# Patient Record
Sex: FEMALE | Race: WHITE | NOT HISPANIC OR LATINO | Employment: OTHER | URBAN - METROPOLITAN AREA
[De-identification: names, ages, dates, MRNs, and addresses within clinical notes are randomized per-mention and may not be internally consistent; named-entity substitution may affect disease eponyms.]

---

## 2017-03-29 ENCOUNTER — TELEPHONE (OUTPATIENT)
Dept: SURGERY | Facility: CLINIC | Age: 62
End: 2017-03-29

## 2017-03-29 NOTE — TELEPHONE ENCOUNTER
Informed pt of new pat appt 4-14-17 9:00 arrival for 9:30 appt/Prescreened for biopsy. Called for [employer] referral.

## 2017-03-31 ENCOUNTER — TELEPHONE (OUTPATIENT)
Dept: SURGERY | Facility: CLINIC | Age: 62
End: 2017-03-31

## 2017-03-31 NOTE — TELEPHONE ENCOUNTER
Pre-screened for possible biopsy, patient does not take ASA.   Takes Ibuprofen prn for pain. She will avoid this x 1 wk prior to appt here.     Yue

## 2017-04-04 ENCOUNTER — TELEPHONE (OUTPATIENT)
Dept: SURGERY | Facility: CLINIC | Age: 62
End: 2017-04-04

## 2017-04-04 DIAGNOSIS — R92.8 ABNORMAL MAMMOGRAM: Primary | ICD-10-CM

## 2017-04-10 ENCOUNTER — TELEPHONE (OUTPATIENT)
Dept: SURGERY | Facility: CLINIC | Age: 62
End: 2017-04-10

## 2017-04-10 NOTE — TELEPHONE ENCOUNTER
Pt rescheduled her U/S at [facility] for today 4-10-17 @ 2:30  I informed patient she would need to bring the U/s images with her for her appt  With Dr CASTILLO on 4-14-17. She voiced she would.    ric

## 2017-04-14 ENCOUNTER — TELEPHONE (OUTPATIENT)
Dept: SURGERY | Facility: CLINIC | Age: 62
End: 2017-04-14

## 2017-04-14 ENCOUNTER — OFFICE VISIT (OUTPATIENT)
Dept: SURGERY | Facility: CLINIC | Age: 62
End: 2017-04-14

## 2017-04-14 VITALS
DIASTOLIC BLOOD PRESSURE: 74 MMHG | SYSTOLIC BLOOD PRESSURE: 115 MMHG | OXYGEN SATURATION: 95 % | WEIGHT: 230.8 LBS | HEIGHT: 59 IN | HEART RATE: 86 BPM | BODY MASS INDEX: 46.53 KG/M2 | TEMPERATURE: 98.1 F

## 2017-04-14 DIAGNOSIS — R59.9 ENLARGED LYMPH NODES: ICD-10-CM

## 2017-04-14 DIAGNOSIS — N63.0 LUMP OR MASS IN BREAST: Primary | ICD-10-CM

## 2017-04-14 DIAGNOSIS — R92.8 ABNORMAL MAMMOGRAM: ICD-10-CM

## 2017-04-14 DIAGNOSIS — Z80.3 FH: BREAST CANCER: ICD-10-CM

## 2017-04-14 DIAGNOSIS — R92.8 ABNORMAL ULTRASOUND OF BREAST: ICD-10-CM

## 2017-04-14 PROCEDURE — 10022 PR FINE NEEDLE ASP;W/IMAGING GUIDANCE: CPT | Performed by: SURGERY

## 2017-04-14 PROCEDURE — 88342 IMHCHEM/IMCYTCHM 1ST ANTB: CPT | Performed by: SURGERY

## 2017-04-14 PROCEDURE — 76942 ECHO GUIDE FOR BIOPSY: CPT | Performed by: SURGERY

## 2017-04-14 PROCEDURE — 19084 BX BREAST ADD LESION US IMAG: CPT | Performed by: SURGERY

## 2017-04-14 PROCEDURE — 88305 TISSUE EXAM BY PATHOLOGIST: CPT | Performed by: SURGERY

## 2017-04-14 PROCEDURE — 99204 OFFICE O/P NEW MOD 45 MIN: CPT | Performed by: SURGERY

## 2017-04-14 PROCEDURE — 19083 BX BREAST 1ST LESION US IMAG: CPT | Performed by: SURGERY

## 2017-04-14 NOTE — TELEPHONE ENCOUNTER
Pt informed of all appts:NPO 4 hrs prior to bone scan/liquids ok  4-21-17 [facility] 9:00 Injection: Bone scan at 12:00  CT Scan 12:30 arrival for contrast:Appt at 1:30  MRI 2:15 arrival  Dr. Garcias 4-27-17 10:45 arrival  santiago

## 2017-04-14 NOTE — PROGRESS NOTES
Chief Complaint: Josseline Vargas is a 61 y.o. female who was seen in consultation at the request of William Hankins, *  for abnormal breast imaging    History of Present Illness:  Patient presents with breast mass and abnormal breast imaging.  She noticed in March a mass in the medial breast.  She denies any skin changes associated with this or nipple discharge or other masses.      She noted no other new masses, skin changes, nipple discharge, nipple changes prior to her most recent imaging.  Her most recent imaging includes the following: September 9, 2015 at/day.  Left diagnostic mammogram showed calcifications pleomorphic in the 9:00 medial left breast anteriorly.  These were felt to be new.  BI-RADS 4.  She then underwent October 20, 2015 a left stereotactic biopsy that returned as atypical duct hyperplasia with intraluminal calcifications.  She had no procedure performed after the stereotactic biopsy and has not had a mammogram between October 15 and her most recent imaging.    Her most recent imaging includes a bilateral screening mammogram at/day that showed more dense fibroglandular tissue versus priors.  Increased skin thickening periareolar.  Scattered calcifications on the left of increased.  BI-RADS 0.  She then had a left diagnostic mammogram and left breast ultrasound that showed an increase in the asymmetric density lateral and medial breast in retroareolar with new calcifications and skin thickening.    Left breast ultrasound showed at 1:00 a 3 cm mass with satellite densities and at 9:00 a 2 cm mass.  BI-RADS 4.    She has had the single left breast biopsy in 2015 no others previously.    She has her uterus and ovaries, is postmenopausal, and takes nor hormones.  Her family history includes the following:   She has no daughters, has 2 sisters, one maternal aunt, no paternal aunts.  Her mother had breast cancer age 50.  Her maternal great aunt had breast cancer unknown age.  No other breast or  ovarian cancer in her family.    She is here for evaluation.    Review of Systems:  Review of Systems   HENT:   Positive for mouth sores and tinnitus.    Cardiovascular: Positive for leg swelling.   Genitourinary: Positive for frequency.    Musculoskeletal: Positive for gait problem and myalgias.   Neurological: Positive for dizziness, extremity weakness and gait problem.   Hematological: Bruises/bleeds easily.   Psychiatric/Behavioral: Positive for confusion and depression. The patient is nervous/anxious.    All other systems reviewed and are negative.       Past Medical and Surgical History:  Breast Biopsy History:  Patient has had the following breast biopsies:10/2015 left breast biopsy, benign   Breast Cancer HIstory:  Patient does not have a past medical history of breast cancer.  Breast Operations, and year:  None   Obstetric/Gynecologic History:  Age menstrual periods began:9 yrs old   Patient is postmenopausal, entered menopause naturally at age: [age]   Number of pregnancies:1  Number of live births: 1 (son passed away, suicide 4 yrs ago)  Number of abortions or miscarriages: 0  Age of delivery of first child: 18  Patient did not breast feed.  Length of time taking birth control pills:none   Patient has never taken hormone replacement  Patient still has uterus and ovaries.     Past Surgical History:   Procedure Laterality Date   • APPENDECTOMY     • CATARACT EXTRACTION     • EAR TUBES     • KNEE SURGERY     • KNEE SURGERY Right    • OVARIAN CYST REMOVAL     • TONSILLECTOMY     • TUBAL ABDOMINAL LIGATION         Past Medical History:   Diagnosis Date   • Bell's palsy    • Depressed    • Diabetes    • H/O meningitis 1987   • H/O: stroke    • High cholesterol    • HTN (hypertension)    • Migraine headache    • Polio    • Seizures        Prior Hospitalizations, other than for surgery or childbirth, and year:  Meningitis 2/1987, Coma following this for 6 days.   Right leg swelling 2016  [facility] in 2012  "4-5 days    Social History     Social History   • Marital status:      Spouse name: N/A   • Number of children: N/A   • Years of education: N/A     Occupational History   • Not on file.     Social History Main Topics   • Smoking status: Former Smoker     Quit date: 1972   • Smokeless tobacco: Not on file   • Alcohol use Yes      Comment: SOCIAL   • Drug use: No   • Sexual activity: Not on file     Other Topics Concern   • Not on file     Social History Narrative     Patient is .  Patient is on disability.  Patient drinks 2 servings of caffeine per day.    Family History:  Family History   Problem Relation Age of Onset   • Asthma Mother    • Diabetes Mother    • Hearing loss Mother    • Heart attack Mother    • Heart failure Mother    • Breast cancer Mother 50   • Alcohol abuse Father    • Diabetes Sister    • Diabetes Brother    • Brain cancer Brother    • Cancer Brother      bladder   • Cancer Paternal Aunt    • Lung cancer Paternal Uncle    • Breast cancer Maternal Grandmother        Vital Signs:  /74 (BP Location: Right arm, Patient Position: Sitting, Cuff Size: Large Adult)  Pulse 86  Temp 98.1 °F (36.7 °C) (Temporal Artery )   Ht 59\" (149.9 cm)  Wt 230 lb 12.8 oz (105 kg)  SpO2 95%  BMI 46.62 kg/m2     Medications:    Current Outpatient Prescriptions:   •  cetirizine (ZyrTEC) 10 MG tablet, , Disp: , Rfl:   •  Cholecalciferol (VITAMIN D3) 92099 UNITS capsule, Take  by mouth., Disp: , Rfl:   •  citalopram (CELEXA) 40 MG tablet, Take  by mouth daily., Disp: , Rfl:   •  fluconazole (DIFLUCAN) 150 MG tablet, , Disp: , Rfl:   •  folic acid (FOLVITE) 1 MG tablet, Take  by mouth daily., Disp: , Rfl:   •  gabapentin (NEURONTIN) 300 MG capsule, , Disp: , Rfl:   •  ibuprofen (ADVIL,MOTRIN) 800 MG tablet, , Disp: , Rfl:   •  KLOR-CON 10 MEQ CR tablet, , Disp: , Rfl:   •  lisinopril-hydrochlorothiazide (PRINZIDE,ZESTORETIC) 10-12.5 MG per tablet, , Disp: , Rfl:   •  metFORMIN (GLUCOPHAGE) 500 MG " "tablet, , Disp: , Rfl:   •  nortriptyline (PAMELOR) 25 MG capsule, Take 1 capsule by mouth every night., Disp: 30 capsule, Rfl: 11  •  phenytoin (DILANTIN) 100 MG ER capsule, Take 2 capsules by mouth 2 (two) times a day., Disp: 120 capsule, Rfl: 11  •  topiramate (TOPAMAX) 25 MG tablet, Take 1 tablet by mouth 2 (two) times a day., Disp: 60 tablet, Rfl: 11  •  topiramate (TOPAMAX) 50 MG tablet, Take 1 tablet by mouth 2 (two) times a day., Disp: 60 tablet, Rfl: 11  •  VENTOLIN  (90 BASE) MCG/ACT inhaler, , Disp: , Rfl:   •  butalbital-acetaminophen-caffeine (ORBIVAN) -40 MG capsule capsule, TAKE ONE CAPSULE BY MOUTH TWICE DAILY AS NEEDED FOR HEADACHE, Disp: 30 capsule, Rfl: 0  •  butalbital-acetaminophen-caffeine (ORBIVAN) -40 MG capsule capsule, Take  by mouth., Disp: , Rfl:   •  doxycycline (VIBRAMYCIN) 100 MG injection, Take  by mouth 2 (two) times a day., Disp: , Rfl:   •  famotidine (PEPCID) 20 MG tablet, Take  by mouth daily., Disp: , Rfl:   •  LORazepam (ATIVAN) 1 MG tablet, Take  by mouth., Disp: , Rfl:   •  prazosin (MINIPRESS) 2 MG capsule, Take  by mouth., Disp: , Rfl:   •  traMADol (ULTRAM) 50 MG tablet, , Disp: , Rfl:      Allergies:  Allergies   Allergen Reactions   • Diclofenac    • Penicillins        Physical Examination:  /74 (BP Location: Right arm, Patient Position: Sitting, Cuff Size: Large Adult)  Pulse 86  Temp 98.1 °F (36.7 °C) (Temporal Artery )   Ht 59\" (149.9 cm)  Wt 230 lb 12.8 oz (105 kg)  SpO2 95%  BMI 46.62 kg/m2  General Appearance:  Patient is in no distress.  She is well kept and has an morbidly obese build.   Psychiatric:  Patient with appropriate mood and affect. Alert and oriented to self, time, and place.    Breast, RIGHT:  medium sized, symmetric with the contralateral side.  Breast skin is without erythema, edema, rashes.  There are no visible abnormalities upon inspection during the arm-raising maneuver or with hands on hips in the sitting " position.  There are bilateral symmetric chronically inverted nipples.  There is no nipple retraction, discharge or nipple/areolar skin changes.There are no masses palpable in the sitting or supine positions.    Breast, LEFT:  medium sized, symmetric with the contralateral side.  Breast skin is without erythema, edema, rashes.  There are no visible abnormalities upon inspection during the arm-raising maneuver or with hands on hips in the sitting position. There is no nipple retraction, discharge or nipple/areolar skin changes.There are bilateral symmetric chronically inverted nipples. There is a palpable mass at the left breast 2:00, 10 cm from the nipple location that measures 6 x 4 cm.  There is no overlying skin involvement.  There is a second mass palpable at the left breast 10:00, 9.5 cm from the nipple location that measures 5.5 x 4 cm.  There is no skin involvement or tethering.  There are some edematous changes to the breast skin centrally due to the size of these masses.  It is not consistent with inflammatory breast cancer.    Lymphatic:  There is no axillary, cervical, infraclavicular, or supraclavicular adenopathy bilaterally. Noticeable axillary fat pads with no palpable nodes. There are bilateral skin hyperpigmentation changes due to the skin rubbing chronically.  Eyes:  Pupils are round and reactive to light.  Cardiovascular:  Heart rate and rhythm are regular.  Respiratory:  Lungs are clear bilaterally with no crackles or wheezes in any lung field.  Gastrointestinal:  Abdomen is soft, nondistended, and nontender. Obese abdomen with rectus diastasis.    Musculoskeletal:  Good strength in all 4 extremities.   There is good range of motion in both shoulders.    Skin:  No new skin lesions or rashes on the skin excluding the breast (see breast exam above).        Imagin/01/15 [facility]  BILAT DIGITAL SCREENING MAMMO  ALFREDO PIKE  Stable benign calcifications bilaterally new small group of 3 or 4  microcalcifications anterior left breast middle one third, 9:00 position.  BI RADS 0    09/09/15 [facility]  DIAGNOSTIC LEFT MAMMOGRAM ALFREDO PIKE MD  Fairly tight cluster of slightly ovoid and mildly pleomorphic calculi medial left breast, 9:00 position anteriorly. Felt to be new. Indeterminate appearance.  BI-RADS: Category 4    3/14/17 [facility]  BILATERAL SCREENING MAMMO ALFREDO PIKE  Fibroglandular tissue on the left has become significantly more dense as the previous exams. There is also felt to be significant skin thickening in the periareolar area on the left.  There are scattered microcalcifications throughout the fibroglandular tissue on the left. They have increased since the previous exams and are considered indeterminate.  BI-RADS CATEGORY 0    3/29/17 [facility]      DIAGNOSTIC MAMMO/LEFT BREAST ULTRASOUND     ALFREDO PIKE  Increased asymmetric densities are noted in both the lateral and medial aspect of the breast and to a lesser degree of the subareolar tissue. There are new microcalcifications associated with dense tissue in both the medial and lateral locations.  LEFT BREAST ULTRASOUND: 1:00 location, there is an irregular hypoechoic mass with poorly delineated margins. 3 cm in diameter. There appear to be small satellite hypoechoic densities. A mass of similar echogenicity that is approximately 2 cm in diameter is noted at the 9:00 location.  BI-RADS CATEGORY 4    4/10/17        [facility]                ULTRASOUND LEFT AXILLA         ALFREDO PIKE   A single lymph node is identified measuring 2.5 cm along the long axis and 1.5 cm on the short axis. The cortex is thicker toward the interior aspect of the lymph node; however, there is no lobulation of the cortex. Findings are indeterminate. Fine needle aspiration specifically of the inferior pole of the lymph node could be performed.   BIRADS Category 4    Pathology:  10/20/15 [facility]  LEFT BREAST STEREOTACTIC BIOPSY GLENDY  ALFREDO  9:00 left breast. Multiple samples. Biopsy clip marker was deployed. A post procedure routine views demonstrate the biopsy clip marker to be in the location of the previously seen calcifications and the calcifications are no longer present.    10/21/15 [facility]  ALFREDO PIKE   Atypical ductal hyperplasia and intraluminal calcifications.      Procedures:  Percutaneous ultrasound-guided vacuum-assisted core breast biopsy X 2  Indication:  ultrasound-visible breast mass x 2 separate sites suspicious for breast cancer  Location:  1- LEFT 2:00 10 CFN  2- LEFT 10:00 9.5 CFN  Consent:  The risks, benefits, and alternatives to the procedure were discussed with the patient, who understood and wished to proceed.  The risks described included, but were not limited to, bleeding, infection, pneumothorax, and inadequate sampling requiring either repeat percutaneous or open excisional biopsy.  Description of Procedure:   After the patient was positioned supine on the procedure table, I located each  lesion using ultrasound. I performed the same procedure on each separate mass, each located in a different quadrant.  I prepped and draped the breast skin in sterile fashion at each site.  I anesthetized the breast skin at the site of anticipated mammotomy at each separate site with 1% lidocaine with epinephrine.  I then anesthetized the underlying subcutaneous tissue and breast parenchyma surrounding each lesion with 1% lidocaine with epinephrine under ultrasound visualization and guidance. I then made a nicking incision with an 11blade and inserted the 14 G celero biopsy device from inferolateral to superomedial through each lesion under ultrasound guidance.   I then took 4 core samples  of the 2:00 lesion  And 2 core samples of the 10:00 lesion under direct visualization with ultrasound.  I withdrew the probe and placed a hydromark marker into each biopsy site.  We held manual compression for 10  minutes, placed steri -strips at the mammotomy site, and wrapped the patient in a 6 inch super ace wrap and a cold pack.  Marker placed: hydromark at each of the 2 sites  Tolerance: The patient tolerated the procedure well.  Disposition: We will see her back within a week to review her pathology.    Ultrasound-guided fine-needle aspiration of axillary lymph node:  Indication:  enlarged axillary lymph node on ultrasound with clinical diagnosis of cancer and abnormal imaging and exam  Location: LEFT axilla  Consent:  The risks, benefits, and alternatives to the procedure were discussed with the patient, who understood and wished to proceed.  The risks described included, but were not limited to, bleeding, infection, pneumothorax, injury to the axillary vein.  Description of Procedure:   After the patient was positioned supine on the procedure table with her arm raised over her head, I located the abnormal appearing/enlarged lymph node using ultrasound.  I prepped and draped the axillary skin in sterile fashion.  I anesthetized the axillary skin with 1% lidocaine with epinephrine.  I then anesthetized the deep tissues with 1% lidocaine with epinephrine under ultrasound visualization and guidance. I then inserted a 21 G needle (attached to a 5cc syringe) into the node under ultrasound guidance and made 15-20 passes into the node to perform an adequate aspiration of cells.   The sample was placed on 4 slides and sent in alcohol for cytologic evaluation.  Manual compression was held for 5 minutes and a bandaid applied.  Marker placed: hydromark  Tolerance: The patient tolerated the procedure well.  Disposition: We will see her back within one week to discuss her pathology.    Assessment:   Diagnosis Plan   1. Lump or mass in breast  CT Abdomen Pelvis With Contrast    CT Chest With Contrast    MRI Breast Bilateral With & Without Contrast    NM Bone Scan Whole Body   2. Abnormal mammogram  US Axilla Left    CT Abdomen  Pelvis With Contrast    CT Chest With Contrast    MRI Breast Bilateral With & Without Contrast    NM Bone Scan Whole Body   3. Abnormal ultrasound of breast  CT Abdomen Pelvis With Contrast    CT Chest With Contrast    MRI Breast Bilateral With & Without Contrast    NM Bone Scan Whole Body   4. Enlarged lymph nodes  CT Abdomen Pelvis With Contrast    CT Chest With Contrast    MRI Breast Bilateral With & Without Contrast    NM Bone Scan Whole Body   5. FH: breast cancer  CT Abdomen Pelvis With Contrast    CT Chest With Contrast    MRI Breast Bilateral With & Without Contrast    NM Bone Scan Whole Body   1-3  2 breast masses;  LEFT 10:00 9.5 CFN- 5.5 cm on exam; 2 cm on ultrasound but underrepresented- BR4      LEFT 2:00 10 CFN 6 cm no exam, 3 cm on ultrasound but underrepresented- Br5      4-  LEFT equivocal axillary node 2.5 cm enlarged without definitavely abnormal morphology- BR4    5-  Mother age 50  MGA age uncertain        Plan:  Josseline and I reviewed her interval history, her imaging reports, her bedside imaging, her past medical history and family history.    We discussed that she clinically has a situation that is consistent with left breast cancer.  We discussed that we are not sure if there is anh involvement at this time.  We discussed the need to obtain histology to determine further treatment.    We discussed the option of core biopsy of the 2 separate masses indistinct quadrants as well as a fine-needle aspiration of the left axillary lymph node and she was interested in doing these today.  She tolerated the procedure well and I left markers in all 3 locations.    I told her that I do believe this will be breast cancer in that I will arrange a CT chest abdomen pelvis as well as bone scan and MRI of the breast once we have tissue verification.  She would be a candidate for neoadjuvant chemotherapy upfront my opinion.  In addition with her family history of breast cancer if this returns as malignant we  will have her see genetics.      I will await to discuss the genetics component until her next visit.    She needs to have her imaging after Tuesday and see me the following week pls.    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  Note- Depression with prior admission to [facility].  in 2012 and lost son to suicide 2013.  Has smoked tobacco for 45 years 1.5 ppd    Yuli Garcias MD        We have spent 50 minutes in visit today, 26 in face to face .      Next Appointment:  Return for week afer next, after her imaging next week at [facility].      EMR Dragon/transcription disclaimer:    Much of this encounter note is an electronic transcription/translocation of spoken language to printed text.  The electronic translation of spoken language may permit erroneous, or at times, nonsensical words or phrases to be inadvertently transcribed.  Although I have reviewed the note from such areas, some may still exist.

## 2017-04-17 ENCOUNTER — TELEPHONE (OUTPATIENT)
Dept: SURGERY | Facility: CLINIC | Age: 62
End: 2017-04-17

## 2017-04-17 NOTE — TELEPHONE ENCOUNTER
Dr. Guzman with [facility] called to let us know melignant cells in sample from FNA, report should be out soon.     lml

## 2017-04-18 ENCOUNTER — TELEPHONE (OUTPATIENT)
Dept: SURGERY | Facility: CLINIC | Age: 62
End: 2017-04-18

## 2017-04-18 NOTE — TELEPHONE ENCOUNTER
I let patient know that her biopsies showed breast cancer as we discussed in clinic, Dr. Garcias will be seeing her back in office after scans are completed as we planned and discussed.     isabel

## 2017-04-19 ENCOUNTER — TELEPHONE (OUTPATIENT)
Dept: SURGERY | Facility: CLINIC | Age: 62
End: 2017-04-19
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
## 2017-04-19 NOTE — TELEPHONE ENCOUNTER
Silvadene cream 1%  Sig-apply BID to affected area, 1 refill.   Called into [facility] [phone]. Medication will be delivered to patient.     lml

## 2017-04-21 ENCOUNTER — HOSPITAL ENCOUNTER (OUTPATIENT)
Dept: CT IMAGING | Facility: HOSPITAL | Age: 62
Discharge: HOME | End: 2017-04-21
Attending: SURGERY | Admitting: SURGERY

## 2017-04-21 ENCOUNTER — HOSPITAL ENCOUNTER (OUTPATIENT)
Dept: MRI IMAGING | Facility: HOSPITAL | Age: 62
Discharge: HOME | End: 2017-04-21
Attending: SURGERY

## 2017-04-21 ENCOUNTER — HOSPITAL ENCOUNTER (OUTPATIENT)
Dept: NUCLEAR MEDICINE | Facility: HOSPITAL | Age: 62
Discharge: HOME | End: 2017-04-21
Attending: SURGERY

## 2017-04-21 ENCOUNTER — TELEPHONE (OUTPATIENT)
Dept: SURGERY | Facility: CLINIC | Age: 62
End: 2017-04-21

## 2017-04-21 DIAGNOSIS — R92.8 ABNORMAL MAMMOGRAM: ICD-10-CM

## 2017-04-21 DIAGNOSIS — Z80.3 FH: BREAST CANCER: ICD-10-CM

## 2017-04-21 DIAGNOSIS — N63.0 LUMP OR MASS IN BREAST: ICD-10-CM

## 2017-04-21 DIAGNOSIS — R92.8 ABNORMAL ULTRASOUND OF BREAST: ICD-10-CM

## 2017-04-21 DIAGNOSIS — R59.9 ENLARGED LYMPH NODES: ICD-10-CM

## 2017-04-21 LAB
CREAT BLDA-MCNC: 0.9 MG/DL (ref 0.6–1.3)
CYTO UR: NORMAL
LAB AP CASE REPORT: NORMAL
LAB AP CLINICAL INFORMATION: NORMAL
Lab: NORMAL
Lab: NORMAL
PATH REPORT.ADDENDUM SPEC: NORMAL
PATH REPORT.FINAL DX SPEC: NORMAL
PATH REPORT.GROSS SPEC: NORMAL

## 2017-04-21 PROCEDURE — A9577 INJ MULTIHANCE: HCPCS | Performed by: SURGERY

## 2017-04-21 PROCEDURE — 78306 BONE IMAGING WHOLE BODY: CPT

## 2017-04-21 PROCEDURE — 0 GADOBENATE DIMEGLUMINE 529 MG/ML SOLUTION: Performed by: SURGERY

## 2017-04-21 PROCEDURE — 0 TECHNETIUM MEDRONATE KIT: Performed by: SURGERY

## 2017-04-21 PROCEDURE — 71260 CT THORAX DX C+: CPT

## 2017-04-21 PROCEDURE — A9503 TC99M MEDRONATE: HCPCS | Performed by: SURGERY

## 2017-04-21 PROCEDURE — 0159T HC MRI BREAST COMPUTER ANALYSIS: CPT

## 2017-04-21 PROCEDURE — 74177 CT ABD & PELVIS W/CONTRAST: CPT

## 2017-04-21 PROCEDURE — 0 IOPAMIDOL 61 % SOLUTION: Performed by: SURGERY

## 2017-04-21 PROCEDURE — C8908 MRI W/O FOL W/CONT, BREAST,: HCPCS

## 2017-04-21 PROCEDURE — 82565 ASSAY OF CREATININE: CPT

## 2017-04-21 RX ORDER — TC 99M MEDRONATE 20 MG/10ML
22.3 INJECTION, POWDER, LYOPHILIZED, FOR SOLUTION INTRAVENOUS
Status: COMPLETED | OUTPATIENT
Start: 2017-04-21 | End: 2017-04-21

## 2017-04-21 RX ADMIN — IOPAMIDOL 85 ML: 612 INJECTION, SOLUTION INTRAVENOUS at 10:49

## 2017-04-21 RX ADMIN — GADOBENATE DIMEGLUMINE 20 ML: 529 INJECTION, SOLUTION INTRAVENOUS at 14:16

## 2017-04-21 RX ADMIN — Medication 22.3 MILLICURIE: at 10:30

## 2017-04-21 NOTE — TELEPHONE ENCOUNTER
Receptors returned as   2:00 10 CFN- ER 90 WV 90, her 2 IHC 2+, ration 2.1, positive. Copy number was 5.1.  10:00 9.5 CFN- ER 90 WV 90 her 2 tacos 2+, ration 2.0, copy number 4.6, positive.

## 2017-04-24 ENCOUNTER — TELEPHONE (OUTPATIENT)
Dept: SURGERY | Facility: CLINIC | Age: 62
End: 2017-04-24

## 2017-04-24 NOTE — TELEPHONE ENCOUNTER
[facility] Bone scan 4-24-17 returned as    FINDINGS:  1. Right parietal-occipital bone lesion with intense abnormal activity,  suspicious for metastasis. CT-guided biopsy may be appropriate.  2. Abnormal effectively laterally at the left knee likely degenerative.  3. Otherwise normal whole body nuclear medicine bone scan (for age and  body habitus).      4-21-17- Bilateral breast MRI [facility]  Posterior one third medial left breast at 9:00 there is an area of irregular enhancement measuring 5.7 x 2.7 x 2.5 cm.  Clip is in this region.    -Additionally in the posterior one third lateral left breast area of irregular enhancement measures 4.7 x 5.7 x 3.1 cm.  Centered at 2:30.    -Left nipple retraction, no chest wall enhancement.  -Metallic clip within an abnormal appearing left axillary lymph node.  -No areas of abnormal enhancement on the right side.    We will ask if the marker can be seen, as the lateral mass has also been biopsied and a hydromark left in place.      Ct chest,abd, pelvis [facility] 4-21-17 showed multiple left axillary nodes the largest measuring 1.6 cm.  A few nodes at the lateral margin between pectoralis muscle bellies.  No lymphadenopathy within the chest a few tiny 3-4 mm pulmonary nodules are seen.  The liver is cirrhotic with a diffusely nodular surface there are gastroesophageal junction varices.  Recommend follow-up CT in 4 months for the pulmonary nodules.

## 2017-04-27 ENCOUNTER — OFFICE VISIT (OUTPATIENT)
Dept: SURGERY | Facility: CLINIC | Age: 62
End: 2017-04-27

## 2017-04-27 ENCOUNTER — DOCUMENTATION (OUTPATIENT)
Dept: PSYCHIATRY | Facility: HOSPITAL | Age: 62
End: 2017-04-27

## 2017-04-27 VITALS
HEIGHT: 59 IN | HEART RATE: 101 BPM | TEMPERATURE: 97.4 F | DIASTOLIC BLOOD PRESSURE: 80 MMHG | WEIGHT: 234.2 LBS | BODY MASS INDEX: 47.21 KG/M2 | OXYGEN SATURATION: 99 % | SYSTOLIC BLOOD PRESSURE: 139 MMHG

## 2017-04-27 DIAGNOSIS — R93.89 ABNORMAL CT SCAN, CHEST: ICD-10-CM

## 2017-04-27 DIAGNOSIS — C77.3 SECONDARY MALIGNANT NEOPLASM OF AXILLARY LYMPH NODES (HCC): ICD-10-CM

## 2017-04-27 DIAGNOSIS — K74.60 CIRRHOSIS OF LIVER WITHOUT ASCITES, UNSPECIFIED HEPATIC CIRRHOSIS TYPE (HCC): ICD-10-CM

## 2017-04-27 DIAGNOSIS — C50.212 MALIGNANT NEOPLASM OF UPPER-INNER QUADRANT OF LEFT FEMALE BREAST (HCC): Primary | ICD-10-CM

## 2017-04-27 DIAGNOSIS — S21.002A BREAST WOUND, LEFT, INITIAL ENCOUNTER: ICD-10-CM

## 2017-04-27 DIAGNOSIS — R94.8 ABNORMAL RADIONUCLIDE BONE SCAN: ICD-10-CM

## 2017-04-27 DIAGNOSIS — C50.412 MALIGNANT NEOPLASM OF UPPER-OUTER QUADRANT OF LEFT FEMALE BREAST (HCC): ICD-10-CM

## 2017-04-27 DIAGNOSIS — Z80.3 FH: BREAST CANCER: ICD-10-CM

## 2017-04-27 PROCEDURE — 99214 OFFICE O/P EST MOD 30 MIN: CPT | Performed by: SURGERY

## 2017-04-27 NOTE — PROGRESS NOTES
Chief Complaint: Josselnie Vargas is a 61 y.o. female who was seen in consultation at the request of William Hankins, *  for abnormal breast imaging    History of Present Illness:  Patient presents with breast mass and abnormal breast imaging.  She noticed in March a mass in the medial breast.  She denies any skin changes associated with this or nipple discharge or other masses.      She noted no other new masses, skin changes, nipple discharge, nipple changes prior to her most recent imaging.  Her most recent imaging includes the following: September 9, 2015 at/day.  Left diagnostic mammogram showed calcifications pleomorphic in the 9:00 medial left breast anteriorly.  These were felt to be new.  BI-RADS 4.  She then underwent October 20, 2015 a left stereotactic biopsy that returned as atypical duct hyperplasia with intraluminal calcifications.  She had no procedure performed after the stereotactic biopsy and has not had a mammogram between October 15 and her most recent imaging.    Her most recent imaging includes a bilateral screening mammogram at/day that showed more dense fibroglandular tissue versus priors.  Increased skin thickening periareolar.  Scattered calcifications on the left of increased.  BI-RADS 0.  She then had a left diagnostic mammogram and left breast ultrasound that showed an increase in the asymmetric density lateral and medial breast in retroareolar with new calcifications and skin thickening.    Left breast ultrasound showed at 1:00 a 3 cm mass with satellite densities and at 9:00 a 2 cm mass.  BI-RADS 4.    She has had the single left breast biopsy in 2015 no others previously.    She has her uterus and ovaries, is postmenopausal, and takes nor hormones.  Her family history includes the following:   She has no daughters, has 2 sisters, one maternal aunt, no paternal aunts.  Her mother had breast cancer age 50.  Her maternal great aunt had breast cancer unknown age.  No other breast or  ovarian cancer in her family.    She is here for evaluation.    Interval History:  Pathology from in office procedures 4-14-17   c  I then arranged for a bone scan and CT CAP:   [facility] Bone scan 4-24-17 returned as  FINDINGS:  1. Right parietal-occipital bone lesion with intense abnormal activity,  suspicious for metastasis. CT-guided biopsy may be appropriate.  2. Abnormal effectively laterally at the left knee likely degenerative.  3. Otherwise normal whole body nuclear medicine bone scan (for age and  body habitus).      4-21-17- Bilateral breast MRI [facility] Posterior one third medial left breast at 9:00 there is an area of irregular enhancement measuring 5.7 x 2.7 x 2.5 cm. Clip is in this region.   -Additionally in the posterior one third lateral left breast area of irregular enhancement measures 4.7 x 5.7 x 3.1 cm. Centered at 2:30.   -Left nipple retraction, no chest wall enhancement.  -Metallic clip within an abnormal appearing left axillary lymph node.  -No areas of abnormal enhancement on the right side.     Dr Fish confimred that the second marker, UOQ cannot be clearly seen on MRI.      Ct chest,abd, pelvis [facility] 4-21-17 showed multiple left axillary nodes the largest measuring 1.6 cm. A few nodes at the lateral margin between pectoralis muscle bellies. No lymphadenopathy within the chest a few tiny 3-4 mm pulmonary nodules are seen. The liver is cirrhotic with a diffusely nodular surface there are gastroesophageal junction varices.  Recommend follow-up CT in 4 months for the pulmonary nodules.     Patient then called with oozing from the breast. I looked at her breast in the office,  LEFT, superior and inferior to the IMF in a mirror distribution, she has a burn type injury to the skin. It is not spatially related to the biopsy sites.   There are no changes on the RIGHT wrap, which I would expect if it were to be from her ACE bandage.  I asked if she has had any burns and she states  no.     I wrote her for silvadene and asked her to apply this BID.     SHe comes in to discuss all of the above today.      Review of Systems:  Review of Systems   Constitutional: Positive for unexpected weight change (4.2 LB WT GAIN).   HENT:   Positive for mouth sores and tinnitus.    Cardiovascular: Positive for leg swelling.   Genitourinary: Positive for frequency.    Musculoskeletal: Positive for gait problem and myalgias.   Neurological: Positive for dizziness, extremity weakness and gait problem.   Hematological: Bruises/bleeds easily.   Psychiatric/Behavioral: Positive for confusion and depression. The patient is nervous/anxious.    All other systems reviewed and are negative.       Past Medical and Surgical History:  Breast Biopsy History:  Patient has had the following breast biopsies:10/2015 left breast biopsy, benign   Breast Cancer HIstory:  Patient does not have a past medical history of breast cancer.  Breast Operations, and year:  None   Obstetric/Gynecologic History:  Age menstrual periods began:9 yrs old   Patient is postmenopausal, entered menopause naturally at age: 1998   Number of pregnancies:1  Number of live births: 1 (son passed away, suicide 4 yrs ago)  Number of abortions or miscarriages: 0  Age of delivery of first child: 18  Patient did not breast feed.  Length of time taking birth control pills:none   Patient has never taken hormone replacement  Patient still has uterus and ovaries.     Past Surgical History:   Procedure Laterality Date   • APPENDECTOMY     • CATARACT EXTRACTION     • EAR TUBES     • KNEE SURGERY     • KNEE SURGERY Right    • OVARIAN CYST REMOVAL     • TONSILLECTOMY     • TUBAL ABDOMINAL LIGATION         Past Medical History:   Diagnosis Date   • Bell's palsy    • Depressed    • Diabetes    • H/O meningitis 1987   • H/O: stroke    • High cholesterol    • HTN (hypertension)    • Migraine headache    • Polio    • Seizures        Prior Hospitalizations, other than for  "surgery or childbirth, and year:  Meningitis 2/1987, Coma following this for 6 days.   Right leg swelling 2016  [facility] in 2012 4-5 days    Social History     Social History   • Marital status:      Spouse name: N/A   • Number of children: N/A   • Years of education: N/A     Occupational History   • Not on file.     Social History Main Topics   • Smoking status: Former Smoker     Quit date: 1972   • Smokeless tobacco: Not on file   • Alcohol use Yes      Comment: SOCIAL   • Drug use: No   • Sexual activity: Not on file     Other Topics Concern   • Not on file     Social History Narrative     Patient is .  Patient is on disability.  Patient drinks 2 servings of caffeine per day.    Family History:  Family History   Problem Relation Age of Onset   • Asthma Mother    • Diabetes Mother    • Hearing loss Mother    • Heart attack Mother    • Heart failure Mother    • Breast cancer Mother 50   • Alcohol abuse Father    • Diabetes Sister    • Diabetes Brother    • Brain cancer Brother    • Cancer Brother      bladder   • Cancer Paternal Aunt    • Lung cancer Paternal Uncle    • Breast cancer Maternal Grandmother        Vital Signs:  /80 (BP Location: Right arm, Patient Position: Sitting, Cuff Size: Large Adult)  Pulse 101  Temp 97.4 °F (36.3 °C) (Temporal Artery )   Ht 59\" (149.9 cm)  Wt 234 lb 3.2 oz (106 kg)  SpO2 99%  BMI 47.3 kg/m2     Medications:    Current Outpatient Prescriptions:   •  butalbital-acetaminophen-caffeine (ORBIVAN) -40 MG capsule capsule, TAKE ONE CAPSULE BY MOUTH TWICE DAILY AS NEEDED FOR HEADACHE, Disp: 30 capsule, Rfl: 0  •  butalbital-acetaminophen-caffeine (ORBIVAN) -40 MG capsule capsule, Take  by mouth., Disp: , Rfl:   •  cetirizine (ZyrTEC) 10 MG tablet, , Disp: , Rfl:   •  Cholecalciferol (VITAMIN D3) 16640 UNITS capsule, Take  by mouth., Disp: , Rfl:   •  citalopram (CELEXA) 40 MG tablet, Take  by mouth daily., Disp: , Rfl:   •  doxycycline " "(VIBRAMYCIN) 100 MG injection, Take  by mouth 2 (two) times a day., Disp: , Rfl:   •  famotidine (PEPCID) 20 MG tablet, Take  by mouth daily., Disp: , Rfl:   •  fluconazole (DIFLUCAN) 150 MG tablet, , Disp: , Rfl:   •  folic acid (FOLVITE) 1 MG tablet, Take  by mouth daily., Disp: , Rfl:   •  gabapentin (NEURONTIN) 300 MG capsule, , Disp: , Rfl:   •  ibuprofen (ADVIL,MOTRIN) 800 MG tablet, , Disp: , Rfl:   •  KLOR-CON 10 MEQ CR tablet, , Disp: , Rfl:   •  lisinopril-hydrochlorothiazide (PRINZIDE,ZESTORETIC) 10-12.5 MG per tablet, , Disp: , Rfl:   •  LORazepam (ATIVAN) 1 MG tablet, Take  by mouth., Disp: , Rfl:   •  metFORMIN (GLUCOPHAGE) 500 MG tablet, , Disp: , Rfl:   •  nortriptyline (PAMELOR) 25 MG capsule, Take 1 capsule by mouth every night., Disp: 30 capsule, Rfl: 11  •  phenytoin (DILANTIN) 100 MG ER capsule, Take 2 capsules by mouth 2 (two) times a day., Disp: 120 capsule, Rfl: 11  •  prazosin (MINIPRESS) 2 MG capsule, Take  by mouth., Disp: , Rfl:   •  silver sulfadiazine (SILVADENE, SSD) 1 % cream, Apply  topically 2 (Two) Times a Day., Disp: , Rfl:   •  topiramate (TOPAMAX) 25 MG tablet, Take 1 tablet by mouth 2 (two) times a day., Disp: 60 tablet, Rfl: 11  •  topiramate (TOPAMAX) 50 MG tablet, Take 1 tablet by mouth 2 (two) times a day., Disp: 60 tablet, Rfl: 11  •  traMADol (ULTRAM) 50 MG tablet, , Disp: , Rfl:   •  VENTOLIN  (90 BASE) MCG/ACT inhaler, , Disp: , Rfl:      Allergies:  Allergies   Allergen Reactions   • Diclofenac    • Penicillins        Physical Examination:  /80 (BP Location: Right arm, Patient Position: Sitting, Cuff Size: Large Adult)  Pulse 101  Temp 97.4 °F (36.3 °C) (Temporal Artery )   Ht 59\" (149.9 cm)  Wt 234 lb 3.2 oz (106 kg)  SpO2 99%  BMI 47.3 kg/m2  General Appearance:  Patient is in no distress.  She is well kept and has an morbidly obese build.   Psychiatric:  Patient with appropriate mood and affect. Alert and oriented to self, time, and " place.    Breast, RIGHT:  medium sized, symmetric with the contralateral side.  Breast skin is without erythema, edema, rashes.  There are no visible abnormalities upon inspection during the arm-raising maneuver or with hands on hips in the sitting position.  There are bilateral symmetric chronically inverted nipples.  There is no nipple retraction, discharge or nipple/areolar skin changes.There are no masses palpable in the sitting or supine positions.    Breast, LEFT:  medium sized, symmetric with the contralateral side.  Breast skin is without erythema, edema, rashes.  There are no visible abnormalities upon inspection during the arm-raising maneuver or with hands on hips in the sitting position. There is no nipple retraction, discharge or nipple/areolar skin changes.There are bilateral symmetric chronically inverted nipples. There is a palpable mass at the left breast 2:00, 10 cm from the nipple location that measures 6 x 4 cm.  There is no overlying skin involvement.  There is a second mass palpable at the left breast 10:00, 9.5 cm from the nipple location that measures 5.5 x 4 cm.  There is no skin involvement or tethering.  There are some edematous changes to the breast skin centrally due to the size of these masses.  It is not consistent with inflammatory breast cancer. LEFT hyperpigmentation on her upper abdomen symmetric to a wound LEFT LOQ breast that we are treating with silvadene and uncertain of etiology. Happened after her biopsy and we thought it looked like a burn injury ? Ice or heating pad, but patient denies. The wound on the LOQ breast skin is clean today and oval, measuring 5cm x 3 cm.    Lymphatic:  There is no axillary, cervical, infraclavicular, or supraclavicular adenopathy bilaterally. Noticeable axillary fat pads with no palpable nodes. There are bilateral skin hyperpigmentation changes due to the skin rubbing chronically.  Eyes:  Pupils are round and reactive to  light.  Cardiovascular:  Heart rate and rhythm are regular.  Respiratory:  Lungs are clear bilaterally with no crackles or wheezes in any lung field.  Gastrointestinal:  Abdomen is soft, nondistended, and nontender. Obese abdomen with rectus diastasis.    Musculoskeletal:  Good strength in all 4 extremities.   There is good range of motion in both shoulders.    Skin:  No new skin lesions or rashes on the skin excluding the breast (see breast exam above).        Imagin/01/15 [facility]  BILAT DIGITAL SCREENING MAMMO  ALFREDO PIKE  Stable benign calcifications bilaterally new small group of 3 or 4 microcalcifications anterior left breast middle one third, 9:00 position.  BI RADS 0    09/09/15 [facility]  DIAGNOSTIC LEFT MAMMOGRAM ALFREDO PIKE MD  Fairly tight cluster of slightly ovoid and mildly pleomorphic calculi medial left breast, 9:00 position anteriorly. Felt to be new. Indeterminate appearance.  BI-RADS: Category 4    3/14/17 [facility]  BILATERAL SCREENING MAMMO ALFREDO PIKE  Fibroglandular tissue on the left has become significantly more dense as the previous exams. There is also felt to be significant skin thickening in the periareolar area on the left.  There are scattered microcalcifications throughout the fibroglandular tissue on the left. They have increased since the previous exams and are considered indeterminate.  BI-RADS CATEGORY 0    3/29/17 [facility]      DIAGNOSTIC MAMMO/LEFT BREAST ULTRASOUND     ALFREDO PIKE  Increased asymmetric densities are noted in both the lateral and medial aspect of the breast and to a lesser degree of the subareolar tissue. There are new microcalcifications associated with dense tissue in both the medial and lateral locations.  LEFT BREAST ULTRASOUND: 1:00 location, there is an irregular hypoechoic mass with poorly delineated margins. 3 cm in diameter. There appear to be small satellite hypoechoic densities. A mass of similar echogenicity that is  approximately 2 cm in diameter is noted at the 9:00 location.  BI-RADS CATEGORY 4    4/10/17        [facility]                ULTRASOUND LEFT AXILLA         ALFREDO PIKE.   A single lymph node is identified measuring 2.5 cm along the long axis and 1.5 cm on the short axis. The cortex is thicker toward the interior aspect of the lymph node; however, there is no lobulation of the cortex. Findings are indeterminate. Fine needle aspiration specifically of the inferior pole of the lymph node could be performed.   BIRADS Category 4    [facility] Bone scan 4-24-17 returned as   FINDINGS:  1. Right parietal-occipital bone lesion with intense abnormal activity,  suspicious for metastasis. CT-guided biopsy may be appropriate.  2. Abnormal effectively laterally at the left knee likely degenerative.  3. Otherwise normal whole body nuclear medicine bone scan (for age and  body habitus).        4-21-17- Bilateral breast MRI [facility] Posterior one third medial left breast at 9:00 there is an area of irregular enhancement measuring 5.7 x 2.7 x 2.5 cm. Clip is in this region.   -Additionally in the posterior one third lateral left breast area of irregular enhancement measures 4.7 x 5.7 x 3.1 cm. Centered at 2:30.   -Left nipple retraction, no chest wall enhancement.  -Metallic clip within an abnormal appearing left axillary lymph node.  -No areas of abnormal enhancement on the right side.       Ct chest,abd, pelvis [facility] 4-21-17 showed multiple left axillary nodes the largest measuring 1.6 cm. A few nodes at the lateral margin between pectoralis muscle bellies. No lymphadenopathy within the chest a few tiny 3-4 mm pulmonary nodules are seen. The liver is cirrhotic with a diffusely nodular surface there are gastroesophageal junction varices.  Recommend follow-up CT in 4 months for the pulmonary nodules.        Pathology:  10/20/15 [facility]  LEFT BREAST STEREOTACTIC BIOPSY ALFREDO PIKE  9:00 left breast. Multiple samples.  "Biopsy clip marker was deployed. A post procedure routine views demonstrate the biopsy clip marker to be in the location of the previously seen calcifications and the calcifications are no longer present.    10/21/15 [facility] SURGICAL PATHOLOGY  ALFREDO PIKE   Atypical ductal hyperplasia and intraluminal calcifications.    04/14/17         [facility]           PATHOLOGY                 ALFREDO PIKE  Final Diagnosis   1. BREAST, LEFT, DESIGNATED \"10 O'CLOCK, 9.5 CM FROM NIPPLE\", CORE BIOPSY:  INVASIVE DUCTAL CARCINOMA.  PREDICTED ERIC SCORE: TUBULAR SCORE 3, NUCLEAR SCORE 2, MITOTIC SCORE 1;  OVERALL GRADE 2 (SCORE 6 OF 9).   MAXIMUM MEASURED LENGTH OF INVASIVE CARCINOMA IN A SINGLE CORE IS 1.2 CM.      2. BREAST, LEFT, DESIGNATED \"2 O'CLOCK, 10 CM FROM NIPPLE\", CORE BIOPSY:  INVASIVE DUCTAL CARCINOMA WITH FOCAL LOBULAR FEATURES (SEE COMMENT).   PREDICTED ERIC SCORE: TUBULAR SCORE 3, NUCLEAR SCORE 2, MITOTIC SCORE 1;  OVERALL GRADE 2 (SCORE 6 OF 9).   MAXIMUM MEASURED LENGTH OF INVASIVE CARCINOMA IN A SINGLE CORE IS 1.5 CM.      COMMENT: The diagnosis for specimen #2 is supported by E-cadherin immunohistochemistry (see Microscopic Description for immunohistochemical staining details). ER, AL, and HER-2/tacos studies will be performed on both specimens with results to be reported in addenda.         TDJ/hmf IHC/a/FLORI     Receptors returned as   2:00 10 CFN- ER 90 AL 90, her 2 IHC 2+, ration 2.1, positive. Copy number was 5.1.  10:00 9.5 CFN- ER 90 AL 90 her 2 tacos 2+, ration 2.0, copy number 4.6, positive.      Procedures:  Percutaneous ultrasound-guided vacuum-assisted core breast biopsy X 2  Indication:  ultrasound-visible breast mass x 2 separate sites suspicious for breast cancer  Location:  1- LEFT 2:00 10 CFN  2- LEFT 10:00 9.5 CFN  Consent:  The risks, benefits, and alternatives to the procedure were discussed with the patient, who understood and wished to proceed.  The risks described included, but " were not limited to, bleeding, infection, pneumothorax, and inadequate sampling requiring either repeat percutaneous or open excisional biopsy.  Description of Procedure:   After the patient was positioned supine on the procedure table, I located each  lesion using ultrasound. I performed the same procedure on each separate mass, each located in a different quadrant.  I prepped and draped the breast skin in sterile fashion at each site.  I anesthetized the breast skin at the site of anticipated mammotomy at each separate site with 1% lidocaine with epinephrine.  I then anesthetized the underlying subcutaneous tissue and breast parenchyma surrounding each lesion with 1% lidocaine with epinephrine under ultrasound visualization and guidance. I then made a nicking incision with an 11blade and inserted the 14 G celero biopsy device from inferolateral to superomedial through each lesion under ultrasound guidance.   I then took 4 core samples  of the 2:00 lesion  And 2 core samples of the 10:00 lesion under direct visualization with ultrasound.  I withdrew the probe and placed a hydromark marker into each biopsy site.  We held manual compression for 10 minutes, placed steri -strips at the mammotomy site, and wrapped the patient in a 6 inch super ace wrap and a cold pack.  Marker placed: hydromark at each of the 2 sites  Tolerance: The patient tolerated the procedure well.  Disposition: We will see her back within a week to review her pathology.    Ultrasound-guided fine-needle aspiration of axillary lymph node:  Indication:  enlarged axillary lymph node on ultrasound with clinical diagnosis of cancer and abnormal imaging and exam  Location: LEFT axilla  Consent:  The risks, benefits, and alternatives to the procedure were discussed with the patient, who understood and wished to proceed.  The risks described included, but were not limited to, bleeding, infection, pneumothorax, injury to the axillary vein.  Description of  Procedure:   After the patient was positioned supine on the procedure table with her arm raised over her head, I located the abnormal appearing/enlarged lymph node using ultrasound.  I prepped and draped the axillary skin in sterile fashion.  I anesthetized the axillary skin with 1% lidocaine with epinephrine.  I then anesthetized the deep tissues with 1% lidocaine with epinephrine under ultrasound visualization and guidance. I then inserted a 21 G needle (attached to a 5cc syringe) into the node under ultrasound guidance and made 15-20 passes into the node to perform an adequate aspiration of cells.   The sample was placed on 4 slides and sent in alcohol for cytologic evaluation.  Manual compression was held for 5 minutes and a bandaid applied.  Marker placed: hydromark  Tolerance: The patient tolerated the procedure well.  Disposition: We will see her back within one week to discuss her pathology.    Assessment:   Diagnosis Plan   1. Malignant neoplasm of upper-inner quadrant of left female breast  Ambulatory Referral to Hematology / Oncology   2. Malignant neoplasm of upper-outer quadrant of left female breast  Ambulatory Referral to Hematology / Oncology   3. Secondary malignant neoplasm of axillary lymph nodes  Ambulatory Referral to Hematology / Oncology   4. Abnormal radionuclide bone scan  Ambulatory Referral to Hematology / Oncology   5. Abnormal CT scan, chest     6. FH: breast cancer     7. Breast wound, left, initial encounter     8. Cirrhosis of liver without ascites, unspecified hepatic cirrhosis type        1-5  LEFT 10:00 9.5 CFN- 5.5 cm on exam; 2 cm on ultrasound but underrepresented, 5.7 cm on MRI- BR4  Left breast 10:00, invasive ductal carcinoma, intermediate grade, 3, 2, 1, largest length in a core 1.2 cm.  10:00 9.5 CFN- ER 90 RI 90 her 2 tacos 2+, ration 2.0, copy number 4.6, positive.    LEFT 2:00 10 CFN 6 cm no exam, 3 cm on ultrasound but underrepresented, also 5.7 cm on MRI- Br5  Left  breast biopsy 2:00, invasive mammary carcinoma with focal lobular features, intermediate grade, 3, 2, 1, largest size in a core 1.5 cm.  2:00 10 CFN- ER 90 AZ 90, her 2 IHC 2+, ration 2.1, positive. Copy number was 5.1.    LEFT axilla node FNA- positive for metastatic mammary carcinoma    Bone scan showed 4-21-17 RIGHT parietal-occipital bone lesion susp for metastatic disease    Clinical stage T3N1M1- stage 4        5-  CT chest 4-2017 pulmonary nodules 3-4 mm- recommend Fu chest CT 4 months      6-  Mother age 50  MGA age uncertain    7-  LEFT LOQ, ? Etiology, clinically resembled a burn after a biopsy- started silvadene 4-24-17    8-  Seen on staging imaging with varices= patient uncertain of etiology- was told was due to dilantin. Denies hepatits or excessive ETOH      Plan:  Josseline and I reviewed her interval history, her imaging reports, and her pathology reports today, including MRI, CT and bone scan.    We discussed that there is concern that there is metastatic disease. In this light, I will have her see medical oncology.    We had Keisha Stauffer come today as well to discuss her social support.    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  Note- Depression with prior admission to [facility].  in 2012 and lost son to suicide 2013.  Has smoked tobacco for 45 years 1.5 ppd    Yuli Garcias MD        We have spent 35 minutes in visit today, 25 in face to face .      Next Appointment:  Return for to be determined.      EMR Dragon/transcription disclaimer:    Much of this encounter note is an electronic transcription/translocation of spoken language to printed text.  The electronic translation of spoken language may permit erroneous, or at times, nonsensical words or phrases to be inadvertently transcribed.  Although I have reviewed the note from such areas, some may still exist.

## 2017-04-27 NOTE — PROGRESS NOTES
Referral rec'd from Dr. Garcias to see patient for psychosocial support in regards to new cancer diagnosis and possible upcoming surgery.  Due to recent imaging and findings, Patient is suspicious for metastatic disease. Surgery may no longer be an option.  Patient has extensive medical history and mild cognitive deficits.    OSW spoke to patient in exam room.  Psychosocial assessment completed.    Patient is 61 yr old  female, on disability, , lives in [facility] and relies on [employer] transportation ( [employer]) to get back and forth for her medical appts,  Patient is alert and oriented x3, uses no assistive walking devices and states that she is independent in her home.   Patient states that her boyfriend, Nic Doran and her sister Tati Dozier ( [phone] ) are her main support people.     Patient disclosed that she  her  about 5 years ago and that her son, Az committed suicide 4 years ago.  Patient is current with Psychiatrist : Dr. Margot Villarreal in [facility].  Patient currently on Ativan and Neurontin which she says Dr. Villarreal placed her on.  Patient also receives counseling 2x a month at [facility] in [facility].  OSW will follow up with [facility] regarding patients treatment plan there.    Patient endorses feelings of sadness and anxiety of the recent news told to her today in Dr. Yuan office.  OSW provided supportive counseling and active and reflective listening while processing new information.  Patient asked for ways to tell her boyfriend, Nic.  OSW offered ways to communicate the news to Nic.  OSW encouraged patient to bring either Nic or her sister Tati to her next appt with the medical oncologist for support and another set of listening ears.    OSW explained role and services available,  Contact card provided.  Will assist as needs arise,  Thank you,  Keisha Looney, MSSW, CSW, OSW-C

## 2017-04-28 ENCOUNTER — TELEPHONE (OUTPATIENT)
Dept: NEUROLOGY | Facility: CLINIC | Age: 62
End: 2017-04-28

## 2017-05-02 ENCOUNTER — TELEPHONE (OUTPATIENT)
Dept: PSYCHIATRY | Facility: HOSPITAL | Age: 62
End: 2017-05-02

## 2017-05-03 ENCOUNTER — LAB (OUTPATIENT)
Dept: OTHER | Facility: HOSPITAL | Age: 62
End: 2017-05-03

## 2017-05-03 ENCOUNTER — APPOINTMENT (OUTPATIENT)
Dept: ONCOLOGY | Facility: CLINIC | Age: 62
End: 2017-05-03

## 2017-05-03 DIAGNOSIS — C77.3 SECONDARY MALIGNANT NEOPLASM OF AXILLARY LYMPH NODES (HCC): Primary | ICD-10-CM

## 2017-05-09 ENCOUNTER — DOCUMENTATION (OUTPATIENT)
Dept: ONCOLOGY | Facility: CLINIC | Age: 62
End: 2017-05-09

## 2017-05-09 DIAGNOSIS — C50.212 MALIGNANT NEOPLASM OF UPPER-INNER QUADRANT OF LEFT FEMALE BREAST (HCC): Primary | ICD-10-CM

## 2017-05-10 ENCOUNTER — DOCUMENTATION (OUTPATIENT)
Dept: ONCOLOGY | Facility: CLINIC | Age: 62
End: 2017-05-10

## 2017-05-10 ENCOUNTER — PREP FOR SURGERY (OUTPATIENT)
Dept: SURGERY | Facility: CLINIC | Age: 62
End: 2017-05-10

## 2017-05-10 ENCOUNTER — CONSULT (OUTPATIENT)
Dept: ONCOLOGY | Facility: CLINIC | Age: 62
End: 2017-05-10

## 2017-05-10 ENCOUNTER — LAB (OUTPATIENT)
Dept: OTHER | Facility: HOSPITAL | Age: 62
End: 2017-05-10

## 2017-05-10 VITALS
TEMPERATURE: 97.9 F | WEIGHT: 232 LBS | BODY MASS INDEX: 45.55 KG/M2 | OXYGEN SATURATION: 98 % | DIASTOLIC BLOOD PRESSURE: 76 MMHG | RESPIRATION RATE: 18 BRPM | SYSTOLIC BLOOD PRESSURE: 121 MMHG | HEART RATE: 73 BPM | HEIGHT: 60 IN

## 2017-05-10 DIAGNOSIS — C50.212 MALIGNANT NEOPLASM OF UPPER-INNER QUADRANT OF LEFT FEMALE BREAST (HCC): Primary | ICD-10-CM

## 2017-05-10 DIAGNOSIS — C50.212 BREAST CANCER OF UPPER-INNER QUADRANT OF LEFT FEMALE BREAST (HCC): Primary | ICD-10-CM

## 2017-05-10 DIAGNOSIS — C50.412 MALIGNANT NEOPLASM OF UPPER-OUTER QUADRANT OF LEFT FEMALE BREAST (HCC): ICD-10-CM

## 2017-05-10 DIAGNOSIS — C50.212 MALIGNANT NEOPLASM OF UPPER-INNER QUADRANT OF LEFT FEMALE BREAST (HCC): ICD-10-CM

## 2017-05-10 DIAGNOSIS — Z79.899 DRUG THERAPY: ICD-10-CM

## 2017-05-10 LAB
ALBUMIN SERPL-MCNC: 4.4 G/DL (ref 3.5–5.2)
ALBUMIN/GLOB SERPL: 1.1 G/DL
ALP SERPL-CCNC: 130 U/L (ref 39–117)
ALT SERPL W P-5'-P-CCNC: <5 U/L (ref 1–33)
ANION GAP SERPL CALCULATED.3IONS-SCNC: 12.5 MMOL/L
AST SERPL-CCNC: 30 U/L (ref 1–32)
BASOPHILS # BLD AUTO: 0.05 10*3/MM3 (ref 0–0.2)
BASOPHILS NFR BLD AUTO: 1 % (ref 0–1.5)
BILIRUB SERPL-MCNC: 0.2 MG/DL (ref 0.1–1.2)
BUN BLD-MCNC: 16 MG/DL (ref 8–23)
BUN/CREAT SERPL: 19.5 (ref 7–25)
CALCIUM SPEC-SCNC: 9.7 MG/DL (ref 8.6–10.5)
CHLORIDE SERPL-SCNC: 103 MMOL/L (ref 98–107)
CO2 SERPL-SCNC: 25.5 MMOL/L (ref 22–29)
CREAT BLD-MCNC: 0.82 MG/DL (ref 0.57–1)
DEPRECATED RDW RBC AUTO: 45.6 FL (ref 37–54)
EOSINOPHIL # BLD AUTO: 0.45 10*3/MM3 (ref 0–0.7)
EOSINOPHIL NFR BLD AUTO: 8.6 % (ref 0.3–6.2)
ERYTHROCYTE [DISTWIDTH] IN BLOOD BY AUTOMATED COUNT: 13.4 % (ref 11.7–13)
GFR SERPL CREATININE-BSD FRML MDRD: 70 ML/MIN/1.73
GLOBULIN UR ELPH-MCNC: 3.9 GM/DL
GLUCOSE BLD-MCNC: 128 MG/DL (ref 65–99)
HCT VFR BLD AUTO: 41.4 % (ref 35.6–45.5)
HGB BLD-MCNC: 13.4 G/DL (ref 11.9–15.5)
HOLD SPECIMEN: NORMAL
IMM GRANULOCYTES # BLD: 0.02 10*3/MM3 (ref 0–0.03)
IMM GRANULOCYTES NFR BLD: 0.4 % (ref 0–0.5)
LYMPHOCYTES # BLD AUTO: 1.4 10*3/MM3 (ref 0.9–4.8)
LYMPHOCYTES NFR BLD AUTO: 26.9 % (ref 19.6–45.3)
MCH RBC QN AUTO: 29.7 PG (ref 26.9–32)
MCHC RBC AUTO-ENTMCNC: 32.4 G/DL (ref 32.4–36.3)
MCV RBC AUTO: 91.8 FL (ref 80.5–98.2)
MONOCYTES # BLD AUTO: 0.3 10*3/MM3 (ref 0.2–1.2)
MONOCYTES NFR BLD AUTO: 5.8 % (ref 5–12)
NEUTROPHILS # BLD AUTO: 2.99 10*3/MM3 (ref 1.9–8.1)
NEUTROPHILS NFR BLD AUTO: 57.3 % (ref 42.7–76)
NRBC BLD MANUAL-RTO: 0 /100 WBC (ref 0–0)
PLATELET # BLD AUTO: 184 10*3/MM3 (ref 140–500)
PMV BLD AUTO: 11.4 FL (ref 6–12)
POTASSIUM BLD-SCNC: 4.8 MMOL/L (ref 3.5–5.2)
PROT SERPL-MCNC: 8.3 G/DL (ref 6–8.5)
RBC # BLD AUTO: 4.51 10*6/MM3 (ref 3.9–5.2)
SODIUM BLD-SCNC: 141 MMOL/L (ref 136–145)
WBC NRBC COR # BLD: 5.21 10*3/MM3 (ref 4.5–10.7)
WHOLE BLOOD HOLD SPECIMEN: NORMAL

## 2017-05-10 PROCEDURE — 80053 COMPREHEN METABOLIC PANEL: CPT | Performed by: INTERNAL MEDICINE

## 2017-05-10 PROCEDURE — 36415 COLL VENOUS BLD VENIPUNCTURE: CPT

## 2017-05-10 PROCEDURE — 85025 COMPLETE CBC W/AUTO DIFF WBC: CPT | Performed by: INTERNAL MEDICINE

## 2017-05-10 PROCEDURE — 99205 OFFICE O/P NEW HI 60 MIN: CPT | Performed by: INTERNAL MEDICINE

## 2017-05-10 RX ORDER — VENLAFAXINE 37.5 MG/1
37.5 TABLET ORAL DAILY
COMMUNITY

## 2017-05-10 RX ORDER — FUROSEMIDE 40 MG/1
TABLET ORAL
COMMUNITY
Start: 2012-12-11 | End: 2017-05-15

## 2017-05-10 RX ORDER — SODIUM CHLORIDE, SODIUM LACTATE, POTASSIUM CHLORIDE, CALCIUM CHLORIDE 600; 310; 30; 20 MG/100ML; MG/100ML; MG/100ML; MG/100ML
100 INJECTION, SOLUTION INTRAVENOUS CONTINUOUS
Status: CANCELLED | OUTPATIENT
Start: 2017-05-10

## 2017-05-11 ENCOUNTER — HOSPITAL ENCOUNTER (OUTPATIENT)
Dept: CT IMAGING | Facility: HOSPITAL | Age: 62
Discharge: HOME | End: 2017-05-11
Attending: INTERNAL MEDICINE

## 2017-05-15 ENCOUNTER — TELEPHONE (OUTPATIENT)
Dept: PSYCHIATRY | Facility: HOSPITAL | Age: 62
End: 2017-05-15

## 2017-05-15 ENCOUNTER — TELEPHONE (OUTPATIENT)
Dept: SURGERY | Facility: CLINIC | Age: 62
End: 2017-05-15

## 2017-05-15 ENCOUNTER — APPOINTMENT (OUTPATIENT)
Dept: PET IMAGING | Facility: HOSPITAL | Age: 62
End: 2017-05-15
Attending: INTERNAL MEDICINE

## 2017-05-15 ENCOUNTER — HOSPITAL ENCOUNTER (OUTPATIENT)
Dept: GENERAL RADIOLOGY | Facility: HOSPITAL | Age: 62
Discharge: HOME | End: 2017-05-15
Admitting: SURGERY

## 2017-05-15 ENCOUNTER — DOCUMENTATION (OUTPATIENT)
Dept: PSYCHIATRY | Facility: HOSPITAL | Age: 62
End: 2017-05-15

## 2017-05-15 ENCOUNTER — APPOINTMENT (OUTPATIENT)
Dept: PREADMISSION TESTING | Facility: HOSPITAL | Age: 62
End: 2017-05-15

## 2017-05-15 VITALS
WEIGHT: 229 LBS | DIASTOLIC BLOOD PRESSURE: 81 MMHG | HEART RATE: 70 BPM | SYSTOLIC BLOOD PRESSURE: 144 MMHG | RESPIRATION RATE: 16 BRPM | TEMPERATURE: 98.7 F | HEIGHT: 60 IN | OXYGEN SATURATION: 98 % | BODY MASS INDEX: 44.96 KG/M2

## 2017-05-15 DIAGNOSIS — C50.212 MALIGNANT NEOPLASM OF UPPER-INNER QUADRANT OF LEFT FEMALE BREAST (HCC): ICD-10-CM

## 2017-05-15 DIAGNOSIS — C50.112 CANCER OF CENTRAL PORTION OF LEFT BREAST (HCC): ICD-10-CM

## 2017-05-15 LAB
ANION GAP SERPL CALCULATED.3IONS-SCNC: 14.8 MMOL/L
BUN BLD-MCNC: 19 MG/DL (ref 8–23)
BUN/CREAT SERPL: 20.9 (ref 7–25)
CALCIUM SPEC-SCNC: 9.9 MG/DL (ref 8.6–10.5)
CHLORIDE SERPL-SCNC: 100 MMOL/L (ref 98–107)
CO2 SERPL-SCNC: 24.2 MMOL/L (ref 22–29)
CREAT BLD-MCNC: 0.91 MG/DL (ref 0.57–1)
DEPRECATED RDW RBC AUTO: 44.9 FL (ref 37–54)
ERYTHROCYTE [DISTWIDTH] IN BLOOD BY AUTOMATED COUNT: 13.7 % (ref 11.7–13)
GFR SERPL CREATININE-BSD FRML MDRD: 62 ML/MIN/1.73
GLUCOSE BLD-MCNC: 132 MG/DL (ref 65–99)
HCT VFR BLD AUTO: 40.3 % (ref 35.6–45.5)
HGB BLD-MCNC: 13.3 G/DL (ref 11.9–15.5)
MCH RBC QN AUTO: 29.4 PG (ref 26.9–32)
MCHC RBC AUTO-ENTMCNC: 33 G/DL (ref 32.4–36.3)
MCV RBC AUTO: 89.2 FL (ref 80.5–98.2)
PLATELET # BLD AUTO: 156 10*3/MM3 (ref 140–500)
PMV BLD AUTO: 12 FL (ref 6–12)
POTASSIUM BLD-SCNC: 4.4 MMOL/L (ref 3.5–5.2)
RBC # BLD AUTO: 4.52 10*6/MM3 (ref 3.9–5.2)
SODIUM BLD-SCNC: 139 MMOL/L (ref 136–145)
WBC NRBC COR # BLD: 5.2 10*3/MM3 (ref 4.5–10.7)

## 2017-05-15 PROCEDURE — 93005 ELECTROCARDIOGRAM TRACING: CPT

## 2017-05-15 PROCEDURE — 80048 BASIC METABOLIC PNL TOTAL CA: CPT | Performed by: SURGERY

## 2017-05-15 PROCEDURE — 85027 COMPLETE CBC AUTOMATED: CPT | Performed by: SURGERY

## 2017-05-15 PROCEDURE — 80076 HEPATIC FUNCTION PANEL: CPT | Performed by: SURGERY

## 2017-05-15 PROCEDURE — 71020 HC CHEST PA AND LATERAL: CPT

## 2017-05-15 PROCEDURE — 93010 ELECTROCARDIOGRAM REPORT: CPT | Performed by: INTERNAL MEDICINE

## 2017-05-15 PROCEDURE — 36415 COLL VENOUS BLD VENIPUNCTURE: CPT

## 2017-05-15 RX ORDER — FUROSEMIDE 40 MG/1
40 TABLET ORAL DAILY
COMMUNITY

## 2017-05-16 ENCOUNTER — TELEPHONE (OUTPATIENT)
Dept: SURGERY | Facility: CLINIC | Age: 62
End: 2017-05-16

## 2017-05-16 ENCOUNTER — TRANSCRIBE ORDERS (OUTPATIENT)
Dept: SURGERY | Facility: CLINIC | Age: 62
End: 2017-05-16

## 2017-05-16 DIAGNOSIS — C50.112 CANCER OF CENTRAL PORTION OF LEFT BREAST (HCC): Primary | ICD-10-CM

## 2017-05-16 LAB
ALBUMIN SERPL-MCNC: 4.3 G/DL (ref 3.5–5.2)
ALP SERPL-CCNC: 138 U/L (ref 39–117)
ALT SERPL W P-5'-P-CCNC: 7 U/L (ref 1–33)
AST SERPL-CCNC: 33 U/L (ref 1–32)
BILIRUB CONJ SERPL-MCNC: <0.2 MG/DL (ref 0–0.3)
BILIRUB INDIRECT SERPL-MCNC: ABNORMAL MG/DL
BILIRUB SERPL-MCNC: 0.4 MG/DL (ref 0.1–1.2)
PROT SERPL-MCNC: 8.3 G/DL (ref 6–8.5)

## 2017-05-17 ENCOUNTER — TELEPHONE (OUTPATIENT)
Dept: SURGERY | Facility: CLINIC | Age: 62
End: 2017-05-17

## 2017-05-17 RX ORDER — CLINDAMYCIN PHOSPHATE 900 MG/50ML
900 INJECTION INTRAVENOUS ONCE
Status: COMPLETED | OUTPATIENT
Start: 2017-05-18 | End: 2017-05-18

## 2017-05-18 ENCOUNTER — APPOINTMENT (OUTPATIENT)
Dept: GENERAL RADIOLOGY | Facility: HOSPITAL | Age: 62
End: 2017-05-18
Attending: SURGERY

## 2017-05-18 ENCOUNTER — ANESTHESIA (OUTPATIENT)
Dept: PERIOP | Facility: HOSPITAL | Age: 62
End: 2017-05-18

## 2017-05-18 ENCOUNTER — HOSPITAL ENCOUNTER (OUTPATIENT)
Facility: HOSPITAL | Age: 62
Setting detail: HOSPITAL OUTPATIENT SURGERY
Discharge: HOME | End: 2017-05-18
Attending: SURGERY | Admitting: SURGERY

## 2017-05-18 ENCOUNTER — ANESTHESIA EVENT (OUTPATIENT)
Dept: PERIOP | Facility: HOSPITAL | Age: 62
End: 2017-05-18

## 2017-05-18 ENCOUNTER — APPOINTMENT (OUTPATIENT)
Dept: GENERAL RADIOLOGY | Facility: HOSPITAL | Age: 62
End: 2017-05-18

## 2017-05-18 VITALS
RESPIRATION RATE: 16 BRPM | HEART RATE: 83 BPM | DIASTOLIC BLOOD PRESSURE: 64 MMHG | OXYGEN SATURATION: 98 % | TEMPERATURE: 98 F | SYSTOLIC BLOOD PRESSURE: 131 MMHG

## 2017-05-18 DIAGNOSIS — C50.212 BREAST CANCER OF UPPER-INNER QUADRANT OF LEFT FEMALE BREAST (HCC): ICD-10-CM

## 2017-05-18 LAB
GLUCOSE BLDC GLUCOMTR-MCNC: 126 MG/DL (ref 70–130)
INR PPP: 1.33 (ref 0.9–1.1)
PROTHROMBIN TIME: 16 SECONDS (ref 11.7–14.2)

## 2017-05-18 PROCEDURE — 85610 PROTHROMBIN TIME: CPT | Performed by: SURGERY

## 2017-05-18 PROCEDURE — C1788 PORT, INDWELLING, IMP: HCPCS | Performed by: SURGERY

## 2017-05-18 PROCEDURE — 25010000002 ONDANSETRON PER 1 MG: Performed by: NURSE ANESTHETIST, CERTIFIED REGISTERED

## 2017-05-18 PROCEDURE — 25010000002 PROPOFOL 10 MG/ML EMULSION: Performed by: NURSE ANESTHETIST, CERTIFIED REGISTERED

## 2017-05-18 PROCEDURE — 77001 FLUOROGUIDE FOR VEIN DEVICE: CPT

## 2017-05-18 PROCEDURE — 25010000002 MIDAZOLAM PER 1 MG: Performed by: ANESTHESIOLOGY

## 2017-05-18 PROCEDURE — 25010000002 HEPARIN (PORCINE) PER 1000 UNITS: Performed by: SURGERY

## 2017-05-18 PROCEDURE — 82962 GLUCOSE BLOOD TEST: CPT

## 2017-05-18 PROCEDURE — 36561 INSERT TUNNELED CV CATH: CPT | Performed by: SURGERY

## 2017-05-18 PROCEDURE — 77001 FLUOROGUIDE FOR VEIN DEVICE: CPT | Performed by: SURGERY

## 2017-05-18 PROCEDURE — 25010000002 FENTANYL CITRATE (PF) 100 MCG/2ML SOLUTION: Performed by: NURSE ANESTHETIST, CERTIFIED REGISTERED

## 2017-05-18 DEVICE — POWERPORT M.R.I. IMPLANTABLE PORT WITH ATTACHABLE 8F CHRONOFLEX OPEN-ENDED SINGLE-LUMEN VENOUS CATHETER INTERMEDIATE KIT (WITH SUTURE PLUGS)
Type: IMPLANTABLE DEVICE | Status: FUNCTIONAL
Brand: POWERPORT M.R.I., CHRONOFLEX

## 2017-05-18 RX ORDER — DIPHENHYDRAMINE HYDROCHLORIDE 50 MG/ML
12.5 INJECTION INTRAMUSCULAR; INTRAVENOUS
Status: DISCONTINUED | OUTPATIENT
Start: 2017-05-18 | End: 2017-05-18 | Stop reason: HOSPADM

## 2017-05-18 RX ORDER — PROMETHAZINE HYDROCHLORIDE 25 MG/ML
6.25 INJECTION, SOLUTION INTRAMUSCULAR; INTRAVENOUS ONCE AS NEEDED
Status: DISCONTINUED | OUTPATIENT
Start: 2017-05-18 | End: 2017-05-18 | Stop reason: HOSPADM

## 2017-05-18 RX ORDER — FENTANYL CITRATE 50 UG/ML
50 INJECTION, SOLUTION INTRAMUSCULAR; INTRAVENOUS
Status: DISCONTINUED | OUTPATIENT
Start: 2017-05-18 | End: 2017-05-18 | Stop reason: HOSPADM

## 2017-05-18 RX ORDER — ONDANSETRON 2 MG/ML
INJECTION INTRAMUSCULAR; INTRAVENOUS AS NEEDED
Status: DISCONTINUED | OUTPATIENT
Start: 2017-05-18 | End: 2017-05-18 | Stop reason: SURG

## 2017-05-18 RX ORDER — FENTANYL CITRATE 50 UG/ML
INJECTION, SOLUTION INTRAMUSCULAR; INTRAVENOUS AS NEEDED
Status: DISCONTINUED | OUTPATIENT
Start: 2017-05-18 | End: 2017-05-18 | Stop reason: SURG

## 2017-05-18 RX ORDER — SODIUM CHLORIDE, SODIUM LACTATE, POTASSIUM CHLORIDE, CALCIUM CHLORIDE 600; 310; 30; 20 MG/100ML; MG/100ML; MG/100ML; MG/100ML
100 INJECTION, SOLUTION INTRAVENOUS CONTINUOUS
Status: DISCONTINUED | OUTPATIENT
Start: 2017-05-18 | End: 2017-05-18 | Stop reason: SDUPTHER

## 2017-05-18 RX ORDER — OXYCODONE HYDROCHLORIDE AND ACETAMINOPHEN 5; 325 MG/1; MG/1
1 TABLET ORAL ONCE AS NEEDED
Status: DISCONTINUED | OUTPATIENT
Start: 2017-05-18 | End: 2017-05-18 | Stop reason: HOSPADM

## 2017-05-18 RX ORDER — FAMOTIDINE 10 MG/ML
20 INJECTION, SOLUTION INTRAVENOUS ONCE
Status: COMPLETED | OUTPATIENT
Start: 2017-05-18 | End: 2017-05-18

## 2017-05-18 RX ORDER — SODIUM CHLORIDE 0.9 % (FLUSH) 0.9 %
1-10 SYRINGE (ML) INJECTION AS NEEDED
Status: DISCONTINUED | OUTPATIENT
Start: 2017-05-18 | End: 2017-05-18 | Stop reason: HOSPADM

## 2017-05-18 RX ORDER — NALBUPHINE HCL 10 MG/ML
2 AMPUL (ML) INJECTION EVERY 4 HOURS PRN
Status: DISCONTINUED | OUTPATIENT
Start: 2017-05-18 | End: 2017-05-18 | Stop reason: HOSPADM

## 2017-05-18 RX ORDER — LIDOCAINE HYDROCHLORIDE 20 MG/ML
INJECTION, SOLUTION INFILTRATION; PERINEURAL AS NEEDED
Status: DISCONTINUED | OUTPATIENT
Start: 2017-05-18 | End: 2017-05-18 | Stop reason: SURG

## 2017-05-18 RX ORDER — ACETAMINOPHEN 325 MG/1
650 TABLET ORAL ONCE AS NEEDED
Status: DISCONTINUED | OUTPATIENT
Start: 2017-05-18 | End: 2017-05-18 | Stop reason: HOSPADM

## 2017-05-18 RX ORDER — SODIUM CHLORIDE, SODIUM LACTATE, POTASSIUM CHLORIDE, CALCIUM CHLORIDE 600; 310; 30; 20 MG/100ML; MG/100ML; MG/100ML; MG/100ML
9 INJECTION, SOLUTION INTRAVENOUS CONTINUOUS
Status: DISCONTINUED | OUTPATIENT
Start: 2017-05-18 | End: 2017-05-18 | Stop reason: HOSPADM

## 2017-05-18 RX ORDER — PROMETHAZINE HYDROCHLORIDE 25 MG/1
25 SUPPOSITORY RECTAL ONCE AS NEEDED
Status: DISCONTINUED | OUTPATIENT
Start: 2017-05-18 | End: 2017-05-18 | Stop reason: HOSPADM

## 2017-05-18 RX ORDER — PROMETHAZINE HYDROCHLORIDE 25 MG/1
25 TABLET ORAL ONCE AS NEEDED
Status: DISCONTINUED | OUTPATIENT
Start: 2017-05-18 | End: 2017-05-18 | Stop reason: HOSPADM

## 2017-05-18 RX ORDER — MIDAZOLAM HYDROCHLORIDE 1 MG/ML
2 INJECTION INTRAMUSCULAR; INTRAVENOUS
Status: DISCONTINUED | OUTPATIENT
Start: 2017-05-18 | End: 2017-05-18 | Stop reason: HOSPADM

## 2017-05-18 RX ORDER — MIDAZOLAM HYDROCHLORIDE 1 MG/ML
1 INJECTION INTRAMUSCULAR; INTRAVENOUS
Status: DISCONTINUED | OUTPATIENT
Start: 2017-05-18 | End: 2017-05-18 | Stop reason: HOSPADM

## 2017-05-18 RX ORDER — NALOXONE HCL 0.4 MG/ML
0.4 VIAL (ML) INJECTION AS NEEDED
Status: DISCONTINUED | OUTPATIENT
Start: 2017-05-18 | End: 2017-05-18 | Stop reason: HOSPADM

## 2017-05-18 RX ORDER — HYDRALAZINE HYDROCHLORIDE 20 MG/ML
5 INJECTION INTRAMUSCULAR; INTRAVENOUS
Status: DISCONTINUED | OUTPATIENT
Start: 2017-05-18 | End: 2017-05-18 | Stop reason: HOSPADM

## 2017-05-18 RX ORDER — ACETAMINOPHEN 650 MG/1
650 SUPPOSITORY RECTAL ONCE AS NEEDED
Status: DISCONTINUED | OUTPATIENT
Start: 2017-05-18 | End: 2017-05-18 | Stop reason: HOSPADM

## 2017-05-18 RX ORDER — PROPOFOL 10 MG/ML
VIAL (ML) INTRAVENOUS AS NEEDED
Status: DISCONTINUED | OUTPATIENT
Start: 2017-05-18 | End: 2017-05-18 | Stop reason: SURG

## 2017-05-18 RX ORDER — NALBUPHINE HCL 10 MG/ML
10 AMPUL (ML) INJECTION EVERY 4 HOURS PRN
Status: DISCONTINUED | OUTPATIENT
Start: 2017-05-18 | End: 2017-05-18 | Stop reason: HOSPADM

## 2017-05-18 RX ORDER — ACETAMINOPHEN 325 MG/1
650 TABLET ORAL ONCE
Status: COMPLETED | OUTPATIENT
Start: 2017-05-18 | End: 2017-05-18

## 2017-05-18 RX ORDER — HYDROMORPHONE HYDROCHLORIDE 1 MG/ML
0.25 INJECTION, SOLUTION INTRAMUSCULAR; INTRAVENOUS; SUBCUTANEOUS
Status: DISCONTINUED | OUTPATIENT
Start: 2017-05-18 | End: 2017-05-18 | Stop reason: HOSPADM

## 2017-05-18 RX ADMIN — ACETAMINOPHEN 650 MG: 325 TABLET ORAL at 07:12

## 2017-05-18 RX ADMIN — OXYCODONE HYDROCHLORIDE AND ACETAMINOPHEN 1 TABLET: 5; 325 TABLET ORAL at 10:15

## 2017-05-18 RX ADMIN — CLINDAMYCIN PHOSPHATE 900 MG: 900 INJECTION INTRAVENOUS at 08:30

## 2017-05-18 RX ADMIN — PROPOFOL 110 MG: 10 INJECTION, EMULSION INTRAVENOUS at 08:34

## 2017-05-18 RX ADMIN — SODIUM CHLORIDE, POTASSIUM CHLORIDE, SODIUM LACTATE AND CALCIUM CHLORIDE 9 ML/HR: 600; 310; 30; 20 INJECTION, SOLUTION INTRAVENOUS at 06:58

## 2017-05-18 RX ADMIN — LIDOCAINE HYDROCHLORIDE 60 MG: 20 INJECTION, SOLUTION INFILTRATION; PERINEURAL at 08:34

## 2017-05-18 RX ADMIN — MIDAZOLAM 1 MG: 1 INJECTION INTRAMUSCULAR; INTRAVENOUS at 07:43

## 2017-05-18 RX ADMIN — MIDAZOLAM 1 MG: 1 INJECTION INTRAMUSCULAR; INTRAVENOUS at 07:13

## 2017-05-18 RX ADMIN — ONDANSETRON 4 MG: 2 INJECTION INTRAMUSCULAR; INTRAVENOUS at 09:15

## 2017-05-18 RX ADMIN — FAMOTIDINE 20 MG: 10 INJECTION, SOLUTION INTRAVENOUS at 07:12

## 2017-05-18 RX ADMIN — FENTANYL CITRATE 50 MCG: 50 INJECTION INTRAMUSCULAR; INTRAVENOUS at 08:34

## 2017-05-22 ENCOUNTER — HOSPITAL ENCOUNTER (OUTPATIENT)
Dept: CT IMAGING | Facility: HOSPITAL | Age: 62
Discharge: HOME | End: 2017-05-22
Attending: INTERNAL MEDICINE | Admitting: INTERNAL MEDICINE

## 2017-05-22 VITALS
WEIGHT: 230 LBS | BODY MASS INDEX: 45.16 KG/M2 | RESPIRATION RATE: 20 BRPM | OXYGEN SATURATION: 100 % | DIASTOLIC BLOOD PRESSURE: 69 MMHG | TEMPERATURE: 97.5 F | SYSTOLIC BLOOD PRESSURE: 169 MMHG | HEIGHT: 60 IN | HEART RATE: 90 BPM

## 2017-05-22 DIAGNOSIS — C50.212 MALIGNANT NEOPLASM OF UPPER-INNER QUADRANT OF LEFT FEMALE BREAST (HCC): ICD-10-CM

## 2017-05-22 DIAGNOSIS — Z79.899 DRUG THERAPY: ICD-10-CM

## 2017-05-22 DIAGNOSIS — C50.412 MALIGNANT NEOPLASM OF UPPER-OUTER QUADRANT OF LEFT FEMALE BREAST (HCC): ICD-10-CM

## 2017-05-22 LAB
INR PPP: 1.28 (ref 0.9–1.1)
PROTHROMBIN TIME: 15.5 SECONDS (ref 11.7–14.2)

## 2017-05-22 PROCEDURE — 36415 COLL VENOUS BLD VENIPUNCTURE: CPT

## 2017-05-22 PROCEDURE — 77012 CT SCAN FOR NEEDLE BIOPSY: CPT

## 2017-05-22 PROCEDURE — 85610 PROTHROMBIN TIME: CPT | Performed by: RADIOLOGY

## 2017-05-22 RX ORDER — SODIUM CHLORIDE 0.9 % (FLUSH) 0.9 %
1-10 SYRINGE (ML) INJECTION AS NEEDED
Status: CANCELLED | OUTPATIENT
Start: 2017-05-22

## 2017-05-22 RX ORDER — ALPRAZOLAM 1 MG/1
1 TABLET ORAL ONCE
Status: COMPLETED | OUTPATIENT
Start: 2017-05-22 | End: 2017-05-22

## 2017-05-22 RX ADMIN — ALPRAZOLAM 1 MG: 0.5 TABLET ORAL at 09:29

## 2017-05-23 ENCOUNTER — OFFICE VISIT (OUTPATIENT)
Dept: NEUROLOGY | Facility: CLINIC | Age: 62
End: 2017-05-23

## 2017-05-23 VITALS
DIASTOLIC BLOOD PRESSURE: 62 MMHG | BODY MASS INDEX: 45.94 KG/M2 | SYSTOLIC BLOOD PRESSURE: 125 MMHG | HEIGHT: 60 IN | WEIGHT: 234 LBS

## 2017-05-23 DIAGNOSIS — G40.909 SEIZURE DISORDER (HCC): ICD-10-CM

## 2017-05-23 DIAGNOSIS — G43.009 MIGRAINE WITHOUT AURA AND WITHOUT STATUS MIGRAINOSUS, NOT INTRACTABLE: Primary | ICD-10-CM

## 2017-05-23 PROCEDURE — 99213 OFFICE O/P EST LOW 20 MIN: CPT | Performed by: PSYCHIATRY & NEUROLOGY

## 2017-05-23 RX ORDER — TOPIRAMATE 25 MG/1
25 TABLET ORAL 2 TIMES DAILY
Qty: 60 TABLET | Refills: 11 | Status: SHIPPED | OUTPATIENT
Start: 2017-05-23 | End: 2017-09-11 | Stop reason: SDUPTHER

## 2017-05-23 RX ORDER — TOPIRAMATE 50 MG/1
50 TABLET, FILM COATED ORAL 2 TIMES DAILY
Qty: 60 TABLET | Refills: 11 | Status: SHIPPED | OUTPATIENT
Start: 2017-05-23 | End: 2017-09-11 | Stop reason: SDUPTHER

## 2017-05-23 RX ORDER — PHENYTOIN SODIUM 100 MG/1
300 CAPSULE, EXTENDED RELEASE ORAL DAILY
Qty: 90 CAPSULE | Refills: 11 | Status: SHIPPED | OUTPATIENT
Start: 2017-05-23 | End: 2017-09-13 | Stop reason: SDUPTHER

## 2017-05-25 ENCOUNTER — HOSPITAL ENCOUNTER (OUTPATIENT)
Dept: CARDIOLOGY | Facility: HOSPITAL | Age: 62
End: 2017-05-25
Attending: INTERNAL MEDICINE

## 2017-05-25 ENCOUNTER — TELEPHONE (OUTPATIENT)
Dept: ONCOLOGY | Facility: HOSPITAL | Age: 62
End: 2017-05-25

## 2017-05-25 ENCOUNTER — APPOINTMENT (OUTPATIENT)
Dept: LAB | Facility: HOSPITAL | Age: 62
End: 2017-05-25

## 2017-05-25 ENCOUNTER — HOSPITAL ENCOUNTER (OUTPATIENT)
Dept: PET IMAGING | Facility: HOSPITAL | Age: 62
End: 2017-05-25
Attending: INTERNAL MEDICINE

## 2017-05-25 ENCOUNTER — APPOINTMENT (OUTPATIENT)
Dept: PET IMAGING | Facility: HOSPITAL | Age: 62
End: 2017-05-25
Attending: INTERNAL MEDICINE

## 2017-05-25 ENCOUNTER — TELEPHONE (OUTPATIENT)
Dept: SURGERY | Facility: CLINIC | Age: 62
End: 2017-05-25

## 2017-05-26 ENCOUNTER — TELEPHONE (OUTPATIENT)
Dept: SURGERY | Facility: CLINIC | Age: 62
End: 2017-05-26

## 2017-05-31 ENCOUNTER — HOSPITAL ENCOUNTER (OUTPATIENT)
Dept: PET IMAGING | Facility: HOSPITAL | Age: 62
Discharge: HOME | End: 2017-05-31
Attending: INTERNAL MEDICINE

## 2017-05-31 ENCOUNTER — TELEPHONE (OUTPATIENT)
Dept: SURGERY | Facility: CLINIC | Age: 62
End: 2017-05-31

## 2017-05-31 ENCOUNTER — HOSPITAL ENCOUNTER (OUTPATIENT)
Dept: CARDIOLOGY | Facility: HOSPITAL | Age: 62
Discharge: HOME | End: 2017-05-31
Attending: INTERNAL MEDICINE

## 2017-05-31 ENCOUNTER — HOSPITAL ENCOUNTER (OUTPATIENT)
Dept: PET IMAGING | Facility: HOSPITAL | Age: 62
Discharge: HOME | End: 2017-05-31
Attending: INTERNAL MEDICINE | Admitting: INTERNAL MEDICINE

## 2017-05-31 VITALS
DIASTOLIC BLOOD PRESSURE: 62 MMHG | HEIGHT: 60 IN | BODY MASS INDEX: 45.94 KG/M2 | SYSTOLIC BLOOD PRESSURE: 125 MMHG | WEIGHT: 234 LBS

## 2017-05-31 DIAGNOSIS — Z79.899 DRUG THERAPY: ICD-10-CM

## 2017-05-31 DIAGNOSIS — C50.212 MALIGNANT NEOPLASM OF UPPER-INNER QUADRANT OF LEFT FEMALE BREAST (HCC): ICD-10-CM

## 2017-05-31 DIAGNOSIS — C50.412 MALIGNANT NEOPLASM OF UPPER-OUTER QUADRANT OF LEFT FEMALE BREAST (HCC): ICD-10-CM

## 2017-05-31 LAB
BH CV ECHO MEAS - ACS: 1.9 CM
BH CV ECHO MEAS - AO MAX PG (FULL): 2.6 MMHG
BH CV ECHO MEAS - AO MAX PG: 6.6 MMHG
BH CV ECHO MEAS - AO MEAN PG (FULL): 2 MMHG
BH CV ECHO MEAS - AO MEAN PG: 4 MMHG
BH CV ECHO MEAS - AO ROOT AREA (BSA CORRECTED): 1.4
BH CV ECHO MEAS - AO ROOT AREA: 6.2 CM^2
BH CV ECHO MEAS - AO ROOT DIAM: 2.8 CM
BH CV ECHO MEAS - AO V2 MAX: 128 CM/SEC
BH CV ECHO MEAS - AO V2 MEAN: 90.2 CM/SEC
BH CV ECHO MEAS - AO V2 VTI: 28.3 CM
BH CV ECHO MEAS - AVA(I,A): 2.7 CM^2
BH CV ECHO MEAS - AVA(I,D): 2.7 CM^2
BH CV ECHO MEAS - AVA(V,A): 2.5 CM^2
BH CV ECHO MEAS - AVA(V,D): 2.5 CM^2
BH CV ECHO MEAS - BSA(HAYCOCK): 2.2 M^2
BH CV ECHO MEAS - BSA: 2 M^2
BH CV ECHO MEAS - BZI_BMI: 45.7 KILOGRAMS/M^2
BH CV ECHO MEAS - BZI_METRIC_HEIGHT: 152.4 CM
BH CV ECHO MEAS - BZI_METRIC_WEIGHT: 106.1 KG
BH CV ECHO MEAS - CONTRAST EF 4CH: 58.7 ML/M^2
BH CV ECHO MEAS - EDV(CUBED): 85.2 ML
BH CV ECHO MEAS - EDV(MOD-SP4): 75 ML
BH CV ECHO MEAS - EDV(TEICH): 87.7 ML
BH CV ECHO MEAS - EF(CUBED): 61.5 %
BH CV ECHO MEAS - EF(MOD-SP4): 58.7 %
BH CV ECHO MEAS - EF(TEICH): 53.3 %
BH CV ECHO MEAS - ESV(CUBED): 32.8 ML
BH CV ECHO MEAS - ESV(MOD-SP4): 31 ML
BH CV ECHO MEAS - ESV(TEICH): 41 ML
BH CV ECHO MEAS - FS: 27.3 %
BH CV ECHO MEAS - IVS/LVPW: 1
BH CV ECHO MEAS - IVSD: 1 CM
BH CV ECHO MEAS - LA DIMENSION: 3.9 CM
BH CV ECHO MEAS - LA/AO: 1.4
BH CV ECHO MEAS - LAT PEAK E' VEL: 10.2 CM/SEC
BH CV ECHO MEAS - LV DIASTOLIC VOL/BSA (35-75): 37.6 ML/M^2
BH CV ECHO MEAS - LV MASS(C)D: 147.8 GRAMS
BH CV ECHO MEAS - LV MASS(C)DI: 74.1 GRAMS/M^2
BH CV ECHO MEAS - LV MAX PG: 4 MMHG
BH CV ECHO MEAS - LV MEAN PG: 2 MMHG
BH CV ECHO MEAS - LV SYSTOLIC VOL/BSA (12-30): 15.5 ML/M^2
BH CV ECHO MEAS - LV V1 MAX: 100 CM/SEC
BH CV ECHO MEAS - LV V1 MEAN: 70.3 CM/SEC
BH CV ECHO MEAS - LV V1 VTI: 23.9 CM
BH CV ECHO MEAS - LVIDD: 4.4 CM
BH CV ECHO MEAS - LVIDS: 3.2 CM
BH CV ECHO MEAS - LVLD AP4: 6.8 CM
BH CV ECHO MEAS - LVLS AP4: 5 CM
BH CV ECHO MEAS - LVOT AREA (M): 3.1 CM^2
BH CV ECHO MEAS - LVOT AREA: 3.1 CM^2
BH CV ECHO MEAS - LVOT DIAM: 2 CM
BH CV ECHO MEAS - LVPWD: 1 CM
BH CV ECHO MEAS - MED PEAK E' VEL: 5.8 CM/SEC
BH CV ECHO MEAS - MV A DUR: 0.19 SEC
BH CV ECHO MEAS - MV A MAX VEL: 98.5 CM/SEC
BH CV ECHO MEAS - MV DEC SLOPE: 430 CM/SEC^2
BH CV ECHO MEAS - MV DEC TIME: 0.2 SEC
BH CV ECHO MEAS - MV E MAX VEL: 97.5 CM/SEC
BH CV ECHO MEAS - MV E/A: 0.99
BH CV ECHO MEAS - MV MAX PG: 4 MMHG
BH CV ECHO MEAS - MV MEAN PG: 1 MMHG
BH CV ECHO MEAS - MV P1/2T MAX VEL: 96.4 CM/SEC
BH CV ECHO MEAS - MV P1/2T: 65.7 MSEC
BH CV ECHO MEAS - MV V2 MAX: 100 CM/SEC
BH CV ECHO MEAS - MV V2 MEAN: 52.2 CM/SEC
BH CV ECHO MEAS - MV V2 VTI: 27.7 CM
BH CV ECHO MEAS - MVA P1/2T LCG: 2.3 CM^2
BH CV ECHO MEAS - MVA(P1/2T): 3.4 CM^2
BH CV ECHO MEAS - MVA(VTI): 2.7 CM^2
BH CV ECHO MEAS - PA MAX PG (FULL): 2.8 MMHG
BH CV ECHO MEAS - PA MAX PG: 4.9 MMHG
BH CV ECHO MEAS - PA V2 MAX: 111 CM/SEC
BH CV ECHO MEAS - PULM A REVS DUR: 0.13 SEC
BH CV ECHO MEAS - PULM A REVS VEL: 28.9 CM/SEC
BH CV ECHO MEAS - PULM DIAS VEL: 52.3 CM/SEC
BH CV ECHO MEAS - PULM S/D: 1.2
BH CV ECHO MEAS - PULM SYS VEL: 64.3 CM/SEC
BH CV ECHO MEAS - PVA(V,A): 2.3 CM^2
BH CV ECHO MEAS - PVA(V,D): 2.3 CM^2
BH CV ECHO MEAS - QP/QS: 0.84
BH CV ECHO MEAS - RV MAX PG: 2.1 MMHG
BH CV ECHO MEAS - RV MEAN PG: 1 MMHG
BH CV ECHO MEAS - RV V1 MAX: 73.2 CM/SEC
BH CV ECHO MEAS - RV V1 MEAN: 52.1 CM/SEC
BH CV ECHO MEAS - RV V1 VTI: 18.2 CM
BH CV ECHO MEAS - RVDD: 2.5 CM
BH CV ECHO MEAS - RVOT AREA: 3.5 CM^2
BH CV ECHO MEAS - RVOT DIAM: 2.1 CM
BH CV ECHO MEAS - SI(AO): 87.3 ML/M^2
BH CV ECHO MEAS - SI(CUBED): 26.3 ML/M^2
BH CV ECHO MEAS - SI(LVOT): 37.6 ML/M^2
BH CV ECHO MEAS - SI(MOD-SP4): 22.1 ML/M^2
BH CV ECHO MEAS - SI(TEICH): 23.4 ML/M^2
BH CV ECHO MEAS - SV(AO): 174.3 ML
BH CV ECHO MEAS - SV(CUBED): 52.4 ML
BH CV ECHO MEAS - SV(LVOT): 75.1 ML
BH CV ECHO MEAS - SV(MOD-SP4): 44 ML
BH CV ECHO MEAS - SV(RVOT): 63 ML
BH CV ECHO MEAS - SV(TEICH): 46.7 ML
BH CV ECHO MEAS - TAPSE (>1.6): 2.5 CM2
BH CV XLRA - RV BASE: 3.6 CM
BH CV XLRA - RV LENGTH: 6.3 CM
BH CV XLRA - RV MID: 2.8 CM
BH CV XLRA - TDI S': 10.9 CM/SEC
LEFT ATRIUM VOLUME INDEX: 33.9 ML/M2
LV EF 2D ECHO EST: 60 %

## 2017-05-31 PROCEDURE — 0 FLUDEOXYGLUCOSE F18 SOLUTION: Performed by: INTERNAL MEDICINE

## 2017-05-31 PROCEDURE — 78815 PET IMAGE W/CT SKULL-THIGH: CPT

## 2017-05-31 PROCEDURE — 93306 TTE W/DOPPLER COMPLETE: CPT | Performed by: INTERNAL MEDICINE

## 2017-05-31 PROCEDURE — C8929 TTE W OR WO FOL WCON,DOPPLER: HCPCS

## 2017-05-31 PROCEDURE — 25010000002 PERFLUTREN (DEFINITY) 8.476 MG IN SODIUM CHLORIDE 10 ML INJECTION: Performed by: INTERNAL MEDICINE

## 2017-05-31 PROCEDURE — A9552 F18 FDG: HCPCS | Performed by: INTERNAL MEDICINE

## 2017-05-31 RX ADMIN — FLUDEOXYGLUCOSE F18 1 DOSE: 300 INJECTION INTRAVENOUS at 12:14

## 2017-05-31 RX ADMIN — SODIUM CHLORIDE 3 ML: 9 INJECTION INTRAMUSCULAR; INTRAVENOUS; SUBCUTANEOUS at 14:16

## 2017-06-02 ENCOUNTER — DOCUMENTATION (OUTPATIENT)
Dept: ONCOLOGY | Facility: CLINIC | Age: 62
End: 2017-06-02

## 2017-06-02 ENCOUNTER — OFFICE VISIT (OUTPATIENT)
Dept: ONCOLOGY | Facility: CLINIC | Age: 62
End: 2017-06-02

## 2017-06-02 ENCOUNTER — APPOINTMENT (OUTPATIENT)
Dept: LAB | Facility: HOSPITAL | Age: 62
End: 2017-06-02

## 2017-06-02 VITALS
TEMPERATURE: 98.4 F | DIASTOLIC BLOOD PRESSURE: 72 MMHG | WEIGHT: 234.8 LBS | SYSTOLIC BLOOD PRESSURE: 120 MMHG | HEART RATE: 82 BPM | RESPIRATION RATE: 18 BRPM | BODY MASS INDEX: 46.1 KG/M2 | HEIGHT: 60 IN

## 2017-06-02 DIAGNOSIS — C50.212 MALIGNANT NEOPLASM OF UPPER-INNER QUADRANT OF LEFT FEMALE BREAST (HCC): Primary | ICD-10-CM

## 2017-06-02 DIAGNOSIS — C50.412 MALIGNANT NEOPLASM OF UPPER-OUTER QUADRANT OF LEFT FEMALE BREAST (HCC): ICD-10-CM

## 2017-06-02 LAB
BASOPHILS # BLD AUTO: 0.04 10*3/MM3 (ref 0–0.1)
BASOPHILS NFR BLD AUTO: 1.2 % (ref 0–1.1)
DEPRECATED RDW RBC AUTO: 43.4 FL (ref 37–49)
EOSINOPHIL # BLD AUTO: 0.17 10*3/MM3 (ref 0–0.36)
EOSINOPHIL NFR BLD AUTO: 5.3 % (ref 1–5)
ERYTHROCYTE [DISTWIDTH] IN BLOOD BY AUTOMATED COUNT: 13.2 % (ref 11.7–14.5)
HCT VFR BLD AUTO: 39.8 % (ref 34–45)
HGB BLD-MCNC: 13 G/DL (ref 11.5–14.9)
IMM GRANULOCYTES # BLD: 0.04 10*3/MM3 (ref 0–0.03)
IMM GRANULOCYTES NFR BLD: 1.2 % (ref 0–0.5)
LYMPHOCYTES # BLD AUTO: 0.88 10*3/MM3 (ref 1–3.5)
LYMPHOCYTES NFR BLD AUTO: 27.4 % (ref 20–49)
MCH RBC QN AUTO: 29.5 PG (ref 27–33)
MCHC RBC AUTO-ENTMCNC: 32.7 G/DL (ref 32–35)
MCV RBC AUTO: 90.2 FL (ref 83–97)
MONOCYTES # BLD AUTO: 0.25 10*3/MM3 (ref 0.25–0.8)
MONOCYTES NFR BLD AUTO: 7.8 % (ref 4–12)
NEUTROPHILS # BLD AUTO: 1.83 10*3/MM3 (ref 1.5–7)
NEUTROPHILS NFR BLD AUTO: 57.1 % (ref 39–75)
NRBC BLD MANUAL-RTO: 0 /100 WBC (ref 0–0)
PLATELET # BLD AUTO: 113 10*3/MM3 (ref 150–375)
PMV BLD AUTO: 11.5 FL (ref 8.9–12.1)
RBC # BLD AUTO: 4.41 10*6/MM3 (ref 3.9–5)
WBC NRBC COR # BLD: 3.21 10*3/MM3 (ref 4–10)

## 2017-06-02 PROCEDURE — 36416 COLLJ CAPILLARY BLOOD SPEC: CPT

## 2017-06-02 PROCEDURE — 99215 OFFICE O/P EST HI 40 MIN: CPT | Performed by: INTERNAL MEDICINE

## 2017-06-02 PROCEDURE — 85025 COMPLETE CBC W/AUTO DIFF WBC: CPT

## 2017-06-02 NOTE — PROGRESS NOTES
Subjective .     REASON FOR FOLLOWUP :   Breast cancer  Provide an opinion on any further workup or treatment                             HISTORY OF PRESENT ILLNESS:  The patient is a 61 y.o. year old female  who is here for follow-up with the above-mentioned history.    Since her last visit she has no new problems.  She is here to review PET scan and to finalize a treatment plan.  She has not yet told her boyfriend/fiancé about her new diagnosis of breast cancer.    Past Medical History:   Diagnosis Date   • Bell's palsy    • Cancer     Left breast   • Depressed    • Diabetes    • Flea bite of multiple sites of lower extremity     PT INSTRUCTED TO INFORM DR GARCIAS OF BITES PRIOR TO SURGERY   • H/O meningitis 1987   • H/O: stroke    • High cholesterol    • HTN (hypertension)    • Migraine headache    • Polio    • Seizures    • Stroke      Past Surgical History:   Procedure Laterality Date   • APPENDECTOMY     • BREAST SURGERY Left 10/2015    Biopsy, benign   • CATARACT EXTRACTION     • EAR TUBES     • KNEE MENISCECTOMY     • OVARIAN CYST REMOVAL     • RI INSJ TUNNELED CVC W/O SUBQ PORT/ AGE 3 YR/> Right 5/18/2017    Procedure: INSERTION RIGHT VENOUS ACCESS DEVICE;  Surgeon: Yuli Garcias MD;  [place] [facility] [place] [facility]  Service: General   • TONSILLECTOMY     • TUBAL ABDOMINAL LIGATION         HEMATOLOGIC/ONCOLOGIC HISTORY:  (History from previous dates can be found in the separate document.)    MEDICATIONS    Current Outpatient Prescriptions:   •  cetirizine (ZyrTEC) 10 MG tablet, Take 10 mg by mouth Daily., Disp: , Rfl:   •  Cholecalciferol (VITAMIN D3) 20071 UNITS capsule, Take 50,000 Units by mouth Daily., Disp: , Rfl:   •  citalopram (CELEXA) 40 MG tablet, Take 40 mg by mouth Daily., Disp: , Rfl:   •  folic acid (FOLVITE) 1 MG tablet, Take 1 mg by mouth Daily., Disp: , Rfl:   •  furosemide (LASIX) 40 MG tablet, Take 40 mg by mouth Daily., Disp: , Rfl:   •  gabapentin (NEURONTIN) 300 MG capsule, Take 300  mg by mouth 3 (Three) Times a Day., Disp: , Rfl:   •  KLOR-CON 10 MEQ CR tablet, Take 10 mEq by mouth Daily., Disp: , Rfl:   •  lisinopril-hydrochlorothiazide (PRINZIDE,ZESTORETIC) 10-12.5 MG per tablet, Take 1 tablet by mouth Daily., Disp: , Rfl:   •  metFORMIN (GLUCOPHAGE) 500 MG tablet, Take 500 mg by mouth Daily With Breakfast., Disp: , Rfl:   •  nortriptyline (PAMELOR) 25 MG capsule, Take 1 capsule by mouth every night., Disp: 30 capsule, Rfl: 11  •  phenytoin (DILANTIN) 100 MG ER capsule, Take 3 capsules by mouth Daily., Disp: 90 capsule, Rfl: 11  •  topiramate (TOPAMAX) 25 MG tablet, Take 1 tablet by mouth 2 (Two) Times a Day., Disp: 60 tablet, Rfl: 11  •  topiramate (TOPAMAX) 50 MG tablet, Take 1 tablet by mouth 2 (Two) Times a Day., Disp: 60 tablet, Rfl: 11  •  venlafaxine (EFFEXOR) 37.5 MG tablet, Take 37.5 mg by mouth Daily., Disp: , Rfl:     ALLERGIES:     Allergies   Allergen Reactions   • Latex Hives   • Penicillins Rash       SOCIAL HISTORY:       Social History     Social History   • Marital status:      Spouse name: N/A   • Number of children: 9   • Years of education: 12     Occupational History   •  Disabled     Social History Main Topics   • Smoking status: Former Smoker     Packs/day: 2.50     Start date: 1972     Quit date: 2003   • Smokeless tobacco: Not on file   • Alcohol use Yes      Comment: SOCIAL; rare   • Drug use: No   • Sexual activity: Not on file     Other Topics Concern   • Not on file     Social History Narrative         FAMILY HISTORY:  Family History   Problem Relation Age of Onset   • Asthma Mother    • Diabetes Mother    • Hearing loss Mother    • Heart attack Mother    • Heart failure Mother    • Breast cancer Mother 55   • Hypertension Mother    • Alcohol abuse Father    • Diabetes Sister    • Diabetes Brother    • Brain cancer Brother 56   • Cancer Brother 56     bladder       REVIEW OF SYSTEMS:  GENERAL: No change in appetite or weight;   No fevers, chills, sweats.  "   SKIN: No nonhealing lesions.   No rashes.  HEME/LYMPH: No easy bruising, bleeding.   No swollen nodes.   EYES: No vision changes or diplopia.   ENT: No tinnitus, hearing loss, gum bleeding, epistaxis, hoarseness or dysphagia.   RESPIRATORY: No cough, shortness of breath, hemoptysis or wheezing.   CVS: No chest pain, palpitations, orthopnea, dyspnea on exertion or PND.   GI: No melena or hematochezia.   No abdominal pain.  No nausea, vomiting, constipation, diarrhea  : No lower tract obstructive symptoms, dysuria or hematuria.   MUSCULOSKELETAL: Chronic intermittent back pain for decades, unchanged NEUROLOGICAL: No global weakness, loss of consciousness or seizures.   PSYCHIATRIC: No increased nervousness, mood changes or depression.     Objective    Vitals:    06/02/17 1110   BP: 120/72   Pulse: 82   Resp: 18   Temp: 98.4 °F (36.9 °C)   Weight: 234 lb 12.8 oz (107 kg)   Height: 59.84\" (152 cm)   PainSc: 4  Comment: headache     Current Status 6/2/2017   ECOG score 0      PHYSICAL EXAM:    GENERAL:  Well-developed, well-nourished in no acute distress.   SKIN:  Warm, dry without rashes, purpura or petechiae.  EYES:  Pupils equal, round and reactive to light.  EOMs intact.  Conjunctivae normal.  EARS:  Hearing intact.  NOSE:  Septum midline.  No excoriations or nasal discharge.  MOUTH:  Tongue is well-papillated; no stomatitis or ulcers.  Lips normal.  THROAT:  Oropharynx without lesions or exudates.  NECK:  Supple with good range of motion; no thyromegaly or masses, no JVD.  LYMPHATICS:  No cervical, supraclavicular, axillary or inguinal adenopathy.  CHEST:  Lungs clear to auscultation. Good airflow.  CARDIAC:  Regular rate and rhythm without murmurs, rubs or gallops. Normal S1,S2.  BREAST: Bilateral inverted nipples  Mild skin dimpling left breast.   In the seated position:  Left breast mass at 9:30 o'clock: 7 cm horizontally, 5.5 cm vertically  Left breast mass at 2:30 o'clock: 7.5 cm horizontally, 5 cm " vertically  ABDOMEN:  Soft, nontender with no hepatosplenomegaly or masses.  EXTREMITIES:  No clubbing, cyanosis or edema.  NEUROLOGICAL:  Cranial Nerves II-XII grossly intact.  No focal neurological deficits.  PSYCHIATRIC:  Normal affect and mood.    RECENT LABS:        WBC   Date Value Ref Range Status   06/02/2017 3.21 (L) 4.00 - 10.00 10*3/mm3 Final   05/15/2017 5.20 4.50 - 10.70 10*3/mm3 Final   05/10/2017 5.21 4.50 - 10.70 10*3/mm3 Final     Hemoglobin   Date Value Ref Range Status   06/02/2017 13.0 11.5 - 14.9 g/dL Final   05/15/2017 13.3 11.9 - 15.5 g/dL Final   05/10/2017 13.4 11.9 - 15.5 g/dL Final     Platelets   Date Value Ref Range Status   06/02/2017 113 (L) 150 - 375 10*3/mm3 Final   05/15/2017 156 140 - 500 10*3/mm3 Final   05/10/2017 184 140 - 500 10*3/mm3 Final       Assessment/Plan   Malignant neoplasm of upper-inner quadrant of left female breast  - MRI Brain With & Without Contrast  - MRI Pelvis With & Without Contrast    Malignant neoplasm of upper-outer quadrant of left female breast  - MRI Brain With & Without Contrast  - MRI Pelvis With & Without Contrast  1.  Synchronous left breast cancers with biopsy-proven left axillary node involvement:  · 9:30 o'clock position: 5.7 cm by MRI 4/21/17. Invasive ductal carcinoma.  Grade 2.  ER >90%, ID >90%.  HER-2 positive.  · With nonflexible ruler in the seated position:   · 6/2/17: 7 cm horizontally, 5.5 cm vertically  · 2:30 o'clock position: 5.7 cm by MRI 4/21/17.  Invasive ductal carcinoma.  Grade 2.  ER 90%, ID 90%.  HER-2 positive  · With nonflexible ruler in the seated position:  · 6/2/17: 7.5 cm horizontally, 5 cm vertically  · CT CAP with contrast 4/21/17: Few tiny pulmonary nodules (3-4 millimeters and also a few tiny calcified granulomas)  · Bone scan 4/21/17: Right parietal/occipital bone lesion (this could not be seen on CT or PET scan) (this could not be biopsied as it could not be seen on CT)  · PET scan 5/31/17:: Possible focal uptake  sacral body and bilateral sacral ala (pelvis), suspected to be due to attenuation artifact.  No problems seen on concurrent CT imaging or on bone scan.  · Due to the questionable right parietal/occipital bone lesion and the questionable pelvic lesions, MRI brain and MRI pelvis ordered as recommended by the radiologist.  This will serve as a baseline for future follow-up.  It is not felt to  and therefore chemotherapy will begin 6/6/17.  I discussed this difficult case with both Dr. Lopez and Dr. Espinosa of our practice.  She has some findings concerning for metastatic disease, but no clear evidence of metastatic disease.  This is a high risk breast cancer.  It measures larger by my exam today (but exam, especially of something this large can be variable) initially, we will treat with curative intent (although suspect this is metastatic disease).  Whether we are treating for cure or for metastatic disease, a reasonable regimen for both would be:  Taxol weekly 80 mg/m² with every three-week Perjeta and every 3 week Herceptin, with a goal of 6 cycles, and then continued HER-2 directed therapy  Likely we'll reimage after 3 cycles.  If her questionable areas resolved, this likely represented metastatic disease.  If this appears to be localized disease, would likely proceed with surgery after completing 6 cycles of therapy.  We all agree TCHP (including carboplatin) would likely be too difficult for her, especially with her underlying cirrhosis.  Taxol was felt to be an easier to tolerate option and less likely to have liver complications than Taxotere.    2.  Cirrhosis with splenomegaly and tiny GE junction varices seen on CT 4/21/17.  Patient was not previously aware of a diagnosis of cirrhosis.  She states her LFTs have been elevated which Dr. Guadarrama believed was due to her Dilantin.  Dr. Guadarrama retired. Her last seizure was 1987 with bacterial meningitis.  Her new neurologist, Dr. Henry,  decreased her Dilantin dose.    Plan:  · Chemotherapy teaching and begin weekly Taxol with every three-week Herceptin and Perjeta on Tuesday 6/6/17.  · MRI brain and pelvis the evening of 6/6/17.  · She relies on free transportation which requires a 72 hour notice.  · Appointment with me in roughly 2 weeks after beginning chemotherapy for toxicity check.  · She will see either a nurse practitioner or a different physician in C2 D1 as I am out of the office that week.  · Reimage after 3 cycles.  Perhaps with bone scan, perhaps with MRI (depending on MRI results).  · Echocardiogram every 3 months.  Last was 5/31/17: 60%        I personally reviewed PET images.

## 2017-06-02 NOTE — PROGRESS NOTES
Dr Hudson called me about Ms Vargas who burst into tears when Dr. Hudson answered her question that, yes, she would lose her hair when she has chemotherapy.  She still hasn't told her boyfriend that she has breast cancer. He lost his first wife to cancer and she is afraid he may not be able to hear she has it too.  We discussed. I suggested she bring him with her to an appointment (and to chemotherapy, if he'll come). Hopefully he would feel supported by everyone talking to him.  Dr. Hudson wanted her to have a MRI this weekend.  We were in the process of getting cab vouchers for this appt.  Her [employer]  insurance denied it (they need 72 hours for an authorization.)  Her Medicaid transportation from [facility] needs 72 hours.  She will call for a ride this afternoon, come for the MRI on Tuesday. She has information about getting a wig, will need a prescription.  She has contact information for us, saw Irma Vera LCSW recently.  She said she planned to call her therapist at [facility] as soon as she got home.

## 2017-06-05 ENCOUNTER — TELEPHONE (OUTPATIENT)
Dept: ONCOLOGY | Facility: CLINIC | Age: 62
End: 2017-06-05

## 2017-06-05 ENCOUNTER — DOCUMENTATION (OUTPATIENT)
Dept: PSYCHIATRY | Facility: HOSPITAL | Age: 62
End: 2017-06-05

## 2017-06-05 DIAGNOSIS — C50.212 MALIGNANT NEOPLASM OF UPPER-INNER QUADRANT OF LEFT FEMALE BREAST (HCC): Primary | ICD-10-CM

## 2017-06-05 PROBLEM — Z45.2 FITTING AND ADJUSTMENT OF VASCULAR CATHETER: Status: ACTIVE | Noted: 2017-06-05

## 2017-06-05 RX ORDER — HEPARIN SODIUM (PORCINE) LOCK FLUSH IV SOLN 100 UNIT/ML 100 UNIT/ML
500 SOLUTION INTRAVENOUS AS NEEDED
OUTPATIENT
Start: 2017-06-06

## 2017-06-05 RX ORDER — SODIUM CHLORIDE 0.9 % (FLUSH) 0.9 %
10 SYRINGE (ML) INJECTION AS NEEDED
OUTPATIENT
Start: 2017-06-06

## 2017-06-05 NOTE — TELEPHONE ENCOUNTER
"Pt calling in states her \" at home\" told her her chemo treatment for tomorrow was cancelled. i confirmed with her that she is scheduled to have chemo ed and is scheduled for chemo following that. She v/u.  "

## 2017-06-05 NOTE — PROGRESS NOTES
Phone call received from patients , Tu [facility] ( [phone]).  Discussed upcoming appt's for patient.  She has transportation arranged.  Tu concerned about patient receiving chemo before results of MRI of brain and pelvis.  Tu feels that patient does not have clear understanding of her disease and the intent of treatment.  OSW to meet with patient in CBC office in the morning and ask APRN if OSW can be present during chemo education.  OSW faxed last progress note/office visit that has treatment plan and decisions. Tu has release of medical information from patient.     Will follow and assist.  Thank you,  Keisha Looney, MSSW, CSW, OSW-C

## 2017-06-06 ENCOUNTER — TELEPHONE (OUTPATIENT)
Dept: ONCOLOGY | Facility: CLINIC | Age: 62
End: 2017-06-06

## 2017-06-06 ENCOUNTER — APPOINTMENT (OUTPATIENT)
Dept: ONCOLOGY | Facility: HOSPITAL | Age: 62
End: 2017-06-06

## 2017-06-06 ENCOUNTER — APPOINTMENT (OUTPATIENT)
Dept: ONCOLOGY | Facility: CLINIC | Age: 62
End: 2017-06-06

## 2017-06-06 ENCOUNTER — HOSPITAL ENCOUNTER (OUTPATIENT)
Dept: MRI IMAGING | Facility: HOSPITAL | Age: 62
Discharge: HOME | End: 2017-06-06
Attending: INTERNAL MEDICINE | Admitting: INTERNAL MEDICINE

## 2017-06-06 ENCOUNTER — HOSPITAL ENCOUNTER (OUTPATIENT)
Dept: MRI IMAGING | Facility: HOSPITAL | Age: 62
Discharge: HOME | End: 2017-06-06
Attending: INTERNAL MEDICINE

## 2017-06-06 DIAGNOSIS — C50.212 MALIGNANT NEOPLASM OF UPPER-INNER QUADRANT OF LEFT FEMALE BREAST (HCC): ICD-10-CM

## 2017-06-06 DIAGNOSIS — C50.412 MALIGNANT NEOPLASM OF UPPER-OUTER QUADRANT OF LEFT FEMALE BREAST (HCC): ICD-10-CM

## 2017-06-06 LAB — CREAT BLDA-MCNC: 0.9 MG/DL (ref 0.6–1.3)

## 2017-06-06 PROCEDURE — 0 GADOBENATE DIMEGLUMINE 529 MG/ML SOLUTION: Performed by: INTERNAL MEDICINE

## 2017-06-06 PROCEDURE — 70553 MRI BRAIN STEM W/O & W/DYE: CPT

## 2017-06-06 PROCEDURE — 72197 MRI PELVIS W/O & W/DYE: CPT

## 2017-06-06 PROCEDURE — A9577 INJ MULTIHANCE: HCPCS | Performed by: INTERNAL MEDICINE

## 2017-06-06 PROCEDURE — 82565 ASSAY OF CREATININE: CPT

## 2017-06-06 RX ORDER — SODIUM CHLORIDE 9 MG/ML
250 INJECTION, SOLUTION INTRAVENOUS ONCE
Status: CANCELLED | OUTPATIENT
Start: 2017-06-20

## 2017-06-06 RX ORDER — SODIUM CHLORIDE 9 MG/ML
250 INJECTION, SOLUTION INTRAVENOUS ONCE
Status: CANCELLED | OUTPATIENT
Start: 2017-06-13

## 2017-06-06 RX ORDER — FAMOTIDINE 10 MG/ML
20 INJECTION, SOLUTION INTRAVENOUS ONCE
Status: CANCELLED | OUTPATIENT
Start: 2017-06-13

## 2017-06-06 RX ORDER — FAMOTIDINE 10 MG/ML
20 INJECTION, SOLUTION INTRAVENOUS ONCE
Status: CANCELLED | OUTPATIENT
Start: 2017-06-20

## 2017-06-06 RX ORDER — FAMOTIDINE 10 MG/ML
20 INJECTION, SOLUTION INTRAVENOUS ONCE
Status: CANCELLED | OUTPATIENT
Start: 2017-06-06

## 2017-06-06 RX ORDER — SODIUM CHLORIDE 9 MG/ML
250 INJECTION, SOLUTION INTRAVENOUS ONCE
Status: CANCELLED | OUTPATIENT
Start: 2017-06-06

## 2017-06-06 RX ADMIN — GADOBENATE DIMEGLUMINE 20 ML: 529 INJECTION, SOLUTION INTRAVENOUS at 21:30

## 2017-06-06 NOTE — TELEPHONE ENCOUNTER
Called the patient to check on her status that she was due for an appointment at 9 AM or in education and to begin chemotherapy today.  The patient states that appointment is to be canceled.  She has an appointment for an MRI this evening that she will keep.  I asked her to come in this afternoon for chemotherapy education that she was going to be in town anyway for an MRI but she declined stating she did not have a ride.  I have her cab vouchers to return to the office tomorrow for education and chemotherapy however she declined.  I asked Wednesday she could come in for treatment, explaining that we wish to get started soon and she was unsure and asked to call our office back.  I have left a voicemail for her  at [facility], Tu, to return my phone call to discuss the patient's status and the need to begin chemotherapy.  Tu had called SADI Haro, with concerns that the patient did not understand her diagnosis and still had pending tests.  This was discussed with Dr. Hudson this morning and the results of the test will not change the treatment plan and as he felt on exam that the breast mass had become larger within a week, he felt it necessary to begin treatment in a timely fashion.  I encouraged the patient once again to come to the office for education as this could be explained to her at that appointment, with a  present as well, however she declined due to concerns of not having a ride though cab vouchers were offered.

## 2017-06-06 NOTE — TELEPHONE ENCOUNTER
----- Message from ELENA Appiah sent at 6/6/2017 12:24 PM EDT -----  Regarding: call social work for patient  Please call the patients , Tu, at [phone].  The patient needs to reschedule her appointment to Friday of this week for chemo education (please alert Keisha Stauffer, social work, of appointment time) and then taxol , herceptin and perjeta.  Her appointment with Dr Hudson would then need to be pushed to 5/23 with taxol and she needs treatment 5/16 with Taxol.

## 2017-06-07 DIAGNOSIS — C50.412 MALIGNANT NEOPLASM OF UPPER-OUTER QUADRANT OF LEFT FEMALE BREAST (HCC): ICD-10-CM

## 2017-06-07 DIAGNOSIS — C50.212 MALIGNANT NEOPLASM OF UPPER-INNER QUADRANT OF LEFT FEMALE BREAST (HCC): ICD-10-CM

## 2017-06-08 ENCOUNTER — TELEPHONE (OUTPATIENT)
Dept: ONCOLOGY | Facility: HOSPITAL | Age: 62
End: 2017-06-08

## 2017-06-08 ENCOUNTER — TELEPHONE (OUTPATIENT)
Dept: ONCOLOGY | Facility: CLINIC | Age: 62
End: 2017-06-08

## 2017-06-08 ENCOUNTER — TELEPHONE (OUTPATIENT)
Dept: SURGERY | Facility: CLINIC | Age: 62
End: 2017-06-08

## 2017-06-08 ENCOUNTER — DOCUMENTATION (OUTPATIENT)
Dept: ONCOLOGY | Facility: HOSPITAL | Age: 62
End: 2017-06-08

## 2017-06-08 NOTE — TELEPHONE ENCOUNTER
Left message with pt confirming that she did want to cancel all further appts with us & to see if she needed anything from us (ex transferring records to [facility]).  Gave her my contact info to ret call.

## 2017-06-08 NOTE — TELEPHONE ENCOUNTER
Lenka with [facility] called because pt. aloxi is being denied.  Needs a PA.  Will give this call to julius to research.  Understanding noted.

## 2017-06-08 NOTE — TELEPHONE ENCOUNTER
Patient's  from [facility], she lab, returned my phone call on Tuesday afternoon.  She reports being unsure of the diagnosis for her patient and what it means as well as the treatment choices and scans.  I did explain that Dr. Poole plan of treatment will not change pending scan results.  I explained to Tu that I thought it would be best for the patient to come in for an education session where we can have severe [place],  present to assist the patient.  This may help clear up any misconceptions as well as give the patient more information.  Tu express concerns of her transportation.  We discussed getting treatment set up as soon as possible if not at the end of this week, then a week from today.  A message was sent to scheduling as Tu asked for them to call her to set this up so she can help arrange for transportation.

## 2017-06-08 NOTE — PROGRESS NOTES
Received a message from Lenka at [facility] -pt is scheduled to receive TCHP + Aloxi & Neulasta AT [facility] .  Lenka is requesting that I remove our auth for Aloxi with [employer] so they can get auth.  Called [employer] & removed auth .  Left message for Lenka [phone].  Will cancel further appts with us.

## 2017-06-08 NOTE — TELEPHONE ENCOUNTER
She has transferred care to Dr Brandon Henley in [place]. He plans to treat her with neoadjuvant chemotherapy  And then repeat MRI brain and breast after 6 cycles of therapy and to see if there is a change in the lesion seen in the calvarium.  Saw neurology and recommendedher reducing her dilantin to improve the recent elevation in liver enzymes.    Patient went for a CT guided biopsy of the calvarium and Dr Hadley did not see a target on CT. She then had a PET CT 5-31-17  that showed index lesion LEFT breast and nodes in the LEFT axilla and subpectoral space.No uptake at the skull to correlate to the recent bone scan finding. Perceived abnormal uptake at the sacral body and bilateral sacral ala with no definate underlying lesions at CT. No visceral metastases. Recommend MRI brain and head. MRI brain and head 6-6-17 showed abnormal signal intensity on the calvarium posterior laterally to the right suspicious for calvarial metastatic lesion with adjacent dural thickening or possibly tumor extension.  A dural metastatic lesion with secondary involvement of the calvarium is less likely.  Far less likely would be and on plaque meningioma with osseous extension.    MRI pelvis June 6, 2017 showed a 1.3 cm area of abnormal signal enhancement medullary space of the left femoral head and neck junction anterosuperiorly which is indeterminate and will require follow-up.  No associated abnormality on PET/CT in this area in the sitting on bone scan.  MRI would be the study of choice for follow-up.    Dr. Henley plans for neoadjuvant chemotherapy and then reassessment with a PET/CT as well as repeat MRI of the brain and breast and possibly pelvis

## 2017-06-12 ENCOUNTER — APPOINTMENT (OUTPATIENT)
Dept: ONCOLOGY | Facility: CLINIC | Age: 62
End: 2017-06-12

## 2017-06-12 ENCOUNTER — APPOINTMENT (OUTPATIENT)
Dept: ONCOLOGY | Facility: HOSPITAL | Age: 62
End: 2017-06-12

## 2017-06-12 ENCOUNTER — TELEPHONE (OUTPATIENT)
Dept: SURGERY | Facility: CLINIC | Age: 62
End: 2017-06-12

## 2017-06-19 ENCOUNTER — APPOINTMENT (OUTPATIENT)
Dept: ONCOLOGY | Facility: CLINIC | Age: 62
End: 2017-06-19

## 2017-06-19 ENCOUNTER — APPOINTMENT (OUTPATIENT)
Dept: LAB | Facility: HOSPITAL | Age: 62
End: 2017-06-19

## 2017-06-19 ENCOUNTER — APPOINTMENT (OUTPATIENT)
Dept: ONCOLOGY | Facility: HOSPITAL | Age: 62
End: 2017-06-19

## 2017-06-26 ENCOUNTER — APPOINTMENT (OUTPATIENT)
Dept: ONCOLOGY | Facility: HOSPITAL | Age: 62
End: 2017-06-26

## 2017-07-06 ENCOUNTER — TELEPHONE (OUTPATIENT)
Dept: SURGERY | Facility: CLINIC | Age: 62
End: 2017-07-06

## 2017-07-06 NOTE — TELEPHONE ENCOUNTER
Confirmed with Dr. Kale Ramos's office   Josseline's last cycle of tx should be 10/2/2017.     Need to be sure we have brain, breast and pelvis MRI reports for upcoming appointment to see Dr. Garcias.   This should take place 3rd week in October 2017.     lml

## 2017-09-11 RX ORDER — TOPIRAMATE 25 MG/1
TABLET ORAL
Qty: 60 TABLET | Refills: 7 | Status: SHIPPED | OUTPATIENT
Start: 2017-09-11 | End: 2017-12-18 | Stop reason: SDUPTHER

## 2017-09-11 RX ORDER — NORTRIPTYLINE HYDROCHLORIDE 25 MG/1
CAPSULE ORAL
Qty: 30 CAPSULE | Refills: 7 | Status: SHIPPED | OUTPATIENT
Start: 2017-09-11 | End: 2018-01-02 | Stop reason: SDUPTHER

## 2017-09-11 RX ORDER — TOPIRAMATE 50 MG/1
TABLET, FILM COATED ORAL
Qty: 60 TABLET | Refills: 7 | Status: SHIPPED | OUTPATIENT
Start: 2017-09-11 | End: 2017-12-18 | Stop reason: SDUPTHER

## 2017-09-13 RX ORDER — PHENYTOIN SODIUM 100 MG/1
CAPSULE, EXTENDED RELEASE ORAL
Qty: 120 CAPSULE | Refills: 5 | Status: SHIPPED | OUTPATIENT
Start: 2017-09-13 | End: 2017-12-18 | Stop reason: SDUPTHER

## 2017-10-02 ENCOUNTER — TELEPHONE (OUTPATIENT)
Dept: SURGERY | Facility: CLINIC | Age: 62
End: 2017-10-02

## 2017-10-02 NOTE — TELEPHONE ENCOUNTER
Need reports; breast, brain and pelvis MR as well as Med Onc notes.     Left message for Rachel Henley's office and Medical records-[facility].     Lml

## 2017-10-05 ENCOUNTER — TELEPHONE (OUTPATIENT)
Dept: SURGERY | Facility: CLINIC | Age: 62
End: 2017-10-05

## 2017-10-05 NOTE — TELEPHONE ENCOUNTER
Note on Josseline Vargsa dated September 27, 2017 from [facility]/ cancer [place].  Plan cycle 5 Neulasta support.  Restaged after cycle 6 and before surgery.  Brandon Henley.  We will obtain the date for her last dose of neoadjuvant PTC H, and request scns of the parietal skull lesion and lung lesions.      The patient completed 5 of 6 cycles of neoadjuvant TC HP.  She was hospitalized twice for transfusion and most recently for lower extremity cellulitis as an exacerbation.  She wishes completed 2 weeks of antibiotic.  He did not feel the patient given the last dose of chemotherapy.

## 2017-10-06 ENCOUNTER — TELEPHONE (OUTPATIENT)
Dept: SURGERY | Facility: CLINIC | Age: 62
End: 2017-10-06

## 2017-10-06 NOTE — TELEPHONE ENCOUNTER
Messages left for Triage nurse with Dr. Henley's offce 10/5/17 and again this morning    Need to know begin and expected end dates for tx; TCHP  Need to know if scan for skull and lung lesions have been scheduled or repeated yet?     lml

## 2017-10-09 ENCOUNTER — TELEPHONE (OUTPATIENT)
Dept: SURGERY | Facility: CLINIC | Age: 62
End: 2017-10-09

## 2017-10-09 NOTE — TELEPHONE ENCOUNTER
Josseline's 1st cycle of TCHP 6/12/17  Last planned cycle 10/11/17  Scans to be scheduled following last cycle    isabel

## 2017-10-19 ENCOUNTER — TELEPHONE (OUTPATIENT)
Dept: SURGERY | Facility: CLINIC | Age: 62
End: 2017-10-19

## 2017-10-19 NOTE — TELEPHONE ENCOUNTER
Contact [facility] to get Scan of brain and Pelvis   Was informed patient was admitted to [facility], requested admitting records   Had to leave another message with Dr. Brandon Dallas office on status of last cycle of chemo, scans, and why she was admitted.     Toml

## 2017-10-19 NOTE — TELEPHONE ENCOUNTER
Confirmed with RN w/Dr. Henley's office   Patient's last tx scheduled 10/16 was delayed due to patient being inpatient  They do have notes to order scan after last tx. Patient's last tx note was sent.     Yue

## 2017-10-24 ENCOUNTER — TELEPHONE (OUTPATIENT)
Dept: SURGERY | Facility: CLINIC | Age: 62
End: 2017-10-24

## 2017-10-24 NOTE — TELEPHONE ENCOUNTER
Spoke with AINSLEY Ramos in [facility]  Ms. Vargas is still inpatient  Last chemotherapy cycle has not been scheduled due to her being in the hospital  Rachel agreed to let me know when she is discharged and tx is scheduled.     lml

## 2017-11-09 ENCOUNTER — TELEPHONE (OUTPATIENT)
Dept: SURGERY | Facility: CLINIC | Age: 62
End: 2017-11-09

## 2017-11-09 NOTE — TELEPHONE ENCOUNTER
Contacted Dr. Brandon Dallas office today,  Patient's last & final dose of chemo was scheduled 11/8  Patient did not have this, per Siria Henley is discontinuing chemo tx, and will not be scheduling anymore scans at this time.   (We have been inquiring on these scans/staging regularly)   Dr. Henley is defering patient back to our office. Ms. Vargas has been added to our schedule for Monday 11/20.     lml     Spoke with Moshe Henley's office  I requested for Dr. Henley to comment on her lung and skull lesions seen pre-tx. It was planned to re-images these after chemo.   Patient's last tx was cycle 5 9/20/17. We need clarification for we see Ms. Vargas back in the office. Moshe will let Dr. Henley know and call me back.     Lml

## 2017-11-13 ENCOUNTER — TELEPHONE (OUTPATIENT)
Dept: SURGERY | Facility: CLINIC | Age: 62
End: 2017-11-13

## 2017-11-22 ENCOUNTER — TELEPHONE (OUTPATIENT)
Dept: SURGERY | Facility: CLINIC | Age: 62
End: 2017-11-22

## 2017-11-22 RX ORDER — AZITHROMYCIN 250 MG/1
250 TABLET, FILM COATED ORAL DAILY
COMMUNITY
End: 2017-11-27 | Stop reason: HOSPADM

## 2017-11-22 NOTE — TELEPHONE ENCOUNTER
Spoke with Radiologist Dr. Hoang [facility] following 11/16/17 scans.   Comparison images were requested to clarify on pulmonary nodules and axillary nodes from 4/21/17 CT @ [facility].   As well as a comment on dural changes, pls compare to 6/6/17 Brain MRI.     Need to clarify if Dr. Henley plans any imaging regarding pelvis abnormality from Pelvis MRI 6-7-17. Rachel relayed to me after confirming w/Dr. Henley. He is not concerned with this finding, asked that  proceeds.     Updated medication list provided by [facility]:  Potassium chloride ER 8 meq daily   Metformin 500 mg daily   Furosemide 20 mg daily   Phentoin (Dilantin) 100 mg 2 tablets BID  Topiramate 75mg BID  Nortripline 25 mg 1 x bedtime   Gabapentin 300 mg 1 x bedtime   Magnesium Oxide 400 mg 2 tablets daily   Zithromax 250 mg started 11/20/17 for 5 days   Venlafaxine 37.5 mg daily   Citalopram 40 mg daily  Updated in system.     Patient's last date/dose of chemotherapy 9/20/17, this was her 5th cycle.     Lml

## 2017-11-27 ENCOUNTER — OFFICE VISIT (OUTPATIENT)
Dept: SURGERY | Facility: CLINIC | Age: 62
End: 2017-11-27

## 2017-11-27 ENCOUNTER — TRANSCRIBE ORDERS (OUTPATIENT)
Dept: SURGERY | Facility: CLINIC | Age: 62
End: 2017-11-27

## 2017-11-27 VITALS
OXYGEN SATURATION: 96 % | WEIGHT: 218.4 LBS | BODY MASS INDEX: 44.03 KG/M2 | DIASTOLIC BLOOD PRESSURE: 78 MMHG | HEIGHT: 59 IN | HEART RATE: 74 BPM | SYSTOLIC BLOOD PRESSURE: 133 MMHG

## 2017-11-27 DIAGNOSIS — Z80.3 FH: BREAST CANCER: ICD-10-CM

## 2017-11-27 DIAGNOSIS — R93.89 ABNORMAL FINDINGS ON IMAGING TEST: ICD-10-CM

## 2017-11-27 DIAGNOSIS — C50.412 CARCINOMA OF BREAST UPPER OUTER QUADRANT, LEFT (HCC): ICD-10-CM

## 2017-11-27 DIAGNOSIS — Z17.0 MALIGNANT NEOPLASM OF UPPER-INNER QUADRANT OF LEFT BREAST IN FEMALE, ESTROGEN RECEPTOR POSITIVE (HCC): ICD-10-CM

## 2017-11-27 DIAGNOSIS — C50.412 MALIGNANT NEOPLASM OF UPPER-OUTER QUADRANT OF LEFT FEMALE BREAST, UNSPECIFIED ESTROGEN RECEPTOR STATUS (HCC): Primary | ICD-10-CM

## 2017-11-27 DIAGNOSIS — C77.3 SECONDARY MALIGNANT NEOPLASM OF AXILLARY LYMPH NODES (HCC): ICD-10-CM

## 2017-11-27 DIAGNOSIS — C50.212 CARCINOMA OF UPPER-INNER QUADRANT OF FEMALE BREAST, LEFT (HCC): Primary | ICD-10-CM

## 2017-11-27 DIAGNOSIS — K74.69 OTHER CIRRHOSIS OF LIVER (HCC): ICD-10-CM

## 2017-11-27 DIAGNOSIS — C50.212 MALIGNANT NEOPLASM OF UPPER-INNER QUADRANT OF LEFT BREAST IN FEMALE, ESTROGEN RECEPTOR POSITIVE (HCC): ICD-10-CM

## 2017-11-27 PROCEDURE — 99215 OFFICE O/P EST HI 40 MIN: CPT | Performed by: SURGERY

## 2017-11-27 NOTE — PROGRESS NOTES
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 axillary lymph node:  Indication:  enlarged axillary lymph node on ultrasound with clinical diagnosis of cancer and abnormal imaging and exam  Location: LEFT axilla  Consent:  The risks, benefits, and alternatives to the procedure were discussed with the patient, who understood and wished to proceed.  The risks described included, but were not limited to, bleeding, infection, pneumothorax, injury to the axillary vein.  Description of Procedure:   After the patient was positioned supine on the procedure table with her arm raised over her head, I located the abnormal appearing/enlarged lymph node using ultrasound.  I prepped and draped the axillary skin in sterile fashion.  I anesthetized the axillary skin with 1% lidocaine with epinephrine.  I then anesthetized the deep tissues with 1% lidocaine with epinephrine under ultrasound visualization and guidance. I then inserted a 21 G needle (attached to a 5cc syringe) into the node under ultrasound guidance and made 15-20 passes into the node to perform an adequate aspiration of cells.   The sample was placed on 4 slides and sent in alcohol for cytologic evaluation.  Manual compression was held for 5 minutes and a bandaid applied.  Marker placed: hydromark  Tolerance: The patient  tolerated the procedure well.  Disposition: We will see her back within one week to discuss her pathology.    Assessment:   Diagnosis Plan   1. Malignant neoplasm of upper-outer quadrant of left female breast, unspecified estrogen receptor status  Ambulatory Referral to Hepatology   2. Malignant neoplasm of upper-inner quadrant of left breast in female, estrogen receptor positive     3. Secondary malignant neoplasm of axillary lymph nodes     4. FH: breast cancer     5. Other cirrhosis of liver     6. Abnormal findings on imaging test        1-5  LEFT 10:00 9.5 CFN- 5.5 cm on exam; 2 cm on ultrasound but underrepresented, 5.7 cm on MRI- BR4  Left breast 10:00, invasive ductal carcinoma, intermediate grade, 3, 2, 1, largest length in a core 1.2 cm.  10:00 9.5 CFN- ER 90 ND 90 her 2 tacos 2+, ration 2.0, copy number 4.6, positive.    LEFT 2:00 10 CFN 6 cm no exam, 3 cm on ultrasound but underrepresented, also 5.7 cm on MRI- Br5  Left breast biopsy 2:00, invasive mammary carcinoma with focal lobular features, intermediate grade, 3, 2, 1, largest size in a core 1.5 cm.  2:00 10 CFN- ER 90 ND 90, her 2 IHC 2+, ration 2.1, positive. Copy number was 5.1.    LEFT axilla node FNA- positive for metastatic mammary carcinoma    - Clinical stage [id number]- stage IIIA (Dr Henley did not feel that the findings on imaging were metastatic)    4-  Mother age 50  MGA age uncertain    5-  Cirrhosis with abnormal imaging of liver, splenomegaly, small GEJxn varices and slightly elevated INR of 1.33  patient uncertain of etiology- was told was due to dilantin. Denies hepatits or excessive ETOH- has not seen hepatology    6-  -Bone scan showed 4-21-17 RIGHT parietal-occipital bone lesion susp for metastatic disease- couldn't find target for biopsy; repeat MRI showed stable dural thickening  -CT chest 4-2017 pulmonary nodules 3-4 mm- recommend Fu chest CT 4 months- FU CT in November 2017 showed stable nodules with benign  morphology      8-  Seen on staging imaging with varices=       Plan:  Josseline, her friend and I reviewed her interval history, her imaging reports, and her exam today.     She has had a quite impressive response to the chemotherapy, despite it being shortened due to her lower extremity cellulitis and/or intolerance.    We discussed that with a multicentric tumor with lesions located at the 10:00 and the 2:00 location, that we are unable to offer breast conservation in this case.  In addition with the magnitude of the initial tumors and the skin changes, we would prefer to resect a good portion of her skin.      We therefore discussed the procedure of a:  Left total mastectomy, left sentinel lymph node biopsy to include retrieval of prior clip, possible axillary lymph node dissection, no reconstruction.    We did discuss the risks and benefits of reconstruction in her case, and have declined that option at this time.  She understands the risks of mastectomy including bleeding, infection, seroma and chance of skin ischemia/necrosis. She understands the risks of  sentinel node biopsy, including 7% chance of lymphedema, injury to nerves or vessels in the axilla,  seroma. We discussed that we will proceed with a frozen section analysis of the sentinel node in the operating room, and if any positivity, would proceed with a completion axillary dissection at that time.  NCCN guidelines have been followed.  We gave her EMLA cream and tegederm for her sentinel node procedure.    She will receive a recommendation about radiation after surgery.SHe will need radiation.    Since she has never been seen by hepatology and has imaging documentation of cirrhosis, we will have her see Dr. Hines in the preoperative period.    Due to her tearfulness in clinic today and her past history of depression, we will have her see Lily Bullard.  We will follow up with Keisha Stauffer to see if to ensure that she has postoperative  care.      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  Note- Depression with prior admission to [facility].  in 2012 and lost son to suicide 2013.  Has smoked tobacco for 45 years 1.5 ppd    Most recent labs:  5-22-17- INR 1.28, Albumin 4.3, ALT7, AST 33, , Bili 0.4  6-2-17- PLts 113, PCV 90, Cr, 0.91      Yuli Garcias MD        We have spent 40 minutes in visit today, 25 in face to face .      Next Appointment:  No Follow-up on file.      EMR Dragon/transcription disclaimer:    Much of this encounter note is an electronic transcription/translocation of spoken language to printed text.  The electronic translation of spoken language may permit erroneous, or at times, nonsensical words or phrases to be inadvertently transcribed.  Although I have reviewed the note from such areas, some may still exist.

## 2017-11-28 ENCOUNTER — PREP FOR SURGERY (OUTPATIENT)
Dept: OTHER | Facility: HOSPITAL | Age: 62
End: 2017-11-28

## 2017-11-28 ENCOUNTER — TELEPHONE (OUTPATIENT)
Dept: SURGERY | Facility: CLINIC | Age: 62
End: 2017-11-28

## 2017-11-28 DIAGNOSIS — C50.412 BREAST CANCER OF UPPER-OUTER QUADRANT OF LEFT FEMALE BREAST (HCC): Primary | ICD-10-CM

## 2017-11-28 DIAGNOSIS — C50.412 CARCINOMA OF UPPER-OUTER QUADRANT OF FEMALE BREAST, LEFT (HCC): ICD-10-CM

## 2017-11-28 DIAGNOSIS — C50.212 CARCINOMA OF UPPER-INNER QUADRANT OF FEMALE BREAST, LEFT (HCC): Primary | ICD-10-CM

## 2017-11-28 PROBLEM — K74.69 OTHER CIRRHOSIS OF LIVER (HCC): Status: ACTIVE | Noted: 2017-11-28

## 2017-11-28 RX ORDER — DIAZEPAM 5 MG/1
10 TABLET ORAL ONCE
Status: CANCELLED | OUTPATIENT
Start: 2018-01-11 | End: 2017-11-28

## 2017-11-28 RX ORDER — CLINDAMYCIN PHOSPHATE 900 MG/50ML
900 INJECTION INTRAVENOUS ONCE
Status: CANCELLED | OUTPATIENT
Start: 2018-01-11 | End: 2017-11-28

## 2017-11-28 RX ORDER — SODIUM CHLORIDE, SODIUM LACTATE, POTASSIUM CHLORIDE, CALCIUM CHLORIDE 600; 310; 30; 20 MG/100ML; MG/100ML; MG/100ML; MG/100ML
100 INJECTION, SOLUTION INTRAVENOUS CONTINUOUS
Status: CANCELLED | OUTPATIENT
Start: 2018-01-11

## 2017-11-28 NOTE — TELEPHONE ENCOUNTER
Scheduled Consult with Dr. Hines (Hepatology)  12-29-19 @ 3:30  Spoke with Tamy.   She will call the patient and let her know about appt.    Explained  needs clearance for surgery.  Tamy said she will let Dr. Hines know.    kdl

## 2017-11-29 ENCOUNTER — TELEPHONE (OUTPATIENT)
Dept: SURGERY | Facility: CLINIC | Age: 62
End: 2017-11-29

## 2017-11-29 ENCOUNTER — TELEPHONE (OUTPATIENT)
Dept: PSYCHIATRY | Facility: HOSPITAL | Age: 62
End: 2017-11-29

## 2017-11-29 PROBLEM — C50.412 BREAST CANCER OF UPPER-OUTER QUADRANT OF LEFT FEMALE BREAST: Status: ACTIVE | Noted: 2017-11-29

## 2017-11-29 NOTE — TELEPHONE ENCOUNTER
Scheduled Surgery  [facility]  1/11/18  Left Total Mastectomy, Left Axilla Oneill Node Biopsy, Possible Left Axilla Node Dissection  No Reconstruction          Arrive       @ 5:45am  SI             @ 7:00am  Surgery   @ 8:00am    PAT 1-2-18 @ 12:30  Return to see Dr CASTILLO 1-23-18 arrive 12:15    Lily Alexander to assist    Patient is aware of appointments  I also sent letter with appointments    ric

## 2017-11-29 NOTE — TELEPHONE ENCOUNTER
Support[facility] Oncology [facility]    Call placed to pt regarding referral from Dr. Garcias to [facility] clinic. Pt reports existing relationship with psychiatric nurse practitioner, Margot Villarreal, which was initiated appx 6 months ago for grief associated with completed suicide of son appx 4 years ago. Additional stressors include breast cancer, recent death of mother, and brother with current brain tumor. Pt is tearful and conversation is relatively childlike. Discussed clinic offerings, including ability for consultation and potential to take over mental health care short term or ability to collaborate with existing psychiatric provider. Pt stated [place] was a far drive. Recommended pt make appt with existing provider to discuss health changes and establish regular follow up. I have provided pt with my information and offered support. Pt aware she may call me if unable to obtain appropriate care through existing provider or if I can be of any assistance.    Pt called back to tell me she has made appt with ELENA Burnette on December 5. I will plan to follow only as needed.

## 2017-12-06 ENCOUNTER — TELEPHONE (OUTPATIENT)
Dept: SURGERY | Facility: CLINIC | Age: 62
End: 2017-12-06

## 2017-12-06 NOTE — TELEPHONE ENCOUNTER
Patient contacted by [facility]   Patient has existing psych provider, patient encouraged to make follow-up appt w/this provider. Patient has done this for 12/5/17.     Lml

## 2017-12-12 ENCOUNTER — TELEPHONE (OUTPATIENT)
Dept: SURGERY | Facility: CLINIC | Age: 62
End: 2017-12-12

## 2017-12-12 NOTE — TELEPHONE ENCOUNTER
Contacted [facility]arm[facility]  Jacinto is no longer w/the agency. Patients new  is Payal Watson [phone]. I left a message for Payal to call me regarding perioperative assistance at home.     lml

## 2017-12-13 ENCOUNTER — TELEPHONE (OUTPATIENT)
Dept: ONCOLOGY | Facility: HOSPITAL | Age: 62
End: 2017-12-13

## 2017-12-13 NOTE — TELEPHONE ENCOUNTER
Referral received from Dr. Garcias.  Patient to undergo breast surgery on 1/11/2018.  Dr. Garcias concerned about post op care at home for clinical needs and practical needs.  OSW called and spoke to patient.  She states that her sisters Tati Dozier and Carine Villatoro will be with her day of sx and day after and they will provide transportation home.  She states that once she is home her sister Carine Villatoro will stay with her in her home to assist.  Carine's # is [phone].  OSW will call Carine and confirm this plan.  Also,  Patient has used [facility] in the past and would like them again to provide in home drain care.  OSW will make referral to [facility] for anticipated help post d/c.    OSW will follow up with patient on 6 park after sx on 1/12/18  VARINDER Lopez, CSW< OSW-C

## 2017-12-18 ENCOUNTER — OFFICE VISIT (OUTPATIENT)
Dept: NEUROLOGY | Facility: CLINIC | Age: 62
End: 2017-12-18

## 2017-12-18 VITALS
SYSTOLIC BLOOD PRESSURE: 130 MMHG | HEIGHT: 60 IN | WEIGHT: 222 LBS | DIASTOLIC BLOOD PRESSURE: 80 MMHG | BODY MASS INDEX: 43.59 KG/M2

## 2017-12-18 DIAGNOSIS — G43.009 MIGRAINE WITHOUT AURA AND WITHOUT STATUS MIGRAINOSUS, NOT INTRACTABLE: Primary | ICD-10-CM

## 2017-12-18 DIAGNOSIS — G40.909 SEIZURE DISORDER (HCC): ICD-10-CM

## 2017-12-18 PROCEDURE — 99213 OFFICE O/P EST LOW 20 MIN: CPT | Performed by: PSYCHIATRY & NEUROLOGY

## 2017-12-18 RX ORDER — TOPIRAMATE 25 MG/1
25 TABLET ORAL 2 TIMES DAILY
Qty: 60 TABLET | Refills: 11 | Status: SHIPPED | OUTPATIENT
Start: 2017-12-18 | End: 2018-08-15 | Stop reason: SDUPTHER

## 2017-12-18 RX ORDER — TOPIRAMATE 50 MG/1
50 TABLET, FILM COATED ORAL 2 TIMES DAILY
Qty: 60 TABLET | Refills: 11 | Status: SHIPPED | OUTPATIENT
Start: 2017-12-18 | End: 2018-07-26 | Stop reason: SDUPTHER

## 2017-12-18 RX ORDER — PHENYTOIN SODIUM 100 MG/1
100 CAPSULE, EXTENDED RELEASE ORAL NIGHTLY
Qty: 90 CAPSULE | Refills: 5 | Status: SHIPPED | OUTPATIENT
Start: 2017-12-18 | End: 2018-12-27 | Stop reason: SDUPTHER

## 2017-12-18 NOTE — PROGRESS NOTES
Subjective:     Patient ID: Josseline Vargas is a 62 y.o. female.    History of Present Illness     The patient was seen back the office for follow-up of seizures and headaches.  She's had no seizures over the past year.  Her headaches are well controlled.  Medicines were reviewed.  Client.  No side effects.  The following portions of the patient's history were reviewed and updated as appropriate: allergies, current medications, past family history, past medical history, past social history, past surgical history and problem list.      Current Outpatient Prescriptions:   •  citalopram (CELEXA) 40 MG tablet, Take 40 mg by mouth Daily., Disp: , Rfl:   •  furosemide (LASIX) 40 MG tablet, Take 20 mg by mouth Daily., Disp: , Rfl:   •  gabapentin (NEURONTIN) 300 MG capsule, Take 300 mg by mouth every night at bedtime., Disp: , Rfl:   •  KLOR-CON 10 MEQ CR tablet, Take 8 mEq by mouth Daily., Disp: , Rfl:   •  magnesium oxide (MAGOX) 400 (241.3 Mg) MG tablet tablet, Take 400 mg by mouth Daily., Disp: , Rfl:   •  metFORMIN (GLUCOPHAGE) 500 MG tablet, Take 500 mg by mouth Daily With Breakfast., Disp: , Rfl:   •  nortriptyline (PAMELOR) 25 MG capsule, TAKE ONE CAPSULE BY MOUTH AT BEDTIME, Disp: 30 capsule, Rfl: 7  •  phenytoin (DILANTIN) 100 MG ER capsule, Take 1 capsule by mouth Every Night., Disp: 90 capsule, Rfl: 5  •  topiramate (TOPAMAX) 25 MG tablet, Take 1 tablet by mouth 2 (Two) Times a Day., Disp: 60 tablet, Rfl: 11  •  topiramate (TOPAMAX) 50 MG tablet, Take 1 tablet by mouth 2 (Two) Times a Day., Disp: 60 tablet, Rfl: 11  •  venlafaxine (EFFEXOR) 37.5 MG tablet, Take 37.5 mg by mouth Daily., Disp: , Rfl:     Review of Systems   Constitutional: Negative.    Neurological: Negative.    Psychiatric/Behavioral: Negative.         Objective:    Neurologic Exam  Mental status examination was appropriate.  Funduscopy, visual fields, eye movements and pupillary reflexes were normal.  No facial weakness was noted.  Gait was  normal.  No pattern of focal weakness was noted.  Physical Exam    Assessment/Plan:     Josseline was seen today for seizures.    Diagnoses and all orders for this visit:    Migraine without aura and without status migrainosus, not intractable    Seizure disorder    Other orders  -     topiramate (TOPAMAX) 50 MG tablet; Take 1 tablet by mouth 2 (Two) Times a Day.  -     topiramate (TOPAMAX) 25 MG tablet; Take 1 tablet by mouth 2 (Two) Times a Day.  -     phenytoin (DILANTIN) 100 MG ER capsule; Take 1 capsule by mouth Every Night.         Prescription drug management - meds as above    Follow-up in the office in one year. Thank you for allowing me to share in the care of this patient.  Hmuberto Henry M.D.

## 2018-01-02 ENCOUNTER — APPOINTMENT (OUTPATIENT)
Dept: PREADMISSION TESTING | Facility: HOSPITAL | Age: 63
End: 2018-01-02

## 2018-01-02 VITALS
SYSTOLIC BLOOD PRESSURE: 118 MMHG | HEIGHT: 60 IN | RESPIRATION RATE: 20 BRPM | WEIGHT: 224 LBS | HEART RATE: 61 BPM | TEMPERATURE: 96.7 F | BODY MASS INDEX: 43.98 KG/M2 | OXYGEN SATURATION: 98 % | DIASTOLIC BLOOD PRESSURE: 61 MMHG

## 2018-01-02 DIAGNOSIS — C50.412 CARCINOMA OF UPPER-OUTER QUADRANT OF FEMALE BREAST, LEFT (HCC): ICD-10-CM

## 2018-01-02 DIAGNOSIS — C50.212 CARCINOMA OF UPPER-INNER QUADRANT OF FEMALE BREAST, LEFT (HCC): ICD-10-CM

## 2018-01-02 LAB
ALBUMIN SERPL-MCNC: 3.8 G/DL (ref 3.5–5.2)
ALBUMIN/GLOB SERPL: 0.8 G/DL
ALP SERPL-CCNC: 129 U/L (ref 39–117)
ALT SERPL W P-5'-P-CCNC: 5 U/L (ref 1–33)
ANION GAP SERPL CALCULATED.3IONS-SCNC: 11.9 MMOL/L
AST SERPL-CCNC: 25 U/L (ref 1–32)
BASOPHILS # BLD AUTO: 0.02 10*3/MM3 (ref 0–0.2)
BASOPHILS NFR BLD AUTO: 0.6 % (ref 0–1.5)
BILIRUB SERPL-MCNC: 0.3 MG/DL (ref 0.1–1.2)
BILIRUB UR QL STRIP: NEGATIVE
BUN BLD-MCNC: 18 MG/DL (ref 8–23)
BUN/CREAT SERPL: 17.8 (ref 7–25)
CALCIUM SPEC-SCNC: 9.2 MG/DL (ref 8.6–10.5)
CHLORIDE SERPL-SCNC: 108 MMOL/L (ref 98–107)
CLARITY UR: CLEAR
CO2 SERPL-SCNC: 22.1 MMOL/L (ref 22–29)
COLOR UR: YELLOW
CREAT BLD-MCNC: 1.01 MG/DL (ref 0.57–1)
DEPRECATED RDW RBC AUTO: 56 FL (ref 37–54)
EOSINOPHIL # BLD AUTO: 0.17 10*3/MM3 (ref 0–0.7)
EOSINOPHIL NFR BLD AUTO: 5.1 % (ref 0.3–6.2)
ERYTHROCYTE [DISTWIDTH] IN BLOOD BY AUTOMATED COUNT: 15.5 % (ref 11.7–13)
GFR SERPL CREATININE-BSD FRML MDRD: 55 ML/MIN/1.73
GLOBULIN UR ELPH-MCNC: 4.5 GM/DL
GLUCOSE BLD-MCNC: 94 MG/DL (ref 65–99)
GLUCOSE UR STRIP-MCNC: NEGATIVE MG/DL
HCT VFR BLD AUTO: 42.7 % (ref 35.6–45.5)
HGB BLD-MCNC: 13 G/DL (ref 11.9–15.5)
HGB UR QL STRIP.AUTO: NEGATIVE
IMM GRANULOCYTES # BLD: 0 10*3/MM3 (ref 0–0.03)
IMM GRANULOCYTES NFR BLD: 0 % (ref 0–0.5)
INR PPP: 1.29 (ref 0.9–1.1)
KETONES UR QL STRIP: NEGATIVE
LEUKOCYTE ESTERASE UR QL STRIP.AUTO: NEGATIVE
LYMPHOCYTES # BLD AUTO: 1.05 10*3/MM3 (ref 0.9–4.8)
LYMPHOCYTES NFR BLD AUTO: 31.5 % (ref 19.6–45.3)
MAGNESIUM SERPL-MCNC: 1.7 MG/DL (ref 1.6–2.4)
MCH RBC QN AUTO: 30.1 PG (ref 26.9–32)
MCHC RBC AUTO-ENTMCNC: 30.4 G/DL (ref 32.4–36.3)
MCV RBC AUTO: 98.8 FL (ref 80.5–98.2)
MONOCYTES # BLD AUTO: 0.26 10*3/MM3 (ref 0.2–1.2)
MONOCYTES NFR BLD AUTO: 7.8 % (ref 5–12)
NEUTROPHILS # BLD AUTO: 1.83 10*3/MM3 (ref 1.9–8.1)
NEUTROPHILS NFR BLD AUTO: 55 % (ref 42.7–76)
NITRITE UR QL STRIP: NEGATIVE
PH UR STRIP.AUTO: <=5 [PH] (ref 5–8)
PLATELET # BLD AUTO: 103 10*3/MM3 (ref 140–500)
PMV BLD AUTO: 11.4 FL (ref 6–12)
POTASSIUM BLD-SCNC: 4.2 MMOL/L (ref 3.5–5.2)
PROT SERPL-MCNC: 8.3 G/DL (ref 6–8.5)
PROT UR QL STRIP: NEGATIVE
PROTHROMBIN TIME: 15.6 SECONDS (ref 11.7–14.2)
RBC # BLD AUTO: 4.32 10*6/MM3 (ref 3.9–5.2)
SODIUM BLD-SCNC: 142 MMOL/L (ref 136–145)
SP GR UR STRIP: 1.01 (ref 1–1.03)
UROBILINOGEN UR QL STRIP: NORMAL
WBC NRBC COR # BLD: 3.33 10*3/MM3 (ref 4.5–10.7)

## 2018-01-02 PROCEDURE — 83735 ASSAY OF MAGNESIUM: CPT | Performed by: SURGERY

## 2018-01-02 PROCEDURE — 85610 PROTHROMBIN TIME: CPT | Performed by: SURGERY

## 2018-01-02 PROCEDURE — 36415 COLL VENOUS BLD VENIPUNCTURE: CPT

## 2018-01-02 PROCEDURE — 93010 ELECTROCARDIOGRAM REPORT: CPT | Performed by: INTERNAL MEDICINE

## 2018-01-02 PROCEDURE — 80053 COMPREHEN METABOLIC PANEL: CPT | Performed by: SURGERY

## 2018-01-02 PROCEDURE — 93005 ELECTROCARDIOGRAM TRACING: CPT

## 2018-01-02 PROCEDURE — 81003 URINALYSIS AUTO W/O SCOPE: CPT | Performed by: SURGERY

## 2018-01-02 PROCEDURE — 85025 COMPLETE CBC W/AUTO DIFF WBC: CPT | Performed by: SURGERY

## 2018-01-02 RX ORDER — NORTRIPTYLINE HYDROCHLORIDE 25 MG/1
25 CAPSULE ORAL NIGHTLY
COMMUNITY
End: 2018-07-11 | Stop reason: SDUPTHER

## 2018-01-02 NOTE — DISCHARGE INSTRUCTIONS
Take the following medications the morning of surgery with a small sip of water:        General Instructions:  • Do not eat solid food after midnight the night before surgery.  • You may drink clear liquids day of surgery but must stop at least one hour before your hospital arrival time.  • It is beneficial for you to have a clear drink that contains carbohydrates the day of surgery.  We suggest a 12 to 20 ounce bottle of Gatorade or Powerade for non-diabetic patients or a 12 to 20 ounce bottle of G2 or Powerade Zero for diabetic patients. (Pediatric patients, are not advised to drink a 12 to 20 ounce carbohydrate drink)    Clear liquids are liquids you can see through.  Nothing red in color.     Plain water                               Sports drinks  Sodas                                   Gelatin (Jell-O)  Fruit juices without pulp such as white grape juice and apple juice  Popsicles that contain no fruit or yogurt  Tea or coffee (no cream or milk added)  Gatorade / Powerade  G2 / Powerade Zero    • Infants may have breast milk up to four hours before surgery.  • Infants drinking formula may drink formula up to six hours before surgery.   • Patients who avoid smoking, chewing tobacco and alcohol for 4 weeks prior to surgery have a reduced risk of post-operative complications.  Quit smoking as many days before surgery as you can.  • Do not smoke, use chewing tobacco or drink alcohol the day of surgery.   • If applicable bring your C-PAP/ BI-PAP machine.  • Bring any papers given to you in the doctor’s office.  • Wear clean comfortable clothes and socks.  • Do not wear contact lenses or make-up.  Bring a case for your glasses.   • Bring crutches or walker if applicable.  • Remove all piercings.  Leave jewelry and any other valuables at home.  • Hair extensions with metal clips must be removed prior to surgery.  • The Pre-Admission Testing nurse will instruct you to bring medications if unable to obtain an accurate  list in Pre-Admission Testing.        If you were given a blood bank ID arm band remember to bring it with you the day of surgery.    Preventing a Surgical Site Infection:  • For 2 to 3 days before surgery, avoid shaving with a razor because the razor can irritate skin and make it easier to develop an infection.  • The night prior to surgery sleep in a clean bed with clean clothing.  Do not allow pets to sleep with you.  • Shower on the morning of surgery using a fresh bar of anti-bacterial soap (such as Dial) and clean washcloth.  Dry with a clean towel and dress in clean clothing.  • Ask your surgeon if you will be receiving antibiotics prior to surgery.  • Make sure you, your family, and all healthcare providers clean their hands with soap and water or an alcohol based hand  before caring for you or your wound.    Day of surgery:  Upon arrival, a Pre-op nurse and Anesthesiologist will review your health history, obtain vital signs, and answer questions you may have.  The only belongings needed at this time will be your home medications and if applicable your C-PAP/BI-PAP machine.  If you are staying overnight your family can leave the rest of your belongings in the car and bring them to your room later.  A Pre-op nurse will start an IV and you may receive medication in preparation for surgery, including something to help you relax.  Your family will be able to see you in the Pre-op area.  While you are in surgery your family should notify the waiting room  if they leave the waiting room area and provide a contact phone number.    Please be aware that surgery does come with discomfort.  We want to make every effort to control your discomfort so please discuss any uncontrolled symptoms with your nurse.   Your doctor will most likely have prescribed pain medications.      If you are going home after surgery you will receive individualized written care instructions before being discharged.  A  responsible adult must drive you to and from the hospital on the day of your surgery and stay with you for 24 hours.    If you are staying overnight following surgery, you will be transported to your hospital room following the recovery period.  [facility] has all private rooms.    If you have any questions please call Pre-Admission Testing at [phone].  Deductibles and co-payments are collected on the day of service. Please be prepared to pay the required co-pay, deductible or deposit on the day of service as defined by your plan.

## 2018-01-03 ENCOUNTER — TELEPHONE (OUTPATIENT)
Dept: SURGERY | Facility: CLINIC | Age: 63
End: 2018-01-03

## 2018-01-04 ENCOUNTER — TELEPHONE (OUTPATIENT)
Dept: SURGERY | Facility: CLINIC | Age: 63
End: 2018-01-04

## 2018-01-04 NOTE — TELEPHONE ENCOUNTER
Changed Ms. Vargas's   Post Op Visit (same day)  But she will arrive at 11:15    Patient v/u    damiánl

## 2018-01-04 NOTE — TELEPHONE ENCOUNTER
Note from Dr. Hines, hepatology from [facility] dated 12-29-17-  Cirrhosis- risk of surgery 15%. Mortality 7d just under 4%; 30 day 15 %, and 90 day 40%. Minimize HTN and nephrotoxins and any form of renal injury.    He sent alpha 1 antitrypsin, kanwal, hep A panel, hep B panel, hep C panel, IgG  And M, Liver fibrosis fibroactitest panel, mitochondrial antibody, GARCIA fibrosure, Smooth muscle antibody, liver US, gave flu shot, prevnar, plan for Cscope and EGD.    Will discuss her INR with him- 1.29-1.33, (nml 0.9-1.1). PT is 15.5-16 (nml 11.7-14.2).  PLt 103, PCV 42.7, WBC 3,.33 with ANC 1.83.      He does not think that she will have trouble with bleeding with surgery.   He believes that her cirrhosis is related to GARCIA.    She has selective Ig A deficiency- would need to let blood bank know.

## 2018-01-08 ENCOUNTER — TELEPHONE (OUTPATIENT)
Dept: SURGERY | Facility: CLINIC | Age: 63
End: 2018-01-08

## 2018-01-08 DIAGNOSIS — C50.412 CARCINOMA OF UPPER-OUTER QUADRANT OF FEMALE BREAST, LEFT: Primary | ICD-10-CM

## 2018-01-10 ENCOUNTER — TELEPHONE (OUTPATIENT)
Dept: SURGERY | Facility: CLINIC | Age: 63
End: 2018-01-10

## 2018-01-10 NOTE — TELEPHONE ENCOUNTER
Lisa can not assist with Surgery tomorrow,  She is sick.    Jessie Rendon is going to assist.    damiánl

## 2018-01-11 ENCOUNTER — APPOINTMENT (OUTPATIENT)
Dept: GENERAL RADIOLOGY | Facility: HOSPITAL | Age: 63
End: 2018-01-11

## 2018-01-11 ENCOUNTER — APPOINTMENT (OUTPATIENT)
Dept: MAMMOGRAPHY | Facility: HOSPITAL | Age: 63
End: 2018-01-11

## 2018-01-11 ENCOUNTER — HOSPITAL ENCOUNTER (OUTPATIENT)
Facility: HOSPITAL | Age: 63
Discharge: HOME | End: 2018-01-12
Attending: SURGERY | Admitting: SURGERY

## 2018-01-11 ENCOUNTER — ANESTHESIA EVENT (OUTPATIENT)
Dept: PERIOP | Facility: HOSPITAL | Age: 63
End: 2018-01-11

## 2018-01-11 ENCOUNTER — HOSPITAL ENCOUNTER (OUTPATIENT)
Dept: NUCLEAR MEDICINE | Facility: HOSPITAL | Age: 63
Discharge: HOME | End: 2018-01-11
Attending: SURGERY

## 2018-01-11 ENCOUNTER — ANESTHESIA (OUTPATIENT)
Dept: PERIOP | Facility: HOSPITAL | Age: 63
End: 2018-01-11

## 2018-01-11 DIAGNOSIS — C50.412 CARCINOMA OF BREAST UPPER OUTER QUADRANT, LEFT (HCC): ICD-10-CM

## 2018-01-11 DIAGNOSIS — C50.412 BREAST CANCER OF UPPER-OUTER QUADRANT OF LEFT FEMALE BREAST (HCC): ICD-10-CM

## 2018-01-11 DIAGNOSIS — C50.212 CARCINOMA OF UPPER-INNER QUADRANT OF FEMALE BREAST, LEFT (HCC): ICD-10-CM

## 2018-01-11 LAB
GLUCOSE BLDC GLUCOMTR-MCNC: 118 MG/DL (ref 70–130)
GLUCOSE BLDC GLUCOMTR-MCNC: 124 MG/DL (ref 70–130)
GLUCOSE BLDC GLUCOMTR-MCNC: 136 MG/DL (ref 70–130)

## 2018-01-11 PROCEDURE — 82962 GLUCOSE BLOOD TEST: CPT

## 2018-01-11 PROCEDURE — 88307 TISSUE EXAM BY PATHOLOGIST: CPT | Performed by: SURGERY

## 2018-01-11 PROCEDURE — 76098 X-RAY EXAM SURGICAL SPECIMEN: CPT

## 2018-01-11 PROCEDURE — 25010000002 PHENYLEPHRINE PER 1 ML: Performed by: NURSE ANESTHETIST, CERTIFIED REGISTERED

## 2018-01-11 PROCEDURE — 25010000002 MIDAZOLAM PER 1 MG: Performed by: ANESTHESIOLOGY

## 2018-01-11 PROCEDURE — 0 TECHNETIUM FILTERED SULFUR COLLOID: Performed by: SURGERY

## 2018-01-11 PROCEDURE — 25010000002 FENTANYL CITRATE (PF) 100 MCG/2ML SOLUTION: Performed by: NURSE ANESTHETIST, CERTIFIED REGISTERED

## 2018-01-11 PROCEDURE — 25010000002 EPINEPHRINE PER 0.1 MG: Performed by: SURGERY

## 2018-01-11 PROCEDURE — 25010000002 FENTANYL CITRATE (PF) 100 MCG/2ML SOLUTION: Performed by: ANESTHESIOLOGY

## 2018-01-11 PROCEDURE — 25010000002 ONDANSETRON PER 1 MG: Performed by: NURSE ANESTHETIST, CERTIFIED REGISTERED

## 2018-01-11 PROCEDURE — 38792 RA TRACER ID OF SENTINL NODE: CPT

## 2018-01-11 PROCEDURE — 19307 MAST MOD RAD: CPT | Performed by: SURGERY

## 2018-01-11 PROCEDURE — A9541 TC99M SULFUR COLLOID: HCPCS | Performed by: SURGERY

## 2018-01-11 PROCEDURE — 25010000002 PROPOFOL 10 MG/ML EMULSION: Performed by: NURSE ANESTHETIST, CERTIFIED REGISTERED

## 2018-01-11 PROCEDURE — G0378 HOSPITAL OBSERVATION PER HR: HCPCS

## 2018-01-11 RX ORDER — LIDOCAINE AND PRILOCAINE 25; 25 MG/G; MG/G
CREAM TOPICAL ONCE
Status: COMPLETED | OUTPATIENT
Start: 2018-01-11 | End: 2018-01-11

## 2018-01-11 RX ORDER — GLYCOPYRROLATE 0.2 MG/ML
INJECTION INTRAMUSCULAR; INTRAVENOUS AS NEEDED
Status: DISCONTINUED | OUTPATIENT
Start: 2018-01-11 | End: 2018-01-11 | Stop reason: SURG

## 2018-01-11 RX ORDER — DIPHENHYDRAMINE HYDROCHLORIDE 50 MG/ML
6.25 INJECTION INTRAMUSCULAR; INTRAVENOUS
Status: DISCONTINUED | OUTPATIENT
Start: 2018-01-11 | End: 2018-01-11 | Stop reason: HOSPADM

## 2018-01-11 RX ORDER — CALCIUM POLYCARBOPHIL 625 MG
1250 TABLET ORAL DAILY
Status: DISCONTINUED | OUTPATIENT
Start: 2018-01-11 | End: 2018-01-12 | Stop reason: HOSPADM

## 2018-01-11 RX ORDER — EPHEDRINE SULFATE 50 MG/ML
INJECTION, SOLUTION INTRAVENOUS AS NEEDED
Status: DISCONTINUED | OUTPATIENT
Start: 2018-01-11 | End: 2018-01-11 | Stop reason: SURG

## 2018-01-11 RX ORDER — HYDROCODONE BITARTRATE AND ACETAMINOPHEN 5; 325 MG/1; MG/1
2 TABLET ORAL EVERY 4 HOURS PRN
Status: DISCONTINUED | OUTPATIENT
Start: 2018-01-11 | End: 2018-01-12 | Stop reason: HOSPADM

## 2018-01-11 RX ORDER — EPHEDRINE SULFATE 50 MG/ML
5 INJECTION, SOLUTION INTRAVENOUS ONCE AS NEEDED
Status: DISCONTINUED | OUTPATIENT
Start: 2018-01-11 | End: 2018-01-11 | Stop reason: HOSPADM

## 2018-01-11 RX ORDER — PHENYTOIN SODIUM 100 MG/1
100 CAPSULE, EXTENDED RELEASE ORAL NIGHTLY
Status: DISCONTINUED | OUTPATIENT
Start: 2018-01-11 | End: 2018-01-12 | Stop reason: HOSPADM

## 2018-01-11 RX ORDER — FENTANYL CITRATE 50 UG/ML
100 INJECTION, SOLUTION INTRAMUSCULAR; INTRAVENOUS
Status: DISCONTINUED | OUTPATIENT
Start: 2018-01-11 | End: 2018-01-11 | Stop reason: HOSPADM

## 2018-01-11 RX ORDER — FENTANYL CITRATE 50 UG/ML
INJECTION, SOLUTION INTRAMUSCULAR; INTRAVENOUS AS NEEDED
Status: DISCONTINUED | OUTPATIENT
Start: 2018-01-11 | End: 2018-01-11 | Stop reason: SURG

## 2018-01-11 RX ORDER — ONDANSETRON 2 MG/ML
4 INJECTION INTRAMUSCULAR; INTRAVENOUS ONCE AS NEEDED
Status: DISCONTINUED | OUTPATIENT
Start: 2018-01-11 | End: 2018-01-11 | Stop reason: HOSPADM

## 2018-01-11 RX ORDER — LIDOCAINE HYDROCHLORIDE 20 MG/ML
INJECTION, SOLUTION INFILTRATION; PERINEURAL AS NEEDED
Status: DISCONTINUED | OUTPATIENT
Start: 2018-01-11 | End: 2018-01-11 | Stop reason: SURG

## 2018-01-11 RX ORDER — NICOTINE POLACRILEX 4 MG
15 LOZENGE BUCCAL
Status: DISCONTINUED | OUTPATIENT
Start: 2018-01-11 | End: 2018-01-12 | Stop reason: HOSPADM

## 2018-01-11 RX ORDER — PROMETHAZINE HYDROCHLORIDE 25 MG/ML
6.25 INJECTION, SOLUTION INTRAMUSCULAR; INTRAVENOUS ONCE AS NEEDED
Status: DISCONTINUED | OUTPATIENT
Start: 2018-01-11 | End: 2018-01-11 | Stop reason: HOSPADM

## 2018-01-11 RX ORDER — PROMETHAZINE HYDROCHLORIDE 25 MG/1
25 SUPPOSITORY RECTAL ONCE AS NEEDED
Status: DISCONTINUED | OUTPATIENT
Start: 2018-01-11 | End: 2018-01-11 | Stop reason: HOSPADM

## 2018-01-11 RX ORDER — GABAPENTIN 300 MG/1
300 CAPSULE ORAL EVERY 12 HOURS SCHEDULED
Status: DISCONTINUED | OUTPATIENT
Start: 2018-01-11 | End: 2018-01-12 | Stop reason: HOSPADM

## 2018-01-11 RX ORDER — HYDROCODONE BITARTRATE AND ACETAMINOPHEN 5; 325 MG/1; MG/1
1 TABLET ORAL EVERY 4 HOURS PRN
Status: DISCONTINUED | OUTPATIENT
Start: 2018-01-11 | End: 2018-01-12 | Stop reason: HOSPADM

## 2018-01-11 RX ORDER — DIPHENHYDRAMINE HCL 25 MG
25 CAPSULE ORAL EVERY 6 HOURS PRN
Status: DISCONTINUED | OUTPATIENT
Start: 2018-01-11 | End: 2018-01-12 | Stop reason: HOSPADM

## 2018-01-11 RX ORDER — DIAZEPAM 5 MG/1
10 TABLET ORAL ONCE
Status: COMPLETED | OUTPATIENT
Start: 2018-01-11 | End: 2018-01-11

## 2018-01-11 RX ORDER — ROCURONIUM BROMIDE 10 MG/ML
INJECTION, SOLUTION INTRAVENOUS AS NEEDED
Status: DISCONTINUED | OUTPATIENT
Start: 2018-01-11 | End: 2018-01-11 | Stop reason: SURG

## 2018-01-11 RX ORDER — HYDRALAZINE HYDROCHLORIDE 20 MG/ML
5 INJECTION INTRAMUSCULAR; INTRAVENOUS
Status: DISCONTINUED | OUTPATIENT
Start: 2018-01-11 | End: 2018-01-11 | Stop reason: HOSPADM

## 2018-01-11 RX ORDER — LIDOCAINE AND PRILOCAINE 25; 25 MG/G; MG/G
CREAM TOPICAL
Status: COMPLETED
Start: 2018-01-11 | End: 2018-01-11

## 2018-01-11 RX ORDER — ONDANSETRON 2 MG/ML
4 INJECTION INTRAMUSCULAR; INTRAVENOUS EVERY 6 HOURS PRN
Status: DISCONTINUED | OUTPATIENT
Start: 2018-01-11 | End: 2018-01-12 | Stop reason: HOSPADM

## 2018-01-11 RX ORDER — LABETALOL HYDROCHLORIDE 5 MG/ML
5 INJECTION, SOLUTION INTRAVENOUS
Status: DISCONTINUED | OUTPATIENT
Start: 2018-01-11 | End: 2018-01-11 | Stop reason: HOSPADM

## 2018-01-11 RX ORDER — PROPOFOL 10 MG/ML
VIAL (ML) INTRAVENOUS AS NEEDED
Status: DISCONTINUED | OUTPATIENT
Start: 2018-01-11 | End: 2018-01-11 | Stop reason: SURG

## 2018-01-11 RX ORDER — CLINDAMYCIN PHOSPHATE 900 MG/50ML
900 INJECTION INTRAVENOUS ONCE
Status: COMPLETED | OUTPATIENT
Start: 2018-01-11 | End: 2018-01-11

## 2018-01-11 RX ORDER — SODIUM CHLORIDE, SODIUM LACTATE, POTASSIUM CHLORIDE, CALCIUM CHLORIDE 600; 310; 30; 20 MG/100ML; MG/100ML; MG/100ML; MG/100ML
9 INJECTION, SOLUTION INTRAVENOUS CONTINUOUS
Status: DISCONTINUED | OUTPATIENT
Start: 2018-01-11 | End: 2018-01-11

## 2018-01-11 RX ORDER — TOPIRAMATE 25 MG/1
25 TABLET ORAL EVERY 12 HOURS SCHEDULED
Status: DISCONTINUED | OUTPATIENT
Start: 2018-01-11 | End: 2018-01-12 | Stop reason: HOSPADM

## 2018-01-11 RX ORDER — NORTRIPTYLINE HYDROCHLORIDE 25 MG/1
25 CAPSULE ORAL NIGHTLY
Status: DISCONTINUED | OUTPATIENT
Start: 2018-01-11 | End: 2018-01-12 | Stop reason: HOSPADM

## 2018-01-11 RX ORDER — TOPIRAMATE 50 MG/1
50 TABLET, FILM COATED ORAL EVERY 12 HOURS SCHEDULED
Status: DISCONTINUED | OUTPATIENT
Start: 2018-01-11 | End: 2018-01-12 | Stop reason: HOSPADM

## 2018-01-11 RX ORDER — SODIUM CHLORIDE 0.9 % (FLUSH) 0.9 %
1-10 SYRINGE (ML) INJECTION AS NEEDED
Status: DISCONTINUED | OUTPATIENT
Start: 2018-01-11 | End: 2018-01-11 | Stop reason: HOSPADM

## 2018-01-11 RX ORDER — FAMOTIDINE 10 MG/ML
20 INJECTION, SOLUTION INTRAVENOUS ONCE
Status: COMPLETED | OUTPATIENT
Start: 2018-01-11 | End: 2018-01-11

## 2018-01-11 RX ORDER — ACETAMINOPHEN 325 MG/1
650 TABLET ORAL EVERY 4 HOURS PRN
Status: DISCONTINUED | OUTPATIENT
Start: 2018-01-11 | End: 2018-01-12 | Stop reason: HOSPADM

## 2018-01-11 RX ORDER — FUROSEMIDE 40 MG/1
40 TABLET ORAL DAILY
Status: DISCONTINUED | OUTPATIENT
Start: 2018-01-11 | End: 2018-01-12 | Stop reason: HOSPADM

## 2018-01-11 RX ORDER — VENLAFAXINE 37.5 MG/1
37.5 TABLET ORAL DAILY
Status: DISCONTINUED | OUTPATIENT
Start: 2018-01-11 | End: 2018-01-12 | Stop reason: HOSPADM

## 2018-01-11 RX ORDER — MIDAZOLAM HYDROCHLORIDE 1 MG/ML
2 INJECTION INTRAMUSCULAR; INTRAVENOUS
Status: DISCONTINUED | OUTPATIENT
Start: 2018-01-11 | End: 2018-01-11 | Stop reason: HOSPADM

## 2018-01-11 RX ORDER — NALOXONE HCL 0.4 MG/ML
0.1 VIAL (ML) INJECTION
Status: DISCONTINUED | OUTPATIENT
Start: 2018-01-11 | End: 2018-01-12 | Stop reason: HOSPADM

## 2018-01-11 RX ORDER — HYDROMORPHONE HYDROCHLORIDE 1 MG/ML
0.5 INJECTION, SOLUTION INTRAMUSCULAR; INTRAVENOUS; SUBCUTANEOUS
Status: DISCONTINUED | OUTPATIENT
Start: 2018-01-11 | End: 2018-01-12 | Stop reason: HOSPADM

## 2018-01-11 RX ORDER — DEXTROSE MONOHYDRATE 25 G/50ML
25 INJECTION, SOLUTION INTRAVENOUS
Status: DISCONTINUED | OUTPATIENT
Start: 2018-01-11 | End: 2018-01-12 | Stop reason: HOSPADM

## 2018-01-11 RX ORDER — SODIUM CHLORIDE, SODIUM LACTATE, POTASSIUM CHLORIDE, CALCIUM CHLORIDE 600; 310; 30; 20 MG/100ML; MG/100ML; MG/100ML; MG/100ML
100 INJECTION, SOLUTION INTRAVENOUS CONTINUOUS
Status: DISCONTINUED | OUTPATIENT
Start: 2018-01-11 | End: 2018-01-11

## 2018-01-11 RX ORDER — ACETAMINOPHEN 650 MG/1
650 SUPPOSITORY RECTAL EVERY 4 HOURS PRN
Status: DISCONTINUED | OUTPATIENT
Start: 2018-01-11 | End: 2018-01-12 | Stop reason: HOSPADM

## 2018-01-11 RX ORDER — ONDANSETRON 4 MG/1
4 TABLET, ORALLY DISINTEGRATING ORAL EVERY 6 HOURS PRN
Status: DISCONTINUED | OUTPATIENT
Start: 2018-01-11 | End: 2018-01-12 | Stop reason: HOSPADM

## 2018-01-11 RX ORDER — DEXTROSE, SODIUM CHLORIDE, AND POTASSIUM CHLORIDE 5; .45; .15 G/100ML; G/100ML; G/100ML
75 INJECTION INTRAVENOUS CONTINUOUS
Status: DISCONTINUED | OUTPATIENT
Start: 2018-01-11 | End: 2018-01-12 | Stop reason: HOSPADM

## 2018-01-11 RX ORDER — HYDROCODONE BITARTRATE AND ACETAMINOPHEN 7.5; 325 MG/1; MG/1
1 TABLET ORAL ONCE AS NEEDED
Status: DISCONTINUED | OUTPATIENT
Start: 2018-01-11 | End: 2018-01-11 | Stop reason: HOSPADM

## 2018-01-11 RX ORDER — FENTANYL CITRATE 50 UG/ML
50 INJECTION, SOLUTION INTRAMUSCULAR; INTRAVENOUS
Status: DISCONTINUED | OUTPATIENT
Start: 2018-01-11 | End: 2018-01-11 | Stop reason: HOSPADM

## 2018-01-11 RX ORDER — ONDANSETRON 4 MG/1
4 TABLET, FILM COATED ORAL EVERY 6 HOURS PRN
Status: DISCONTINUED | OUTPATIENT
Start: 2018-01-11 | End: 2018-01-12 | Stop reason: HOSPADM

## 2018-01-11 RX ORDER — FLUMAZENIL 0.1 MG/ML
0.2 INJECTION INTRAVENOUS AS NEEDED
Status: DISCONTINUED | OUTPATIENT
Start: 2018-01-11 | End: 2018-01-11 | Stop reason: HOSPADM

## 2018-01-11 RX ORDER — METOCLOPRAMIDE HYDROCHLORIDE 5 MG/ML
10 INJECTION INTRAMUSCULAR; INTRAVENOUS EVERY 6 HOURS
Status: DISCONTINUED | OUTPATIENT
Start: 2018-01-11 | End: 2018-01-12 | Stop reason: HOSPADM

## 2018-01-11 RX ORDER — MIDAZOLAM HYDROCHLORIDE 1 MG/ML
1 INJECTION INTRAMUSCULAR; INTRAVENOUS
Status: DISCONTINUED | OUTPATIENT
Start: 2018-01-11 | End: 2018-01-11 | Stop reason: HOSPADM

## 2018-01-11 RX ORDER — ACETAMINOPHEN 160 MG/5ML
650 SOLUTION ORAL EVERY 4 HOURS PRN
Status: DISCONTINUED | OUTPATIENT
Start: 2018-01-11 | End: 2018-01-12 | Stop reason: HOSPADM

## 2018-01-11 RX ORDER — HYDROCODONE BITARTRATE AND ACETAMINOPHEN 5; 325 MG/1; MG/1
1 TABLET ORAL EVERY 4 HOURS PRN
Status: DISCONTINUED | OUTPATIENT
Start: 2018-01-11 | End: 2018-01-11

## 2018-01-11 RX ORDER — ONDANSETRON 2 MG/ML
INJECTION INTRAMUSCULAR; INTRAVENOUS AS NEEDED
Status: DISCONTINUED | OUTPATIENT
Start: 2018-01-11 | End: 2018-01-11 | Stop reason: SURG

## 2018-01-11 RX ORDER — CITALOPRAM 20 MG/1
20 TABLET ORAL DAILY
Status: DISCONTINUED | OUTPATIENT
Start: 2018-01-11 | End: 2018-01-12 | Stop reason: HOSPADM

## 2018-01-11 RX ORDER — PROMETHAZINE HYDROCHLORIDE 25 MG/1
25 TABLET ORAL ONCE AS NEEDED
Status: DISCONTINUED | OUTPATIENT
Start: 2018-01-11 | End: 2018-01-11 | Stop reason: HOSPADM

## 2018-01-11 RX ORDER — LIDOCAINE HYDROCHLORIDE 10 MG/ML
0.5 INJECTION, SOLUTION EPIDURAL; INFILTRATION; INTRACAUDAL; PERINEURAL ONCE AS NEEDED
Status: DISCONTINUED | OUTPATIENT
Start: 2018-01-11 | End: 2018-01-11 | Stop reason: HOSPADM

## 2018-01-11 RX ORDER — POTASSIUM CHLORIDE 750 MG/1
10 CAPSULE, EXTENDED RELEASE ORAL DAILY
Status: DISCONTINUED | OUTPATIENT
Start: 2018-01-12 | End: 2018-01-12 | Stop reason: HOSPADM

## 2018-01-11 RX ORDER — OXYCODONE HYDROCHLORIDE AND ACETAMINOPHEN 5; 325 MG/1; MG/1
2 TABLET ORAL ONCE AS NEEDED
Status: DISCONTINUED | OUTPATIENT
Start: 2018-01-11 | End: 2018-01-11 | Stop reason: HOSPADM

## 2018-01-11 RX ADMIN — PHENYLEPHRINE HYDROCHLORIDE 100 MCG: 10 INJECTION INTRAVENOUS at 10:35

## 2018-01-11 RX ADMIN — LIDOCAINE AND PRILOCAINE: 25; 25 CREAM TOPICAL at 06:24

## 2018-01-11 RX ADMIN — TOPIRAMATE 25 MG: 25 TABLET, FILM COATED ORAL at 20:08

## 2018-01-11 RX ADMIN — CITALOPRAM 20 MG: 20 TABLET, FILM COATED ORAL at 16:41

## 2018-01-11 RX ADMIN — ROCURONIUM BROMIDE 30 MG: 10 INJECTION INTRAVENOUS at 08:37

## 2018-01-11 RX ADMIN — GABAPENTIN 300 MG: 300 CAPSULE ORAL at 20:07

## 2018-01-11 RX ADMIN — DIAZEPAM 10 MG: 5 TABLET ORAL at 06:25

## 2018-01-11 RX ADMIN — SODIUM CHLORIDE, POTASSIUM CHLORIDE, SODIUM LACTATE AND CALCIUM CHLORIDE 9 ML/HR: 600; 310; 30; 20 INJECTION, SOLUTION INTRAVENOUS at 07:49

## 2018-01-11 RX ADMIN — NORTRIPTYLINE HYDROCHLORIDE 25 MG: 25 CAPSULE ORAL at 20:07

## 2018-01-11 RX ADMIN — FENTANYL CITRATE 50 MCG: 50 INJECTION INTRAMUSCULAR; INTRAVENOUS at 09:55

## 2018-01-11 RX ADMIN — TECHNETIUM TC 99M SULFUR COLLOID 1 DOSE: KIT at 08:00

## 2018-01-11 RX ADMIN — GLYCOPYRROLATE 0.4 MG: 0.2 INJECTION INTRAMUSCULAR; INTRAVENOUS at 09:12

## 2018-01-11 RX ADMIN — PHENYLEPHRINE HYDROCHLORIDE 100 MCG: 10 INJECTION INTRAVENOUS at 09:00

## 2018-01-11 RX ADMIN — POTASSIUM CHLORIDE, DEXTROSE MONOHYDRATE AND SODIUM CHLORIDE 75 ML/HR: 150; 5; 450 INJECTION, SOLUTION INTRAVENOUS at 14:41

## 2018-01-11 RX ADMIN — SUGAMMADEX 200 MG: 100 INJECTION, SOLUTION INTRAVENOUS at 12:39

## 2018-01-11 RX ADMIN — CLINDAMYCIN PHOSPHATE 900 MG: 900 INJECTION INTRAVENOUS at 08:40

## 2018-01-11 RX ADMIN — PROPOFOL 200 MG: 10 INJECTION, EMULSION INTRAVENOUS at 08:37

## 2018-01-11 RX ADMIN — Medication 2 MG: at 07:49

## 2018-01-11 RX ADMIN — FENTANYL CITRATE 100 MCG: 50 INJECTION INTRAMUSCULAR; INTRAVENOUS at 08:37

## 2018-01-11 RX ADMIN — FENTANYL CITRATE 50 MCG: 50 INJECTION, SOLUTION INTRAMUSCULAR; INTRAVENOUS at 13:23

## 2018-01-11 RX ADMIN — VENLAFAXINE HYDROCHLORIDE 37.5 MG: 37.5 TABLET ORAL at 16:40

## 2018-01-11 RX ADMIN — LIDOCAINE HYDROCHLORIDE 100 MG: 20 INJECTION, SOLUTION INFILTRATION; PERINEURAL at 08:37

## 2018-01-11 RX ADMIN — PHENYLEPHRINE HYDROCHLORIDE 100 MCG: 10 INJECTION INTRAVENOUS at 09:16

## 2018-01-11 RX ADMIN — TOPIRAMATE 50 MG: 50 TABLET, FILM COATED ORAL at 20:07

## 2018-01-11 RX ADMIN — CALCIUM POLYCARBOPHIL 1250 MG: 625 TABLET, FILM COATED ORAL at 16:39

## 2018-01-11 RX ADMIN — EPHEDRINE SULFATE 5 MG: 50 INJECTION INTRAMUSCULAR; INTRAVENOUS; SUBCUTANEOUS at 08:58

## 2018-01-11 RX ADMIN — FUROSEMIDE 40 MG: 40 TABLET ORAL at 16:41

## 2018-01-11 RX ADMIN — ONDANSETRON 4 MG: 2 INJECTION INTRAMUSCULAR; INTRAVENOUS at 10:05

## 2018-01-11 RX ADMIN — FAMOTIDINE 20 MG: 10 INJECTION, SOLUTION INTRAVENOUS at 07:49

## 2018-01-11 RX ADMIN — PHENYTOIN SODIUM 100 MG: 100 CAPSULE, EXTENDED RELEASE ORAL at 20:07

## 2018-01-11 RX ADMIN — HYDROCODONE BITARTRATE AND ACETAMINOPHEN 2 TABLET: 5; 325 TABLET ORAL at 18:20

## 2018-01-11 RX ADMIN — FENTANYL CITRATE 50 MCG: 50 INJECTION INTRAMUSCULAR; INTRAVENOUS at 09:38

## 2018-01-11 RX ADMIN — HYDROCODONE BITARTRATE AND ACETAMINOPHEN 2 TABLET: 5; 325 TABLET ORAL at 22:49

## 2018-01-11 RX ADMIN — HYDROCODONE BITARTRATE AND ACETAMINOPHEN 2 TABLET: 5; 325 TABLET ORAL at 14:42

## 2018-01-11 NOTE — ANESTHESIA POSTPROCEDURE EVALUATION
Patient: Josseline Vargas    Procedure Summary     Date Anesthesia Start Anesthesia Stop Room / Location    01/11/18 0830 1252  [facility]  /  [facility]       Procedure Diagnosis Surgeon Provider    Left total mastectomy, left sentinel lymph node biopsy to include retrieval of prior clip, axillary lymph node dissection, no reconstruction.  (Left Breast) Breast cancer of upper-outer quadrant of left female breast  (Breast cancer of upper-outer quadrant of left female breast [C50.412]) MD Milo Panchal MD          Anesthesia Type: general  Last vitals  BP   143/82 (01/11/18 1400)   Temp   36.9 °C (98.4 °F) (01/11/18 1400)   Pulse   90 (01/11/18 1400)   Resp   16 (01/11/18 1400)     SpO2   99 % (01/11/18 1400)     Post Anesthesia Care and Evaluation    Patient location during evaluation: bedside  Patient participation: complete - patient participated  Level of consciousness: awake  Pain score: 1  Pain management: adequate  Airway patency: patent  Anesthetic complications: No anesthetic complications    Cardiovascular status: acceptable  Respiratory status: acceptable  Hydration status: acceptable    Comments: ---------------------------               01/11/18 1400         ---------------------------   BP:          143/82         Pulse:         90           Resp:          16           Temp:   36.9 °C (98.4 °F)   SpO2:          99%         ---------------------------

## 2018-01-11 NOTE — OP NOTE
Operative note    Preoperative Diagnosis: Breast cancer     Postoperative Diagnosis: Breast cancer    Procedure Performed: left modified radical mastectomy, no reconstruction  Dictating physician and surgeon: Yuli Garcias MD      First asst:Jessie Rendon    EBL:5cc    FINDINGS AND DESCRIPTION OF PROCEDURE:     The patient was brought to the operating room and placed on the table in the supine position. After adequate generalETT anesthesia was obtained, we sterilly prepped and draped the breast and axilla. We did a time out to identify correct patient and correct operative site.  She did receive IV antibiotic within an hour of and prior to incision.     For the mastectomy, I performed the following procedure:  I tumesced the mastectomy flaps using 240 cc of 1:500,000 epinephrine in normal saline.  I tumesced superiorly to the clavicle, inferiorly to the inframammary fold, medially to the sternum and laterally to the anterior axillary line.  I then incised a skin sparing ellipse of skin surrounding the nipple areolar complex using a 10 blade. I then sharply dissected using a 10 blade and a long curved may scissor superiorly to the clavicle, inferiorly to the inframammary fold, medially to the sternum and laterally to the anterior axillary line. I then scored the perimeter of the specimen, the pectoral fascia, circumferentially. I then lifted the pectoral fascia off of the pectoralis major, as the posterior margin of the specimen.  I submitted the specimen as left mastectomy,  stitch marks 12:00.    Next, I turned my attention to the axilla. I used the neoprobe at the start of the case to ensure that the radiotracer had migrated to the axilla, which it had not. I massaged the breast and the radiotracer could not pass the area where the tmore superior and lateral tumor was located. Therefore  I proceeded on to axillary node dissection.  I  turned my attention to the axilla. I palpated to fell lateral pectoralis  margin and also to feel the latissimus dorsi laterally.  I dissected down using bovie electrocautery and developed flaps until I encountered the latissimus dorsi laterally and the pectoralis medially.  I dissected superiorly towards the axillary vein until I saw the vein and used caution to preserve this.  I then dissected medially towards the thoracodorsal neurovascular bundle and retracted this posteriorly I dissected further medially until I saw the long thoracic nerve and retracted this posteriorly as well. I then dissected along the chest wall and serratus up under pectoralis minor to remove level 2 nodes.  At the conclusion of the case, the vein, thoracodorsal neurovascular bundle and long thoracic were preserved and the contents of the axilla removed.       I then submitted the specimen as left axilla  stitch marks level 2.    I then irrigated, assured hemostasis and placed 2 drains. One 19 kamila under the mastectomy flaps and a second 19 kamila in the axilla. Both secured with a 2-0 nylon surture.    I placed surgicel, 2 large sheets.    I then closed the skin in 2 layers, the first an interrupted 3-0 vicryl layer and the second a running 4-0 monocryl layer.  I placed lengthwise 1/2inch steri strips and a sterile island dressing, wrapped her in 4x4s and a 6 inch ACE dressing.     She tolerated the procedure well, there were no immediate complications, and all counts were correct at the end of the case.

## 2018-01-11 NOTE — DISCHARGE INSTR - ACTIVITY
Please give patients the following postoperative WOUND CARE, ACTIVITY and OTHER discharge instructions:    WOUND CARE:  FOR PATIENTS WHO HAVE HAD BREAST SURGERY:  IF..... patient has had reconstruction or oncoplastic closure, wound care will be from the plastic surgeon.  IF PATIENT DOES NOT HAVE PLASTIC SURGEON, keep ace wrap (if present)  in place and dressings dry for 72 hours. May then remove ACE wrap (if present) and dressings and may shower. Leave steri strips on skin and ignore clear suture tails.  Blot dry incision with towel.  No tubs, hot tubs, pools. May wear post op bra or camisole given to you in my office or hospital, after removing ACE.   FOR PATIENTS WHO HAVE HAD PORT PLACED:   Keep dressing  in place and dressings dry for 72 hours.   May then remove dressings and may shower. Leave steri strips on skin and ignore clear suture tails.  Blot dry incision with towel.  No tubs, hot tubs, pools.  May use port immediately, but prefer to keep incision dry for 3 days.  Do not remove steri strips for 7-10 days.  FOR PATIENTS WHO HAVE DRAINS:  Drain and record output every 4 hours or as needed. Strip drain and clean, as per inpatient teaching.    ACTIVITY INSTRUCTIONS:  If patient has had reconstruction, please get all postoperative and discharge activity instructions from plastic surgeon. If patient HAS NOT had reconstruction, then the following instructions will apply for postop and for discharge:  A responsible adult should accompany the patient on discharge and for 24 hours following surgery.  No heavy lifting >5# or strenuous upper arm activity, and no raising arms over head until followup appointment.  No IV or blood pressure cuffs on arm on side of lymph node surgery, if patient has had lymph node surgery. No driving while taking pain or nausea medications, including muscle relaxants.      OTHER:  Responsible adult to stay with patient at discharge and at least 24 hours after discharge.  Call the office  for any of the following: persistent bleeding, excessive bruising or swelling, excessive sleepiness or trouble breathing, severe pain, persistent nausea or vomiting, fever greater than 101.0  Please note: if a plastic surgeon has been involved in the case, please call his or her office. If there is no plastic surgeon involved, please call Dr Garcias's office.  Postop appointment was scheduled in Dr. Garcias's office preoperatively. If patient cannot find that appointment, please call the office for a reminder on the next business day.     NURSING:  Patient prescriptions on chart in the paper form.

## 2018-01-11 NOTE — PLAN OF CARE
Problem: Patient Care Overview (Adult)  Goal: Plan of Care Review  Outcome: Ongoing (interventions implemented as appropriate)   01/11/18 0547   Coping/Psychosocial Response Interventions   Plan Of Care Reviewed With patient   Patient Care Overview   Progress improving     Goal: Adult Individualization and Mutuality  Outcome: Ongoing (interventions implemented as appropriate)    Goal: Discharge Needs Assessment  Outcome: Ongoing (interventions implemented as appropriate)   01/11/18 0547   Discharge Needs Assessment   Concerns To Be Addressed no discharge needs identified

## 2018-01-11 NOTE — ANESTHESIA PROCEDURE NOTES
Airway  Urgency: elective    Airway not difficult    General Information and Staff    Patient location during procedure: OR  Anesthesiologist: LUPIS GARCIA  CRNA: GENE CARROLL    Indications and Patient Condition  Indications for airway management: airway protection    Preoxygenated: yes  MILS maintained throughout  Mask difficulty assessment: 1 - vent by mask    Final Airway Details  Final airway type: endotracheal airway      Successful airway: ETT  Cuffed: yes   Successful intubation technique: direct laryngoscopy  Facilitating devices/methods: intubating stylet  Endotracheal tube insertion site: oral  Blade: Almaraz  Blade size: #2  ETT size: 7.0 mm  Cormack-Lehane Classification: grade I - full view of glottis  Placement verified by: chest auscultation and capnometry   Measured from: lips  ETT to lips (cm): 21  Number of attempts at approach: 1    Additional Comments  Smooth iv induction with no complications

## 2018-01-11 NOTE — PLAN OF CARE
Problem: Perioperative Period (Adult)  Goal: Signs and Symptoms of Listed Potential Problems Will be Absent or Manageable (Perioperative Period)  Outcome: Ongoing (interventions implemented as appropriate)   01/11/18 0547   Perioperative Period   Problems Assessed (Perioperative Period) all   Problems Present (Perioperative Period) none

## 2018-01-12 VITALS
SYSTOLIC BLOOD PRESSURE: 106 MMHG | HEART RATE: 74 BPM | DIASTOLIC BLOOD PRESSURE: 57 MMHG | RESPIRATION RATE: 16 BRPM | OXYGEN SATURATION: 95 % | TEMPERATURE: 97.1 F

## 2018-01-12 LAB — GLUCOSE BLDC GLUCOMTR-MCNC: 120 MG/DL (ref 70–130)

## 2018-01-12 PROCEDURE — 63710000001 DIPHENHYDRAMINE PER 50 MG: Performed by: SURGERY

## 2018-01-12 PROCEDURE — 99024 POSTOP FOLLOW-UP VISIT: CPT | Performed by: SURGERY

## 2018-01-12 PROCEDURE — G0378 HOSPITAL OBSERVATION PER HR: HCPCS

## 2018-01-12 PROCEDURE — 25010000002 HEPARIN FLUSH (PORCINE) 100 UNIT/ML SOLUTION

## 2018-01-12 PROCEDURE — 82962 GLUCOSE BLOOD TEST: CPT

## 2018-01-12 RX ADMIN — CALCIUM POLYCARBOPHIL 1250 MG: 625 TABLET, FILM COATED ORAL at 08:42

## 2018-01-12 RX ADMIN — POTASSIUM CHLORIDE 10 MEQ: 750 CAPSULE, EXTENDED RELEASE ORAL at 08:42

## 2018-01-12 RX ADMIN — FUROSEMIDE 40 MG: 40 TABLET ORAL at 08:42

## 2018-01-12 RX ADMIN — DIPHENHYDRAMINE HYDROCHLORIDE 25 MG: 25 CAPSULE ORAL at 08:43

## 2018-01-12 RX ADMIN — HYDROCODONE BITARTRATE AND ACETAMINOPHEN 2 TABLET: 5; 325 TABLET ORAL at 02:48

## 2018-01-12 RX ADMIN — CITALOPRAM 20 MG: 20 TABLET, FILM COATED ORAL at 08:42

## 2018-01-12 RX ADMIN — POTASSIUM CHLORIDE, DEXTROSE MONOHYDRATE AND SODIUM CHLORIDE 75 ML/HR: 150; 5; 450 INJECTION, SOLUTION INTRAVENOUS at 04:46

## 2018-01-12 RX ADMIN — GABAPENTIN 300 MG: 300 CAPSULE ORAL at 08:43

## 2018-01-12 RX ADMIN — METFORMIN HYDROCHLORIDE 500 MG: 500 TABLET ORAL at 08:42

## 2018-01-12 RX ADMIN — TOPIRAMATE 50 MG: 50 TABLET, FILM COATED ORAL at 08:43

## 2018-01-12 RX ADMIN — VENLAFAXINE HYDROCHLORIDE 37.5 MG: 37.5 TABLET ORAL at 08:43

## 2018-01-12 RX ADMIN — SODIUM CHLORIDE, PRESERVATIVE FREE: 5 INJECTION INTRAVENOUS at 10:48

## 2018-01-12 RX ADMIN — HYDROCODONE BITARTRATE AND ACETAMINOPHEN 2 TABLET: 5; 325 TABLET ORAL at 10:48

## 2018-01-12 RX ADMIN — TOPIRAMATE 25 MG: 25 TABLET, FILM COATED ORAL at 08:43

## 2018-01-12 NOTE — DISCHARGE PLACEMENT REQUEST
"Alfredo Vargas (62 y.o. Female)     Date of Birth Social Security Number Address Home Phone MRN    1955  [id number]  [facility] [address] [phone] [id number]    Jainism Marital Status          None        Admission Date Admission Type Admitting Provider Attending Provider Department, Room/Bed    1/11/18 Elective Yuli Garcias MD Hatmaker, Allison R, MD [address], 86/1    Discharge Date Discharge Disposition Discharge Destination         Home or Self Care             Attending Provider: Yuli Garcias MD     Allergies:  Latex, Penicillins    Isolation:  None   Infection:  None   Code Status:  FULL    Ht:  152.4 cm (60\")   Wt:  102 kg (224 lb)    Admission Cmt:  None   Principal Problem:  Breast cancer of upper-outer quadrant of left female breast [C50.412] More...                 Active Insurance as of 1/11/2018     Primary Coverage     Payor Plan Insurance Group Employer/Plan Group    PAS[employer]ORT PAS[employer]ORT MEDICAID     Payor Plan Address Payor Plan Phone Number Effective From Effective To    [address] [phone] 1/1/2014     [place] [address]       Subscriber Name Subscriber Birth Date Member ID       ALFREDO VARGAS [id number]                 Emergency Contacts      (Rel.) Home Phone Work Phone Mobile Phone    Tati Sevilla (Sister) -- -- [phone]              "

## 2018-01-12 NOTE — PROGRESS NOTES
INPATIENT ROUNDING NOTE    Postoperative day: 1    Overnight events:     Patient has done well overnight.    no nausea  She reports that her pain is responding favorably to the prescribed meds. She does have some itching with the lortab.  She has voided since her catheter has been removed.  She has ambulated.    Exam:  Temp:  [97 °F (36.1 °C)-98.5 °F (36.9 °C)] 97.1 °F (36.2 °C)  Heart Rate:  [70-92] 74  Resp:  [16-18] 16  BP: ()/(55-89) 106/57     UOP 2.2L  Drains- 225 and 115. Serosamguinous. Thinning. The more medial- mastectomy flap drain has a brownish discoloration from the surgicel.    On examination, her incisions are dressed and dressings clean dry and intact.  Her mastectomy flaps are well perfused with minimal ecchymoses.   There are no undrained fluid collections.      Assessment and plan:    Breast cancer, postoperative day 1.  Doing well.  HLIV.  Likely home today after drain teaching and breakfast .    She has an appointment with me in the office already scheduled for her postop visit.

## 2018-01-12 NOTE — PROGRESS NOTES
Discharge Planning Assessment  [facility]     Patient Name: Josseline Vargas  MRN: [id number]  Today's Date: 1/12/2018    Admit Date: 1/11/2018          Discharge Needs Assessment       01/12/18 0816    Living Environment    Lives With alone    Living Arrangements apartment   ground level    Home Accessibility no concerns    Stair Railings at Home none    Type of Financial/Environmental Concern none    Transportation Available car;family or friend will provide    Living Environment    Provides Primary Care For no one    Quality Of Family Relationships supportive    Able to Return to Prior Living Arrangements yes    Discharge Needs Assessment    Concerns To Be Addressed no discharge needs identified;denies needs/concerns at this time;adjustment to diagnosis/illness concerns    Anticipated Changes Related to Illness other (see comments)   Assist with ADL    Equipment Currently Used at Home none    Equipment Needed After Discharge none    Discharge Facility/Level Of Care Needs home with home health            Discharge Plan       01/12/18 0809    Case Management/Social Work Plan    Plan Home with family to stay with her and [facility]    Patient/Family In Agreement With Plan yes    Additional Comments I met with pt and her two sisters Tati Sevilla and Gaston, pt confirmed the address PCP and pharmacy are correct. Pt said she lives alone, is IADL, uses no DME and can afford her Rx. Pt said her x-daughter-in-law and her boyfriend help her with shopping.   Elizabeth said she will be staying with pt after she returns home and will be assisting with ADL as needed as well as shopping.  Pt said [facility] assist her with Port care, she agreed she wants them to continue services (Sari notified)        Discharge Placement     Facility/Agency Request Status Selected? Address Phone Number Fax Number    [facility] Accepted    Yes [address], [address], [facility] [address] [phone]         Expected Discharge Date and Time     Expected Discharge Date Expected Discharge Time    Jan 12, 2018               Demographic Summary       01/12/18 0813    Referral Information    Admission Type observation    Arrived From home or self-care    Referral Source admission list    Record Reviewed medical record    Contact Information    Permission Granted to Share Information With family/designee;facility             Functional Status       01/12/18 0813    Functional Status Current    Ambulation 2-->assistive person    Transferring 2-->assistive person    Toileting 2-->assistive person    Bathing 2-->assistive person    Dressing 2-->assistive person    Eating 0-->independent    Communication 0-->understands/communicates without difficulty    Swallowing (if score 2 or more for any item, consult Rehab Services) 0-->swallows foods/liquids without difficulty    Change in Functional Status Since Onset of Current Illness/Injury yes    Functional Status Prior    Ambulation 0-->independent    Transferring 0-->independent    Toileting 0-->independent    Bathing 0-->independent    Dressing 0-->independent    Eating 0-->independent    Communication 0-->understands/communicates without difficulty    Swallowing 0-->swallows foods/liquids without difficulty    IADL    Medications independent    Meal Preparation independent    Housekeeping independent    Laundry independent    Shopping assistive person    Oral Care independent            Psychosocial     None            Abuse/Neglect     None            Legal       01/12/18 0820    Legal    Legal Considerations patient/family ability to make health care decisions;patient/ for healthcare needs;patient/family capacity to make sound judgments    Legal Comments --   pt's sister Santosh Dozier said pt does have a living will, POA or HCS, she said she will bring a copy the next time pt comes to this hospital            Substance Abuse     None             Patient Forms       01/12/18 0819    Patient Forms    Provider Choice List Attempted   Pt said she did not need, she said she is returning home and is current with [facility]          Adelaida Dias RN

## 2018-01-12 NOTE — PLAN OF CARE
Problem: Patient Care Overview (Adult)  Goal: Plan of Care Review  Outcome: Ongoing (interventions implemented as appropriate)   01/12/18 0321   Coping/Psychosocial Response Interventions   Plan Of Care Reviewed With patient   Patient Care Overview   Progress improving   Outcome Evaluation   Outcome Summary/Follow up Plan VSS. Pain well controlled. Dressing dry and intact. JUAN A drainage small in amount. Ambulated in the ward. Voided to small amount, for follow up     Goal: Adult Individualization and Mutuality  Outcome: Ongoing (interventions implemented as appropriate)    Goal: Discharge Needs Assessment  Outcome: Ongoing (interventions implemented as appropriate)      Problem: Pain, Acute (Adult)  Goal: Identify Related Risk Factors and Signs and Symptoms  Outcome: Outcome(s) achieved Date Met: 01/12/18    Goal: Acceptable Pain Control/Comfort Level  Outcome: Ongoing (interventions implemented as appropriate)

## 2018-01-15 ENCOUNTER — TELEPHONE (OUTPATIENT)
Dept: SURGERY | Facility: CLINIC | Age: 63
End: 2018-01-15

## 2018-01-15 LAB
CYTO UR: NORMAL
LAB AP CASE REPORT: NORMAL
Lab: NORMAL
PATH REPORT.FINAL DX SPEC: NORMAL
PATH REPORT.GROSS SPEC: NORMAL

## 2018-01-15 NOTE — TELEPHONE ENCOUNTER
Spoke w/patient and sister, check in on drains, both drains are draining well, I will be calling later this week for totals of both drains.     Patient is constipated since surgery,   Will do Mirlax 1 cap powder in 8 oz water up to 2 times a day  Fibercon tablet 1 x daily   Glycerin suppository 1 x day. Will let Dr. Garcias know and check in this week.     lml

## 2018-01-15 NOTE — PROGRESS NOTES
Case Management Discharge Note    Final Note: [facility]    Discharge Placement     Facility/Agency Request Status Selected? Address Phone Number Fax Number    [facility] Accepted    Yes [address], [address], [facility] [address] [phone]        Other: Other (private auto)    Discharge Codes: 06  Discharged/transferred to home under care of organized home health service organization in anticipation of skilled care

## 2018-01-16 ENCOUNTER — TELEPHONE (OUTPATIENT)
Dept: SURGERY | Facility: CLINIC | Age: 63
End: 2018-01-16

## 2018-01-16 DIAGNOSIS — C50.412 MALIGNANT NEOPLASM OF UPPER-OUTER QUADRANT OF LEFT FEMALE BREAST, UNSPECIFIED ESTROGEN RECEPTOR STATUS (HCC): Primary | ICD-10-CM

## 2018-01-16 NOTE — TELEPHONE ENCOUNTER
Pathology from 1-11-18 LEFT modified radical mastectomy- pathology returned as IMC, NST, int grade, multifocal with 2 separate foci.  The largest foci of 5.2 cm and the smaller 4.0 cm.  Additional invasive carcinoma noted in sections of the nipple.  Invasive carcinoma also present in 2 out of 3 random sections from tissue between the 2 involved areas.  Intermediate grade, 3, 2, 1.  Focal duct carcinoma in situ, intermediate grade.  Tumor extends focally into skeletal muscle posteriorly.  Tumor extends to superior superficial margin and is less than 1 mm from deep margin.  Note that no sentinel lymph nodes were taken due to the failure of migration of radiotracer.  Total number of lymph nodes examined on 14, number of lymph nodes with cancer on 5.  2 micrometastatic and 3 isolated tumor cells.  Additional focus of tumor within a lymphatic space in the adipose tissue.  Pathologic stage [id number] latoya.  Stage IIIA.  She will certainly need radiation and we will arrange. I will order that today.  I will call and let her know.    Final Diagnosis   1. LEFT TOTAL MASTECTOMY:                         INVASIVE DUCT CARCINOMA, GRADE 2, MULTIFOCAL.                         TUMOR EXTENDS FOCALLY INTO MUSCLE AND IS LESS THAN 1 MM FROM DEEP MARGIN.                         INVASIVE CARCINOMA IS PRESENT FOCALLY AT SUPERIOR SUPERFICIAL MARGIN.                         METASTATIC CARCINOMA PRESENT IN TWO OF EIGHT LYMPH NODES.     2. LEFT AXILLARY TISSUE:                         METASTATIC CARCINOMA PRESENT IN THREE OF SIX LYMPH NODES WITH ADDITIONAL FOCUS                                                OF TUMOR WITHIN A LYMPHATIC SPACE  IN  ADIPOSE TISSUE.        The following synoptic report utilizes the June 2017 [facility] protocol for Invasive Carcinoma of Breast.     Specimen: Total mastectomy.  Specimen laterality: Left.  Tumor size:                          Greatest dimension of largest invasive focus greater than a millimeter: 5.2 cm.                          Comment: Both fibrous areas have invasive carcinoma throughout the length of the greatest dimension. The                         larger focus is 5.2 cm and the smaller is 4.0 cm.  In addition invasive carcinoma is noted in sections of the nipple.                         Invasive carcinoma is also present in two out three random sections obtained from tissue  the two                         fibrous areas.    Histologic type: Invasive carcinoma no special type (ductal).  Histologic grade, Sim histologic score:                         Glandular/tubular differentiation: Score 3.                         Nuclear pleomorphism: score 2.                         Mitotic rate: Score 1.                         Overall grade: Grade 2.  Tumor focality: Multiple foci of invasive carcinoma (2).  Duct carcinoma in situ:  Focal duct carcinoma in situ, intermediate grade is present.  Tumor extension:                         Skeletal muscle: Carcinoma focally invades skeletal muscle.  Margins:                          Invasive carcinoma margins:                                                Invasive carcinoma is present at superior superficial margin and is less than 1 mm from deep margin.                         Duct carcinoma in situ margins:                                                  Uninvolved by duct carcinoma in situ.                                                Distance from closest margin: Over 2 mm from superficial margin.  Regional lymph nodes:                         Number of lymph nodes with macrometastases: 0.                         Number of lymph nodes with micrometastases: 2 (larger focus is 1.2 mm).                         Number of lymph nodes with isolated tumor cells: 3.                         Number of lymph nodes examined: 14.                         Number of sentinel nodes examined: 0.  Treatment effect:                         Treatment effect in the breast: No  definite response to presurgical therapy in the invasive carcinoma.                         Treatment effect in the lymph nodes: Probable or definite response to presurgical therapy in metastatic                                carcinoma                         Comment: Many lymph nodes have sclerosis suggesting regression secondary to therapy.                         One lymph node has changes consistent with prior biopsy.  Lymphovascular invasion: Present.  Pathologic staging:                         TNM Descriptor: m (multiple foci of invasive carcinoma).                                                                        y (post treatment).                         Primary tumor: pT3.                         Regional lymph nodes:                                                Category: pN1mi.  Ancillary studies: Hormone receptor and HER/2/tacos studies have been performed on prior biopsy material.  If those studies are desired on the current specimen, please contact the laboratory.     CMK/marylum  I[facility]/TDJ/THM

## 2018-01-17 ENCOUNTER — TELEPHONE (OUTPATIENT)
Dept: MAMMOGRAPHY | Facility: CLINIC | Age: 63
End: 2018-01-17

## 2018-01-17 NOTE — TELEPHONE ENCOUNTER
PT NOTIFIED OF APPTS:    [facility]  1/30/2018  DR KOLB AT 9:00  DR YOU AT 9:30    SPOKE WITH PT AND PTS SISTER TOOK ALL INFO. VOICED UNDERSTANDING.

## 2018-01-19 ENCOUNTER — TELEPHONE (OUTPATIENT)
Dept: SURGERY | Facility: CLINIC | Age: 63
End: 2018-01-19

## 2018-01-19 NOTE — TELEPHONE ENCOUNTER
Drain totals     #1 #2  1/15/18 55 125  1/16/18 90 120   1/17/18 140 80   1/18/18 105 40    We will see patient Tuesday 1/23 post-op.     lml     Drain #2 removed at post op appt today 1/23/18 lml     1/22 95 cc  1/23 150 cc  1/24  40 cc  1/25  75 cc  1/26  65 cc  1/27  60 cc  1/28  80 cc  1/29  60 cc  1/30 60 cc    lml

## 2018-01-23 ENCOUNTER — TELEPHONE (OUTPATIENT)
Dept: SURGERY | Facility: CLINIC | Age: 63
End: 2018-01-23

## 2018-01-23 ENCOUNTER — OFFICE VISIT (OUTPATIENT)
Dept: SURGERY | Facility: CLINIC | Age: 63
End: 2018-01-23

## 2018-01-23 VITALS
OXYGEN SATURATION: 99 % | BODY MASS INDEX: 43.51 KG/M2 | HEART RATE: 64 BPM | SYSTOLIC BLOOD PRESSURE: 128 MMHG | WEIGHT: 215.8 LBS | DIASTOLIC BLOOD PRESSURE: 75 MMHG | HEIGHT: 59 IN

## 2018-01-23 DIAGNOSIS — C77.3 SECONDARY MALIGNANT NEOPLASM OF AXILLARY LYMPH NODES (HCC): ICD-10-CM

## 2018-01-23 DIAGNOSIS — Z80.3 FH: BREAST CANCER: ICD-10-CM

## 2018-01-23 DIAGNOSIS — C50.412 MALIGNANT NEOPLASM OF UPPER-OUTER QUADRANT OF LEFT FEMALE BREAST, UNSPECIFIED ESTROGEN RECEPTOR STATUS (HCC): Primary | ICD-10-CM

## 2018-01-23 DIAGNOSIS — K74.69 OTHER CIRRHOSIS OF LIVER (HCC): ICD-10-CM

## 2018-01-23 DIAGNOSIS — C50.212 MALIGNANT NEOPLASM OF UPPER-INNER QUADRANT OF LEFT FEMALE BREAST, UNSPECIFIED ESTROGEN RECEPTOR STATUS (HCC): ICD-10-CM

## 2018-01-23 PROCEDURE — 99024 POSTOP FOLLOW-UP VISIT: CPT | Performed by: SURGERY

## 2018-01-23 RX ORDER — HYDROCODONE BITARTRATE AND ACETAMINOPHEN 5; 325 MG/1; MG/1
1 TABLET ORAL EVERY 8 HOURS PRN
Status: ON HOLD | COMMUNITY
Start: 2018-01-12 | End: 2022-09-21

## 2018-01-23 RX ORDER — ACETAMINOPHEN 500 MG
1000 TABLET ORAL 2 TIMES DAILY
COMMUNITY
End: 2022-09-29 | Stop reason: HOSPADM

## 2018-01-23 NOTE — TELEPHONE ENCOUNTER
appt w/ Dr. Brandon Henley on 1/30/18 at 9:00 am  appt w/ Dinora Merino on 1/30/18 9:30am  These appts are back to back on same day & pt is aware of both appointments    [phone]  [place] [address]    Mammogram scheduled at [facility] on Thursday 3/29/18 at 10:20am

## 2018-01-23 NOTE — PROGRESS NOTES
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 therapy.                         One lymph node has changes consistent with prior biopsy.  Lymphovascular invasion: Present.  Pathologic staging:                         TNM Descriptor: m (multiple foci of invasive carcinoma).                                                                        y (post treatment).                         Primary tumor: pT3.                         Regional lymph nodes:                                                Category: pN1mi.  Ancillary studies: Hormone receptor and HER/2/tacos studies have been performed on prior biopsy material.  If those studies are desired on the current specimen, please contact the laboratory.     CMK/marylum  IHC/TDJ/THM          Patient went for a CT guided biopsy of the calvarium and Dr Hadley did not see a target on CT. She then had a PET CT 5-31-17  that showed index lesion LEFT breast and nodes in the LEFT axilla and subpectoral space.No uptake at the skull to correlate to the recent bone scan finding. Perceived abnormal uptake at the sacral body and bilateral sacral ala with no definate underlying lesions at CT. No visceral metastases. Recommend MRI brain and head. MRI brain and head 6-6-17 showed abnormal signal intensity on the calvarium posterior laterally to the right suspicious for calvarial metastatic lesion with adjacent dural thickening  or possibly tumor extension.  A dural metastatic lesion with secondary involvement of the calvarium is less likely.  Far less likely would be and on plaque meningioma with osseous extension.     MRI pelvis June 6, 2017 showed a 1.3 cm area of abnormal signal enhancement medullary space of the left femoral head and neck junction anterosuperiorly which is indeterminate and will require follow-up.  No associated abnormality on PET/CT in this area in the sitting on bone scan.  MRI would be the study of choice for follow-up.       Procedures:  Percutaneous ultrasound-guided vacuum-assisted core breast biopsy X 2  Indication:  ultrasound-visible breast mass x 2 separate sites suspicious for breast cancer  Location:  1- LEFT 2:00 10 CFN  2- LEFT 10:00 9.5 CFN  Consent:  The risks, benefits, and alternatives to the procedure were discussed with the patient, who understood and wished to proceed.  The risks described included, but were not limited to, bleeding, infection, pneumothorax, and inadequate sampling requiring either repeat percutaneous or open excisional biopsy.  Description of Procedure:   After the patient was positioned supine on the procedure table, I located each  lesion using ultrasound. I performed the same procedure on each separate mass, each located in a different quadrant.  I prepped and draped the breast skin in sterile fashion at each site.  I anesthetized the breast skin at the site of anticipated mammotomy at each separate site with 1% lidocaine with epinephrine.  I then anesthetized the underlying subcutaneous tissue and breast parenchyma surrounding each lesion with 1% lidocaine with epinephrine under ultrasound visualization and guidance. I then made a nicking incision with an 11blade and inserted the 14 G celero biopsy device from inferolateral to superomedial through each lesion under ultrasound guidance.   I then took 4 core samples  of the 2:00 lesion  And 2 core samples of the 10:00 lesion  under direct visualization with ultrasound.  I withdrew the probe and placed a hydromark marker into each biopsy site.  We held manual compression for 10 minutes, placed steri -strips at the mammotomy site, and wrapped the patient in a 6 inch super ace wrap and a cold pack.  Marker placed: hydromark at each of the 2 sites  Tolerance: The patient tolerated the procedure well.  Disposition: We will see her back within a week to review her pathology.    Ultrasound-guided fine-needle aspiration of axillary lymph node:  Indication:  enlarged axillary lymph node on ultrasound with clinical diagnosis of cancer and abnormal imaging and exam  Location: LEFT axilla  Consent:  The risks, benefits, and alternatives to the procedure were discussed with the patient, who understood and wished to proceed.  The risks described included, but were not limited to, bleeding, infection, pneumothorax, injury to the axillary vein.  Description of Procedure:   After the patient was positioned supine on the procedure table with her arm raised over her head, I located the abnormal appearing/enlarged lymph node using ultrasound.  I prepped and draped the axillary skin in sterile fashion.  I anesthetized the axillary skin with 1% lidocaine with epinephrine.  I then anesthetized the deep tissues with 1% lidocaine with epinephrine under ultrasound visualization and guidance. I then inserted a 21 G needle (attached to a 5cc syringe) into the node under ultrasound guidance and made 15-20 passes into the node to perform an adequate aspiration of cells.   The sample was placed on 4 slides and sent in alcohol for cytologic evaluation.  Manual compression was held for 5 minutes and a bandaid applied.  Marker placed: hydromark  Tolerance: The patient tolerated the procedure well.  Disposition: We will see her back within one week to discuss her pathology.    Assessment:   Diagnosis Plan   1. Malignant neoplasm of upper-outer quadrant of left female  breast, unspecified estrogen receptor status  Mammo Screening Modified Right With CAD    Ambulatory Referral to Physical Therapy Bioimpedance, Lymphedema   2. Malignant neoplasm of upper-inner quadrant of left female breast, unspecified estrogen receptor status  Mammo Screening Modified Right With CAD    Ambulatory Referral to Physical Therapy Bioimpedance, Lymphedema   3. Secondary malignant neoplasm of axillary lymph nodes  Mammo Screening Modified Right With CAD    Ambulatory Referral to Physical Therapy Bioimpedance, Lymphedema   4. FH: breast cancer  Mammo Screening Modified Right With CAD    Ambulatory Referral to Physical Therapy Bioimpedance, Lymphedema   5. Other cirrhosis of liver  Mammo Screening Modified Right With CAD    Ambulatory Referral to Physical Therapy Bioimpedance, Lymphedema      1-5  LEFT 10:00 9.5 CFN- 5.5 cm on exam; 2 cm on ultrasound but underrepresented, 5.7 cm on MRI- BR4  Left breast 10:00, invasive ductal carcinoma, intermediate grade, 3, 2, 1, largest length in a core 1.2 cm.  10:00 9.5 CFN- ER 90 DC 90 her 2 tacos 2+, ration 2.0, copy number 4.6, positive.    LEFT 2:00 10 CFN 6 cm no exam, 3 cm on ultrasound but underrepresented, also 5.7 cm on MRI- Br5  Left breast biopsy 2:00, invasive mammary carcinoma with focal lobular features, intermediate grade, 3, 2, 1, largest size in a core 1.5 cm.  2:00 10 CFN- ER 90 DC 90, her 2 IHC 2+, ration 2.1, positive. Copy number was 5.1.    LEFT axilla node FNA- positive for metastatic mammary carcinoma    - Clinical stage [id number]- stage IIIA (Dr Henley did not feel that the findings on imaging were metastatic)  -Pathology from 1-11-18 LEFT modified radical mastectomy- pathology returned as IMC, NST, int grade, multifocal with 2 separate foci.  The largest foci of 5.2 cm and the smaller 4.0 cm.  Additional invasive carcinoma noted in sections of the nipple.  Invasive carcinoma also present in 2 out of 3 random sections from tissue between the 2  involved areas.  Intermediate grade, 3, 2, 1.  Focal duct carcinoma in situ, intermediate grade.  Tumor extends focally into skeletal muscle posteriorly.  Tumor extends to superior superficial margin and is less than 1 mm from deep margin.  Note that no sentinel lymph nodes were taken due to the failure of migration of radiotracer.  Total number of lymph nodes examined on 14, number of lymph nodes with cancer on 5.  2 micrometastatic and 3 isolated tumor cells.  Additional focus of tumor within a lymphatic space in the adipose tissue.  Pathologic stage [id number] latoya.  Stage IIIA.    4-  Mother age 50  MGA age uncertain    5-  Cirrhosis with abnormal imaging of liver, splenomegaly, small GEJxn varices and slightly elevated INR of 1.33  patient uncertain of etiology- was told was due to dilantin. Denies hepatits or excessive ETOH- has not seen hepatology    6-  -Bone scan showed 4-21-17 RIGHT parietal-occipital bone lesion susp for metastatic disease- couldn't find target for biopsy; repeat MRI showed stable dural thickening  -CT chest 4-2017 pulmonary nodules 3-4 mm- recommend Fu chest CT 4 months- FU CT in November 2017 showed stable nodules with benign morphology            Plan:  Josseline, her sister and I reviewed her exam and surgical pathology report.  I will arrange for her to see Dr. Dinora Merino for postoperative radiation therapy.  We will get her back in to see Dr. Henley.  She still has a port in place.  I will arrange for her right screening mammogram March 15, 2018 at/day and to see me back.  We will arrange for her to see physical therapy to have bile impedance measurements of the left arm.  We will write for her prosthesis and post mastectomy bra in April when she comes back to see us.  I asked her call me interim with any concerns or changes.  She is doing well today.  We will remove drain #2 and call her back in to get the drain #1 removed in the interim.      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  Note- Depression with prior admission to [facility].  in 2012 and lost son to suicide 2013.  Has smoked tobacco for 45 years 1.5 ppd    Most recent labs:  5-22-17- INR 1.28, Albumin 4.3, ALT7, AST 33, , Bili 0.4  6-2-17- PLts 113, PCV 90, Cr, 0.91      Yuli Garcias MD            Next Appointment:  Return for Next scheduled follow up, after imaging.      EMR Dragon/transcription disclaimer:    Much of this encounter note is an electronic transcription/translocation of spoken language to printed text.  The electronic translation of spoken language may permit erroneous, or at times, nonsensical words or phrases to be inadvertently transcribed.  Although I have reviewed the note from such areas, some may still exist.

## 2018-01-25 ENCOUNTER — TELEPHONE (OUTPATIENT)
Dept: SURGERY | Facility: CLINIC | Age: 63
End: 2018-01-25

## 2018-01-25 NOTE — TELEPHONE ENCOUNTER
MELIA for Ms. Vargas to call:    Want to make sure she is aware of her   Rt Screen Mammo at [facility] 3/29/18 @ 10:20  Return to see Dr CASTILLO 4/10/18 arrive 1:15    [email or web address]

## 2018-01-29 NOTE — TELEPHONE ENCOUNTER
Drain totals     #1 #2  1/15/18 55 125  1/16/18 90 120   1/17/18 140 80   1/18/18 105 40    We will see patient Tuesday 1/23 post-op.     lml     Drain #2 removed at post op appt today 1/23/18 lml

## 2018-01-31 ENCOUNTER — TELEPHONE (OUTPATIENT)
Dept: SURGERY | Facility: CLINIC | Age: 63
End: 2018-01-31

## 2018-01-31 NOTE — TELEPHONE ENCOUNTER
Moshe from Dr. Merino office called   She would like to start Radiation for patient as soon as possible   She has request JUAN A Drain be removed by 2/15/18    I let Moshe know that we are tracking totals for drain #2 and they are as follows   1/22 95 cc  1/23 150 cc  1/24  40 cc  1/25  75 cc  1/26  65 cc  1/27  60 cc  1/28  80 cc  1/29  60 cc  1/30 60 cc     I will be sure to share Dr. Merino's request with Dr. Garcias  And agreed to update nurses Siria or Moshe on the status of her drain [phone].     l

## 2018-02-02 ENCOUNTER — TELEPHONE (OUTPATIENT)
Dept: SURGERY | Facility: CLINIC | Age: 63
End: 2018-02-02

## 2018-02-05 ENCOUNTER — APPOINTMENT (OUTPATIENT)
Dept: PHYSICAL THERAPY | Facility: HOSPITAL | Age: 63
End: 2018-02-05
Attending: SURGERY

## 2018-02-05 ENCOUNTER — TELEPHONE (OUTPATIENT)
Dept: SURGERY | Facility: CLINIC | Age: 63
End: 2018-02-05

## 2018-02-05 NOTE — TELEPHONE ENCOUNTER
Note from Maggie Merino dated January 3, 2018: She plans to begin radiation to the chest wall axilla and super clamps after drain removal.  Note from Dr. Brandon Henley dated [facility] recovery [phone]: Plan to complete 1 year for Herceptin once the drain has been removed.  Plan to initiate anastrozole today.  Plan for adjuvant radiation.

## 2018-02-07 ENCOUNTER — TELEPHONE (OUTPATIENT)
Dept: SURGERY | Facility: CLINIC | Age: 63
End: 2018-02-07

## 2018-02-07 NOTE — TELEPHONE ENCOUNTER
----- Message from Zofia Almazan sent at 2/7/2018  2:14 PM EST -----  Regarding: NEED DRAIN TUBE OUT  Tu called # [phone]  and said Dr De La Rosa wants to know if Tube can come out on Ms Vargas wants to start Radiation Earlier.     Drain tube will be tracked and we will let patient and Rad Onc know as soon as this JUAN A Drain can come out. Lml

## 2018-04-16 ENCOUNTER — TELEPHONE (OUTPATIENT)
Dept: SURGERY | Facility: CLINIC | Age: 63
End: 2018-04-16

## 2018-04-16 NOTE — TELEPHONE ENCOUNTER
4-13-18- RIGHT screen mammo at [facility]. Scattered FG,

## 2018-04-18 ENCOUNTER — OFFICE VISIT (OUTPATIENT)
Dept: SURGERY | Facility: CLINIC | Age: 63
End: 2018-04-18

## 2018-04-18 VITALS
DIASTOLIC BLOOD PRESSURE: 77 MMHG | HEART RATE: 96 BPM | OXYGEN SATURATION: 99 % | SYSTOLIC BLOOD PRESSURE: 138 MMHG | BODY MASS INDEX: 46.57 KG/M2 | HEIGHT: 59 IN | WEIGHT: 231 LBS

## 2018-04-18 DIAGNOSIS — C50.212 MALIGNANT NEOPLASM OF UPPER-INNER QUADRANT OF LEFT FEMALE BREAST, UNSPECIFIED ESTROGEN RECEPTOR STATUS (HCC): ICD-10-CM

## 2018-04-18 DIAGNOSIS — K74.69 OTHER CIRRHOSIS OF LIVER (HCC): ICD-10-CM

## 2018-04-18 DIAGNOSIS — C77.3 SECONDARY MALIGNANT NEOPLASM OF AXILLARY LYMPH NODES (HCC): ICD-10-CM

## 2018-04-18 DIAGNOSIS — R93.89 ABNORMAL CT SCAN, CHEST: ICD-10-CM

## 2018-04-18 DIAGNOSIS — Z80.3 FH: BREAST CANCER: ICD-10-CM

## 2018-04-18 DIAGNOSIS — C50.412 MALIGNANT NEOPLASM OF UPPER-OUTER QUADRANT OF LEFT FEMALE BREAST, UNSPECIFIED ESTROGEN RECEPTOR STATUS (HCC): Primary | ICD-10-CM

## 2018-04-18 PROCEDURE — 99213 OFFICE O/P EST LOW 20 MIN: CPT | Performed by: SURGERY

## 2018-04-18 RX ORDER — SULFAMETHOXAZOLE AND TRIMETHOPRIM 400; 80 MG/1; MG/1
1 TABLET ORAL 2 TIMES DAILY
Status: ON HOLD | COMMUNITY
End: 2019-08-08

## 2018-04-18 NOTE — PROGRESS NOTES
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 nodes with micrometastases: 2 (larger focus is 1.2 mm).                         Number of lymph nodes with isolated tumor cells: 3.                         Number of lymph nodes examined: 14.                         Number of sentinel nodes examined: 0.  Treatment effect:                         Treatment effect in the breast: No definite response to presurgical therapy in the invasive carcinoma.                         Treatment effect in the lymph nodes: Probable or definite response to presurgical therapy in metastatic                                carcinoma                         Comment: Many lymph nodes have sclerosis suggesting regression secondary to therapy.                         One lymph node has changes consistent with prior biopsy.  Lymphovascular invasion: Present.  Pathologic staging:                         TNM Descriptor: m (multiple foci of invasive carcinoma).                                                                        y (post treatment).                         Primary tumor: pT3.                         Regional lymph nodes:                                                Category: pN1mi.  Ancillary studies: Hormone receptor and HER/2/tacos studies have been performed on prior biopsy material.  If those studies are desired on the  current specimen, please contact the laboratory.     CMK/pkm  IHC/TDJ/THM          Patient went for a CT guided biopsy of the calvarium and Dr Hadley did not see a target on CT. She then had a PET CT 5-31-17  that showed index lesion LEFT breast and nodes in the LEFT axilla and subpectoral space.No uptake at the skull to correlate to the recent bone scan finding. Perceived abnormal uptake at the sacral body and bilateral sacral ala with no definate underlying lesions at CT. No visceral metastases. Recommend MRI brain and head. MRI brain and head 6-6-17 showed abnormal signal intensity on the calvarium posterior laterally to the right suspicious for calvarial metastatic lesion with adjacent dural thickening or possibly tumor extension.  A dural metastatic lesion with secondary involvement of the calvarium is less likely.  Far less likely would be and on plaque meningioma with osseous extension.     MRI pelvis June 6, 2017 showed a 1.3 cm area of abnormal signal enhancement medullary space of the left femoral head and neck junction anterosuperiorly which is indeterminate and will require follow-up.  No associated abnormality on PET/CT in this area in the sitting on bone scan.  MRI would be the study of choice for follow-up.        Procedures:      Assessment:   Diagnosis Plan   1. Malignant neoplasm of upper-outer quadrant of left female breast, unspecified estrogen receptor status  Ambulatory Referral to Physical Therapy Bioimpedance, Lymphedema    Mammo Screening Modified With Tomosynthesis Right With CAD   2. Malignant neoplasm of upper-inner quadrant of left female breast, unspecified estrogen receptor status  Ambulatory Referral to Physical Therapy Bioimpedance, Lymphedema   3. Secondary malignant neoplasm of axillary lymph nodes  Ambulatory Referral to Physical Therapy Bioimpedance, Lymphedema   4. FH: breast cancer     5. Other cirrhosis of liver     6. Abnormal CT scan, chest        1-3  LEFT 10:00 9.5 CFN-  5.5 cm on exam; 2 cm on ultrasound but underrepresented, 5.7 cm on MRI- BR4  Left breast 10:00, invasive ductal carcinoma, intermediate grade, 3, 2, 1, largest length in a core 1.2 cm.  10:00 9.5 CFN- ER 90 MT 90 her 2 tacos 2+, ration 2.0, copy number 4.6, positive.    LEFT 2:00 10 CFN 6 cm no exam, 3 cm on ultrasound but underrepresented, also 5.7 cm on MRI- Br5  Left breast biopsy 2:00, invasive mammary carcinoma with focal lobular features, intermediate grade, 3, 2, 1, largest size in a core 1.5 cm.  2:00 10 CFN- ER 90 MT 90, her 2 IHC 2+, ration 2.1, positive. Copy number was 5.1.    LEFT axilla node FNA- positive for metastatic mammary carcinoma    - Clinical stage [id number]- stage IIIA (Dr eHnley did not feel that the findings on imaging were metastatic)  -Pathology from 1-11-18 LEFT modified radical mastectomy- pathology returned as IMC, NST, int grade, multifocal with 2 separate foci.  The largest foci of 5.2 cm and the smaller 4.0 cm.  Additional invasive carcinoma noted in sections of the nipple.  Invasive carcinoma also present in 2 out of 3 random sections from tissue between the 2 involved areas.  Intermediate grade, 3, 2, 1.  Focal duct carcinoma in situ, intermediate grade.  Tumor extends focally into skeletal muscle posteriorly.  Tumor extends to superior superficial margin and is less than 1 mm from deep margin.  Note that no sentinel lymph nodes were taken due to the failure of migration of radiotracer.  Total number of lymph nodes examined on 14, number of lymph nodes with cancer on 5.  2 micrometastatic and 3 isolated tumor cells.  Additional focus of tumor within a lymphatic space in the adipose tissue.  Pathologic stage [id number] latoya.  Stage IIIA.    Dr Henley- [facility]- receiving maintenance herceptin presently  Dr Merino- 2-13-18 start CW and regional irradiation    4-  Mother age 50  MGA age uncertain    5-  Cirrhosis with abnormal imaging of liver, splenomegaly, small GEJxn varices and  slightly elevated INR of 1.33  patient uncertain of etiology- was told was due to dilantin. Denies hepatits or excessive ETOH- has not seen hepatology    6-  -Bone scan showed 4-21-17 RIGHT parietal-occipital bone lesion susp for metastatic disease- couldn't find target for biopsy; repeat MRI showed stable dural thickening  -CT chest 4-2017 pulmonary nodules 3-4 mm- recommend Fu chest CT 4 months- FU CT in November 2017 showed stable nodules with benign morphology            Plan:  Josseline,and I reviewed her interval history, treatment, exam and imaging reports together today.    She continues the radiation and herceptin.  She tells me that she has not yet started the arimidex, I presume she will start this after irradiation.    I will arrange for her to have a bioimpedence measurement. She did not go for this after our last visit.  I gave her a Rx for a postmastectomy bra plus prosthesis but asked her to clarify with Dr Merino when she would allow her to have a heavy prosthesis rubbing on the skin of the chest wall.    I plan to see her back in one year after a RIGHT mammogram at [facility] 4-14-19.     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  Note- Depression with [facility]  in 2012 and lost son to suicide 2013.  Has smoked tobacco for 45 years 1.5 ppd    I asked her to call us in the interim with concerns we'd be happy to see her back sooner.      Yuli Garcias MD      We spent 15 min in visit, 10 in face to face       Next Appointment:  Return for Next scheduled follow up, after imaging.      EMR Dragon/transcription disclaimer:    Much of this encounter note is an electronic transcription/translocation of spoken language to printed text.  The electronic translation of spoken language may permit erroneous, or at times, nonsensical words or phrases to be inadvertently transcribed.   Although I have reviewed the note from such areas, some may still exist.

## 2018-04-24 ENCOUNTER — TELEPHONE (OUTPATIENT)
Dept: SURGERY | Facility: CLINIC | Age: 63
End: 2018-04-24

## 2018-04-24 NOTE — TELEPHONE ENCOUNTER
Ms Vargas called said she needs 72 hr notice to make appointments  Here in [place], she was calling about Physical Therapy at [facility].    I sent the referral back to PT to have them speak with her before they  Schedule so she will have enough time for to make arrangements for transportation.    ric

## 2018-05-10 ENCOUNTER — TELEPHONE (OUTPATIENT)
Dept: SURGERY | Facility: CLINIC | Age: 63
End: 2018-05-10

## 2018-05-10 NOTE — TELEPHONE ENCOUNTER
NOte from Dr Merino dated 5-9-18- -- took XRT [facility] 3-13-18 through 5-9-18- LEFT supraclav treated to 4500 cGy and LEFT CW to 5000cGy. Then electron boost to mastectomy incision.  4000 cGy to axilla.

## 2018-05-16 NOTE — TELEPHONE ENCOUNTER
"Pathology from in office procedures 4-14-17 left breast 10:00, invasive ductal carcinoma, intermediate grade, 3, 2, 1, largest length in a core 1.2 cm.  Left breast biopsy 2:00, invasive mammary carcinoma with focal lobular features, intermediate grade, 3, 2, 1, largest size in a core 1.5 cm. Report on her fine-needle aspiration from Dr. Guzman with PCA stated that there are malignant cells present, consistent with mammary carcinoma.No lymphocytes seen in the specimen, but clinically was taken from enlarged node.    We will let herk now that the biospies were malignant as we expected.  We will see her back after her imaging.    Clinical Information       2 of 2, Percutaneous U/S guided core breast biopsy.   Left breast mass 10:00 9.5 cm from nipple.   Final Diagnosis   1. BREAST, LEFT, DESIGNATED \"10 O'CLOCK, 9.5 CM FROM NIPPLE\", CORE BIOPSY:  INVASIVE DUCTAL CARCINOMA.  PREDICTED ERIC SCORE: TUBULAR SCORE 3, NUCLEAR SCORE 2, MITOTIC SCORE 1;  OVERALL GRADE 2 (SCORE 6 OF 9).   MAXIMUM MEASURED LENGTH OF INVASIVE CARCINOMA IN A SINGLE CORE IS 1.2 CM.      2. BREAST, LEFT, DESIGNATED \"2 O'CLOCK, 10 CM FROM NIPPLE\", CORE BIOPSY:  INVASIVE DUCTAL CARCINOMA WITH FOCAL LOBULAR FEATURES (SEE COMMENT).   PREDICTED ERIC SCORE: TUBULAR SCORE 3, NUCLEAR SCORE 2, MITOTIC SCORE 1;  OVERALL GRADE 2 (SCORE 6 OF 9).   MAXIMUM MEASURED LENGTH OF INVASIVE CARCINOMA IN A SINGLE CORE IS 1.5 CM.      COMMENT: The diagnosis for specimen #2 is supported by E-cadherin immunohistochemistry (see Microscopic Description for immunohistochemical staining details). ER, ME, and HER-2/tacos studies will be performed on both specimens with results to be reported in addenda.        " Billing Type: Third-Party Bill

## 2018-06-22 RX ORDER — NORTRIPTYLINE HYDROCHLORIDE 25 MG/1
CAPSULE ORAL
Qty: 30 CAPSULE | Refills: 4 | OUTPATIENT
Start: 2018-06-22

## 2018-07-02 ENCOUNTER — TELEPHONE (OUTPATIENT)
Dept: SURGERY | Facility: CLINIC | Age: 63
End: 2018-07-02

## 2018-07-02 ENCOUNTER — TELEPHONE (OUTPATIENT)
Dept: OTHER | Facility: HOSPITAL | Age: 63
End: 2018-07-02

## 2018-07-02 NOTE — TELEPHONE ENCOUNTER
Note from radiation oncology at/day dated June 21, 2018: Dr. Dinora Merino: No evidence of local regional disease.  However patient was placed on doxycycline for PET bug infection.  She will return in 3 months.  The patient's radiation treatment was chest wall,  left supraclavicular, left chest wall, boost to the mastectomy scar, and also the axilla.  Initiated March 13, 2018 and completed May 29, 2018.

## 2018-07-12 RX ORDER — NORTRIPTYLINE HYDROCHLORIDE 25 MG/1
CAPSULE ORAL
Qty: 30 CAPSULE | Refills: 4 | Status: SHIPPED | OUTPATIENT
Start: 2018-07-12 | End: 2018-12-27 | Stop reason: SDUPTHER

## 2018-07-26 RX ORDER — TOPIRAMATE 50 MG/1
TABLET, FILM COATED ORAL
Qty: 60 TABLET | Refills: 4 | Status: SHIPPED | OUTPATIENT
Start: 2018-07-26 | End: 2019-02-08 | Stop reason: SDUPTHER

## 2018-08-16 RX ORDER — TOPIRAMATE 25 MG/1
TABLET ORAL
Qty: 60 TABLET | Refills: 4 | Status: SHIPPED | OUTPATIENT
Start: 2018-08-16 | End: 2019-06-10

## 2018-12-27 RX ORDER — NORTRIPTYLINE HYDROCHLORIDE 25 MG/1
CAPSULE ORAL
Qty: 30 CAPSULE | Refills: 0 | Status: SHIPPED | OUTPATIENT
Start: 2018-12-27 | End: 2019-02-08 | Stop reason: SDUPTHER

## 2018-12-27 RX ORDER — PHENYTOIN SODIUM 100 MG/1
CAPSULE, EXTENDED RELEASE ORAL
Qty: 30 CAPSULE | Refills: 0 | Status: SHIPPED | OUTPATIENT
Start: 2018-12-27 | End: 2019-02-08 | Stop reason: SDUPTHER

## 2019-01-10 ENCOUNTER — TELEPHONE (OUTPATIENT)
Dept: SURGERY | Facility: CLINIC | Age: 64
End: 2019-01-10

## 2019-01-10 NOTE — TELEPHONE ENCOUNTER
NOte from Dr Merino dated 1-9-19- radiation was given to the chest wall, supraclavicular and axilla.  The mastectomy scar was then given an additional boost.  Start date March 13, 2018 completion date May 29, 2018.  Encouraged adherence to her diabetic regimen.  We'll administer fluids vaccine and returned to 3 weeks for Pneumovax.

## 2019-02-08 RX ORDER — PHENYTOIN SODIUM 100 MG/1
CAPSULE, EXTENDED RELEASE ORAL
Qty: 30 CAPSULE | Refills: 0 | Status: ON HOLD | OUTPATIENT
Start: 2019-02-08 | End: 2022-09-21

## 2019-02-08 RX ORDER — NORTRIPTYLINE HYDROCHLORIDE 25 MG/1
CAPSULE ORAL
Qty: 30 CAPSULE | Refills: 0 | Status: SHIPPED | OUTPATIENT
Start: 2019-02-08 | End: 2019-06-10

## 2019-02-08 RX ORDER — TOPIRAMATE 50 MG/1
TABLET, FILM COATED ORAL
Qty: 60 TABLET | Refills: 0 | Status: SHIPPED | OUTPATIENT
Start: 2019-02-08 | End: 2019-06-10

## 2019-02-22 ENCOUNTER — TELEPHONE (OUTPATIENT)
Dept: SURGERY | Facility: CLINIC | Age: 64
End: 2019-02-22

## 2019-02-22 NOTE — TELEPHONE ENCOUNTER
Scheduled Rt 3D Screen Mammo at [facility] 4-15-19 arrive 11:20    Return to see Dr CASTILLO 4-25-19 arrive 2:15        Spoke to pt she v/u with appts  damiánl

## 2019-04-16 ENCOUNTER — TELEPHONE (OUTPATIENT)
Dept: SURGERY | Facility: CLINIC | Age: 64
End: 2019-04-16

## 2019-04-16 NOTE — TELEPHONE ENCOUNTER
Note from Dr. Kale Henley dated April 2019 -continue Arimidex for 5 years, which will be 3 more years from now.  Chronic pain in her chest cavity on Lortab.  At this point I do not believe it is indicated for chronic pain control for neuropathic pain.  Recommend referral to pain management.  We will not refill that medication at this time.      [facility] screening right mammogram.  No mammographic evidence of malignancy.  BI-RADS 2  April 15, 2019.

## 2019-04-25 ENCOUNTER — PREP FOR SURGERY (OUTPATIENT)
Dept: OTHER | Facility: HOSPITAL | Age: 64
End: 2019-04-25

## 2019-04-25 ENCOUNTER — OFFICE VISIT (OUTPATIENT)
Dept: SURGERY | Facility: CLINIC | Age: 64
End: 2019-04-25

## 2019-04-25 VITALS
SYSTOLIC BLOOD PRESSURE: 140 MMHG | WEIGHT: 250.06 LBS | DIASTOLIC BLOOD PRESSURE: 76 MMHG | HEART RATE: 66 BPM | BODY MASS INDEX: 50.41 KG/M2 | HEIGHT: 59 IN | OXYGEN SATURATION: 98 %

## 2019-04-25 DIAGNOSIS — C50.212 MALIGNANT NEOPLASM OF UPPER-INNER QUADRANT OF LEFT FEMALE BREAST, UNSPECIFIED ESTROGEN RECEPTOR STATUS (HCC): ICD-10-CM

## 2019-04-25 DIAGNOSIS — R93.89 ABNORMAL CT SCAN, CHEST: ICD-10-CM

## 2019-04-25 DIAGNOSIS — Z80.3 FH: BREAST CANCER: ICD-10-CM

## 2019-04-25 DIAGNOSIS — K74.69 OTHER CIRRHOSIS OF LIVER (HCC): ICD-10-CM

## 2019-04-25 DIAGNOSIS — C50.412 MALIGNANT NEOPLASM OF UPPER-OUTER QUADRANT OF LEFT FEMALE BREAST, UNSPECIFIED ESTROGEN RECEPTOR STATUS (HCC): Primary | ICD-10-CM

## 2019-04-25 DIAGNOSIS — E66.01 MORBID OBESITY WITH BMI OF 50.0-59.9, ADULT (HCC): ICD-10-CM

## 2019-04-25 DIAGNOSIS — C77.3 SECONDARY MALIGNANT NEOPLASM OF AXILLARY LYMPH NODES (HCC): ICD-10-CM

## 2019-04-25 PROCEDURE — 99213 OFFICE O/P EST LOW 20 MIN: CPT | Performed by: SURGERY

## 2019-04-25 RX ORDER — SODIUM CHLORIDE, SODIUM LACTATE, POTASSIUM CHLORIDE, CALCIUM CHLORIDE 600; 310; 30; 20 MG/100ML; MG/100ML; MG/100ML; MG/100ML
100 INJECTION, SOLUTION INTRAVENOUS CONTINUOUS
Status: CANCELLED | OUTPATIENT
Start: 2019-06-20

## 2019-04-25 RX ORDER — CLINDAMYCIN PHOSPHATE 900 MG/50ML
900 INJECTION INTRAVENOUS ONCE
Status: CANCELLED | OUTPATIENT
Start: 2019-06-20 | End: 2019-04-25

## 2019-04-25 NOTE — PROGRESS NOTES
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 Score 3.                         Nuclear pleomorphism: score 2.                         Mitotic rate: Score 1.                         Overall grade: Grade 2.  Tumor focality: Multiple foci of invasive carcinoma (2).  Duct carcinoma in situ:  Focal duct carcinoma in situ, intermediate grade is present.  Tumor extension:                         Skeletal muscle: Carcinoma focally invades skeletal muscle.  Margins:                          Invasive carcinoma margins:                                                Invasive carcinoma is present at superior superficial margin and is less than 1 mm from deep margin.                         Duct carcinoma in situ margins:                                                  Uninvolved by duct carcinoma in situ.                                                Distance from closest margin: Over 2 mm from superficial margin.  Regional lymph nodes:                         Number of lymph nodes with macrometastases: 0.                         Number of lymph nodes with micrometastases: 2 (larger focus is 1.2 mm).                         Number of lymph nodes with isolated tumor cells: 3.                         Number of lymph nodes examined: 14.                         Number of sentinel nodes examined:  0.  Treatment effect:                         Treatment effect in the breast: No definite response to presurgical therapy in the invasive carcinoma.                         Treatment effect in the lymph nodes: Probable or definite response to presurgical therapy in metastatic                                carcinoma                         Comment: Many lymph nodes have sclerosis suggesting regression secondary to therapy.                         One lymph node has changes consistent with prior biopsy.  Lymphovascular invasion: Present.  Pathologic staging:                         TNM Descriptor: m (multiple foci of invasive carcinoma).                                                                        y (post treatment).                         Primary tumor: pT3.                         Regional lymph nodes:                                                Category: pN1mi.  Ancillary studies: Hormone receptor and HER/2/tacos studies have been performed on prior biopsy material.  If those studies are desired on the current specimen, please contact the laboratory.     CMK/cesia  IHC/TDJ/THM          Patient went for a CT guided biopsy of the calvarium and Dr Hadley did not see a target on CT. She then had a PET CT 5-31-17  that showed index lesion LEFT breast and nodes in the LEFT axilla and subpectoral space.No uptake at the skull to correlate to the recent bone scan finding. Perceived abnormal uptake at the sacral body and bilateral sacral ala with no definate underlying lesions at CT. No visceral metastases. Recommend MRI brain and head. MRI brain and head 6-6-17 showed abnormal signal intensity on the calvarium posterior laterally to the right suspicious for calvarial metastatic lesion with adjacent dural thickening or possibly tumor extension.  A dural metastatic lesion with secondary involvement of the calvarium is less likely.  Far less likely would be and on plaque meningioma with osseous extension.     MRI  pelvis June 6, 2017 showed a 1.3 cm area of abnormal signal enhancement medullary space of the left femoral head and neck junction anterosuperiorly which is indeterminate and will require follow-up.  No associated abnormality on PET/CT in this area in the sitting on bone scan.  MRI would be the study of choice for follow-up.        Procedures:      Assessment:   Diagnosis Plan   1. Malignant neoplasm of upper-outer quadrant of left female breast, unspecified estrogen receptor status (CMS/HCC)  Mammo Screening Digital Tomosynthesis Bilateral With CAD   2. Malignant neoplasm of upper-inner quadrant of left female breast, unspecified estrogen receptor status (CMS/HCC)     3. 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 FH: breast cancer     5. Other cirrhosis of liver (CMS/HCC)     6. Abnormal CT scan, chest     7. Morbid obesity with BMI of 50.0-59.9, adult (CMS/HCC)        1-3  LEFT 10:00 9.5 CFN- 5.5 cm on exam; 2 cm on ultrasound but underrepresented, 5.7 cm on MRI- BR4  Left breast 10:00, invasive ductal carcinoma, intermediate grade, 3, 2, 1, largest length in a core 1.2 cm.  10:00 9.5 CFN- ER 90 CT 90 her 2 tacos 2+, ration 2.0, copy number 4.6, positive.    LEFT 2:00 10 CFN 6 cm no exam, 3 cm on ultrasound but underrepresented, also 5.7 cm on MRI- Br5  Left breast biopsy 2:00, invasive mammary carcinoma with focal lobular features, intermediate grade, 3, 2, 1, largest size in a core 1.5 cm.  2:00 10 CFN- ER 90 CT 90, her 2 IHC 2+, ration 2.1, positive. Copy number was 5.1.    LEFT axilla node FNA- positive for metastatic mammary carcinoma    - Clinical stage [id number]- stage IIIA (Dr Henley did not feel that the findings on imaging were metastatic)  -Pathology from 1-11-18 LEFT modified radical mastectomy- pathology returned as IMC, NST, int grade, multifocal with 2 separate foci.  The largest foci of 5.2 cm and the smaller 4.0 cm.  Additional invasive carcinoma noted in sections of the  nipple.  Invasive carcinoma also present in 2 out of 3 random sections from tissue between the 2 involved areas.  Intermediate grade, 3, 2, 1.  Focal duct carcinoma in situ, intermediate grade.  Tumor extends focally into skeletal muscle posteriorly.  Tumor extends to superior superficial margin and is less than 1 mm from deep margin.  Note that no sentinel lymph nodes were taken due to the failure of migration of radiotracer.  Total number of lymph nodes examined on 14, number of lymph nodes with cancer on 5.  2 micrometastatic and 3 isolated tumor cells.  Additional focus of tumor within a lymphatic space in the adipose tissue.  Pathologic stage [id number] latoya.  Stage IIIA.    Dr Henley- neoadj [facility]- on arimidex   Dr Merino- took XRT [facility] 3-13-18 through 5-9-18- LEFT supraclav treated to 4500 cGy and LEFT CW to 5000cGy. Then electron boost to mastectomy incision.  4000 cGy to axilla.      4-  Mother age 50  MGA age uncertain    5-  Cirrhosis with abnormal imaging of liver, splenomegaly, small GEJxn varices and slightly elevated INR of 1.33  patient uncertain of etiology- was told was due to dilantin. Denies hepatits or excessive ETOH- has not seen hepatology    6-  -Bone scan showed 4-21-17 RIGHT parietal-occipital bone lesion susp for metastatic disease- couldn't find target for biopsy; repeat MRI showed stable dural thickening  -CT chest 4-2017 pulmonary nodules 3-4 mm- recommend Fu chest CT 4 months- FU CT in November 2017 showed stable nodules with benign morphology    7-  BMI 51        Plan:  Josseline and I reviewed her interval history, treatment, exam and imaging reports together today.    She is over 1 year out from her surgery and doing well.    I encouraged her to lose weight due to the negative effects on her breast caner recurrence risk.    I offered to set her up to see physical therapy for her limitations in LEFT shoulder ROM. She cannot get to PT. THerefore I gave her a handout on ROM for the shoulder for  her to do at home.    We also discussed RIGHT port removal, risks of bleeding and infection,once it is ok with Dr Henley.  I worry about it being in and whether she goes for her flushes.    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  Note- Depression with prior admission [facility]  in 2012 and lost son to suicide 2013.  Has smoked tobacco for 45 years 1.5 ppd    I asked her to call us in the interim with concerns we'd be happy to see her back sooner.      Yuli Garcias MD      We spent 15 min in visit, 10 in face to face       Next Appointment:  Return for Next scheduled follow up, after imaging- and for port removal.      EMR Dragon/transcription disclaimer:    Much of this encounter note is an electronic transcription/translocation of spoken language to printed text.  The electronic translation of spoken language may permit erroneous, or at times, nonsensical words or phrases to be inadvertently transcribed.  Although I have reviewed the note from such areas, some may still exist.

## 2019-05-13 ENCOUNTER — TELEPHONE (OUTPATIENT)
Dept: SURGERY | Facility: CLINIC | Age: 64
End: 2019-05-13

## 2019-05-13 ENCOUNTER — HOSPITAL ENCOUNTER (OUTPATIENT)
Facility: HOSPITAL | Age: 64
Setting detail: HOSPITAL OUTPATIENT SURGERY
Discharge: HOME | End: 2019-05-13
Attending: SURGERY | Admitting: SURGERY

## 2019-05-13 NOTE — TELEPHONE ENCOUNTER
Scheduled Surgery [facility] 6-6-19  Right Port Removal    Arrive       7:30  Surgery   9:30    PAT 5-29-19 @ 12:30  (pt already signed consent)    Return to see Padmini suture removal 6-20-19 @ 11:15    kdl

## 2019-05-16 ENCOUNTER — TELEPHONE (OUTPATIENT)
Dept: SURGERY | Facility: CLINIC | Age: 64
End: 2019-05-16

## 2019-05-16 NOTE — TELEPHONE ENCOUNTER
MS. Vargas called she has no transportation for surgery on 6-6-19.    She asked that I call her friend Vanna  [phone] that brings her to her appointments.      I called Vanna this morning, she is on vacation until 6-7-19    I have rescheduled Ms Vargas's surgery:    Right Port Removal Surgery [facility]  6-20-19    Arrive       6:00  Surgery   8:00    PAT   6-10-19 @ 11:30  Return to see Padmini suture removal  7-2-19 @ 10:30    I spoke and confirmed appts with Vanna and  both v/u.    damiánl

## 2019-05-29 ENCOUNTER — APPOINTMENT (OUTPATIENT)
Dept: PREADMISSION TESTING | Facility: HOSPITAL | Age: 64
End: 2019-05-29

## 2019-06-10 ENCOUNTER — APPOINTMENT (OUTPATIENT)
Dept: PREADMISSION TESTING | Facility: HOSPITAL | Age: 64
End: 2019-06-10

## 2019-06-10 ENCOUNTER — TELEPHONE (OUTPATIENT)
Dept: SURGERY | Facility: CLINIC | Age: 64
End: 2019-06-10

## 2019-06-10 VITALS
HEIGHT: 60 IN | WEIGHT: 253.5 LBS | BODY MASS INDEX: 49.77 KG/M2 | SYSTOLIC BLOOD PRESSURE: 116 MMHG | RESPIRATION RATE: 16 BRPM | OXYGEN SATURATION: 99 % | TEMPERATURE: 97.5 F | HEART RATE: 86 BPM | DIASTOLIC BLOOD PRESSURE: 71 MMHG

## 2019-06-10 DIAGNOSIS — C50.412 MALIGNANT NEOPLASM OF UPPER-OUTER QUADRANT OF LEFT FEMALE BREAST, UNSPECIFIED ESTROGEN RECEPTOR STATUS (HCC): Primary | ICD-10-CM

## 2019-06-10 LAB
ANION GAP SERPL CALCULATED.3IONS-SCNC: 11.4 MMOL/L
BASOPHILS # BLD AUTO: 0.02 10*3/MM3 (ref 0–0.2)
BASOPHILS NFR BLD AUTO: 0.8 % (ref 0–1.5)
BUN BLD-MCNC: 17 MG/DL (ref 8–23)
BUN/CREAT SERPL: 17.3 (ref 7–25)
CALCIUM SPEC-SCNC: 9.2 MG/DL (ref 8.6–10.5)
CHLORIDE SERPL-SCNC: 103 MMOL/L (ref 98–107)
CO2 SERPL-SCNC: 23.6 MMOL/L (ref 22–29)
CREAT BLD-MCNC: 0.98 MG/DL (ref 0.57–1)
DEPRECATED RDW RBC AUTO: 53.9 FL (ref 37–54)
DEPRECATED RDW RBC AUTO: 54.3 FL (ref 37–54)
EOSINOPHIL # BLD AUTO: 0.1 10*3/MM3 (ref 0–0.4)
EOSINOPHIL NFR BLD AUTO: 4 % (ref 0.3–6.2)
ERYTHROCYTE [DISTWIDTH] IN BLOOD BY AUTOMATED COUNT: 15.7 % (ref 12.3–15.4)
ERYTHROCYTE [DISTWIDTH] IN BLOOD BY AUTOMATED COUNT: 15.8 % (ref 12.3–15.4)
GFR SERPL CREATININE-BSD FRML MDRD: 57 ML/MIN/1.73
GLUCOSE BLD-MCNC: 156 MG/DL (ref 65–99)
HCT VFR BLD AUTO: 41.1 % (ref 34–46.6)
HCT VFR BLD AUTO: 41.5 % (ref 34–46.6)
HGB BLD-MCNC: 12.5 G/DL (ref 12–15.9)
HGB BLD-MCNC: 12.6 G/DL (ref 12–15.9)
LYMPHOCYTES # BLD AUTO: 0.48 10*3/MM3 (ref 0.7–3.1)
LYMPHOCYTES NFR BLD AUTO: 19.4 % (ref 19.6–45.3)
MCH RBC QN AUTO: 28.6 PG (ref 26.6–33)
MCH RBC QN AUTO: 28.8 PG (ref 26.6–33)
MCHC RBC AUTO-ENTMCNC: 30.4 G/DL (ref 31.5–35.7)
MCHC RBC AUTO-ENTMCNC: 30.4 G/DL (ref 31.5–35.7)
MCV RBC AUTO: 94.3 FL (ref 79–97)
MCV RBC AUTO: 94.7 FL (ref 79–97)
MONOCYTES # BLD AUTO: 0.22 10*3/MM3 (ref 0.1–0.9)
MONOCYTES NFR BLD AUTO: 8.9 % (ref 5–12)
NEUTROPHILS # BLD AUTO: 1.63 10*3/MM3 (ref 1.7–7)
NEUTROPHILS NFR BLD AUTO: 66.1 % (ref 42.7–76)
PLATELET # BLD AUTO: 85 10*3/MM3 (ref 140–450)
PLATELET # BLD AUTO: 87 10*3/MM3 (ref 140–450)
PMV BLD AUTO: 11.2 FL (ref 6–12)
PMV BLD AUTO: 12.5 FL (ref 6–12)
POTASSIUM BLD-SCNC: 4.3 MMOL/L (ref 3.5–5.2)
RBC # BLD AUTO: 4.34 10*6/MM3 (ref 3.77–5.28)
RBC # BLD AUTO: 4.4 10*6/MM3 (ref 3.77–5.28)
SODIUM BLD-SCNC: 138 MMOL/L (ref 136–145)
WBC NRBC COR # BLD: 2.5 10*3/MM3 (ref 3.4–10.8)
WBC NRBC COR # BLD: 2.5 10*3/MM3 (ref 3.4–10.8)

## 2019-06-10 PROCEDURE — 85025 COMPLETE CBC W/AUTO DIFF WBC: CPT | Performed by: SURGERY

## 2019-06-10 PROCEDURE — 80048 BASIC METABOLIC PNL TOTAL CA: CPT | Performed by: SURGERY

## 2019-06-10 PROCEDURE — 93010 ELECTROCARDIOGRAM REPORT: CPT | Performed by: INTERNAL MEDICINE

## 2019-06-10 PROCEDURE — 36415 COLL VENOUS BLD VENIPUNCTURE: CPT

## 2019-06-10 PROCEDURE — 93005 ELECTROCARDIOGRAM TRACING: CPT | Performed by: SURGERY

## 2019-06-10 PROCEDURE — 85027 COMPLETE CBC AUTOMATED: CPT | Performed by: SURGERY

## 2019-06-10 RX ORDER — TOPIRAMATE 50 MG/1
50 TABLET, FILM COATED ORAL NIGHTLY
COMMUNITY

## 2019-06-10 RX ORDER — NORTRIPTYLINE HYDROCHLORIDE 25 MG/1
25 CAPSULE ORAL NIGHTLY
COMMUNITY
End: 2021-10-23 | Stop reason: HOSPADM

## 2019-06-10 RX ORDER — ANASTROZOLE 1 MG/1
1 TABLET ORAL DAILY
COMMUNITY

## 2019-06-10 NOTE — DISCHARGE INSTRUCTIONS
Take the following medications the morning of surgery with a small sip of water:  TOPIRIMATE    PLEASE ARRIVE AT THE HOSPITAL AT 6 AM ON June 20, 2019    General Instructions:  • Do not eat solid food after midnight the night before surgery.  • You may drink clear liquids day of surgery but must stop at least one hour before your hospital arrival time.  • It is beneficial for you to have a clear drink that contains carbohydrates the day of surgery.  We suggest a 12 to 20 ounce bottle of Gatorade or Powerade for non-diabetic patients or a 12 to 20 ounce bottle of G2 or Powerade Zero for diabetic patients. (Pediatric patients, are not advised to drink a 12 to 20 ounce carbohydrate drink)    Clear liquids are liquids you can see through.  Nothing red in color.     Plain water                               Sports drinks  Sodas                                   Gelatin (Jell-O)  Fruit juices without pulp such as white grape juice and apple juice  Popsicles that contain no fruit or yogurt  Tea or coffee (no cream or milk added)  Gatorade / Powerade  G2 / Powerade Zero    • Infants may have breast milk up to four hours before surgery.  • Infants drinking formula may drink formula up to six hours before surgery.   • Patients who avoid smoking, chewing tobacco and alcohol for 4 weeks prior to surgery have a reduced risk of post-operative complications.  Quit smoking as many days before surgery as you can.  • Do not smoke, use chewing tobacco or drink alcohol the day of surgery.   • If applicable bring your C-PAP/ BI-PAP machine.  • Bring any papers given to you in the doctor’s office.  • Wear clean comfortable clothes and socks.  • Do not wear contact lenses, false eyelashes or make-up.  Bring a case for your glasses.   • Bring crutches or walker if applicable.  • Remove all piercings.  Leave jewelry and any other valuables at home.  • Hair extensions with metal clips must be removed prior to surgery.  • The Pre-Admission  Testing nurse will instruct you to bring medications if unable to obtain an accurate list in Pre-Admission Testing.      Preventing a Surgical Site Infection:  • For 2 to 3 days before surgery, avoid shaving with a razor because the razor can irritate skin and make it easier to develop an infection.    • Any areas of open skin can increase the risk of a post-operative wound infection by allowing bacteria to enter and travel throughout the body.  Notify your surgeon if you have any skin wounds / rashes even if it is not near the expected surgical site.  The area will need assessed to determine if surgery should be delayed until it is healed.  • The night prior to surgery sleep in a clean bed with clean clothing.  Do not allow pets to sleep with you.  • Shower on the morning of surgery using a fresh bar of anti-bacterial soap (such as Dial) and clean washcloth.  Dry with a clean towel and dress in clean clothing.  • Ask your surgeon if you will be receiving antibiotics prior to surgery.  • Make sure you, your family, and all healthcare providers clean their hands with soap and water or an alcohol based hand  before caring for you or your wound.    Day of surgery:  Upon arrival, a Pre-op nurse and Anesthesiologist will review your health history, obtain vital signs, and answer questions you may have.  The only belongings needed at this time will be a list of your home medications and if applicable your C-PAP/BI-PAP machine.  If you are staying overnight your family can leave the rest of your belongings in the car and bring them to your room later.  A Pre-op nurse will start an IV and you may receive medication in preparation for surgery, including something to help you relax.  Your family will be able to see you in the Pre-op area.  While you are in surgery your family should notify the waiting room  if they leave the waiting room area and provide a contact phone number.    Please be aware that  surgery does come with discomfort.  We want to make every effort to control your discomfort so please discuss any uncontrolled symptoms with your nurse.   Your doctor will most likely have prescribed pain medications.      If you are going home after surgery you will receive individualized written care instructions before being discharged.  A responsible adult must drive you to and from the hospital on the day of your surgery and stay with you for 24 hours.    If you are staying overnight following surgery, you will be transported to your hospital room following the recovery period.  [facility] has all private rooms.    You have received a list of surgical assistants for your reference.  If you have any questions please call Pre-Admission Testing at [phone].  Deductibles and co-payments are collected on the day of service. Please be prepared to pay the required co-pay, deductible or deposit on the day of service as defined by your plan.

## 2019-06-10 NOTE — TELEPHONE ENCOUNTER
"----- Message from Kriss Lamar sent at 6/10/2019  3:44 PM EDT -----  Regarding: Post-Sugho  Pt called to let us know her sister Carine \"Gaston\" Villatoro will be assisting her once she comes home from surgery 06/20/19.    Lml   "

## 2019-06-10 NOTE — TELEPHONE ENCOUNTER
Called Hematology lab requested differential to be added to CBC 6/10/19.       Lab asked that we provide order, this has been entered. lml

## 2019-06-11 ENCOUNTER — TELEPHONE (OUTPATIENT)
Dept: SURGERY | Facility: CLINIC | Age: 64
End: 2019-06-11

## 2019-06-11 NOTE — TELEPHONE ENCOUNTER
Patient preop labs show a 6-10-19 WBC of 2.5 (ANC 1.6) and plt 85  Previous 1-2-18 values were 3.33 (1.8) and 103  Prevous 6-2-17 vaLUES WERE 3.2 (1.8) and 113    We will check with Dr Henley to make sure he has no concerns.    We will go ahead and remove her port.

## 2019-06-12 ENCOUNTER — TELEPHONE (OUTPATIENT)
Dept: SURGERY | Facility: CLINIC | Age: 64
End: 2019-06-12

## 2019-06-12 NOTE — TELEPHONE ENCOUNTER
Faxed CBC w/diff to Medical Oncology office-Dr. Henley  Confirmed w/Rachel. Dr. Henley out this week, will get patient in pre op evaluated for neutropenia and thrombocytopenia. Lml

## 2019-06-19 ENCOUNTER — TELEPHONE (OUTPATIENT)
Dept: SURGERY | Facility: CLINIC | Age: 64
End: 2019-06-19

## 2019-06-19 ENCOUNTER — TRANSCRIBE ORDERS (OUTPATIENT)
Dept: SURGERY | Facility: CLINIC | Age: 64
End: 2019-06-19

## 2019-06-19 DIAGNOSIS — C50.412 CARCINOMA OF UPPER-OUTER QUADRANT OF FEMALE BREAST, LEFT (HCC): Primary | ICD-10-CM

## 2019-06-19 NOTE — TELEPHONE ENCOUNTER
Ms Vargas's surgery for tomorrow as been canceled.    She is scheduled for labs at [facility] out pt  On 7-3-19 anytime from 11-2    She will return to see Dr CASTILLO on 7-8-19  Arrive 11-45    Spoke to Ms Vargas and she asked that   I call her friend Vanna.    I called Vanna and went over Ms Vargas's appointments, regarding surgery being canceled and patient needing labs and appt with Dr CASTILLO.      Vanna v/u with appts  l

## 2019-07-03 ENCOUNTER — TRANSCRIBE ORDERS (OUTPATIENT)
Dept: CARDIOLOGY | Facility: HOSPITAL | Age: 64
End: 2019-07-03

## 2019-07-03 ENCOUNTER — HOSPITAL ENCOUNTER (OUTPATIENT)
Dept: CARDIOLOGY | Facility: HOSPITAL | Age: 64
Discharge: HOME | End: 2019-07-03
Admitting: SURGERY

## 2019-07-03 ENCOUNTER — LAB (OUTPATIENT)
Dept: LAB | Facility: HOSPITAL | Age: 64
End: 2019-07-03

## 2019-07-03 DIAGNOSIS — Z01.818 PRE-OP EXAM: ICD-10-CM

## 2019-07-03 DIAGNOSIS — Z01.818 PRE-OP EXAM: Primary | ICD-10-CM

## 2019-07-03 DIAGNOSIS — C50.412 CARCINOMA OF UPPER-OUTER QUADRANT OF FEMALE BREAST, LEFT (HCC): ICD-10-CM

## 2019-07-03 LAB
ANION GAP SERPL CALCULATED.3IONS-SCNC: 13.5 MMOL/L (ref 5–15)
BASOPHILS # BLD AUTO: 0.03 10*3/MM3 (ref 0–0.2)
BASOPHILS NFR BLD AUTO: 1 % (ref 0–1.5)
BUN BLD-MCNC: 17 MG/DL (ref 8–23)
BUN/CREAT SERPL: 17.7 (ref 7–25)
CALCIUM SPEC-SCNC: 9.7 MG/DL (ref 8.6–10.5)
CHLORIDE SERPL-SCNC: 105 MMOL/L (ref 98–107)
CO2 SERPL-SCNC: 20.5 MMOL/L (ref 22–29)
CREAT BLD-MCNC: 0.96 MG/DL (ref 0.57–1)
DEPRECATED RDW RBC AUTO: 52 FL (ref 37–54)
EOSINOPHIL # BLD AUTO: 0.13 10*3/MM3 (ref 0–0.4)
EOSINOPHIL NFR BLD AUTO: 4.2 % (ref 0.3–6.2)
ERYTHROCYTE [DISTWIDTH] IN BLOOD BY AUTOMATED COUNT: 15 % (ref 12.3–15.4)
GFR SERPL CREATININE-BSD FRML MDRD: 58 ML/MIN/1.73
GLUCOSE BLD-MCNC: 163 MG/DL (ref 65–99)
HCT VFR BLD AUTO: 44.2 % (ref 34–46.6)
HGB BLD-MCNC: 13.6 G/DL (ref 12–15.9)
IMM GRANULOCYTES # BLD AUTO: 0.02 10*3/MM3 (ref 0–0.05)
IMM GRANULOCYTES NFR BLD AUTO: 0.6 % (ref 0–0.5)
LYMPHOCYTES # BLD AUTO: 0.52 10*3/MM3 (ref 0.7–3.1)
LYMPHOCYTES NFR BLD AUTO: 16.6 % (ref 19.6–45.3)
MCH RBC QN AUTO: 28.9 PG (ref 26.6–33)
MCHC RBC AUTO-ENTMCNC: 30.8 G/DL (ref 31.5–35.7)
MCV RBC AUTO: 93.8 FL (ref 79–97)
MONOCYTES # BLD AUTO: 0.27 10*3/MM3 (ref 0.1–0.9)
MONOCYTES NFR BLD AUTO: 8.6 % (ref 5–12)
NEUTROPHILS # BLD AUTO: 2.16 10*3/MM3 (ref 1.7–7)
NEUTROPHILS NFR BLD AUTO: 69 % (ref 42.7–76)
NRBC BLD AUTO-RTO: 0 /100 WBC (ref 0–0.2)
PLATELET # BLD AUTO: 97 10*3/MM3 (ref 140–450)
PMV BLD AUTO: 11.3 FL (ref 6–12)
POTASSIUM BLD-SCNC: 4.2 MMOL/L (ref 3.5–5.2)
RBC # BLD AUTO: 4.71 10*6/MM3 (ref 3.77–5.28)
SODIUM BLD-SCNC: 139 MMOL/L (ref 136–145)
WBC NRBC COR # BLD: 3.13 10*3/MM3 (ref 3.4–10.8)

## 2019-07-03 PROCEDURE — 80048 BASIC METABOLIC PNL TOTAL CA: CPT

## 2019-07-03 PROCEDURE — 85025 COMPLETE CBC W/AUTO DIFF WBC: CPT

## 2019-07-03 PROCEDURE — 93010 ELECTROCARDIOGRAM REPORT: CPT | Performed by: INTERNAL MEDICINE

## 2019-07-03 PROCEDURE — 93005 ELECTROCARDIOGRAM TRACING: CPT | Performed by: SURGERY

## 2019-07-03 PROCEDURE — 36415 COLL VENOUS BLD VENIPUNCTURE: CPT

## 2019-07-05 ENCOUNTER — TELEPHONE (OUTPATIENT)
Dept: SURGERY | Facility: CLINIC | Age: 64
End: 2019-07-05

## 2019-07-05 NOTE — TELEPHONE ENCOUNTER
Preop labs showed a WBC of 3.13 and plt count of 97,. Both improved since last check.  We will proceed with her procedure.

## 2019-07-08 ENCOUNTER — TELEPHONE (OUTPATIENT)
Dept: SURGERY | Facility: CLINIC | Age: 64
End: 2019-07-08

## 2019-07-08 NOTE — TELEPHONE ENCOUNTER
I let patient know labs better, we will see her for port removal surgery  Does not need to come in to review today. Lml

## 2019-07-09 ENCOUNTER — TELEPHONE (OUTPATIENT)
Dept: SURGERY | Facility: CLINIC | Age: 64
End: 2019-07-09

## 2019-07-09 NOTE — TELEPHONE ENCOUNTER
LM for Ms. Vargas's friend to call me(Vanna)  Need to check and see when she can bring her down for her Port removal      kdl

## 2019-07-09 NOTE — TELEPHONE ENCOUNTER
Scheduled Surgery  8-8-19  [facility]  (due to transportation)    Right Port Removal  Arrive 7:00  Surgery TF    NO PAT    Return to see Padmini 8-22-19 11:15    (spoke to Ms. Vargas's friend Vanna  [phone]    She v/u with appts for Ms. Vargas    kdl

## 2019-08-06 ENCOUNTER — TELEPHONE (OUTPATIENT)
Dept: SURGERY | Facility: CLINIC | Age: 64
End: 2019-08-06

## 2019-08-06 NOTE — TELEPHONE ENCOUNTER
August 5, 2019 note from Dr. Maggie Merino.  Discussion with regards to taking her medications as prescribed and watching her diet in controlling controlling her diabetes.  Return in 6 months with repeat mammogram.  Same date visit with Dr. Brandon Henley: Stable tumor markers.  Lower extremity edema and cirrhosis both stable.  Trauma cytopenia grade 2 stable.  Send her a prescription for magnesium.  See her with a right-sided mammogram in April 2019.    Plts 97 on most recent CBC 7-3-19.

## 2019-08-08 ENCOUNTER — HOSPITAL ENCOUNTER (OUTPATIENT)
Facility: HOSPITAL | Age: 64
Setting detail: HOSPITAL OUTPATIENT SURGERY
Discharge: HOME | End: 2019-08-08
Attending: SURGERY | Admitting: SURGERY

## 2019-08-08 ENCOUNTER — ANESTHESIA (OUTPATIENT)
Dept: PERIOP | Facility: HOSPITAL | Age: 64
End: 2019-08-08

## 2019-08-08 ENCOUNTER — ANESTHESIA EVENT (OUTPATIENT)
Dept: PERIOP | Facility: HOSPITAL | Age: 64
End: 2019-08-08

## 2019-08-08 VITALS
OXYGEN SATURATION: 100 % | WEIGHT: 251.99 LBS | RESPIRATION RATE: 16 BRPM | TEMPERATURE: 98 F | DIASTOLIC BLOOD PRESSURE: 78 MMHG | HEART RATE: 84 BPM | SYSTOLIC BLOOD PRESSURE: 145 MMHG | BODY MASS INDEX: 49.21 KG/M2

## 2019-08-08 DIAGNOSIS — C50.412 MALIGNANT NEOPLASM OF UPPER-OUTER QUADRANT OF LEFT FEMALE BREAST, UNSPECIFIED ESTROGEN RECEPTOR STATUS (HCC): ICD-10-CM

## 2019-08-08 LAB — GLUCOSE BLDC GLUCOMTR-MCNC: 148 MG/DL (ref 70–130)

## 2019-08-08 PROCEDURE — 36590 REMOVAL TUNNELED CV CATH: CPT | Performed by: SURGERY

## 2019-08-08 PROCEDURE — 25010000002 PROPOFOL 10 MG/ML EMULSION: Performed by: NURSE ANESTHETIST, CERTIFIED REGISTERED

## 2019-08-08 PROCEDURE — 25010000002 FENTANYL CITRATE (PF) 100 MCG/2ML SOLUTION: Performed by: NURSE ANESTHETIST, CERTIFIED REGISTERED

## 2019-08-08 PROCEDURE — S0260 H&P FOR SURGERY: HCPCS | Performed by: SURGERY

## 2019-08-08 PROCEDURE — 25010000003 LIDOCAINE 1 % SOLUTION 20 ML VIAL: Performed by: SURGERY

## 2019-08-08 PROCEDURE — 82962 GLUCOSE BLOOD TEST: CPT

## 2019-08-08 RX ORDER — ACETAMINOPHEN 650 MG/1
650 SUPPOSITORY RECTAL ONCE AS NEEDED
Status: DISCONTINUED | OUTPATIENT
Start: 2019-08-08 | End: 2019-08-08 | Stop reason: HOSPADM

## 2019-08-08 RX ORDER — SODIUM CHLORIDE, SODIUM LACTATE, POTASSIUM CHLORIDE, CALCIUM CHLORIDE 600; 310; 30; 20 MG/100ML; MG/100ML; MG/100ML; MG/100ML
100 INJECTION, SOLUTION INTRAVENOUS CONTINUOUS
Status: DISCONTINUED | OUTPATIENT
Start: 2019-08-08 | End: 2019-08-08 | Stop reason: HOSPADM

## 2019-08-08 RX ORDER — HYDRALAZINE HYDROCHLORIDE 20 MG/ML
5 INJECTION INTRAMUSCULAR; INTRAVENOUS
Status: DISCONTINUED | OUTPATIENT
Start: 2019-08-08 | End: 2019-08-08 | Stop reason: HOSPADM

## 2019-08-08 RX ORDER — SODIUM CHLORIDE, SODIUM LACTATE, POTASSIUM CHLORIDE, CALCIUM CHLORIDE 600; 310; 30; 20 MG/100ML; MG/100ML; MG/100ML; MG/100ML
9 INJECTION, SOLUTION INTRAVENOUS CONTINUOUS
Status: DISCONTINUED | OUTPATIENT
Start: 2019-08-08 | End: 2019-08-08 | Stop reason: HOSPADM

## 2019-08-08 RX ORDER — ONDANSETRON 2 MG/ML
4 INJECTION INTRAMUSCULAR; INTRAVENOUS ONCE AS NEEDED
Status: DISCONTINUED | OUTPATIENT
Start: 2019-08-08 | End: 2019-08-08 | Stop reason: HOSPADM

## 2019-08-08 RX ORDER — NALOXONE HCL 0.4 MG/ML
0.2 VIAL (ML) INJECTION AS NEEDED
Status: DISCONTINUED | OUTPATIENT
Start: 2019-08-08 | End: 2019-08-08 | Stop reason: HOSPADM

## 2019-08-08 RX ORDER — CLINDAMYCIN PHOSPHATE 900 MG/50ML
900 INJECTION INTRAVENOUS ONCE
Status: DISCONTINUED | OUTPATIENT
Start: 2019-08-08 | End: 2019-08-08 | Stop reason: HOSPADM

## 2019-08-08 RX ORDER — MIDAZOLAM HYDROCHLORIDE 1 MG/ML
1 INJECTION INTRAMUSCULAR; INTRAVENOUS
Status: DISCONTINUED | OUTPATIENT
Start: 2019-08-08 | End: 2019-08-08 | Stop reason: HOSPADM

## 2019-08-08 RX ORDER — DIPHENHYDRAMINE HYDROCHLORIDE 50 MG/ML
12.5 INJECTION INTRAMUSCULAR; INTRAVENOUS
Status: DISCONTINUED | OUTPATIENT
Start: 2019-08-08 | End: 2019-08-08 | Stop reason: HOSPADM

## 2019-08-08 RX ORDER — HYDROCODONE BITARTRATE AND ACETAMINOPHEN 7.5; 325 MG/1; MG/1
1 TABLET ORAL ONCE AS NEEDED
Status: DISCONTINUED | OUTPATIENT
Start: 2019-08-08 | End: 2019-08-08 | Stop reason: HOSPADM

## 2019-08-08 RX ORDER — PROMETHAZINE HYDROCHLORIDE 25 MG/ML
12.5 INJECTION, SOLUTION INTRAMUSCULAR; INTRAVENOUS ONCE AS NEEDED
Status: DISCONTINUED | OUTPATIENT
Start: 2019-08-08 | End: 2019-08-08 | Stop reason: HOSPADM

## 2019-08-08 RX ORDER — FAMOTIDINE 10 MG/ML
20 INJECTION, SOLUTION INTRAVENOUS ONCE
Status: COMPLETED | OUTPATIENT
Start: 2019-08-08 | End: 2019-08-08

## 2019-08-08 RX ORDER — OXYCODONE AND ACETAMINOPHEN 7.5; 325 MG/1; MG/1
1 TABLET ORAL ONCE AS NEEDED
Status: DISCONTINUED | OUTPATIENT
Start: 2019-08-08 | End: 2019-08-08 | Stop reason: HOSPADM

## 2019-08-08 RX ORDER — PROMETHAZINE HYDROCHLORIDE 25 MG/ML
6.25 INJECTION, SOLUTION INTRAMUSCULAR; INTRAVENOUS
Status: DISCONTINUED | OUTPATIENT
Start: 2019-08-08 | End: 2019-08-08 | Stop reason: HOSPADM

## 2019-08-08 RX ORDER — FLUMAZENIL 0.1 MG/ML
0.2 INJECTION INTRAVENOUS AS NEEDED
Status: DISCONTINUED | OUTPATIENT
Start: 2019-08-08 | End: 2019-08-08 | Stop reason: HOSPADM

## 2019-08-08 RX ORDER — FENTANYL CITRATE 50 UG/ML
INJECTION, SOLUTION INTRAMUSCULAR; INTRAVENOUS AS NEEDED
Status: DISCONTINUED | OUTPATIENT
Start: 2019-08-08 | End: 2019-08-08 | Stop reason: SURG

## 2019-08-08 RX ORDER — LIDOCAINE HYDROCHLORIDE 10 MG/ML
0.5 INJECTION, SOLUTION EPIDURAL; INFILTRATION; INTRACAUDAL; PERINEURAL ONCE AS NEEDED
Status: DISCONTINUED | OUTPATIENT
Start: 2019-08-08 | End: 2019-08-08 | Stop reason: HOSPADM

## 2019-08-08 RX ORDER — LABETALOL HYDROCHLORIDE 5 MG/ML
5 INJECTION, SOLUTION INTRAVENOUS
Status: DISCONTINUED | OUTPATIENT
Start: 2019-08-08 | End: 2019-08-08 | Stop reason: HOSPADM

## 2019-08-08 RX ORDER — PROMETHAZINE HYDROCHLORIDE 25 MG/1
25 SUPPOSITORY RECTAL ONCE AS NEEDED
Status: DISCONTINUED | OUTPATIENT
Start: 2019-08-08 | End: 2019-08-08 | Stop reason: HOSPADM

## 2019-08-08 RX ORDER — EPHEDRINE SULFATE 50 MG/ML
5 INJECTION, SOLUTION INTRAVENOUS ONCE AS NEEDED
Status: DISCONTINUED | OUTPATIENT
Start: 2019-08-08 | End: 2019-08-08 | Stop reason: HOSPADM

## 2019-08-08 RX ORDER — MIDAZOLAM HYDROCHLORIDE 1 MG/ML
2 INJECTION INTRAMUSCULAR; INTRAVENOUS
Status: DISCONTINUED | OUTPATIENT
Start: 2019-08-08 | End: 2019-08-08 | Stop reason: HOSPADM

## 2019-08-08 RX ORDER — DIPHENHYDRAMINE HCL 25 MG
25 CAPSULE ORAL
Status: DISCONTINUED | OUTPATIENT
Start: 2019-08-08 | End: 2019-08-08 | Stop reason: HOSPADM

## 2019-08-08 RX ORDER — PROPOFOL 10 MG/ML
VIAL (ML) INTRAVENOUS CONTINUOUS PRN
Status: DISCONTINUED | OUTPATIENT
Start: 2019-08-08 | End: 2019-08-08 | Stop reason: SURG

## 2019-08-08 RX ORDER — FENTANYL CITRATE 50 UG/ML
50 INJECTION, SOLUTION INTRAMUSCULAR; INTRAVENOUS
Status: DISCONTINUED | OUTPATIENT
Start: 2019-08-08 | End: 2019-08-08 | Stop reason: HOSPADM

## 2019-08-08 RX ORDER — PROMETHAZINE HYDROCHLORIDE 25 MG/1
25 TABLET ORAL ONCE AS NEEDED
Status: DISCONTINUED | OUTPATIENT
Start: 2019-08-08 | End: 2019-08-08 | Stop reason: HOSPADM

## 2019-08-08 RX ORDER — ACETAMINOPHEN 325 MG/1
650 TABLET ORAL ONCE AS NEEDED
Status: DISCONTINUED | OUTPATIENT
Start: 2019-08-08 | End: 2019-08-08 | Stop reason: HOSPADM

## 2019-08-08 RX ORDER — SODIUM CHLORIDE 0.9 % (FLUSH) 0.9 %
1-10 SYRINGE (ML) INJECTION AS NEEDED
Status: DISCONTINUED | OUTPATIENT
Start: 2019-08-08 | End: 2019-08-08 | Stop reason: HOSPADM

## 2019-08-08 RX ORDER — HYDROMORPHONE HYDROCHLORIDE 1 MG/ML
0.5 INJECTION, SOLUTION INTRAMUSCULAR; INTRAVENOUS; SUBCUTANEOUS
Status: DISCONTINUED | OUTPATIENT
Start: 2019-08-08 | End: 2019-08-08 | Stop reason: HOSPADM

## 2019-08-08 RX ADMIN — FAMOTIDINE 20 MG: 10 INJECTION INTRAVENOUS at 08:41

## 2019-08-08 RX ADMIN — PROPOFOL 180 MCG/KG/MIN: 10 INJECTION, EMULSION INTRAVENOUS at 09:08

## 2019-08-08 RX ADMIN — SODIUM CHLORIDE, POTASSIUM CHLORIDE, SODIUM LACTATE AND CALCIUM CHLORIDE: 600; 310; 30; 20 INJECTION, SOLUTION INTRAVENOUS at 09:09

## 2019-08-08 RX ADMIN — FENTANYL CITRATE 25 MCG: 50 INJECTION INTRAMUSCULAR; INTRAVENOUS at 09:12

## 2019-08-08 RX ADMIN — SODIUM CHLORIDE, POTASSIUM CHLORIDE, SODIUM LACTATE AND CALCIUM CHLORIDE 100 ML/HR: 600; 310; 30; 20 INJECTION, SOLUTION INTRAVENOUS at 08:06

## 2019-08-08 NOTE — OP NOTE
Operative note    Preoperative Diagnosis:  Breast cancer    Postoperative Diagnosis: Breast cancer    Procedure Performed: left port removal    Dictating physician and surgeon: Yuli Garcias MD    EBL:3cc    FINDINGS AND DESCRIPTION OF PROCEDURE:       The patient was brought to the operating room and placed on the table in the supine position. After adequate monitored anesthesia was obtained, we sterilly prepped and draped the anterior chest wall.   We did a time out to identify correct patient and correct operative site.  She did receive IV antibiotic within an hour of and prior to incision.     I anesthetized the previous incision and underlying soft tissues using 7 cc of a mixture of 1% lidocaine, quarter percent Marcaine, and bicarbonate.    I made an oblique incision in the deltopectoral groove at the location of prior incision used for placement, using a 15 blade. I dissected around the port using bovie electrocautery and removed the 3x 2-0 prolene sutures that were securing the port. I then removed the port from the pocket on the fascia and removed the catheter from the insertion site. I then oversewed the soft tissue overlying the insertion site using a figure of 8,  3-0 vicryl suture.     I then irrigated , assured hemostasis, and closed the skin in 2 layers,  the first being an interrupted 3-0 vicryl layer, followed by a running 4-0 monocryl.    I then applied lengthwise 1/2 inch steri strips, 2x2 s and a tegederm.    She tolerated the procedure well, there were no immediate complications, and all counts were correct at the end of the case.

## 2019-08-08 NOTE — H&P
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 cm and the smaller is 4.0 cm.  In addition invasive carcinoma is noted in sections of the nipple.                         Invasive carcinoma is also present in two out three random sections obtained from tissue  the two                         fibrous areas.    Histologic type: Invasive carcinoma no special type (ductal).  Histologic grade, [place] histologic score:                         Glandular/tubular differentiation: Score 3.                         Nuclear pleomorphism: score 2.                         Mitotic rate: Score 1.                         Overall grade: Grade 2.  Tumor focality: Multiple foci of invasive carcinoma (2).  Duct carcinoma in situ:  Focal duct carcinoma in situ, intermediate grade is present.  Tumor extension:                         Skeletal muscle: Carcinoma focally invades skeletal muscle.  Margins:                          Invasive carcinoma margins:                                                Invasive carcinoma is present at superior superficial margin and is less than 1 mm from deep margin.                         Duct carcinoma in situ margins:                                                  Uninvolved by duct carcinoma in situ.                                                Distance from closest margin: Over 2 mm from superficial  margin.  Regional lymph nodes:                         Number of lymph nodes with macrometastases: 0.                         Number of lymph nodes with micrometastases: 2 (larger focus is 1.2 mm).                         Number of lymph nodes with isolated tumor cells: 3.                         Number of lymph nodes examined: 14.                         Number of sentinel nodes examined: 0.  Treatment effect:                         Treatment effect in the breast: No definite response to presurgical therapy in the invasive carcinoma.                         Treatment effect in the lymph nodes: Probable or definite response to presurgical therapy in metastatic                                carcinoma                         Comment: Many lymph nodes have sclerosis suggesting regression secondary to therapy.                         One lymph node has changes consistent with prior biopsy.  Lymphovascular invasion: Present.  Pathologic staging:                         TNM Descriptor: m (multiple foci of invasive carcinoma).                                                                        y (post treatment).                         Primary tumor: pT3.                         Regional lymph nodes:                                                Category: pN1mi.  Ancillary studies: Hormone receptor and HER/2/tacos studies have been performed on prior biopsy material.  If those studies are desired on the current specimen, please contact the laboratory.     ERENK/marylum  IHC/TDJ/THM          Patient went for a CT guided biopsy of the calvarium and Dr Hadley did not see a target on CT. She then had a PET CT 5-31-17  that showed index lesion LEFT breast and nodes in the LEFT axilla and subpectoral space.No uptake at the skull to correlate to the recent bone scan finding. Perceived abnormal uptake at the sacral body and bilateral sacral ala with no definate underlying lesions at CT. No visceral metastases. Recommend MRI brain  and head. MRI brain and head 6-6-17 showed abnormal signal intensity on the calvarium posterior laterally to the right suspicious for calvarial metastatic lesion with adjacent dural thickening or possibly tumor extension.  A dural metastatic lesion with secondary involvement of the calvarium is less likely.  Far less likely would be and on plaque meningioma with osseous extension.     MRI pelvis June 6, 2017 showed a 1.3 cm area of abnormal signal enhancement medullary space of the left femoral head and neck junction anterosuperiorly which is indeterminate and will require follow-up.  No associated abnormality on PET/CT in this area in the sitting on bone scan.  MRI would be the study of choice for follow-up.        Procedures:      Assessment:   Diagnosis Plan   1. Malignant neoplasm of upper-outer quadrant of left female breast, unspecified estrogen receptor status (CMS/HCC)  Mammo Screening Digital Tomosynthesis Bilateral With CAD   2. Malignant neoplasm of upper-inner quadrant of left female breast, unspecified estrogen receptor status (CMS/HCC)     3. 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 FH: breast cancer     5. Other cirrhosis of liver (CMS/HCC)     6. Abnormal CT scan, chest     7. Morbid obesity with BMI of 50.0-59.9, adult (CMS/HCC)        1-3  LEFT 10:00 9.5 CFN- 5.5 cm on exam; 2 cm on ultrasound but underrepresented, 5.7 cm on MRI- BR4  Left breast 10:00, invasive ductal carcinoma, intermediate grade, 3, 2, 1, largest length in a core 1.2 cm.  10:00 9.5 CFN- ER 90 MN 90 her 2 tacos 2+, ration 2.0, copy number 4.6, positive.    LEFT 2:00 10 CFN 6 cm no exam, 3 cm on ultrasound but underrepresented, also 5.7 cm on MRI- Br5  Left breast biopsy 2:00, invasive mammary carcinoma with focal lobular features, intermediate grade, 3, 2, 1, largest size in a core 1.5 cm.  2:00 10 CFN- ER 90 MN 90, her 2 IHC 2+, ration 2.1, positive. Copy number was 5.1.    LEFT axilla node FNA-  positive for metastatic mammary carcinoma    - Clinical stage [id number]- stage IIIA (Dr Henley did not feel that the findings on imaging were metastatic)  -Pathology from 1-11-18 LEFT modified radical mastectomy- pathology returned as IMC, NST, int grade, multifocal with 2 separate foci.  The largest foci of 5.2 cm and the smaller 4.0 cm.  Additional invasive carcinoma noted in sections of the nipple.  Invasive carcinoma also present in 2 out of 3 random sections from tissue between the 2 involved areas.  Intermediate grade, 3, 2, 1.  Focal duct carcinoma in situ, intermediate grade.  Tumor extends focally into skeletal muscle posteriorly.  Tumor extends to superior superficial margin and is less than 1 mm from deep margin.  Note that no sentinel lymph nodes were taken due to the failure of migration of radiotracer.  Total number of lymph nodes examined on 14, number of lymph nodes with cancer on 5.  2 micrometastatic and 3 isolated tumor cells.  Additional focus of tumor within a lymphatic space in the adipose tissue.  Pathologic stage [id number] latoya.  Stage IIIA.    Dr Henley- neoadj [facility]- on arimidex   Dr Merino- took XRT [facility] 3-13-18 through 5-9-18- LEFT supraclav treated to 4500 cGy and LEFT CW to 5000cGy. Then electron boost to mastectomy incision.  4000 cGy to axilla.      4-  Mother age 50  MGA age uncertain    5-  Cirrhosis with abnormal imaging of liver, splenomegaly, small GEJxn varices and slightly elevated INR of 1.33  patient uncertain of etiology- was told was due to dilantin. Denies hepatits or excessive ETOH- has not seen hepatology    6-  -Bone scan showed 4-21-17 RIGHT parietal-occipital bone lesion susp for metastatic disease- couldn't find target for biopsy; repeat MRI showed stable dural thickening  -CT chest 4-2017 pulmonary nodules 3-4 mm- recommend Fu chest CT 4 months- FU CT in November 2017 showed stable nodules with benign morphology    7-  BMI 51        Plan:  Josseline and I reviewed her  interval history, treatment, exam and imaging reports together today.    She is over 1 year out from her surgery and doing well.    I encouraged her to lose weight due to the negative effects on her breast caner recurrence risk.    I offered to set her up to see physical therapy for her limitations in LEFT shoulder ROM. She cannot get to PT. THerefore I gave her a handout on ROM for the shoulder for her to do at home.    We also discussed RIGHT port removal, risks of bleeding and infection,once it is ok with Dr Henley.  I worry about it being in and whether she goes for her flushes.    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  Note- Depression with prior admission to [facility].  in 2012 and lost son to suicide 2013.  Has smoked tobacco for 45 years 1.5 ppd    I asked her to call us in the interim with concerns we'd be happy to see her back sooner.      Yuli Garcias MD      We spent 15 min in visit, 10 in face to face       Next Appointment:  Return for Next scheduled follow up, after imaging- and for port removal.      EMR Dragon/transcription disclaimer:    Much of this encounter note is an electronic transcription/translocation of spoken language to printed text.  The electronic translation of spoken language may permit erroneous, or at times, nonsensical words or phrases to be inadvertently transcribed.  Although I have reviewed the note from such areas, some may still exist.

## 2019-08-08 NOTE — DISCHARGE INSTRUCTIONS
General Anesthesia, Adult, Care After  This sheet gives you information about how to care for yourself after your procedure. Your health care provider may also give you more specific instructions. If you have problems or questions, contact your health care provider.  What can I expect after the procedure?  After the procedure, the following side effects are common:  · Pain or discomfort at the IV site.  · Nausea.  · Vomiting.  · Sore throat.  · Trouble concentrating.  · Feeling cold or chills.  · Weak or tired.  · Sleepiness and fatigue.  · Soreness and body aches. These side effects can affect parts of the body that were not involved in surgery.  Follow these instructions at home:    For at least 24 hours after the procedure:  · Have a responsible adult stay with you. It is important to have someone help care for you until you are awake and alert.  · Rest as needed.  · Do not:  ? Participate in activities in which you could fall or become injured.  ? Drive.  ? Use heavy machinery.  ? Drink alcohol.  ? Take sleeping pills or medicines that cause drowsiness.  ? Make important decisions or sign legal documents.  ? Take care of children on your own.  Eating and drinking  · Follow any instructions from your health care provider about eating or drinking restrictions.  · When you feel hungry, start by eating small amounts of foods that are soft and easy to digest (bland), such as toast. Gradually return to your regular diet.  · Drink enough fluid to keep your urine pale yellow.  · If you vomit, rehydrate by drinking water, juice, or clear broth.  General instructions  · If you have sleep apnea, surgery and certain medicines can increase your risk for breathing problems. Follow instructions from your health care provider about wearing your sleep device:  ? Anytime you are sleeping, including during daytime naps.  ? While taking prescription pain medicines, sleeping medicines, or medicines that make you drowsy.  · Return to  your normal activities as told by your health care provider. Ask your health care provider what activities are safe for you.  · Take over-the-counter and prescription medicines only as told by your health care provider.  · If you smoke, do not smoke without supervision.  · Keep all follow-up visits as told by your health care provider. This is important.  Contact a health care provider if:  · You have nausea or vomiting that does not get better with medicine.  · You cannot eat or drink without vomiting.  · You have pain that does not get better with medicine.  · You are unable to pass urine.  · You develop a skin rash.  · You have a fever.  · You have redness around your IV site that gets worse.  Get help right away if:  · You have difficulty breathing.  · You have chest pain.  · You have blood in your urine or stool, or you vomit blood.  Summary  · After the procedure, it is common to have a sore throat or nausea. It is also common to feel tired.  · Have a responsible adult stay with you for the first 24 hours after general anesthesia. It is important to have someone help care for you until you are awake and alert.  · When you feel hungry, start by eating small amounts of foods that are soft and easy to digest (bland), such as toast. Gradually return to your regular diet.  · Drink enough fluid to keep your urine pale yellow.  · Return to your normal activities as told by your health care provider. Ask your health care provider what activities are safe for you.  This information is not intended to replace advice given to you by your health care provider. Make sure you discuss any questions you have with your health care provider.  Document Released: 03/26/2002 Document Revised: 08/03/2018 Document Reviewed: 08/03/2018  Elsevier Interactive Patient Education © 2019 [employer]      Implanted Port Removal, Care After  This sheet gives you information about how to care for yourself after your procedure. Your health  care provider may also give you more specific instructions. If you have problems or questions, contact your health care provider.  What can I expect after the procedure?  After the procedure, it is common to have:  · Soreness or pain near your incision.  · Some swelling or bruising near your incision.  Follow these instructions at home:  Medicines  · Take over-the-counter and prescription medicines only as told by your health care provider.  · If you were prescribed an antibiotic medicine, take it as told by your health care provider. Do not stop taking the antibiotic even if you start to feel better.  Bathing  · Do not take baths, swim, or use a hot tub until your health care provider approves. Ask your health care provider if you can take showers. You may only be allowed to take sponge baths.  Incision care    · Follow instructions from your health care provider about how to take care of your incision. Make sure you:  ? Wash your hands with soap and water before you change your bandage (dressing). If soap and water are not available, use hand .  ? Change your dressing as told by your health care provider.  ? Keep your dressing dry.  ? Leave stitches (sutures), skin glue, or adhesive strips in place. These skin closures may need to stay in place for 2 weeks or longer. If adhesive strip edges start to loosen and curl up, you may trim the loose edges. Do not remove adhesive strips completely unless your health care provider tells you to do that.  · Check your incision area every day for signs of infection. Check for:  ? More redness, swelling, or pain.  ? More fluid or blood.  ? Warmth.  ? Pus or a bad smell.  Driving    · Do not drive for 24 hours if you were given a medicine to help you relax (sedative) during your procedure.  · If you did not receive a sedative, ask your health care provider when it is safe to drive.  Activity  · Return to your normal activities as told by your health care provider. Ask  your health care provider what activities are safe for you.  · Do not lift anything that is heavier than 10 lb (4.5 kg), or the limit that you are told, until your health care provider says that it is safe.  · Do not do activities that involve lifting your arms over your head.  General instructions  · Do not use any products that contain nicotine or tobacco, such as cigarettes and e-cigarettes. These can delay healing. If you need help quitting, ask your health care provider.  · Keep all follow-up visits as told by your health care provider. This is important.  Contact a health care provider if:  · You have more redness, swelling, or pain around your incision.  · You have more fluid or blood coming from your incision.  · Your incision feels warm to the touch.  · You have pus or a bad smell coming from your incision.  · You have pain that is not relieved by your pain medicine.  Get help right away if you have:  · A fever or chills.  · Chest pain.  · Difficulty breathing.  Summary  · After the procedure, it is common to have pain, soreness, swelling, or bruising near your incision.  · If you were prescribed an antibiotic medicine, take it as told by your health care provider. Do not stop taking the antibiotic even if you start to feel better.  · Do not drive for 24 hours if you were given a sedative during your procedure.  · Return to your normal activities as told by your health care provider. Ask your health care provider what activities are safe for you.  This information is not intended to replace advice given to you by your health care provider. Make sure you discuss any questions you have with your health care provider.  Document Released: 11/28/2016 Document Revised: 01/31/2019 Document Reviewed: 01/31/2019  [employer] Interactive Patient Education © 2019 [employer]

## 2019-08-08 NOTE — ANESTHESIA POSTPROCEDURE EVALUATION
Patient: Josseline Vargas    Procedure Summary     Date:  08/08/19 Room / Location:   [facility]    Anesthesia Start:  0908 Anesthesia Stop:  0944    Procedure:  RIGHT port removal, (Right Chest) Diagnosis:       Malignant neoplasm of upper-outer quadrant of left female breast, unspecified estrogen receptor status (CMS/HCC)      (Malignant neoplasm of upper-outer quadrant of left female breast, unspecified estrogen receptor status (CMS/HCC) [C50.412])    Surgeon:  Yuli Garcias MD Provider:  Martin Steele MD    Anesthesia Type:  MAC ASA Status:  4          Anesthesia Type: MAC  Last vitals  BP   145/74 (08/08/19 1000)   Temp   36.7 °C (98 °F) (08/08/19 0739)   Pulse   75 (08/08/19 1000)   Resp   16 (08/08/19 1000)     SpO2   99 % (08/08/19 1000)     Post Anesthesia Care and Evaluation    Patient location during evaluation: PACU  Patient participation: complete - patient participated  Level of consciousness: awake and alert  Pain management: adequate  Airway patency: patent  Anesthetic complications: No anesthetic complications    Cardiovascular status: acceptable  Respiratory status: acceptable  Hydration status: acceptable    Comments: --------------------            08/08/19               1000     --------------------   BP:       145/74     Pulse:      75       Resp:       16       Temp:                SpO2:      99%      --------------------

## 2019-08-08 NOTE — ANESTHESIA PREPROCEDURE EVALUATION
Anesthesia Evaluation     Patient summary reviewed and Nursing notes reviewed                Airway   Mallampati: III  No difficulty expected  Dental      Pulmonary    (+) a smoker Former,   Cardiovascular - negative cardio ROS    ECG reviewed  Rate: normal        Neuro/Psych  (+) seizures, CVA, headaches, psychiatric history Depression,     GI/Hepatic/Renal/Endo    (+) morbid obesity,  liver disease, diabetes mellitus type 2,     Musculoskeletal (-) negative ROS    Abdominal    Substance History - negative use     OB/GYN negative ob/gyn ROS         Other      history of cancer                    Anesthesia Plan    ASA 4     MAC   (BMI  No airway issues previously)  intravenous induction   Anesthetic plan, all risks, benefits, and alternatives have been provided, discussed and informed consent has been obtained with: patient.

## 2019-08-22 ENCOUNTER — CLINICAL SUPPORT (OUTPATIENT)
Dept: SURGERY | Facility: CLINIC | Age: 64
End: 2019-08-22

## 2019-10-24 ENCOUNTER — HOSPITAL ENCOUNTER (OUTPATIENT)
Dept: OTHER | Facility: HOSPITAL | Age: 64
Discharge: HOME | End: 2019-10-24
Attending: INTERNAL MEDICINE

## 2019-10-24 LAB — VANCOMYCIN TROUGH SERPL-MCNC: 25 UG/ML (ref 10–20)

## 2019-10-28 ENCOUNTER — HOSPITAL ENCOUNTER (OUTPATIENT)
Dept: OTHER | Facility: HOSPITAL | Age: 64
Discharge: HOME | End: 2019-10-28
Attending: INTERNAL MEDICINE

## 2019-10-28 LAB
ALBUMIN SERPL-MCNC: 3.5 G/DL (ref 3.5–5)
ALBUMIN/GLOB SERPL: 0.7 {RATIO} (ref 1.4–2.6)
ALP SERPL-CCNC: 114 U/L (ref 43–160)
ALT SERPL-CCNC: 6 U/L (ref 10–40)
ANION GAP SERPL CALC-SCNC: 20 MMOL/L (ref 8–19)
AST SERPL-CCNC: 33 U/L (ref 15–50)
BILIRUB SERPL-MCNC: 0.44 MG/DL (ref 0.2–1.3)
BUN SERPL-MCNC: 12 MG/DL (ref 5–25)
BUN/CREAT SERPL: 14 {RATIO} (ref 6–20)
CALCIUM SERPL-MCNC: 9.7 MG/DL (ref 8.7–10.4)
CHLORIDE SERPL-SCNC: 103 MMOL/L (ref 99–111)
CONV CO2: 19 MMOL/L (ref 22–32)
CONV TOTAL PROTEIN: 8.4 G/DL (ref 6.3–8.2)
CREAT UR-MCNC: 0.87 MG/DL (ref 0.5–0.9)
GFR SERPLBLD BASED ON 1.73 SQ M-ARVRAT: >60 ML/MIN/{1.73_M2}
GLOBULIN UR ELPH-MCNC: 4.9 G/DL (ref 2–3.5)
GLUCOSE SERPL-MCNC: 142 MG/DL (ref 65–99)
OSMOLALITY SERPL CALC.SUM OF ELEC: 288 MOSM/KG (ref 273–304)
POTASSIUM SERPL-SCNC: 4.2 MMOL/L (ref 3.5–5.3)
SODIUM SERPL-SCNC: 138 MMOL/L (ref 135–147)
VANCOMYCIN SERPL-MCNC: 20 UG/ML

## 2019-10-31 ENCOUNTER — HOSPITAL ENCOUNTER (OUTPATIENT)
Dept: OTHER | Facility: HOSPITAL | Age: 64
Discharge: HOME | End: 2019-10-31

## 2019-10-31 LAB
ALBUMIN SERPL-MCNC: 3.9 G/DL (ref 3.5–5)
ALBUMIN/GLOB SERPL: 0.8 {RATIO} (ref 1.4–2.6)
ALP SERPL-CCNC: 121 U/L (ref 43–160)
ALT SERPL-CCNC: 6 U/L (ref 10–40)
ANION GAP SERPL CALC-SCNC: 22 MMOL/L (ref 8–19)
AST SERPL-CCNC: 30 U/L (ref 15–50)
BASOPHILS # BLD AUTO: 0.04 10*3/UL (ref 0–0.2)
BASOPHILS NFR BLD AUTO: 1 % (ref 0–3)
BILIRUB SERPL-MCNC: 0.35 MG/DL (ref 0.2–1.3)
BNP SERPL-MCNC: 8 PG/ML (ref 0–900)
BUN SERPL-MCNC: 28 MG/DL (ref 5–25)
BUN/CREAT SERPL: 26 {RATIO} (ref 6–20)
CALCIUM SERPL-MCNC: 9.7 MG/DL (ref 8.7–10.4)
CHLORIDE SERPL-SCNC: 100 MMOL/L (ref 99–111)
CONV ABS IMM GRAN: 0.01 10*3/UL (ref 0–0.2)
CONV CO2: 20 MMOL/L (ref 22–32)
CONV IMMATURE GRAN: 0.3 % (ref 0–1.8)
CONV TOTAL PROTEIN: 8.5 G/DL (ref 6.3–8.2)
CREAT UR-MCNC: 1.09 MG/DL (ref 0.5–0.9)
DEPRECATED RDW RBC AUTO: 52.3 FL (ref 36.4–46.3)
EOSINOPHIL # BLD AUTO: 0.13 10*3/UL (ref 0–0.7)
EOSINOPHIL # BLD AUTO: 3.3 % (ref 0–7)
ERYTHROCYTE [DISTWIDTH] IN BLOOD BY AUTOMATED COUNT: 15 % (ref 11.7–14.4)
GFR SERPLBLD BASED ON 1.73 SQ M-ARVRAT: 53 ML/MIN/{1.73_M2}
GLOBULIN UR ELPH-MCNC: 4.6 G/DL (ref 2–3.5)
GLUCOSE SERPL-MCNC: 185 MG/DL (ref 65–99)
HCT VFR BLD AUTO: 39.7 % (ref 37–47)
HGB BLD-MCNC: 12.3 G/DL (ref 12–16)
LYMPHOCYTES # BLD AUTO: 0.86 10*3/UL (ref 1–5)
LYMPHOCYTES NFR BLD AUTO: 21.9 % (ref 20–45)
MCH RBC QN AUTO: 29.4 PG (ref 27–31)
MCHC RBC AUTO-ENTMCNC: 31 G/DL (ref 33–37)
MCV RBC AUTO: 94.7 FL (ref 81–99)
MONOCYTES # BLD AUTO: 0.34 10*3/UL (ref 0.2–1.2)
MONOCYTES NFR BLD AUTO: 8.7 % (ref 3–10)
NEUTROPHILS # BLD AUTO: 2.54 10*3/UL (ref 2–8)
NEUTROPHILS NFR BLD AUTO: 64.8 % (ref 30–85)
NRBC CBCN: 0 % (ref 0–0.7)
OSMOLALITY SERPL CALC.SUM OF ELEC: 294 MOSM/KG (ref 273–304)
PLATELET # BLD AUTO: 197 10*3/UL (ref 130–400)
PMV BLD AUTO: 13.8 FL (ref 9.4–12.3)
POTASSIUM SERPL-SCNC: 4.7 MMOL/L (ref 3.5–5.3)
RBC # BLD AUTO: 4.19 10*6/UL (ref 4.2–5.4)
SODIUM SERPL-SCNC: 137 MMOL/L (ref 135–147)
WBC # BLD AUTO: 3.92 10*3/UL (ref 4.8–10.8)

## 2019-11-04 ENCOUNTER — HOSPITAL ENCOUNTER (OUTPATIENT)
Dept: OTHER | Facility: HOSPITAL | Age: 64
Discharge: HOME | End: 2019-11-04
Attending: INTERNAL MEDICINE

## 2019-11-04 LAB
ALBUMIN SERPL-MCNC: 3.7 G/DL (ref 3.5–5)
ALBUMIN/GLOB SERPL: 0.8 {RATIO} (ref 1.4–2.6)
ALP SERPL-CCNC: 122 U/L (ref 43–160)
ALT SERPL-CCNC: 7 U/L (ref 10–40)
ANION GAP SERPL CALC-SCNC: 17 MMOL/L (ref 8–19)
AST SERPL-CCNC: 34 U/L (ref 15–50)
BASOPHILS # BLD MANUAL: 0.02 10*3/UL (ref 0–0.2)
BASOPHILS NFR BLD MANUAL: 0.9 % (ref 0–3)
BILIRUB SERPL-MCNC: 0.31 MG/DL (ref 0.2–1.3)
BUN SERPL-MCNC: 22 MG/DL (ref 5–25)
BUN/CREAT SERPL: 25 {RATIO} (ref 6–20)
CALCIUM SERPL-MCNC: 9.4 MG/DL (ref 8.7–10.4)
CHLORIDE SERPL-SCNC: 102 MMOL/L (ref 99–111)
CONV CO2: 21 MMOL/L (ref 22–32)
CONV TOTAL PROTEIN: 8.6 G/DL (ref 6.3–8.2)
CREAT UR-MCNC: 0.87 MG/DL (ref 0.5–0.9)
DEPRECATED RDW RBC AUTO: 50.2 FL
EOSINOPHIL # BLD MANUAL: 0.1 10*3/UL (ref 0–0.7)
EOSINOPHIL NFR BLD MANUAL: 4.4 % (ref 0–7)
ERYTHROCYTE [DISTWIDTH] IN BLOOD BY AUTOMATED COUNT: 14.7 % (ref 11.5–14.5)
GFR SERPLBLD BASED ON 1.73 SQ M-ARVRAT: >60 ML/MIN/{1.73_M2}
GLOBULIN UR ELPH-MCNC: 4.9 G/DL (ref 2–3.5)
GLUCOSE SERPL-MCNC: 215 MG/DL (ref 65–99)
GRANS (ABSOLUTE): 1.34 10*3/UL (ref 2–8)
GRANS: 58.7 % (ref 30–85)
HBA1C MFR BLD: 12.1 G/DL (ref 12–16)
HCT VFR BLD AUTO: 38.7 % (ref 37–47)
IMM GRANULOCYTES # BLD: 0 10*3/UL (ref 0–0.54)
IMM GRANULOCYTES NFR BLD: 0 % (ref 0–0.43)
LYMPHOCYTES # BLD MANUAL: 0.61 10*3/UL (ref 1–5)
LYMPHOCYTES NFR BLD MANUAL: 9.2 % (ref 3–10)
MCH RBC QN AUTO: 28.9 PG (ref 27–31)
MCHC RBC AUTO-ENTMCNC: 31.3 G/DL (ref 33–37)
MCV RBC AUTO: 92.4 FL (ref 81–99)
MONOCYTES # BLD AUTO: 0.21 10*3/UL (ref 0.2–1.2)
OSMOLALITY SERPL CALC.SUM OF ELEC: 292 MOSM/KG (ref 273–304)
PLATELET # BLD AUTO: 113 10*3/UL (ref 130–400)
PMV BLD AUTO: 12.8 FL (ref 7.4–10.4)
POTASSIUM SERPL-SCNC: 4.2 MMOL/L (ref 3.5–5.3)
RBC # BLD AUTO: 4.19 10*6/UL (ref 4.2–5.4)
SODIUM SERPL-SCNC: 136 MMOL/L (ref 135–147)
VARIANT LYMPHS NFR BLD MANUAL: 26.8 % (ref 20–45)
WBC # BLD AUTO: 2.28 10*3/UL (ref 4.8–10.8)

## 2019-11-26 ENCOUNTER — TELEPHONE (OUTPATIENT)
Dept: SURGERY | Facility: CLINIC | Age: 64
End: 2019-11-26

## 2019-11-26 NOTE — TELEPHONE ENCOUNTER
Scheduled Bilateral 3D Screen Mammo  @ [facility] 4-16-20 @ 2:40    Return to see Dr JONATHAN Mann4-30-20 arrive 1:45    LM for pt's friend (Vanna to call)  [phone]      kdl

## 2019-12-30 NOTE — TELEPHONE ENCOUNTER
Call placed to patient RE: offer visit in survivorship clinic. I spoke to the patient and reviewed purpose and goals of survivorship visit as well as what to expect. Patient declines at present due to distance. I did explain this could be provided by mail and reviewed by phone. Patient states she is doing well. She states her daughter in law and her landlord have been good sources of support for her. She is pleased with the information and care she has gotten from the physicians on her treatment team. I've encouraged her to call anytime for additional resources or support and she verbalized understanding.   Improved

## 2020-04-01 ENCOUNTER — TELEPHONE (OUTPATIENT)
Dept: SURGERY | Facility: CLINIC | Age: 65
End: 2020-04-01

## 2020-04-01 NOTE — TELEPHONE ENCOUNTER
Moved pt's Bilateral 3D Screen Mammo @ [facility] to 5-8-20 @ 8:00    Due to COVID-19 VIRUS      Return to see Dr CASTILLO 5-29-20 arrive 11:15 [phone]    Spoke to Vanna (friend) she will contact MS Vargas.    Anabell

## 2020-04-17 ENCOUNTER — HOSPITAL ENCOUNTER (OUTPATIENT)
Dept: OTHER | Facility: HOSPITAL | Age: 65
Discharge: HOME | End: 2020-04-17
Attending: PHYSICAL MEDICINE & REHABILITATION

## 2020-04-17 LAB
25(OH)D3 SERPL-MCNC: 23.6 NG/ML (ref 30–100)
ALBUMIN SERPL-MCNC: 3.8 G/DL (ref 3.5–5)
ALBUMIN/GLOB SERPL: 0.8 {RATIO} (ref 1.4–2.6)
ALP SERPL-CCNC: 127 U/L (ref 43–160)
ALT SERPL-CCNC: 6 U/L (ref 10–40)
ANION GAP SERPL CALC-SCNC: 17 MMOL/L (ref 8–19)
AST SERPL-CCNC: 28 U/L (ref 15–50)
BASOPHILS # BLD AUTO: 0.03 10*3/UL (ref 0–0.2)
BASOPHILS NFR BLD AUTO: 0.8 % (ref 0–3)
BILIRUB SERPL-MCNC: 0.27 MG/DL (ref 0.2–1.3)
BUN SERPL-MCNC: 13 MG/DL (ref 5–25)
BUN/CREAT SERPL: 16 {RATIO} (ref 6–20)
CALCIUM SERPL-MCNC: 9.3 MG/DL (ref 8.7–10.4)
CHLORIDE SERPL-SCNC: 106 MMOL/L (ref 99–111)
CHOLEST SERPL-MCNC: 148 MG/DL (ref 107–200)
CHOLEST/HDLC SERPL: 3 {RATIO} (ref 3–6)
CONV ABS IMM GRAN: 0.01 10*3/UL (ref 0–0.2)
CONV CO2: 19 MMOL/L (ref 22–32)
CONV IMMATURE GRAN: 0.3 % (ref 0–1.8)
CONV TOTAL PROTEIN: 8.3 G/DL (ref 6.3–8.2)
CREAT UR-MCNC: 0.79 MG/DL (ref 0.5–0.9)
DEPRECATED RDW RBC AUTO: 49.6 FL (ref 36.4–46.3)
EOSINOPHIL # BLD AUTO: 0.25 10*3/UL (ref 0–0.7)
EOSINOPHIL # BLD AUTO: 6.4 % (ref 0–7)
ERYTHROCYTE [DISTWIDTH] IN BLOOD BY AUTOMATED COUNT: 14.9 % (ref 11.7–14.4)
EST. AVERAGE GLUCOSE BLD GHB EST-MCNC: 146 MG/DL
GFR SERPLBLD BASED ON 1.73 SQ M-ARVRAT: >60 ML/MIN/{1.73_M2}
GLOBULIN UR ELPH-MCNC: 4.5 G/DL (ref 2–3.5)
GLUCOSE SERPL-MCNC: 148 MG/DL (ref 65–99)
HBA1C MFR BLD: 6.7 % (ref 3.5–5.7)
HCT VFR BLD AUTO: 40.1 % (ref 37–47)
HDLC SERPL-MCNC: 49 MG/DL (ref 40–60)
HGB BLD-MCNC: 12.5 G/DL (ref 12–16)
LDLC SERPL CALC-MCNC: 68 MG/DL (ref 70–100)
LYMPHOCYTES # BLD AUTO: 0.68 10*3/UL (ref 1–5)
LYMPHOCYTES NFR BLD AUTO: 17.3 % (ref 20–45)
MCH RBC QN AUTO: 28.3 PG (ref 27–31)
MCHC RBC AUTO-ENTMCNC: 31.2 G/DL (ref 33–37)
MCV RBC AUTO: 90.9 FL (ref 81–99)
MONOCYTES # BLD AUTO: 0.33 10*3/UL (ref 0.2–1.2)
MONOCYTES NFR BLD AUTO: 8.4 % (ref 3–10)
NEUTROPHILS # BLD AUTO: 2.62 10*3/UL (ref 2–8)
NEUTROPHILS NFR BLD AUTO: 66.8 % (ref 30–85)
NRBC CBCN: 0 % (ref 0–0.7)
OSMOLALITY SERPL CALC.SUM OF ELEC: 287 MOSM/KG (ref 273–304)
PHENYTOIN SERPL-MCNC: 2.6 UG/ML (ref 10–20)
PLATELET # BLD AUTO: 156 10*3/UL (ref 130–400)
PMV BLD AUTO: 12.9 FL (ref 9.4–12.3)
POTASSIUM SERPL-SCNC: 4.6 MMOL/L (ref 3.5–5.3)
RBC # BLD AUTO: 4.41 10*6/UL (ref 4.2–5.4)
SODIUM SERPL-SCNC: 137 MMOL/L (ref 135–147)
TRIGL SERPL-MCNC: 156 MG/DL (ref 40–150)
TSH SERPL-ACNC: 4.05 M[IU]/L (ref 0.27–4.2)
VLDLC SERPL-MCNC: 31 MG/DL (ref 5–37)
WBC # BLD AUTO: 3.92 10*3/UL (ref 4.8–10.8)

## 2020-05-15 ENCOUNTER — TELEPHONE (OUTPATIENT)
Dept: SURGERY | Facility: CLINIC | Age: 65
End: 2020-05-15

## 2020-05-15 DIAGNOSIS — R92.8 ABNORMAL MAMMOGRAM: Primary | ICD-10-CM

## 2020-05-15 NOTE — TELEPHONE ENCOUNTER
[facility] right screening mammogram: Scattered fibroglandular densities.  Oval 5 mm circumscribed isodense mass anterior right breast likely upper outer quadrant not present in April 2019 but may have been present on January 2, 2019.  Ultrasound recommended.  BI-RADS 0.    We will arrange for a right ultrasound and see me after.    Right breast ultrasound [facility] from 2-4 o'clock in the right breast demonstrates no abnormality.  Specifically no solid or cystic mass is seen to correspond with the mammographic finding of a 4 to 5 mm circumscribed nodule in close proximity to the nipple.  This is most likely benign.  BI-RADS 3 recommend a right diagnostic mammogram in 6 months with an as needed ultrasound order.

## 2020-05-18 ENCOUNTER — TELEPHONE (OUTPATIENT)
Dept: SURGERY | Facility: CLINIC | Age: 65
End: 2020-05-18

## 2020-05-18 NOTE — TELEPHONE ENCOUNTER
Scheduled Rt Breast U/S at [facility] 5-27-20 @ 1:00    Return to see Dr CASTILLO arrive 11:45  6-26-20    Spoke to her friend. Vanna Hung

## 2020-06-26 ENCOUNTER — TELEPHONE (OUTPATIENT)
Dept: SURGERY | Facility: CLINIC | Age: 65
End: 2020-06-26

## 2020-06-26 NOTE — TELEPHONE ENCOUNTER
Received message, patient in hospital for cellulitis of her Breast.  She wast taken off of schedule today with Dr CASTILLO.    I will call her friend and get her rescheduled.    Anabell

## 2020-06-29 ENCOUNTER — TELEPHONE (OUTPATIENT)
Dept: SURGERY | Facility: CLINIC | Age: 65
End: 2020-06-29

## 2020-06-29 NOTE — TELEPHONE ENCOUNTER
Pt is out of hospital, I have scheduled her to see  on  7-21-20 arrive 12:15.   spoke to her friend Vanna, she said she would bring her.    Anabell

## 2020-07-17 ENCOUNTER — TELEPHONE (OUTPATIENT)
Dept: SURGERY | Facility: CLINIC | Age: 65
End: 2020-07-17

## 2020-07-17 NOTE — TELEPHONE ENCOUNTER
Rafaela from cancer care in [place] called to let us know patient has been admitted to a nursing home long-term.    She will still be able to come to appointment with Dr. Garcias on 7/21/2020, they needed appointment information faxed to [facility].    Appointment information has been faxed to [facility] in [place].

## 2020-07-21 ENCOUNTER — TELEPHONE (OUTPATIENT)
Dept: SURGERY | Facility: CLINIC | Age: 65
End: 2020-07-21

## 2020-07-21 ENCOUNTER — OFFICE VISIT (OUTPATIENT)
Dept: SURGERY | Facility: CLINIC | Age: 65
End: 2020-07-21

## 2020-07-21 VITALS
OXYGEN SATURATION: 99 % | HEART RATE: 64 BPM | BODY MASS INDEX: 50.89 KG/M2 | HEIGHT: 59 IN | TEMPERATURE: 97.3 F | DIASTOLIC BLOOD PRESSURE: 73 MMHG | SYSTOLIC BLOOD PRESSURE: 141 MMHG

## 2020-07-21 DIAGNOSIS — K74.69 OTHER CIRRHOSIS OF LIVER (HCC): ICD-10-CM

## 2020-07-21 DIAGNOSIS — E66.01 MORBID OBESITY WITH BMI OF 50.0-59.9, ADULT (HCC): ICD-10-CM

## 2020-07-21 DIAGNOSIS — C50.212 MALIGNANT NEOPLASM OF UPPER-INNER QUADRANT OF LEFT FEMALE BREAST, UNSPECIFIED ESTROGEN RECEPTOR STATUS (HCC): ICD-10-CM

## 2020-07-21 DIAGNOSIS — R92.8 ABNORMAL MAMMOGRAM: Primary | ICD-10-CM

## 2020-07-21 DIAGNOSIS — Z80.3 FH: BREAST CANCER: ICD-10-CM

## 2020-07-21 DIAGNOSIS — C77.3 SECONDARY MALIGNANT NEOPLASM OF AXILLARY LYMPH NODES (HCC): ICD-10-CM

## 2020-07-21 DIAGNOSIS — C50.412 MALIGNANT NEOPLASM OF UPPER-OUTER QUADRANT OF LEFT FEMALE BREAST, UNSPECIFIED ESTROGEN RECEPTOR STATUS (HCC): ICD-10-CM

## 2020-07-21 DIAGNOSIS — R93.89 ABNORMAL CT SCAN, CHEST: ICD-10-CM

## 2020-07-21 PROCEDURE — 99213 OFFICE O/P EST LOW 20 MIN: CPT | Performed by: SURGERY

## 2020-07-21 NOTE — PROGRESS NOTES
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                         METASTATIC CARCINOMA PRESENT IN TWO OF EIGHT LYMPH NODES.     2. LEFT AXILLARY TISSUE:                         METASTATIC CARCINOMA PRESENT IN THREE OF SIX LYMPH NODES WITH ADDITIONAL FOCUS                                                OF TUMOR WITHIN A LYMPHATIC SPACE  IN  ADIPOSE TISSUE.        The following synoptic report utilizes the June 2017 CAP protocol for Invasive Carcinoma of Breast.     Specimen: Total mastectomy.  Specimen laterality: Left.  Tumor size:                          Greatest dimension of largest invasive focus greater than a millimeter: 5.2 cm.                         Comment: Both fibrous areas have invasive carcinoma throughout the length of the greatest dimension. The                         larger focus is 5.2 cm and the smaller is 4.0 cm.  In addition invasive carcinoma is noted in sections of the nipple.                         Invasive carcinoma is also present in two out three random sections obtained from tissue  the two                         fibrous areas.    Histologic type: Invasive carcinoma no special type (ductal).  Histologic grade, [place] histologic score:                         Glandular/tubular  differentiation: Score 3.                         Nuclear pleomorphism: score 2.                         Mitotic rate: Score 1.                         Overall grade: Grade 2.  Tumor focality: Multiple foci of invasive carcinoma (2).  Duct carcinoma in situ:  Focal duct carcinoma in situ, intermediate grade is present.  Tumor extension:                         Skeletal muscle: Carcinoma focally invades skeletal muscle.  Margins:                          Invasive carcinoma margins:                                                Invasive carcinoma is present at superior superficial margin and is less than 1 mm from deep margin.                         Duct carcinoma in situ margins:                                                  Uninvolved by duct carcinoma in situ.                                                Distance from closest margin: Over 2 mm from superficial margin.  Regional lymph nodes:                         Number of lymph nodes with macrometastases: 0.                         Number of lymph nodes with micrometastases: 2 (larger focus is 1.2 mm).                         Number of lymph nodes with isolated tumor cells: 3.                         Number of lymph nodes examined: 14.                         Number of sentinel nodes examined: 0.  Treatment effect:                         Treatment effect in the breast: No definite response to presurgical therapy in the invasive carcinoma.                         Treatment effect in the lymph nodes: Probable or definite response to presurgical therapy in metastatic                                carcinoma                         Comment: Many lymph nodes have sclerosis suggesting regression secondary to therapy.                         One lymph node has changes consistent with prior biopsy.  Lymphovascular invasion: Present.  Pathologic staging:                         TNM Descriptor: m (multiple foci of invasive carcinoma).                                                                         y (post treatment).                         Primary tumor: pT3.                         Regional lymph nodes:                                                Category: pN1mi.  Ancillary studies: Hormone receptor and HER/2/tacos studies have been performed on prior biopsy material.  If those studies are desired on the current specimen, please contact the laboratory.     CMK/marylum  IHC/TDJ/THM          Patient went for a CT guided biopsy of the calvarium and Dr Hadley did not see a target on CT. She then had a PET CT 5-31-17  that showed index lesion LEFT breast and nodes in the LEFT axilla and subpectoral space.No uptake at the skull to correlate to the recent bone scan finding. Perceived abnormal uptake at the sacral body and bilateral sacral ala with no definate underlying lesions at CT. No visceral metastases. Recommend MRI brain and head. MRI brain and head 6-6-17 showed abnormal signal intensity on the calvarium posterior laterally to the right suspicious for calvarial metastatic lesion with adjacent dural thickening or possibly tumor extension.  A dural metastatic lesion with secondary involvement of the calvarium is less likely.  Far less likely would be and on plaque meningioma with osseous extension.     MRI pelvis June 6, 2017 showed a 1.3 cm area of abnormal signal enhancement medullary space of the left femoral head and neck junction anterosuperiorly which is indeterminate and will require follow-up.  No associated abnormality on PET/CT in this area in the sitting on bone scan.  MRI would be the study of choice for follow-up.        Procedures:      Assessment:   Diagnosis Plan   1. Abnormal mammogram     2. Malignant neoplasm of upper-outer quadrant of left female breast, unspecified estrogen receptor status (CMS/HCC)     3. Malignant neoplasm of upper-inner quadrant of left female breast, unspecified estrogen receptor status (CMS/HCC)     4. Secondary malignant  neoplasm of axillary lymph nodes (CMS/HCC)     5. FH: breast cancer     6. Other cirrhosis of liver (CMS/HCC)     7. Abnormal CT scan, chest     8. Morbid obesity with BMI of 50.0-59.9, adult (CMS/HCC)      1-    Right breast upper outer quadrant oval circumscribed isodense anterior breast.  Seen May 12, 2020 on screening mammogram.  Nothing palpable to patient or to my examination.  No findings on ultrasound.  BI-RADS 3 recommend a right screening mammogram in October 2020.    2-4LEFT 10:00 9.5 CFN- 5.5 cm on exam; 2 cm on ultrasound but underrepresented, 5.7 cm on MRI- BR4  Left breast 10:00, invasive ductal carcinoma, intermediate grade, 3, 2, 1, largest length in a core 1.2 cm.  10:00 9.5 CFN- ER 90 HI 90 her 2 tacos 2+, ration 2.0, copy number 4.6, positive.    LEFT 2:00 10 CFN 6 cm no exam, 3 cm on ultrasound but underrepresented, also 5.7 cm on MRI- Br5  Left breast biopsy 2:00, invasive mammary carcinoma with focal lobular features, intermediate grade, 3, 2, 1, largest size in a core 1.5 cm.  2:00 10 CFN- ER 90 HI 90, her 2 IHC 2+, ration 2.1, positive. Copy number was 5.1.    LEFT axilla node FNA- positive for metastatic mammary carcinoma    - Clinical stage [id number]- stage IIIA (Dr Henley did not feel that the findings on imaging were metastatic)  -Pathology from 1-11-18 LEFT modified radical mastectomy- pathology returned as IMC, NST, int grade, multifocal with 2 separate foci.  The largest foci of 5.2 cm and the smaller 4.0 cm.  Additional invasive carcinoma noted in sections of the nipple.  Invasive carcinoma also present in 2 out of 3 random sections from tissue between the 2 involved areas.  Intermediate grade, 3, 2, 1.  Focal duct carcinoma in situ, intermediate grade.  Tumor extends focally into skeletal muscle posteriorly.  Tumor extends to superior superficial margin and is less than 1 mm from deep margin.  Note that no sentinel lymph nodes were taken due to the failure of migration of radiotracer.   Total number of lymph nodes examined on 14, number of lymph nodes with cancer on 5.  2 micrometastatic and 3 isolated tumor cells.  Additional focus of tumor within a lymphatic space in the adipose tissue.  Pathologic stage [id number] latoya.  Stage IIIA.    Dr Henley- neoadj [facility]- on arimidex   Dr Merino- took XRT frmo 3-13-18 through 5-9-18- LEFT supraclav treated to 4500 cGy and LEFT CW to 5000cGy. Then electron boost to mastectomy incision.  4000 cGy to axilla.    8-8-19 port removal.    5-  Mother age 50  MGA age uncertain    6-  Cirrhosis with abnormal imaging of liver, splenomegaly, small GEJxn varices and slightly elevated INR of 1.33  patient uncertain of etiology- was told was due to dilantin. Denies hepatits or excessive ETOH- has not seen hepatology    7-  -Bone scan showed 4-21-17 RIGHT parietal-occipital bone lesion susp for metastatic disease- couldn't find target for biopsy; repeat MRI showed stable dural thickening  -CT chest 4-2017 pulmonary nodules 3-4 mm- recommend Fu chest CT 4 months- FU CT in November 2017 showed stable nodules with benign morphology    8-  BMI 51- 7-2020 visit did not weight because in wheel chair      Plan:  Josseline is here with her caregiver and transportation from the nursing home.  She and I reviewed her interval history, imaging, imaging reports and examination together today.  Her examination is in good order with no evidence of disease.  She is asymptomatic.    We discussed that a BIRADS 3 designation describes an imaging finding that is 97-98% likely to represent a benign process. We discussed that the most common management of a BIRADS 3 finding is 6 month imaging surveillance with examination. We discussed that the alternate management option is to biopsy the lesion, if the concern and anxiety over the imaging was not acceptable to the patient. She understood the above, and wished to proceed with imaging surveillance in 6 months .    I offered her to come back and see us  after her imaging or to have her imaging with Dr. Merino or Dr. Henley and alleviate the 1 hour drive.  She preferred to have this done at [facility] and follow-up with her doctors there.  We will send a note to Dr. Henley and Dr. Merino's office so that they are aware.    I asked her to continue her SBE  monthly and to let us know if she has any changes or concerns about her exam or imaging in the future and we would be happy to see her back.    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  Note- Depression with prior admission to [facility].  in 2012 and lost son to suicide 2013.  Has smoked tobacco for 45 years 1.5 ppd      Yuli Garcias MD      We spent 15 min in visit, 10 in face to face       Next Appointment:  Return for any future concerns.      EMR Dragon/transcription disclaimer:    Much of this encounter note is an electronic transcription/translocation of spoken language to printed text.  The electronic translation of spoken language may permit erroneous, or at times, nonsensical words or phrases to be inadvertently transcribed.  Although I have reviewed the note from such areas, some may still exist.

## 2020-07-21 NOTE — TELEPHONE ENCOUNTER
Spoke with Siria Henley's office. They have ordered her imaging for 10/2020 and will see her back in the office in November. Dr. Henley will follow up with patient and order her imaging.

## 2020-08-17 ENCOUNTER — EPISODE CHANGES (OUTPATIENT)
Dept: CASE MANAGEMENT | Facility: OTHER | Age: 65
End: 2020-08-17

## 2020-09-04 NOTE — TELEPHONE ENCOUNTER
1/22 95 cc  1/23 150 cc  1/24  40 cc  1/25  75 cc  1/26  65 cc  1/27  60 cc  1/28  80 cc  1/29  60 cc  1/30 60 cc    Called for drain totals for Ms. Vargas    Patient's niece; Holly informed me that patient is being admitted to [facility] in [place] for Hip and leg infection.     I've requested records. lml     As of 2/7/18 patient is still inpatient at [facility]. rec'd drain totals for RN @ [facility].   2/1 no record  2/2 no record   2/3 40 cc  2/4 40 cc  2/5  40 cc  2/6  25 cc  2/7 20  2/8 15  2/9 20  2/10 20  2/11 15    Lml Drain to be removed. Pt will call me back to let me know when she can be here. lml   Patient will be here Wed 2/14 for drain removal  This is the soonest she can make it due to transportation. lml     DRAIN REMOVED TODAY. LML    ppx: lovenox  diet: regular + MV  bowel regimen: senna/miralax/dulcolax  Dispo: admission for malignancy work up

## 2020-10-13 ENCOUNTER — LAB REQUISITION (OUTPATIENT)
Dept: LAB | Facility: HOSPITAL | Age: 65
End: 2020-10-13

## 2020-10-13 DIAGNOSIS — Z00.00 ROUTINE GENERAL MEDICAL EXAMINATION AT A HEALTH CARE FACILITY: ICD-10-CM

## 2020-10-13 LAB
ALBUMIN SERPL-MCNC: 3.5 G/DL (ref 3.5–5.2)
ALBUMIN/GLOB SERPL: 0.9 G/DL
ALP SERPL-CCNC: 181 U/L (ref 39–117)
ALT SERPL W P-5'-P-CCNC: <5 U/L (ref 1–33)
ANION GAP SERPL CALCULATED.3IONS-SCNC: 13.5 MMOL/L (ref 5–15)
AST SERPL-CCNC: 18 U/L (ref 1–32)
BASOPHILS # BLD AUTO: 0.03 10*3/MM3 (ref 0–0.2)
BASOPHILS NFR BLD AUTO: 1 % (ref 0–1.5)
BILIRUB SERPL-MCNC: 0.3 MG/DL (ref 0–1.2)
BUN SERPL-MCNC: 13 MG/DL (ref 8–23)
BUN/CREAT SERPL: 18.6 (ref 7–25)
CALCIUM SPEC-SCNC: 9 MG/DL (ref 8.6–10.5)
CHLORIDE SERPL-SCNC: 101 MMOL/L (ref 98–107)
CO2 SERPL-SCNC: 22.5 MMOL/L (ref 22–29)
CREAT SERPL-MCNC: 0.7 MG/DL (ref 0.57–1)
DEPRECATED RDW RBC AUTO: 49.2 FL (ref 37–54)
EOSINOPHIL # BLD AUTO: 0.15 10*3/MM3 (ref 0–0.4)
EOSINOPHIL NFR BLD AUTO: 4.8 % (ref 0.3–6.2)
ERYTHROCYTE [DISTWIDTH] IN BLOOD BY AUTOMATED COUNT: 15.8 % (ref 12.3–15.4)
GFR SERPL CREATININE-BSD FRML MDRD: 83 ML/MIN/1.73
GLOBULIN UR ELPH-MCNC: 3.7 GM/DL
GLUCOSE SERPL-MCNC: 122 MG/DL (ref 65–99)
HCT VFR BLD AUTO: 39.4 % (ref 34–46.6)
HGB BLD-MCNC: 12.4 G/DL (ref 12–15.9)
LYMPHOCYTES # BLD AUTO: 0.72 10*3/MM3 (ref 0.7–3.1)
LYMPHOCYTES NFR BLD AUTO: 22.9 % (ref 19.6–45.3)
MCH RBC QN AUTO: 26.9 PG (ref 26.6–33)
MCHC RBC AUTO-ENTMCNC: 31.5 G/DL (ref 31.5–35.7)
MCV RBC AUTO: 85.5 FL (ref 79–97)
MONOCYTES # BLD AUTO: 0.27 10*3/MM3 (ref 0.1–0.9)
MONOCYTES NFR BLD AUTO: 8.6 % (ref 5–12)
NEUTROPHILS NFR BLD AUTO: 1.97 10*3/MM3 (ref 1.7–7)
NEUTROPHILS NFR BLD AUTO: 62.4 % (ref 42.7–76)
PLATELET # BLD AUTO: 142 10*3/MM3 (ref 140–450)
POTASSIUM SERPL-SCNC: 3.6 MMOL/L (ref 3.5–5.2)
PROT SERPL-MCNC: 7.2 G/DL (ref 6–8.5)
RBC # BLD AUTO: 4.61 10*6/MM3 (ref 3.77–5.28)
SODIUM SERPL-SCNC: 137 MMOL/L (ref 136–145)
WBC # BLD AUTO: 3.15 10*3/MM3 (ref 3.4–10.8)

## 2020-10-13 PROCEDURE — 85025 COMPLETE CBC W/AUTO DIFF WBC: CPT

## 2020-10-13 PROCEDURE — 80053 COMPREHEN METABOLIC PANEL: CPT

## 2021-02-22 ENCOUNTER — PATIENT OUTREACH (OUTPATIENT)
Dept: PHARMACY | Facility: HOSPITAL | Age: 66
End: 2021-02-22

## 2021-02-22 NOTE — OUTREACH NOTE
Medication Adherence Call    Patient was called today to discuss medication adherence with metformin, as the patient was identified as having care opportunities.     The patient did not answer. I will try again at a later date.    Chary Anudjar PharmD  02/22/21    Medication Adherence Call    Patient was called again today to discuss medication adherence with metformin, as the patient was identified as having care opportunities.     The patient did not answer for the second time. I will try once more at a later date.    Chary Andujar PharmD  02/26/21

## 2021-10-19 ENCOUNTER — APPOINTMENT (OUTPATIENT)
Dept: GENERAL RADIOLOGY | Facility: HOSPITAL | Age: 66
End: 2021-10-19

## 2021-10-19 ENCOUNTER — HOSPITAL ENCOUNTER (INPATIENT)
Facility: HOSPITAL | Age: 66
LOS: 3 days | Discharge: NURSING, REHAB OR LONG-TERM CARE | End: 2021-10-23
Attending: EMERGENCY MEDICINE | Admitting: INTERNAL MEDICINE

## 2021-10-19 DIAGNOSIS — A41.9 SEPSIS, DUE TO UNSPECIFIED ORGANISM, UNSPECIFIED WHETHER ACUTE ORGAN DYSFUNCTION PRESENT (HCC): Primary | ICD-10-CM

## 2021-10-19 LAB
ALBUMIN SERPL-MCNC: 3.8 G/DL (ref 3.5–5.2)
ALBUMIN/GLOB SERPL: 0.8 G/DL
ALP SERPL-CCNC: 126 U/L (ref 39–117)
ALT SERPL W P-5'-P-CCNC: 7 U/L (ref 1–33)
ANION GAP SERPL CALCULATED.3IONS-SCNC: 16.1 MMOL/L (ref 5–15)
AST SERPL-CCNC: 45 U/L (ref 1–32)
BACTERIA UR QL AUTO: ABNORMAL /HPF
BASOPHILS # BLD AUTO: 0.03 10*3/MM3 (ref 0–0.2)
BASOPHILS NFR BLD AUTO: 0.3 % (ref 0–1.5)
BILIRUB SERPL-MCNC: 0.7 MG/DL (ref 0–1.2)
BILIRUB UR QL STRIP: NEGATIVE
BUN SERPL-MCNC: 23 MG/DL (ref 8–23)
BUN/CREAT SERPL: 27.1 (ref 7–25)
CALCIUM SPEC-SCNC: 9.2 MG/DL (ref 8.6–10.5)
CHLORIDE SERPL-SCNC: 101 MMOL/L (ref 98–107)
CK SERPL-CCNC: 125 U/L (ref 20–180)
CLARITY UR: ABNORMAL
CO2 SERPL-SCNC: 19.9 MMOL/L (ref 22–29)
COLOR UR: YELLOW
CREAT SERPL-MCNC: 0.85 MG/DL (ref 0.57–1)
D-LACTATE SERPL-SCNC: 5.1 MMOL/L (ref 0.5–2)
DEPRECATED RDW RBC AUTO: 52.3 FL (ref 37–54)
EOSINOPHIL # BLD AUTO: 0.21 10*3/MM3 (ref 0–0.4)
EOSINOPHIL NFR BLD AUTO: 2.2 % (ref 0.3–6.2)
ERYTHROCYTE [DISTWIDTH] IN BLOOD BY AUTOMATED COUNT: 16.4 % (ref 12.3–15.4)
FLUAV AG NPH QL: NEGATIVE
FLUBV AG NPH QL IA: NEGATIVE
GFR SERPL CREATININE-BSD FRML MDRD: 67 ML/MIN/1.73
GLOBULIN UR ELPH-MCNC: 4.8 GM/DL
GLUCOSE SERPL-MCNC: 131 MG/DL (ref 65–99)
GLUCOSE UR STRIP-MCNC: NEGATIVE MG/DL
HCT VFR BLD AUTO: 44.9 % (ref 34–46.6)
HGB BLD-MCNC: 14.1 G/DL (ref 12–15.9)
HGB UR QL STRIP.AUTO: ABNORMAL
HOLD SPECIMEN: NORMAL
HOLD SPECIMEN: NORMAL
HYALINE CASTS UR QL AUTO: ABNORMAL /LPF
IMM GRANULOCYTES # BLD AUTO: 0.02 10*3/MM3 (ref 0–0.05)
IMM GRANULOCYTES NFR BLD AUTO: 0.2 % (ref 0–0.5)
KETONES UR QL STRIP: NEGATIVE
LEUKOCYTE ESTERASE UR QL STRIP.AUTO: ABNORMAL
LYMPHOCYTES # BLD AUTO: 0.46 10*3/MM3 (ref 0.7–3.1)
LYMPHOCYTES NFR BLD AUTO: 4.9 % (ref 19.6–45.3)
MCH RBC QN AUTO: 27.4 PG (ref 26.6–33)
MCHC RBC AUTO-ENTMCNC: 31.4 G/DL (ref 31.5–35.7)
MCV RBC AUTO: 87.2 FL (ref 79–97)
MONOCYTES # BLD AUTO: 0.22 10*3/MM3 (ref 0.1–0.9)
MONOCYTES NFR BLD AUTO: 2.3 % (ref 5–12)
NEUTROPHILS NFR BLD AUTO: 8.46 10*3/MM3 (ref 1.7–7)
NEUTROPHILS NFR BLD AUTO: 90.1 % (ref 42.7–76)
NITRITE UR QL STRIP: NEGATIVE
NRBC BLD AUTO-RTO: 0 /100 WBC (ref 0–0.2)
PH UR STRIP.AUTO: 6 [PH] (ref 5–8)
PLATELET # BLD AUTO: 150 10*3/MM3 (ref 140–450)
PMV BLD AUTO: 11.8 FL (ref 6–12)
POTASSIUM SERPL-SCNC: 4.5 MMOL/L (ref 3.5–5.2)
PROT SERPL-MCNC: 8.6 G/DL (ref 6–8.5)
PROT UR QL STRIP: ABNORMAL
RBC # BLD AUTO: 5.15 10*6/MM3 (ref 3.77–5.28)
RBC # UR: ABNORMAL /HPF
REF LAB TEST METHOD: ABNORMAL
SODIUM SERPL-SCNC: 137 MMOL/L (ref 136–145)
SP GR UR STRIP: 1.02 (ref 1–1.03)
SQUAMOUS #/AREA URNS HPF: ABNORMAL /HPF
UROBILINOGEN UR QL STRIP: ABNORMAL
WBC # BLD AUTO: 9.4 10*3/MM3 (ref 3.4–10.8)
WBC UR QL AUTO: ABNORMAL /HPF
WHOLE BLOOD HOLD SPECIMEN: NORMAL
WHOLE BLOOD HOLD SPECIMEN: NORMAL

## 2021-10-19 PROCEDURE — 71045 X-RAY EXAM CHEST 1 VIEW: CPT

## 2021-10-19 PROCEDURE — 81001 URINALYSIS AUTO W/SCOPE: CPT | Performed by: EMERGENCY MEDICINE

## 2021-10-19 PROCEDURE — 87186 SC STD MICRODIL/AGAR DIL: CPT | Performed by: EMERGENCY MEDICINE

## 2021-10-19 PROCEDURE — 87804 INFLUENZA ASSAY W/OPTIC: CPT | Performed by: EMERGENCY MEDICINE

## 2021-10-19 PROCEDURE — 87077 CULTURE AEROBIC IDENTIFY: CPT | Performed by: EMERGENCY MEDICINE

## 2021-10-19 PROCEDURE — 87086 URINE CULTURE/COLONY COUNT: CPT | Performed by: EMERGENCY MEDICINE

## 2021-10-19 PROCEDURE — 87040 BLOOD CULTURE FOR BACTERIA: CPT | Performed by: EMERGENCY MEDICINE

## 2021-10-19 PROCEDURE — 99285 EMERGENCY DEPT VISIT HI MDM: CPT

## 2021-10-19 PROCEDURE — 85025 COMPLETE CBC W/AUTO DIFF WBC: CPT | Performed by: EMERGENCY MEDICINE

## 2021-10-19 PROCEDURE — 83605 ASSAY OF LACTIC ACID: CPT | Performed by: EMERGENCY MEDICINE

## 2021-10-19 PROCEDURE — 80053 COMPREHEN METABOLIC PANEL: CPT | Performed by: EMERGENCY MEDICINE

## 2021-10-19 PROCEDURE — 36415 COLL VENOUS BLD VENIPUNCTURE: CPT | Performed by: EMERGENCY MEDICINE

## 2021-10-19 PROCEDURE — 87150 DNA/RNA AMPLIFIED PROBE: CPT | Performed by: EMERGENCY MEDICINE

## 2021-10-19 PROCEDURE — 25010000002 AZITHROMYCIN PER 500 MG: Performed by: EMERGENCY MEDICINE

## 2021-10-19 PROCEDURE — 87635 SARS-COV-2 COVID-19 AMP PRB: CPT | Performed by: EMERGENCY MEDICINE

## 2021-10-19 PROCEDURE — 25010000002 CEFTRIAXONE PER 250 MG: Performed by: EMERGENCY MEDICINE

## 2021-10-19 PROCEDURE — 82550 ASSAY OF CK (CPK): CPT | Performed by: EMERGENCY MEDICINE

## 2021-10-19 RX ORDER — CEFTRIAXONE SODIUM 1 G/50ML
1 INJECTION, SOLUTION INTRAVENOUS ONCE
Status: COMPLETED | OUTPATIENT
Start: 2021-10-19 | End: 2021-10-19

## 2021-10-19 RX ORDER — SODIUM CHLORIDE 0.9 % (FLUSH) 0.9 %
10 SYRINGE (ML) INJECTION AS NEEDED
Status: DISCONTINUED | OUTPATIENT
Start: 2021-10-19 | End: 2021-10-23 | Stop reason: HOSPADM

## 2021-10-19 RX ADMIN — SODIUM CHLORIDE 1365 ML: 9 INJECTION, SOLUTION INTRAVENOUS at 20:38

## 2021-10-19 RX ADMIN — CEFTRIAXONE SODIUM 1 G: 1 INJECTION, SOLUTION INTRAVENOUS at 20:38

## 2021-10-19 RX ADMIN — AZITHROMYCIN 500 MG: 500 INJECTION, POWDER, LYOPHILIZED, FOR SOLUTION INTRAVENOUS at 22:15

## 2021-10-20 ENCOUNTER — APPOINTMENT (OUTPATIENT)
Dept: CT IMAGING | Facility: HOSPITAL | Age: 66
End: 2021-10-20

## 2021-10-20 ENCOUNTER — APPOINTMENT (OUTPATIENT)
Dept: MRI IMAGING | Facility: HOSPITAL | Age: 66
End: 2021-10-20

## 2021-10-20 ENCOUNTER — APPOINTMENT (OUTPATIENT)
Dept: CARDIOLOGY | Facility: HOSPITAL | Age: 66
End: 2021-10-20

## 2021-10-20 PROBLEM — A41.9 SEPSIS (HCC): Status: ACTIVE | Noted: 2021-10-20

## 2021-10-20 LAB
ACANTHOCYTES BLD QL SMEAR: ABNORMAL
ALBUMIN SERPL-MCNC: 3 G/DL (ref 3.5–5.2)
ALBUMIN/GLOB SERPL: 0.8 G/DL
ALP SERPL-CCNC: 98 U/L (ref 39–117)
ALT SERPL W P-5'-P-CCNC: 6 U/L (ref 1–33)
ANION GAP SERPL CALCULATED.3IONS-SCNC: 12.1 MMOL/L (ref 5–15)
ANISOCYTOSIS BLD QL: ABNORMAL
AST SERPL-CCNC: 34 U/L (ref 1–32)
BACTERIA BLD CULT: ABNORMAL
BACTERIA SPEC AEROBE CULT: NO GROWTH
BH CV LOWER VASCULAR LEFT COMMON FEMORAL AUGMENT: NORMAL
BH CV LOWER VASCULAR LEFT COMMON FEMORAL COMPETENT: NORMAL
BH CV LOWER VASCULAR LEFT COMMON FEMORAL COMPRESS: NORMAL
BH CV LOWER VASCULAR LEFT COMMON FEMORAL PHASIC: NORMAL
BH CV LOWER VASCULAR LEFT COMMON FEMORAL SPONT: NORMAL
BH CV LOWER VASCULAR RIGHT COMMON FEMORAL AUGMENT: NORMAL
BH CV LOWER VASCULAR RIGHT COMMON FEMORAL COMPETENT: NORMAL
BH CV LOWER VASCULAR RIGHT COMMON FEMORAL COMPRESS: NORMAL
BH CV LOWER VASCULAR RIGHT COMMON FEMORAL PHASIC: NORMAL
BH CV LOWER VASCULAR RIGHT COMMON FEMORAL SPONT: NORMAL
BH CV LOWER VASCULAR RIGHT DISTAL FEMORAL COMPRESS: NORMAL
BH CV LOWER VASCULAR RIGHT GREATER SAPH AK COMPRESS: NORMAL
BH CV LOWER VASCULAR RIGHT MID FEMORAL AUGMENT: NORMAL
BH CV LOWER VASCULAR RIGHT MID FEMORAL COMPETENT: NORMAL
BH CV LOWER VASCULAR RIGHT MID FEMORAL COMPRESS: NORMAL
BH CV LOWER VASCULAR RIGHT MID FEMORAL PHASIC: NORMAL
BH CV LOWER VASCULAR RIGHT MID FEMORAL SPONT: NORMAL
BH CV LOWER VASCULAR RIGHT PERONEAL COMPRESS: NORMAL
BH CV LOWER VASCULAR RIGHT POPLITEAL AUGMENT: NORMAL
BH CV LOWER VASCULAR RIGHT POPLITEAL COMPETENT: NORMAL
BH CV LOWER VASCULAR RIGHT POPLITEAL COMPRESS: NORMAL
BH CV LOWER VASCULAR RIGHT POPLITEAL PHASIC: NORMAL
BH CV LOWER VASCULAR RIGHT POPLITEAL SPONT: NORMAL
BH CV LOWER VASCULAR RIGHT POSTERIOR TIBIAL COMPRESS: NORMAL
BH CV LOWER VASCULAR RIGHT PROXIMAL FEMORAL COMPRESS: NORMAL
BH CV LOWER VASCULAR RIGHT SAPHENOFEMORAL JUNCTION COMPRESS: NORMAL
BILIRUB SERPL-MCNC: 0.6 MG/DL (ref 0–1.2)
BUN SERPL-MCNC: 26 MG/DL (ref 8–23)
BUN/CREAT SERPL: 29.5 (ref 7–25)
CALCIUM SPEC-SCNC: 8.1 MG/DL (ref 8.6–10.5)
CHLORIDE SERPL-SCNC: 106 MMOL/L (ref 98–107)
CO2 SERPL-SCNC: 19.9 MMOL/L (ref 22–29)
CREAT SERPL-MCNC: 0.88 MG/DL (ref 0.57–1)
CRP SERPL-MCNC: 16.87 MG/DL (ref 0–0.5)
D DIMER PPP FEU-MCNC: 3.85 MG/L (FEU) (ref 0–0.59)
D-LACTATE SERPL-SCNC: 3.1 MMOL/L (ref 0.5–2)
D-LACTATE SERPL-SCNC: 3.9 MMOL/L (ref 0.5–2)
D-LACTATE SERPL-SCNC: 4 MMOL/L (ref 0.5–2)
DEPRECATED RDW RBC AUTO: 53.5 FL (ref 37–54)
ERYTHROCYTE [DISTWIDTH] IN BLOOD BY AUTOMATED COUNT: 16.3 % (ref 12.3–15.4)
ERYTHROCYTE [SEDIMENTATION RATE] IN BLOOD: 63 MM/HR (ref 0–30)
GFR SERPL CREATININE-BSD FRML MDRD: 64 ML/MIN/1.73
GLOBULIN UR ELPH-MCNC: 3.9 GM/DL
GLUCOSE BLDC GLUCOMTR-MCNC: 130 MG/DL (ref 70–99)
GLUCOSE BLDC GLUCOMTR-MCNC: 141 MG/DL (ref 70–99)
GLUCOSE BLDC GLUCOMTR-MCNC: 142 MG/DL (ref 70–99)
GLUCOSE BLDC GLUCOMTR-MCNC: 155 MG/DL (ref 70–99)
GLUCOSE SERPL-MCNC: 156 MG/DL (ref 65–99)
HCT VFR BLD AUTO: 40.2 % (ref 34–46.6)
HGB BLD-MCNC: 12.3 G/DL (ref 12–15.9)
LARGE PLATELETS: ABNORMAL
LYMPHOCYTES # BLD MANUAL: 1.3 10*3/MM3 (ref 0.7–3.1)
LYMPHOCYTES NFR BLD MANUAL: 15 % (ref 19.6–45.3)
LYMPHOCYTES NFR BLD MANUAL: 2 % (ref 5–12)
MACROCYTES BLD QL SMEAR: ABNORMAL
MAXIMAL PREDICTED HEART RATE: 152 BPM
MCH RBC QN AUTO: 27.3 PG (ref 26.6–33)
MCHC RBC AUTO-ENTMCNC: 30.6 G/DL (ref 31.5–35.7)
MCV RBC AUTO: 89.1 FL (ref 79–97)
METAMYELOCYTES NFR BLD MANUAL: 1 % (ref 0–0)
MICROCYTES BLD QL: ABNORMAL
MONOCYTES # BLD AUTO: 0.17 10*3/MM3 (ref 0.1–0.9)
MRSA DNA SPEC QL NAA+PROBE: ABNORMAL
NEUTROPHILS # BLD AUTO: 7.09 10*3/MM3 (ref 1.7–7)
NEUTROPHILS NFR BLD MANUAL: 70 % (ref 42.7–76)
NEUTS BAND NFR BLD MANUAL: 12 % (ref 0–5)
NT-PROBNP SERPL-MCNC: 115.5 PG/ML (ref 0–900)
OVALOCYTES BLD QL SMEAR: ABNORMAL
PLATELET # BLD AUTO: 108 10*3/MM3 (ref 140–450)
PMV BLD AUTO: 11.8 FL (ref 6–12)
POIKILOCYTOSIS BLD QL SMEAR: ABNORMAL
POTASSIUM SERPL-SCNC: 4.5 MMOL/L (ref 3.5–5.2)
PROCALCITONIN SERPL-MCNC: 7.56 NG/ML (ref 0–0.25)
PROT SERPL-MCNC: 6.9 G/DL (ref 6–8.5)
RBC # BLD AUTO: 4.51 10*6/MM3 (ref 3.77–5.28)
SARS-COV-2 N GENE RESP QL NAA+PROBE: NOT DETECTED
SCAN SLIDE: NORMAL
SMALL PLATELETS BLD QL SMEAR: ABNORMAL
SODIUM SERPL-SCNC: 138 MMOL/L (ref 136–145)
STRESS TARGET HR: 129 BPM
TROPONIN T SERPL-MCNC: <0.01 NG/ML (ref 0–0.03)
WBC # BLD AUTO: 8.65 10*3/MM3 (ref 3.4–10.8)
WBC MORPH BLD: NORMAL

## 2021-10-20 PROCEDURE — 83605 ASSAY OF LACTIC ACID: CPT | Performed by: INTERNAL MEDICINE

## 2021-10-20 PROCEDURE — 71275 CT ANGIOGRAPHY CHEST: CPT

## 2021-10-20 PROCEDURE — 72197 MRI PELVIS W/O & W/DYE: CPT

## 2021-10-20 PROCEDURE — 25010000002 ENOXAPARIN PER 10 MG: Performed by: INTERNAL MEDICINE

## 2021-10-20 PROCEDURE — 93971 EXTREMITY STUDY: CPT | Performed by: SURGERY

## 2021-10-20 PROCEDURE — 80053 COMPREHEN METABOLIC PANEL: CPT | Performed by: INTERNAL MEDICINE

## 2021-10-20 PROCEDURE — 93971 EXTREMITY STUDY: CPT

## 2021-10-20 PROCEDURE — 85025 COMPLETE CBC W/AUTO DIFF WBC: CPT | Performed by: INTERNAL MEDICINE

## 2021-10-20 PROCEDURE — 63710000001 INSULIN LISPRO (HUMAN) PER 5 UNITS: Performed by: INTERNAL MEDICINE

## 2021-10-20 PROCEDURE — 83605 ASSAY OF LACTIC ACID: CPT | Performed by: EMERGENCY MEDICINE

## 2021-10-20 PROCEDURE — 85379 FIBRIN DEGRADATION QUANT: CPT | Performed by: INTERNAL MEDICINE

## 2021-10-20 PROCEDURE — 87641 MR-STAPH DNA AMP PROBE: CPT | Performed by: INTERNAL MEDICINE

## 2021-10-20 PROCEDURE — 82962 GLUCOSE BLOOD TEST: CPT

## 2021-10-20 PROCEDURE — 99223 1ST HOSP IP/OBS HIGH 75: CPT | Performed by: INTERNAL MEDICINE

## 2021-10-20 PROCEDURE — 84484 ASSAY OF TROPONIN QUANT: CPT | Performed by: EMERGENCY MEDICINE

## 2021-10-20 PROCEDURE — 83880 ASSAY OF NATRIURETIC PEPTIDE: CPT | Performed by: EMERGENCY MEDICINE

## 2021-10-20 PROCEDURE — 0 GADOBENATE DIMEGLUMINE 529 MG/ML SOLUTION: Performed by: INTERNAL MEDICINE

## 2021-10-20 PROCEDURE — 94640 AIRWAY INHALATION TREATMENT: CPT

## 2021-10-20 PROCEDURE — 25010000002 VANCOMYCIN 5 G RECONSTITUTED SOLUTION

## 2021-10-20 PROCEDURE — 84145 PROCALCITONIN (PCT): CPT | Performed by: INTERNAL MEDICINE

## 2021-10-20 PROCEDURE — 85652 RBC SED RATE AUTOMATED: CPT | Performed by: INTERNAL MEDICINE

## 2021-10-20 PROCEDURE — A9577 INJ MULTIHANCE: HCPCS | Performed by: INTERNAL MEDICINE

## 2021-10-20 PROCEDURE — 94760 N-INVAS EAR/PLS OXIMETRY 1: CPT

## 2021-10-20 PROCEDURE — 94799 UNLISTED PULMONARY SVC/PX: CPT

## 2021-10-20 PROCEDURE — 0 IOPAMIDOL PER 1 ML: Performed by: INTERNAL MEDICINE

## 2021-10-20 PROCEDURE — 25010000002 CEFEPIME PER 500 MG: Performed by: INTERNAL MEDICINE

## 2021-10-20 PROCEDURE — 86140 C-REACTIVE PROTEIN: CPT | Performed by: INTERNAL MEDICINE

## 2021-10-20 PROCEDURE — 93005 ELECTROCARDIOGRAM TRACING: CPT | Performed by: EMERGENCY MEDICINE

## 2021-10-20 RX ORDER — IPRATROPIUM BROMIDE AND ALBUTEROL SULFATE 2.5; .5 MG/3ML; MG/3ML
3 SOLUTION RESPIRATORY (INHALATION)
Status: DISCONTINUED | OUTPATIENT
Start: 2021-10-20 | End: 2021-10-23 | Stop reason: HOSPADM

## 2021-10-20 RX ORDER — CEFEPIME 1 G/50ML
2 INJECTION, SOLUTION INTRAVENOUS EVERY 8 HOURS SCHEDULED
Status: DISCONTINUED | OUTPATIENT
Start: 2021-10-20 | End: 2021-10-21

## 2021-10-20 RX ORDER — NICOTINE POLACRILEX 4 MG
15 LOZENGE BUCCAL
Status: DISCONTINUED | OUTPATIENT
Start: 2021-10-20 | End: 2021-10-23 | Stop reason: HOSPADM

## 2021-10-20 RX ORDER — SODIUM CHLORIDE 0.9 % (FLUSH) 0.9 %
10 SYRINGE (ML) INJECTION AS NEEDED
Status: DISCONTINUED | OUTPATIENT
Start: 2021-10-20 | End: 2021-10-23 | Stop reason: HOSPADM

## 2021-10-20 RX ORDER — BISACODYL 5 MG/1
5 TABLET, DELAYED RELEASE ORAL DAILY PRN
Status: DISCONTINUED | OUTPATIENT
Start: 2021-10-20 | End: 2021-10-23 | Stop reason: HOSPADM

## 2021-10-20 RX ORDER — ONDANSETRON 2 MG/ML
4 INJECTION INTRAMUSCULAR; INTRAVENOUS EVERY 6 HOURS PRN
Status: DISCONTINUED | OUTPATIENT
Start: 2021-10-20 | End: 2021-10-23 | Stop reason: HOSPADM

## 2021-10-20 RX ORDER — ACETAMINOPHEN 325 MG/1
650 TABLET ORAL EVERY 4 HOURS PRN
Status: DISCONTINUED | OUTPATIENT
Start: 2021-10-20 | End: 2021-10-23 | Stop reason: HOSPADM

## 2021-10-20 RX ORDER — ANASTROZOLE 1 MG/1
1 TABLET ORAL DAILY
Status: DISCONTINUED | OUTPATIENT
Start: 2021-10-20 | End: 2021-10-23 | Stop reason: HOSPADM

## 2021-10-20 RX ORDER — AMOXICILLIN 250 MG
2 CAPSULE ORAL 2 TIMES DAILY
Status: DISCONTINUED | OUTPATIENT
Start: 2021-10-20 | End: 2021-10-23 | Stop reason: HOSPADM

## 2021-10-20 RX ORDER — DEXTROSE MONOHYDRATE 100 MG/ML
25 INJECTION, SOLUTION INTRAVENOUS
Status: DISCONTINUED | OUTPATIENT
Start: 2021-10-20 | End: 2021-10-23 | Stop reason: HOSPADM

## 2021-10-20 RX ORDER — ACETAMINOPHEN 160 MG/5ML
650 SOLUTION ORAL EVERY 4 HOURS PRN
Status: DISCONTINUED | OUTPATIENT
Start: 2021-10-20 | End: 2021-10-23 | Stop reason: HOSPADM

## 2021-10-20 RX ORDER — SODIUM CHLORIDE 9 MG/ML
75 INJECTION, SOLUTION INTRAVENOUS CONTINUOUS
Status: DISCONTINUED | OUTPATIENT
Start: 2021-10-20 | End: 2021-10-21

## 2021-10-20 RX ORDER — IPRATROPIUM BROMIDE AND ALBUTEROL SULFATE 2.5; .5 MG/3ML; MG/3ML
SOLUTION RESPIRATORY (INHALATION)
Status: DISPENSED
Start: 2021-10-20 | End: 2021-10-20

## 2021-10-20 RX ORDER — TOPIRAMATE 25 MG/1
75 TABLET ORAL EVERY 12 HOURS SCHEDULED
Status: DISCONTINUED | OUTPATIENT
Start: 2021-10-20 | End: 2021-10-23 | Stop reason: HOSPADM

## 2021-10-20 RX ORDER — SODIUM CHLORIDE 0.9 % (FLUSH) 0.9 %
10 SYRINGE (ML) INJECTION EVERY 12 HOURS SCHEDULED
Status: DISCONTINUED | OUTPATIENT
Start: 2021-10-20 | End: 2021-10-23 | Stop reason: HOSPADM

## 2021-10-20 RX ORDER — SODIUM CHLORIDE 9 MG/ML
125 INJECTION, SOLUTION INTRAVENOUS CONTINUOUS
Status: DISCONTINUED | OUTPATIENT
Start: 2021-10-20 | End: 2021-10-20

## 2021-10-20 RX ORDER — ACETAMINOPHEN 650 MG/1
650 SUPPOSITORY RECTAL EVERY 4 HOURS PRN
Status: DISCONTINUED | OUTPATIENT
Start: 2021-10-20 | End: 2021-10-23 | Stop reason: HOSPADM

## 2021-10-20 RX ORDER — GABAPENTIN 300 MG/1
300 CAPSULE ORAL 2 TIMES DAILY
Status: DISCONTINUED | OUTPATIENT
Start: 2021-10-20 | End: 2021-10-23 | Stop reason: HOSPADM

## 2021-10-20 RX ORDER — INSULIN GLARGINE 100 [IU]/ML
25 INJECTION, SOLUTION SUBCUTANEOUS NIGHTLY
Status: ON HOLD | COMMUNITY
End: 2022-09-21

## 2021-10-20 RX ORDER — HYDROCODONE BITARTRATE AND ACETAMINOPHEN 5; 325 MG/1; MG/1
1 TABLET ORAL EVERY 6 HOURS PRN
Status: DISCONTINUED | OUTPATIENT
Start: 2021-10-20 | End: 2021-10-23 | Stop reason: HOSPADM

## 2021-10-20 RX ORDER — PHENYTOIN SODIUM 100 MG/1
100 CAPSULE, EXTENDED RELEASE ORAL DAILY
Status: DISCONTINUED | OUTPATIENT
Start: 2021-10-20 | End: 2021-10-23 | Stop reason: HOSPADM

## 2021-10-20 RX ORDER — VENLAFAXINE 37.5 MG/1
37.5 TABLET ORAL DAILY
Status: DISCONTINUED | OUTPATIENT
Start: 2021-10-20 | End: 2021-10-23 | Stop reason: HOSPADM

## 2021-10-20 RX ORDER — POLYETHYLENE GLYCOL 3350 17 G/17G
17 POWDER, FOR SOLUTION ORAL DAILY PRN
Status: DISCONTINUED | OUTPATIENT
Start: 2021-10-20 | End: 2021-10-23 | Stop reason: HOSPADM

## 2021-10-20 RX ORDER — ONDANSETRON 4 MG/1
4 TABLET, FILM COATED ORAL EVERY 6 HOURS PRN
Status: DISCONTINUED | OUTPATIENT
Start: 2021-10-20 | End: 2021-10-23 | Stop reason: HOSPADM

## 2021-10-20 RX ORDER — BISACODYL 10 MG
10 SUPPOSITORY, RECTAL RECTAL DAILY PRN
Status: DISCONTINUED | OUTPATIENT
Start: 2021-10-20 | End: 2021-10-23 | Stop reason: HOSPADM

## 2021-10-20 RX ORDER — ALBUTEROL SULFATE 90 UG/1
2 AEROSOL, METERED RESPIRATORY (INHALATION)
Status: DISCONTINUED | OUTPATIENT
Start: 2021-10-20 | End: 2021-10-20

## 2021-10-20 RX ADMIN — SODIUM CHLORIDE 500 ML: 9 INJECTION, SOLUTION INTRAVENOUS at 10:27

## 2021-10-20 RX ADMIN — CEFEPIME 2 G: 1 INJECTION, SOLUTION INTRAVENOUS at 22:17

## 2021-10-20 RX ADMIN — IPRATROPIUM BROMIDE AND ALBUTEROL SULFATE 3 ML: .5; 2.5 SOLUTION RESPIRATORY (INHALATION) at 14:05

## 2021-10-20 RX ADMIN — SODIUM CHLORIDE 125 ML/HR: 9 INJECTION, SOLUTION INTRAVENOUS at 03:32

## 2021-10-20 RX ADMIN — Medication 2250 MG: at 05:02

## 2021-10-20 RX ADMIN — SODIUM CHLORIDE 1000 ML: 9 INJECTION, SOLUTION INTRAVENOUS at 16:21

## 2021-10-20 RX ADMIN — SODIUM CHLORIDE 75 ML/HR: 9 INJECTION, SOLUTION INTRAVENOUS at 11:02

## 2021-10-20 RX ADMIN — ENOXAPARIN SODIUM 40 MG: 40 INJECTION SUBCUTANEOUS at 20:27

## 2021-10-20 RX ADMIN — SODIUM CHLORIDE, PRESERVATIVE FREE 10 ML: 5 INJECTION INTRAVENOUS at 01:47

## 2021-10-20 RX ADMIN — GABAPENTIN 300 MG: 300 CAPSULE ORAL at 20:25

## 2021-10-20 RX ADMIN — VENLAFAXINE HYDROCHLORIDE 37.5 MG: 37.5 TABLET ORAL at 08:18

## 2021-10-20 RX ADMIN — ALBUTEROL SULFATE 2 PUFF: 90 AEROSOL, METERED RESPIRATORY (INHALATION) at 07:54

## 2021-10-20 RX ADMIN — TOPIRAMATE 75 MG: 25 TABLET, FILM COATED ORAL at 20:25

## 2021-10-20 RX ADMIN — GADOBENATE DIMEGLUMINE 20 ML: 529 INJECTION, SOLUTION INTRAVENOUS at 15:30

## 2021-10-20 RX ADMIN — DOCUSATE SODIUM 50MG AND SENNOSIDES 8.6MG 2 TABLET: 8.6; 5 TABLET, FILM COATED ORAL at 08:09

## 2021-10-20 RX ADMIN — HYDROCODONE BITARTRATE AND ACETAMINOPHEN 1 TABLET: 5; 325 TABLET ORAL at 20:25

## 2021-10-20 RX ADMIN — TOPIRAMATE 75 MG: 25 TABLET, FILM COATED ORAL at 08:18

## 2021-10-20 RX ADMIN — HYDROCODONE BITARTRATE AND ACETAMINOPHEN 1 TABLET: 5; 325 TABLET ORAL at 14:17

## 2021-10-20 RX ADMIN — SODIUM CHLORIDE, PRESERVATIVE FREE 10 ML: 5 INJECTION INTRAVENOUS at 20:26

## 2021-10-20 RX ADMIN — CEFEPIME 2 G: 1 INJECTION, SOLUTION INTRAVENOUS at 15:56

## 2021-10-20 RX ADMIN — ENOXAPARIN SODIUM 40 MG: 40 INJECTION SUBCUTANEOUS at 08:09

## 2021-10-20 RX ADMIN — IOPAMIDOL 100 ML: 755 INJECTION, SOLUTION INTRAVENOUS at 04:32

## 2021-10-20 RX ADMIN — INSULIN LISPRO 2 UNITS: 100 INJECTION, SOLUTION INTRAVENOUS; SUBCUTANEOUS at 12:09

## 2021-10-20 RX ADMIN — SODIUM CHLORIDE 1365 ML: 9 INJECTION, SOLUTION INTRAVENOUS at 01:47

## 2021-10-20 RX ADMIN — IPRATROPIUM BROMIDE AND ALBUTEROL SULFATE 3 ML: .5; 2.5 SOLUTION RESPIRATORY (INHALATION) at 19:04

## 2021-10-20 RX ADMIN — CEFEPIME 2 G: 1 INJECTION, SOLUTION INTRAVENOUS at 07:33

## 2021-10-20 RX ADMIN — HYDROCODONE BITARTRATE AND ACETAMINOPHEN 1 TABLET: 5; 325 TABLET ORAL at 05:12

## 2021-10-20 RX ADMIN — PHENYTOIN SODIUM 100 MG: 100 CAPSULE, EXTENDED RELEASE ORAL at 08:18

## 2021-10-20 RX ADMIN — GABAPENTIN 300 MG: 300 CAPSULE ORAL at 08:09

## 2021-10-20 RX ADMIN — TOPIRAMATE 75 MG: 25 TABLET, FILM COATED ORAL at 03:54

## 2021-10-20 RX ADMIN — ANASTROZOLE 1 MG: 1 TABLET ORAL at 08:50

## 2021-10-21 ENCOUNTER — APPOINTMENT (OUTPATIENT)
Dept: ULTRASOUND IMAGING | Facility: HOSPITAL | Age: 66
End: 2021-10-21

## 2021-10-21 LAB
ANION GAP SERPL CALCULATED.3IONS-SCNC: 10.1 MMOL/L (ref 5–15)
BUN SERPL-MCNC: 20 MG/DL (ref 8–23)
BUN/CREAT SERPL: 26.7 (ref 7–25)
CALCIUM SPEC-SCNC: 7.6 MG/DL (ref 8.6–10.5)
CHLORIDE SERPL-SCNC: 106 MMOL/L (ref 98–107)
CO2 SERPL-SCNC: 18.9 MMOL/L (ref 22–29)
CREAT SERPL-MCNC: 0.75 MG/DL (ref 0.57–1)
D-LACTATE SERPL-SCNC: 2.2 MMOL/L (ref 0.5–2)
DEPRECATED RDW RBC AUTO: 53.1 FL (ref 37–54)
EOSINOPHIL # BLD MANUAL: 0.15 10*3/MM3 (ref 0–0.4)
EOSINOPHIL NFR BLD MANUAL: 3 % (ref 0.3–6.2)
ERYTHROCYTE [DISTWIDTH] IN BLOOD BY AUTOMATED COUNT: 16.7 % (ref 12.3–15.4)
GFR SERPL CREATININE-BSD FRML MDRD: 77 ML/MIN/1.73
GLUCOSE BLDC GLUCOMTR-MCNC: 111 MG/DL (ref 70–99)
GLUCOSE BLDC GLUCOMTR-MCNC: 151 MG/DL (ref 70–99)
GLUCOSE BLDC GLUCOMTR-MCNC: 175 MG/DL (ref 70–99)
GLUCOSE SERPL-MCNC: 178 MG/DL (ref 65–99)
HCT VFR BLD AUTO: 32.2 % (ref 34–46.6)
HGB BLD-MCNC: 10 G/DL (ref 12–15.9)
INR PPP: 1.44 (ref 2–3)
LARGE PLATELETS: ABNORMAL
LYMPHOCYTES # BLD MANUAL: 0.62 10*3/MM3 (ref 0.7–3.1)
LYMPHOCYTES NFR BLD MANUAL: 1 % (ref 5–12)
LYMPHOCYTES NFR BLD MANUAL: 12 % (ref 19.6–45.3)
MCH RBC QN AUTO: 27.1 PG (ref 26.6–33)
MCHC RBC AUTO-ENTMCNC: 31.1 G/DL (ref 31.5–35.7)
MCV RBC AUTO: 87.3 FL (ref 79–97)
MONOCYTES # BLD AUTO: 0.05 10*3/MM3 (ref 0.1–0.9)
NEUTROPHILS # BLD AUTO: 4.33 10*3/MM3 (ref 1.7–7)
NEUTROPHILS NFR BLD MANUAL: 84 % (ref 42.7–76)
PLATELET # BLD AUTO: 96 10*3/MM3 (ref 140–450)
PMV BLD AUTO: 12.9 FL (ref 6–12)
POTASSIUM SERPL-SCNC: 3.7 MMOL/L (ref 3.5–5.2)
PROTHROMBIN TIME: 15.1 SECONDS (ref 9.4–12)
RBC # BLD AUTO: 3.69 10*6/MM3 (ref 3.77–5.28)
RBC MORPH BLD: NORMAL
SCAN SLIDE: NORMAL
SMALL PLATELETS BLD QL SMEAR: ABNORMAL
SODIUM SERPL-SCNC: 135 MMOL/L (ref 136–145)
WBC # BLD AUTO: 5.15 10*3/MM3 (ref 3.4–10.8)
WBC MORPH BLD: NORMAL

## 2021-10-21 PROCEDURE — 85025 COMPLETE CBC W/AUTO DIFF WBC: CPT | Performed by: INTERNAL MEDICINE

## 2021-10-21 PROCEDURE — 94799 UNLISTED PULMONARY SVC/PX: CPT

## 2021-10-21 PROCEDURE — 82962 GLUCOSE BLOOD TEST: CPT

## 2021-10-21 PROCEDURE — 25010000003 CEFTRIAXONE PER 250 MG: Performed by: INTERNAL MEDICINE

## 2021-10-21 PROCEDURE — 25010000002 ENOXAPARIN PER 10 MG: Performed by: INTERNAL MEDICINE

## 2021-10-21 PROCEDURE — 76705 ECHO EXAM OF ABDOMEN: CPT

## 2021-10-21 PROCEDURE — 83605 ASSAY OF LACTIC ACID: CPT | Performed by: INTERNAL MEDICINE

## 2021-10-21 PROCEDURE — 80048 BASIC METABOLIC PNL TOTAL CA: CPT | Performed by: INTERNAL MEDICINE

## 2021-10-21 PROCEDURE — 87176 TISSUE HOMOGENIZATION CULTR: CPT | Performed by: INTERNAL MEDICINE

## 2021-10-21 PROCEDURE — 99221 1ST HOSP IP/OBS SF/LOW 40: CPT | Performed by: ORTHOPAEDIC SURGERY

## 2021-10-21 PROCEDURE — 99233 SBSQ HOSP IP/OBS HIGH 50: CPT | Performed by: INTERNAL MEDICINE

## 2021-10-21 PROCEDURE — 25010000002 VANCOMYCIN 5 G RECONSTITUTED SOLUTION: Performed by: INTERNAL MEDICINE

## 2021-10-21 PROCEDURE — 85007 BL SMEAR W/DIFF WBC COUNT: CPT | Performed by: INTERNAL MEDICINE

## 2021-10-21 PROCEDURE — 87040 BLOOD CULTURE FOR BACTERIA: CPT | Performed by: INTERNAL MEDICINE

## 2021-10-21 PROCEDURE — 94760 N-INVAS EAR/PLS OXIMETRY 1: CPT

## 2021-10-21 PROCEDURE — 77002 NEEDLE LOCALIZATION BY XRAY: CPT

## 2021-10-21 PROCEDURE — 85610 PROTHROMBIN TIME: CPT | Performed by: INTERNAL MEDICINE

## 2021-10-21 PROCEDURE — 63710000001 INSULIN LISPRO (HUMAN) PER 5 UNITS: Performed by: INTERNAL MEDICINE

## 2021-10-21 PROCEDURE — 25010000002 CEFEPIME PER 500 MG: Performed by: INTERNAL MEDICINE

## 2021-10-21 PROCEDURE — 87070 CULTURE OTHR SPECIMN AEROBIC: CPT | Performed by: INTERNAL MEDICINE

## 2021-10-21 PROCEDURE — 87205 SMEAR GRAM STAIN: CPT | Performed by: INTERNAL MEDICINE

## 2021-10-21 PROCEDURE — 0S9B3ZX DRAINAGE OF LEFT HIP JOINT, PERCUTANEOUS APPROACH, DIAGNOSTIC: ICD-10-PCS | Performed by: RADIOLOGY

## 2021-10-21 PROCEDURE — 25010000002 VANCOMYCIN 5 G RECONSTITUTED SOLUTION

## 2021-10-21 RX ORDER — CEFTRIAXONE SODIUM 2 G/50ML
2 INJECTION, SOLUTION INTRAVENOUS EVERY 24 HOURS
Status: DISCONTINUED | OUTPATIENT
Start: 2021-10-21 | End: 2021-10-23

## 2021-10-21 RX ADMIN — IPRATROPIUM BROMIDE AND ALBUTEROL SULFATE 3 ML: .5; 2.5 SOLUTION RESPIRATORY (INHALATION) at 14:17

## 2021-10-21 RX ADMIN — ENOXAPARIN SODIUM 40 MG: 40 INJECTION SUBCUTANEOUS at 17:12

## 2021-10-21 RX ADMIN — Medication 1250 MG: at 05:19

## 2021-10-21 RX ADMIN — CEFEPIME 2 G: 1 INJECTION, SOLUTION INTRAVENOUS at 05:20

## 2021-10-21 RX ADMIN — HYDROCODONE BITARTRATE AND ACETAMINOPHEN 1 TABLET: 5; 325 TABLET ORAL at 02:26

## 2021-10-21 RX ADMIN — IPRATROPIUM BROMIDE AND ALBUTEROL SULFATE 3 ML: .5; 2.5 SOLUTION RESPIRATORY (INHALATION) at 19:44

## 2021-10-21 RX ADMIN — VENLAFAXINE HYDROCHLORIDE 37.5 MG: 37.5 TABLET ORAL at 09:07

## 2021-10-21 RX ADMIN — HYDROCODONE BITARTRATE AND ACETAMINOPHEN 1 TABLET: 5; 325 TABLET ORAL at 09:07

## 2021-10-21 RX ADMIN — CEFTRIAXONE SODIUM 2 G: 2 INJECTION, SOLUTION INTRAVENOUS at 17:12

## 2021-10-21 RX ADMIN — TOPIRAMATE 75 MG: 25 TABLET, FILM COATED ORAL at 20:05

## 2021-10-21 RX ADMIN — VANCOMYCIN HYDROCHLORIDE 1500 MG: 5 INJECTION, POWDER, LYOPHILIZED, FOR SOLUTION INTRAVENOUS at 20:04

## 2021-10-21 RX ADMIN — PHENYTOIN SODIUM 100 MG: 100 CAPSULE, EXTENDED RELEASE ORAL at 09:07

## 2021-10-21 RX ADMIN — IPRATROPIUM BROMIDE AND ALBUTEROL SULFATE 3 ML: .5; 2.5 SOLUTION RESPIRATORY (INHALATION) at 06:48

## 2021-10-21 RX ADMIN — INSULIN LISPRO 2 UNITS: 100 INJECTION, SOLUTION INTRAVENOUS; SUBCUTANEOUS at 12:33

## 2021-10-21 RX ADMIN — TOPIRAMATE 75 MG: 25 TABLET, FILM COATED ORAL at 09:07

## 2021-10-21 RX ADMIN — INSULIN LISPRO 2 UNITS: 100 INJECTION, SOLUTION INTRAVENOUS; SUBCUTANEOUS at 17:12

## 2021-10-21 RX ADMIN — HYDROCODONE BITARTRATE AND ACETAMINOPHEN 1 TABLET: 5; 325 TABLET ORAL at 23:41

## 2021-10-21 RX ADMIN — CEFEPIME 2 G: 1 INJECTION, SOLUTION INTRAVENOUS at 13:00

## 2021-10-21 RX ADMIN — SODIUM CHLORIDE, PRESERVATIVE FREE 10 ML: 5 INJECTION INTRAVENOUS at 20:05

## 2021-10-21 RX ADMIN — DOCUSATE SODIUM 50MG AND SENNOSIDES 8.6MG 2 TABLET: 8.6; 5 TABLET, FILM COATED ORAL at 09:07

## 2021-10-21 RX ADMIN — ANASTROZOLE 1 MG: 1 TABLET ORAL at 09:07

## 2021-10-21 RX ADMIN — SODIUM CHLORIDE, PRESERVATIVE FREE 10 ML: 5 INJECTION INTRAVENOUS at 09:08

## 2021-10-21 RX ADMIN — GABAPENTIN 300 MG: 300 CAPSULE ORAL at 09:07

## 2021-10-21 RX ADMIN — GABAPENTIN 300 MG: 300 CAPSULE ORAL at 20:04

## 2021-10-21 RX ADMIN — HYDROCODONE BITARTRATE AND ACETAMINOPHEN 1 TABLET: 5; 325 TABLET ORAL at 17:12

## 2021-10-22 LAB
BACTERIA SPEC AEROBE CULT: ABNORMAL
GLUCOSE BLDC GLUCOMTR-MCNC: 105 MG/DL (ref 70–99)
GLUCOSE BLDC GLUCOMTR-MCNC: 107 MG/DL (ref 70–99)
GLUCOSE BLDC GLUCOMTR-MCNC: 139 MG/DL (ref 70–99)
GLUCOSE BLDC GLUCOMTR-MCNC: 174 MG/DL (ref 70–99)
GRAM STN SPEC: ABNORMAL
ISOLATED FROM: ABNORMAL
VANCOMYCIN SERPL-MCNC: 17.4 MCG/ML (ref 5–40)

## 2021-10-22 PROCEDURE — 94799 UNLISTED PULMONARY SVC/PX: CPT

## 2021-10-22 PROCEDURE — 25010000003 CEFTRIAXONE PER 250 MG: Performed by: INTERNAL MEDICINE

## 2021-10-22 PROCEDURE — 63710000001 INSULIN LISPRO (HUMAN) PER 5 UNITS: Performed by: INTERNAL MEDICINE

## 2021-10-22 PROCEDURE — 82962 GLUCOSE BLOOD TEST: CPT

## 2021-10-22 PROCEDURE — 94760 N-INVAS EAR/PLS OXIMETRY 1: CPT

## 2021-10-22 PROCEDURE — 25010000002 ENOXAPARIN PER 10 MG: Performed by: INTERNAL MEDICINE

## 2021-10-22 PROCEDURE — 25010000002 VANCOMYCIN 5 G RECONSTITUTED SOLUTION: Performed by: INTERNAL MEDICINE

## 2021-10-22 PROCEDURE — 99233 SBSQ HOSP IP/OBS HIGH 50: CPT | Performed by: INTERNAL MEDICINE

## 2021-10-22 PROCEDURE — 80202 ASSAY OF VANCOMYCIN: CPT | Performed by: INTERNAL MEDICINE

## 2021-10-22 RX ORDER — FUROSEMIDE 20 MG/1
20 TABLET ORAL DAILY
Status: DISCONTINUED | OUTPATIENT
Start: 2021-10-22 | End: 2021-10-23 | Stop reason: HOSPADM

## 2021-10-22 RX ADMIN — ACETAMINOPHEN 650 MG: 325 TABLET ORAL at 09:23

## 2021-10-22 RX ADMIN — FUROSEMIDE 20 MG: 20 TABLET ORAL at 13:44

## 2021-10-22 RX ADMIN — CEFTRIAXONE SODIUM 2 G: 2 INJECTION, SOLUTION INTRAVENOUS at 17:27

## 2021-10-22 RX ADMIN — ENOXAPARIN SODIUM 40 MG: 40 INJECTION SUBCUTANEOUS at 17:27

## 2021-10-22 RX ADMIN — DOCUSATE SODIUM 50MG AND SENNOSIDES 8.6MG 2 TABLET: 8.6; 5 TABLET, FILM COATED ORAL at 09:22

## 2021-10-22 RX ADMIN — HYDROCODONE BITARTRATE AND ACETAMINOPHEN 1 TABLET: 5; 325 TABLET ORAL at 19:14

## 2021-10-22 RX ADMIN — SODIUM CHLORIDE, PRESERVATIVE FREE 10 ML: 5 INJECTION INTRAVENOUS at 20:27

## 2021-10-22 RX ADMIN — HYDROCODONE BITARTRATE AND ACETAMINOPHEN 1 TABLET: 5; 325 TABLET ORAL at 05:36

## 2021-10-22 RX ADMIN — IPRATROPIUM BROMIDE AND ALBUTEROL SULFATE 3 ML: .5; 2.5 SOLUTION RESPIRATORY (INHALATION) at 06:21

## 2021-10-22 RX ADMIN — VENLAFAXINE HYDROCHLORIDE 37.5 MG: 37.5 TABLET ORAL at 09:23

## 2021-10-22 RX ADMIN — TOPIRAMATE 75 MG: 25 TABLET, FILM COATED ORAL at 09:23

## 2021-10-22 RX ADMIN — VANCOMYCIN HYDROCHLORIDE 1500 MG: 5 INJECTION, POWDER, LYOPHILIZED, FOR SOLUTION INTRAVENOUS at 20:26

## 2021-10-22 RX ADMIN — PHENYTOIN SODIUM 100 MG: 100 CAPSULE, EXTENDED RELEASE ORAL at 09:23

## 2021-10-22 RX ADMIN — IPRATROPIUM BROMIDE AND ALBUTEROL SULFATE 3 ML: .5; 2.5 SOLUTION RESPIRATORY (INHALATION) at 13:50

## 2021-10-22 RX ADMIN — INSULIN LISPRO 2 UNITS: 100 INJECTION, SOLUTION INTRAVENOUS; SUBCUTANEOUS at 12:16

## 2021-10-22 RX ADMIN — TOPIRAMATE 75 MG: 25 TABLET, FILM COATED ORAL at 20:40

## 2021-10-22 RX ADMIN — IPRATROPIUM BROMIDE AND ALBUTEROL SULFATE 3 ML: .5; 2.5 SOLUTION RESPIRATORY (INHALATION) at 19:15

## 2021-10-22 RX ADMIN — ANASTROZOLE 1 MG: 1 TABLET ORAL at 09:22

## 2021-10-22 RX ADMIN — SODIUM CHLORIDE, PRESERVATIVE FREE 10 ML: 5 INJECTION INTRAVENOUS at 09:24

## 2021-10-22 RX ADMIN — GABAPENTIN 300 MG: 300 CAPSULE ORAL at 20:26

## 2021-10-22 RX ADMIN — GABAPENTIN 300 MG: 300 CAPSULE ORAL at 09:22

## 2021-10-22 RX ADMIN — ENOXAPARIN SODIUM 40 MG: 40 INJECTION SUBCUTANEOUS at 05:36

## 2021-10-23 VITALS
OXYGEN SATURATION: 98 % | HEART RATE: 88 BPM | BODY MASS INDEX: 47.48 KG/M2 | HEIGHT: 60 IN | WEIGHT: 241.84 LBS | SYSTOLIC BLOOD PRESSURE: 152 MMHG | RESPIRATION RATE: 16 BRPM | TEMPERATURE: 97.8 F | DIASTOLIC BLOOD PRESSURE: 73 MMHG

## 2021-10-23 LAB
ANION GAP SERPL CALCULATED.3IONS-SCNC: 10.7 MMOL/L (ref 5–15)
BASOPHILS # BLD AUTO: 0.02 10*3/MM3 (ref 0–0.2)
BASOPHILS NFR BLD AUTO: 0.7 % (ref 0–1.5)
BUN SERPL-MCNC: 14 MG/DL (ref 8–23)
BUN/CREAT SERPL: 20.9 (ref 7–25)
CALCIUM SPEC-SCNC: 8.4 MG/DL (ref 8.6–10.5)
CHLORIDE SERPL-SCNC: 104 MMOL/L (ref 98–107)
CO2 SERPL-SCNC: 17.3 MMOL/L (ref 22–29)
CREAT SERPL-MCNC: 0.67 MG/DL (ref 0.57–1)
CRP SERPL-MCNC: 10.35 MG/DL (ref 0–0.5)
DEPRECATED RDW RBC AUTO: 49.9 FL (ref 37–54)
EOSINOPHIL # BLD AUTO: 0.25 10*3/MM3 (ref 0–0.4)
EOSINOPHIL NFR BLD AUTO: 8.6 % (ref 0.3–6.2)
ERYTHROCYTE [DISTWIDTH] IN BLOOD BY AUTOMATED COUNT: 16.4 % (ref 12.3–15.4)
GFR SERPL CREATININE-BSD FRML MDRD: 88 ML/MIN/1.73
GLUCOSE BLDC GLUCOMTR-MCNC: 108 MG/DL (ref 70–99)
GLUCOSE BLDC GLUCOMTR-MCNC: 112 MG/DL (ref 70–99)
GLUCOSE BLDC GLUCOMTR-MCNC: 133 MG/DL (ref 70–99)
GLUCOSE BLDC GLUCOMTR-MCNC: 79 MG/DL (ref 70–99)
GLUCOSE SERPL-MCNC: 128 MG/DL (ref 65–99)
HCT VFR BLD AUTO: 36.3 % (ref 34–46.6)
HGB BLD-MCNC: 11.7 G/DL (ref 12–15.9)
IMM GRANULOCYTES # BLD AUTO: 0.01 10*3/MM3 (ref 0–0.05)
IMM GRANULOCYTES NFR BLD AUTO: 0.3 % (ref 0–0.5)
LYMPHOCYTES # BLD AUTO: 0.46 10*3/MM3 (ref 0.7–3.1)
LYMPHOCYTES NFR BLD AUTO: 15.8 % (ref 19.6–45.3)
MCH RBC QN AUTO: 27.2 PG (ref 26.6–33)
MCHC RBC AUTO-ENTMCNC: 32.2 G/DL (ref 31.5–35.7)
MCV RBC AUTO: 84.4 FL (ref 79–97)
MONOCYTES # BLD AUTO: 0.23 10*3/MM3 (ref 0.1–0.9)
MONOCYTES NFR BLD AUTO: 7.9 % (ref 5–12)
NEUTROPHILS NFR BLD AUTO: 1.95 10*3/MM3 (ref 1.7–7)
NEUTROPHILS NFR BLD AUTO: 66.7 % (ref 42.7–76)
NRBC BLD AUTO-RTO: 0 /100 WBC (ref 0–0.2)
PLATELET # BLD AUTO: 132 10*3/MM3 (ref 140–450)
PMV BLD AUTO: 12.4 FL (ref 6–12)
POTASSIUM SERPL-SCNC: 4.1 MMOL/L (ref 3.5–5.2)
PROCALCITONIN SERPL-MCNC: 3.08 NG/ML (ref 0–0.25)
RBC # BLD AUTO: 4.3 10*6/MM3 (ref 3.77–5.28)
SODIUM SERPL-SCNC: 132 MMOL/L (ref 136–145)
WBC # BLD AUTO: 2.92 10*3/MM3 (ref 3.4–10.8)

## 2021-10-23 PROCEDURE — 80048 BASIC METABOLIC PNL TOTAL CA: CPT | Performed by: INTERNAL MEDICINE

## 2021-10-23 PROCEDURE — 82962 GLUCOSE BLOOD TEST: CPT

## 2021-10-23 PROCEDURE — 94799 UNLISTED PULMONARY SVC/PX: CPT

## 2021-10-23 PROCEDURE — 90686 IIV4 VACC NO PRSV 0.5 ML IM: CPT | Performed by: INTERNAL MEDICINE

## 2021-10-23 PROCEDURE — 85025 COMPLETE CBC W/AUTO DIFF WBC: CPT | Performed by: INTERNAL MEDICINE

## 2021-10-23 PROCEDURE — 25010000002 INFLUENZA VAC SPLIT QUAD 0.5 ML SUSPENSION PREFILLED SYRINGE: Performed by: INTERNAL MEDICINE

## 2021-10-23 PROCEDURE — 99239 HOSP IP/OBS DSCHRG MGMT >30: CPT | Performed by: INTERNAL MEDICINE

## 2021-10-23 PROCEDURE — 86140 C-REACTIVE PROTEIN: CPT | Performed by: INTERNAL MEDICINE

## 2021-10-23 PROCEDURE — 25010000002 ENOXAPARIN PER 10 MG: Performed by: INTERNAL MEDICINE

## 2021-10-23 PROCEDURE — 84145 PROCALCITONIN (PCT): CPT | Performed by: INTERNAL MEDICINE

## 2021-10-23 PROCEDURE — G0008 ADMIN INFLUENZA VIRUS VAC: HCPCS | Performed by: INTERNAL MEDICINE

## 2021-10-23 RX ORDER — LEVOFLOXACIN 750 MG/1
750 TABLET ORAL EVERY 24 HOURS
Qty: 8 TABLET | Refills: 0
Start: 2021-10-23 | End: 2021-10-31

## 2021-10-23 RX ORDER — LEVOFLOXACIN 750 MG/1
750 TABLET ORAL EVERY 24 HOURS
Status: DISCONTINUED | OUTPATIENT
Start: 2021-10-23 | End: 2021-10-23 | Stop reason: HOSPADM

## 2021-10-23 RX ADMIN — VENLAFAXINE HYDROCHLORIDE 37.5 MG: 37.5 TABLET ORAL at 10:11

## 2021-10-23 RX ADMIN — HYDROCODONE BITARTRATE AND ACETAMINOPHEN 1 TABLET: 5; 325 TABLET ORAL at 19:23

## 2021-10-23 RX ADMIN — HYDROCODONE BITARTRATE AND ACETAMINOPHEN 1 TABLET: 5; 325 TABLET ORAL at 01:29

## 2021-10-23 RX ADMIN — ENOXAPARIN SODIUM 40 MG: 40 INJECTION SUBCUTANEOUS at 06:09

## 2021-10-23 RX ADMIN — IPRATROPIUM BROMIDE AND ALBUTEROL SULFATE 3 ML: .5; 2.5 SOLUTION RESPIRATORY (INHALATION) at 19:16

## 2021-10-23 RX ADMIN — TOPIRAMATE 75 MG: 25 TABLET, FILM COATED ORAL at 10:10

## 2021-10-23 RX ADMIN — FUROSEMIDE 20 MG: 20 TABLET ORAL at 10:09

## 2021-10-23 RX ADMIN — PHENYTOIN SODIUM 100 MG: 100 CAPSULE, EXTENDED RELEASE ORAL at 10:10

## 2021-10-23 RX ADMIN — IPRATROPIUM BROMIDE AND ALBUTEROL SULFATE 3 ML: .5; 2.5 SOLUTION RESPIRATORY (INHALATION) at 12:18

## 2021-10-23 RX ADMIN — ENOXAPARIN SODIUM 40 MG: 40 INJECTION SUBCUTANEOUS at 17:26

## 2021-10-23 RX ADMIN — ACETAMINOPHEN 650 MG: 325 TABLET ORAL at 10:12

## 2021-10-23 RX ADMIN — SODIUM CHLORIDE, PRESERVATIVE FREE 10 ML: 5 INJECTION INTRAVENOUS at 10:09

## 2021-10-23 RX ADMIN — HYDROCODONE BITARTRATE AND ACETAMINOPHEN 1 TABLET: 5; 325 TABLET ORAL at 06:45

## 2021-10-23 RX ADMIN — IPRATROPIUM BROMIDE AND ALBUTEROL SULFATE 3 ML: .5; 2.5 SOLUTION RESPIRATORY (INHALATION) at 07:24

## 2021-10-23 RX ADMIN — DOCUSATE SODIUM 50MG AND SENNOSIDES 8.6MG 2 TABLET: 8.6; 5 TABLET, FILM COATED ORAL at 10:10

## 2021-10-23 RX ADMIN — ANASTROZOLE 1 MG: 1 TABLET ORAL at 10:10

## 2021-10-23 RX ADMIN — HYDROCODONE BITARTRATE AND ACETAMINOPHEN 1 TABLET: 5; 325 TABLET ORAL at 13:36

## 2021-10-23 RX ADMIN — LEVOFLOXACIN 750 MG: 750 TABLET, FILM COATED ORAL at 15:52

## 2021-10-23 RX ADMIN — INFLUENZA VIRUS VACCINE 0.5 ML: 15; 15; 15; 15 SUSPENSION INTRAMUSCULAR at 17:26

## 2021-10-23 RX ADMIN — GABAPENTIN 300 MG: 300 CAPSULE ORAL at 10:11

## 2021-10-24 LAB
BACTERIA SPEC AEROBE CULT: NORMAL
BACTERIA SPEC AEROBE CULT: NORMAL
GRAM STN SPEC: NORMAL

## 2021-10-25 ENCOUNTER — APPOINTMENT (OUTPATIENT)
Dept: GENERAL RADIOLOGY | Facility: HOSPITAL | Age: 66
End: 2021-10-25

## 2021-10-25 ENCOUNTER — HOSPITAL ENCOUNTER (EMERGENCY)
Facility: HOSPITAL | Age: 66
Discharge: HOME | End: 2021-10-25
Attending: EMERGENCY MEDICINE | Admitting: EMERGENCY MEDICINE

## 2021-10-25 VITALS
BODY MASS INDEX: 49.08 KG/M2 | WEIGHT: 250 LBS | OXYGEN SATURATION: 96 % | SYSTOLIC BLOOD PRESSURE: 128 MMHG | RESPIRATION RATE: 18 BRPM | HEIGHT: 60 IN | TEMPERATURE: 98.5 F | HEART RATE: 81 BPM | DIASTOLIC BLOOD PRESSURE: 59 MMHG

## 2021-10-25 DIAGNOSIS — M25.561 CHRONIC ARTHRALGIAS OF KNEES AND HIPS: ICD-10-CM

## 2021-10-25 DIAGNOSIS — M25.551 CHRONIC ARTHRALGIAS OF KNEES AND HIPS: ICD-10-CM

## 2021-10-25 DIAGNOSIS — G89.29 CHRONIC ARTHRALGIAS OF KNEES AND HIPS: ICD-10-CM

## 2021-10-25 DIAGNOSIS — M25.552 CHRONIC ARTHRALGIAS OF KNEES AND HIPS: ICD-10-CM

## 2021-10-25 DIAGNOSIS — M25.562 CHRONIC ARTHRALGIAS OF KNEES AND HIPS: ICD-10-CM

## 2021-10-25 DIAGNOSIS — R60.9 EDEMA, UNSPECIFIED TYPE: Primary | ICD-10-CM

## 2021-10-25 LAB
ALBUMIN SERPL-MCNC: 3.2 G/DL (ref 3.5–5.2)
ALBUMIN/GLOB SERPL: 0.8 G/DL
ALP SERPL-CCNC: 97 U/L (ref 39–117)
ALT SERPL W P-5'-P-CCNC: 5 U/L (ref 1–33)
ANION GAP SERPL CALCULATED.3IONS-SCNC: 9.7 MMOL/L (ref 5–15)
AST SERPL-CCNC: 25 U/L (ref 1–32)
BACTERIA UR QL AUTO: ABNORMAL /HPF
BASOPHILS # BLD AUTO: 0.03 10*3/MM3 (ref 0–0.2)
BASOPHILS NFR BLD AUTO: 0.9 % (ref 0–1.5)
BILIRUB SERPL-MCNC: 0.3 MG/DL (ref 0–1.2)
BILIRUB UR QL STRIP: NEGATIVE
BUN SERPL-MCNC: 16 MG/DL (ref 8–23)
BUN/CREAT SERPL: 21.6 (ref 7–25)
CALCIUM SPEC-SCNC: 8.8 MG/DL (ref 8.6–10.5)
CHLORIDE SERPL-SCNC: 106 MMOL/L (ref 98–107)
CLARITY UR: ABNORMAL
CO2 SERPL-SCNC: 24.3 MMOL/L (ref 22–29)
COLOR UR: YELLOW
CREAT SERPL-MCNC: 0.74 MG/DL (ref 0.57–1)
D-LACTATE SERPL-SCNC: 1.3 MMOL/L (ref 0.5–2)
DEPRECATED RDW RBC AUTO: 52.4 FL (ref 37–54)
EOSINOPHIL # BLD AUTO: 0.18 10*3/MM3 (ref 0–0.4)
EOSINOPHIL NFR BLD AUTO: 5.4 % (ref 0.3–6.2)
ERYTHROCYTE [DISTWIDTH] IN BLOOD BY AUTOMATED COUNT: 16.8 % (ref 12.3–15.4)
GFR SERPL CREATININE-BSD FRML MDRD: 78 ML/MIN/1.73
GLOBULIN UR ELPH-MCNC: 4.2 GM/DL
GLUCOSE SERPL-MCNC: 133 MG/DL (ref 65–99)
GLUCOSE UR STRIP-MCNC: NEGATIVE MG/DL
HCT VFR BLD AUTO: 36.8 % (ref 34–46.6)
HGB BLD-MCNC: 11.9 G/DL (ref 12–15.9)
HGB UR QL STRIP.AUTO: ABNORMAL
HYALINE CASTS UR QL AUTO: ABNORMAL /LPF
IMM GRANULOCYTES # BLD AUTO: 0.02 10*3/MM3 (ref 0–0.05)
IMM GRANULOCYTES NFR BLD AUTO: 0.6 % (ref 0–0.5)
KETONES UR QL STRIP: NEGATIVE
LEUKOCYTE ESTERASE UR QL STRIP.AUTO: NEGATIVE
LYMPHOCYTES # BLD AUTO: 0.64 10*3/MM3 (ref 0.7–3.1)
LYMPHOCYTES NFR BLD AUTO: 19.3 % (ref 19.6–45.3)
MCH RBC QN AUTO: 27.7 PG (ref 26.6–33)
MCHC RBC AUTO-ENTMCNC: 32.3 G/DL (ref 31.5–35.7)
MCV RBC AUTO: 85.6 FL (ref 79–97)
MONOCYTES # BLD AUTO: 0.31 10*3/MM3 (ref 0.1–0.9)
MONOCYTES NFR BLD AUTO: 9.4 % (ref 5–12)
NEUTROPHILS NFR BLD AUTO: 2.13 10*3/MM3 (ref 1.7–7)
NEUTROPHILS NFR BLD AUTO: 64.4 % (ref 42.7–76)
NITRITE UR QL STRIP: NEGATIVE
NRBC BLD AUTO-RTO: 0 /100 WBC (ref 0–0.2)
NT-PROBNP SERPL-MCNC: 119.7 PG/ML (ref 0–900)
PH UR STRIP.AUTO: 7.5 [PH] (ref 5–8)
PLATELET # BLD AUTO: 130 10*3/MM3 (ref 140–450)
PMV BLD AUTO: 10.5 FL (ref 6–12)
POTASSIUM SERPL-SCNC: 3.5 MMOL/L (ref 3.5–5.2)
PROT SERPL-MCNC: 7.4 G/DL (ref 6–8.5)
PROT UR QL STRIP: ABNORMAL
QT INTERVAL: 325 MS
QT INTERVAL: 353 MS
RBC # BLD AUTO: 4.3 10*6/MM3 (ref 3.77–5.28)
RBC # UR: ABNORMAL /HPF
REF LAB TEST METHOD: ABNORMAL
SODIUM SERPL-SCNC: 140 MMOL/L (ref 136–145)
SP GR UR STRIP: 1.02 (ref 1–1.03)
SQUAMOUS #/AREA URNS HPF: ABNORMAL /HPF
UROBILINOGEN UR QL STRIP: ABNORMAL
WBC # BLD AUTO: 3.31 10*3/MM3 (ref 3.4–10.8)
WBC UR QL AUTO: ABNORMAL /HPF

## 2021-10-25 PROCEDURE — 83605 ASSAY OF LACTIC ACID: CPT | Performed by: EMERGENCY MEDICINE

## 2021-10-25 PROCEDURE — 85025 COMPLETE CBC W/AUTO DIFF WBC: CPT | Performed by: EMERGENCY MEDICINE

## 2021-10-25 PROCEDURE — 25010000002 MORPHINE PER 10 MG: Performed by: EMERGENCY MEDICINE

## 2021-10-25 PROCEDURE — 25010000002 FUROSEMIDE PER 20 MG: Performed by: EMERGENCY MEDICINE

## 2021-10-25 PROCEDURE — 99283 EMERGENCY DEPT VISIT LOW MDM: CPT

## 2021-10-25 PROCEDURE — 87040 BLOOD CULTURE FOR BACTERIA: CPT | Performed by: EMERGENCY MEDICINE

## 2021-10-25 PROCEDURE — P9612 CATHETERIZE FOR URINE SPEC: HCPCS

## 2021-10-25 PROCEDURE — 96374 THER/PROPH/DIAG INJ IV PUSH: CPT

## 2021-10-25 PROCEDURE — 93005 ELECTROCARDIOGRAM TRACING: CPT | Performed by: EMERGENCY MEDICINE

## 2021-10-25 PROCEDURE — 80053 COMPREHEN METABOLIC PANEL: CPT | Performed by: EMERGENCY MEDICINE

## 2021-10-25 PROCEDURE — 81001 URINALYSIS AUTO W/SCOPE: CPT | Performed by: EMERGENCY MEDICINE

## 2021-10-25 PROCEDURE — 96375 TX/PRO/DX INJ NEW DRUG ADDON: CPT

## 2021-10-25 PROCEDURE — 71045 X-RAY EXAM CHEST 1 VIEW: CPT

## 2021-10-25 PROCEDURE — 83880 ASSAY OF NATRIURETIC PEPTIDE: CPT | Performed by: EMERGENCY MEDICINE

## 2021-10-25 RX ORDER — FUROSEMIDE 10 MG/ML
40 INJECTION INTRAMUSCULAR; INTRAVENOUS ONCE
Status: COMPLETED | OUTPATIENT
Start: 2021-10-25 | End: 2021-10-25

## 2021-10-25 RX ORDER — HYDROCODONE BITARTRATE AND ACETAMINOPHEN 5; 325 MG/1; MG/1
1 TABLET ORAL EVERY 6 HOURS PRN
Qty: 10 TABLET | Refills: 0 | Status: ON HOLD | OUTPATIENT
Start: 2021-10-25 | End: 2022-09-29 | Stop reason: SDUPTHER

## 2021-10-25 RX ORDER — HYDROCODONE BITARTRATE AND ACETAMINOPHEN 7.5; 325 MG/1; MG/1
1 TABLET ORAL ONCE
Status: COMPLETED | OUTPATIENT
Start: 2021-10-25 | End: 2021-10-25

## 2021-10-25 RX ORDER — MORPHINE SULFATE 2 MG/ML
2 INJECTION, SOLUTION INTRAMUSCULAR; INTRAVENOUS ONCE
Status: COMPLETED | OUTPATIENT
Start: 2021-10-25 | End: 2021-10-25

## 2021-10-25 RX ADMIN — FUROSEMIDE 40 MG: 10 INJECTION, SOLUTION INTRAMUSCULAR; INTRAVENOUS at 08:23

## 2021-10-25 RX ADMIN — HYDROCODONE BITARTRATE AND ACETAMINOPHEN 1 TABLET: 7.5; 325 TABLET ORAL at 12:43

## 2021-10-25 RX ADMIN — MORPHINE SULFATE 2 MG: 2 INJECTION, SOLUTION INTRAMUSCULAR; INTRAVENOUS at 07:07

## 2021-10-25 NOTE — ED PROVIDER NOTES
Subjective   Josseline Vargas is a 66 y.o. female who presents to the emergency department today with complaints of worsening edema over the past two days. Pt reports bilateral hand swelling, right leg swelling and generalized pain. She complains of associated shortness of breath and a mild cough. Pt claims her swelling is mildly relieved when putting her feet up on something. She follows with ELENA Faye, for her primary care needs. She denies nausea, emesis, fever, headache, chest pain, or any other pertinent sx or concerns.       History provided by:  Patient   used: No        Review of Systems   Constitutional: Negative for chills and fever.   HENT: Negative for congestion, rhinorrhea and sore throat.    Eyes: Negative for pain and visual disturbance.   Respiratory: Positive for cough and shortness of breath. Negative for apnea and chest tightness.    Cardiovascular: Positive for leg swelling. Negative for chest pain and palpitations.   Gastrointestinal: Negative for abdominal pain, diarrhea, nausea and vomiting.   Genitourinary: Negative for difficulty urinating and dysuria.   Musculoskeletal: Negative for joint swelling and myalgias.   Skin: Negative for color change.   Neurological: Negative for seizures and headaches.   Psychiatric/Behavioral: Negative.    All other systems reviewed and are negative.      Past Medical History:   Diagnosis Date   • Cancer (HCC)     Left breast   • Depressed    • Diabetes (HCC)     TYPE 2   • Flea bite of multiple sites of lower extremity     PT INSTRUCTED TO INFORM DR TRINH OF BITES PRIOR TO SURGERY   • H/O meningitis 1987   • History of Bell's palsy    • Migraine headache    • Polio    • Seizures (HCC)    • Stroke (HCC) 1987       Allergies   Allergen Reactions   • Latex Hives   • Penicillins Rash       Past Surgical History:   Procedure Laterality Date   • APPENDECTOMY  1987   • BREAST SURGERY Left 10/2015    Biopsy, benign   • CATARACT  EXTRACTION     • EAR TUBES     • KNEE MENISCECTOMY     • MASTECTOMY     • MASTECTOMY WITH SENTINEL NODE BIOPSY AND AXILLARY NODE DISSECTION Left 2018    Procedure: Left total mastectomy, left sentinel lymph node biopsy to include retrieval of prior clip, axillary lymph node dissection, no reconstruction. ;  Surgeon: Yuli Garcias MD;  [place] [facility] [place] [facility] [place]:    • OVARIAN CYST REMOVAL     • NH INSJ TUNNELED CVC W/O SUBQ PORT/ AGE 3 YR/> Right 2017    Procedure: INSERTION RIGHT VENOUS ACCESS DEVICE;  Surgeon: Yuli Garcias MD;  [place] [facility] [place] [facility] [place]  Service: General   • TONSILLECTOMY     • TUBAL ABDOMINAL LIGATION     • VENOUS ACCESS DEVICE (PORT) REMOVAL Right 2019    Procedure: RIGHT port removal,;  Surgeon: Yuli Garcias MD;  [place] [facility] [place] [facility] [place]  Service: General       Family History   Problem Relation Age of Onset   • Asthma Mother    • Diabetes Mother    • Hearing loss Mother    • Heart attack Mother    • Heart failure Mother    • Breast cancer Mother 55   • Hypertension Mother    • Alcohol abuse Father    • Diabetes Sister    • Diabetes Brother    • Brain cancer Brother 56   • Cancer Brother 56        bladder   • Malig Hyperthermia Neg Hx        Social History     Socioeconomic History   • Marital status:    • Number of children: 9   • Years of education: 12   Tobacco Use   • Smoking status: Former Smoker     Packs/day: 2.50     Start date:      Quit date:      Years since quittin.8   • Smokeless tobacco: Never Used   Vaping Use   • Vaping Use: Never used   Substance and Sexual Activity   • Alcohol use: Yes     Comment: rarely   • Drug use: No   • Sexual activity: Defer         Objective   Physical Exam  Vitals and nursing note reviewed.   Constitutional:       General: She is not in acute distress.     Appearance: Normal appearance. She is not toxic-appearing.   HENT:      Head: Normocephalic and atraumatic.      Jaw: There is normal jaw  occlusion.   Eyes:      General: Lids are normal.      Extraocular Movements: Extraocular movements intact.      Conjunctiva/sclera: Conjunctivae normal.      Pupils: Pupils are equal, round, and reactive to light.   Cardiovascular:      Rate and Rhythm: Normal rate. Rhythm irregularly irregular.      Pulses: Normal pulses.      Heart sounds: Normal heart sounds.   Pulmonary:      Effort: Pulmonary effort is normal. No respiratory distress.      Breath sounds: Wheezing (bilateral expiratory) present. No rhonchi.   Abdominal:      General: Abdomen is flat.      Palpations: Abdomen is soft.      Tenderness: There is no abdominal tenderness. There is no guarding or rebound.   Musculoskeletal:         General: Normal range of motion.      Cervical back: Normal range of motion and neck supple.      Right lower leg: Edema present.      Left lower leg: Pitting Edema present.   Skin:     General: Skin is warm and dry.   Neurological:      Mental Status: She is alert and oriented to person, place, and time. Mental status is at baseline.   Psychiatric:         Mood and Affect: Mood normal.         Procedures         ED Course  ED Course as of 10/25/21 0747   Mon Oct 25, 2021   0711 EKG: Rate 80, normal P waves, PVCs, nonspecific ST segment changes, normal QT interval, change from October 20 of 21. [RW]      ED Course User Index  [RW] Reinier Luna MD     Labs Reviewed   COMPREHENSIVE METABOLIC PANEL - Abnormal; Notable for the following components:       Result Value    Glucose 133 (*)     Albumin 3.20 (*)     All other components within normal limits    Narrative:     GFR Normal >60  Chronic Kidney Disease <60  Kidney Failure <15     CBC WITH AUTO DIFFERENTIAL - Abnormal; Notable for the following components:    WBC 3.31 (*)     Hemoglobin 11.9 (*)     RDW 16.8 (*)     Platelets 130 (*)     Lymphocyte % 19.3 (*)     Immature Grans % 0.6 (*)     Lymphocytes, Absolute 0.64 (*)     All other components within normal limits    URINALYSIS W/ CULTURE IF INDICATED - Abnormal; Notable for the following components:    Appearance, UA Cloudy (*)     Blood, UA Moderate (2+) (*)     Protein, UA Trace (*)     All other components within normal limits   URINALYSIS, MICROSCOPIC ONLY - Abnormal; Notable for the following components:    RBC, UA 3-5 (*)     WBC, UA 0-2 (*)     Squamous Epithelial Cells, UA 3-6 (*)     All other components within normal limits   BNP (IN-HOUSE) - Normal    Narrative:     Among patients with dyspnea, NT-proBNP is highly sensitive for the detection of acute congestive heart failure. In addition NT-proBNP of <300 pg/ml effectively rules out acute congestive heart failure with 99% negative predictive value.    Results may be falsely decreased if patient taking Biotin.     LACTIC ACID, PLASMA - Normal   BLOOD CULTURE   BLOOD CULTURE   CBC AND DIFFERENTIAL    Narrative:     The following orders were created for panel order CBC & Differential.  Procedure                               Abnormality         Status                     ---------                               -----------         ------                     CBC Auto Differential[[id number]]        Abnormal            Final result               Scan Slide[[id number]]                                                                    Please view results for these tests on the individual orders.     XR Chest 1 View    Result Date: 10/25/2021  PROCEDURE: XR CHEST 1 VW  COMPARISON: [facility], CR, XR CHEST 1 VW, 10/19/2021, 22:54.  INDICATIONS: soa  FINDINGS:  Heart size is unchanged.  No new dense consolidation.  No pleural fluid or pneumothorax.  CONCLUSION: No change from 10/19/2021       DAYAMI GOMEZ MD       Electronically Signed and Approved By: DAYAMI GOMEZ MD on 10/25/2021 at 7:23                                                    MDM  Number of Diagnoses or Management Options  Chronic arthralgias of knees and hips  Edema, unspecified type  Diagnosis  management comments: Patient presents with generalized swelling and generalized pain.  She does complain of some associated shortness of air.  Old records reviewed she has a prior history of breast cancer with mastectomy.  With respect to her swelling differential diagnostic considerations include congestive heart failure versus anasarca versus lymphedema.  Chest radiograph does not demonstrate definitive findings of pulmonary edema as read by radiology and reviewed by me.  She does have a low albumin level consistent with possible anasarca.  ED course she received analgesics and diuretics and on reassessment had some symptomatic improvement following the ministration of the analgesics and was discharged stable and advised to double her Lasix dosage.  She is given a limited quantity of opioids for analgesia for her generalized discomfort and advised to follow-up later this week with her PCP to be reassessed and see how she responds to the increased dosage of the diuretic.  She stable on final assessment.      Final diagnoses:   Edema, unspecified type   Chronic arthralgias of knees and hips       Documentation assistance provided by jo Castellon.  Information recorded by the jo was done at my direction and has been verified and validated by me.     Rayna Castellon  10/25/21 0628       Reinier Luna MD  10/25/21 4495

## 2021-10-26 LAB — BACTERIA SPEC AEROBE CULT: NORMAL

## 2021-10-30 LAB
BACTERIA SPEC AEROBE CULT: NORMAL
BACTERIA SPEC AEROBE CULT: NORMAL

## 2021-11-19 ENCOUNTER — OFFICE VISIT (OUTPATIENT)
Dept: ORTHOPEDIC SURGERY | Facility: CLINIC | Age: 66
End: 2021-11-19

## 2021-11-19 VITALS — BODY MASS INDEX: 46.33 KG/M2 | OXYGEN SATURATION: 99 % | HEIGHT: 60 IN | HEART RATE: 79 BPM | WEIGHT: 236 LBS

## 2021-11-19 DIAGNOSIS — M25.552 LEFT HIP PAIN: Primary | ICD-10-CM

## 2021-11-19 PROCEDURE — 99213 OFFICE O/P EST LOW 20 MIN: CPT | Performed by: ORTHOPAEDIC SURGERY

## 2021-11-19 RX ORDER — ROSUVASTATIN CALCIUM 20 MG/1
20 TABLET, COATED ORAL NIGHTLY
COMMUNITY
Start: 2021-11-08

## 2021-11-19 RX ORDER — APIXABAN 2.5 MG/1
2.5 TABLET, FILM COATED ORAL EVERY 12 HOURS SCHEDULED
COMMUNITY
Start: 2021-11-17 | End: 2022-09-29 | Stop reason: HOSPADM

## 2021-11-19 RX ORDER — ALBUTEROL SULFATE 90 UG/1
2 AEROSOL, METERED RESPIRATORY (INHALATION) EVERY 6 HOURS PRN
COMMUNITY
Start: 2021-10-26

## 2021-11-19 RX ORDER — NYSTATIN 100000 [USP'U]/G
POWDER TOPICAL
Status: ON HOLD | COMMUNITY
Start: 2021-09-07 | End: 2022-09-21

## 2021-11-19 RX ORDER — PREDNISONE 10 MG/1
TABLET ORAL
Status: ON HOLD | COMMUNITY
Start: 2021-11-05 | End: 2022-09-21

## 2021-11-19 RX ORDER — TOPIRAMATE 25 MG/1
TABLET ORAL
COMMUNITY
Start: 2021-11-01 | End: 2022-04-28

## 2021-11-19 RX ORDER — BUSPIRONE HYDROCHLORIDE 5 MG/1
TABLET ORAL
COMMUNITY
Start: 2021-11-05 | End: 2022-04-28

## 2021-11-19 RX ORDER — PHENYTOIN 50 MG/1
TABLET, CHEWABLE ORAL
Status: ON HOLD | COMMUNITY
Start: 2021-11-01 | End: 2022-09-21

## 2021-11-19 NOTE — PROGRESS NOTES
"Chief Complaint  Pain of the Left Hip     Subjective      Josseline Vargas presents to [facility] for evaluation of the left hip. I previously seen this patient in the hospital for right hip pain. She has a history of septic left hip and underwent resection and arthroplasty of the left hip last year. Her recent cultures were negative of her left hip. She recently fell on concrete about 2 weeks ago. She is in [facility].     Allergies   Allergen Reactions   • Latex Hives   • Penicillins Rash        Social History     Socioeconomic History   • Marital status:    • Number of children: 9   • Years of education: 12   Tobacco Use   • Smoking status: Former Smoker     Packs/day: 2.50     Start date:      Quit date:      Years since quittin.8   • Smokeless tobacco: Never Used   Vaping Use   • Vaping Use: Never used   Substance and Sexual Activity   • Alcohol use: Yes     Comment: rarely   • Drug use: No   • Sexual activity: Defer        Review of Systems     Objective   Vital Signs:   Pulse 79   Ht 152.4 cm (60\")   Wt 107 kg (236 lb)   SpO2 99%   BMI 46.09 kg/m²       Physical Exam  Constitutional:       Appearance: Normal appearance. The patient is well-developed and normal weight.   HENT:      Head: Normocephalic.      Right Ear: Hearing and external ear normal.      Left Ear: Hearing and external ear normal.      Nose: Nose normal.   Eyes:      Conjunctiva/sclera: Conjunctivae normal.   Cardiovascular:      Rate and Rhythm: Normal rate.   Pulmonary:      Effort: Pulmonary effort is normal.      Breath sounds: No wheezing or rales.   Abdominal:      Palpations: Abdomen is soft.      Tenderness: There is no abdominal tenderness.   Musculoskeletal:      Cervical back: Normal range of motion.   Skin:     Findings: No rash.   Neurological:      Mental Status: The patient is alert and oriented to person, place, and time.   Psychiatric:         Mood and Affect: Mood " and affect normal.         Judgment: Judgment normal.       Ortho Exam      Left hip- Chronic cellulitis to bilateral lower extremities. Improved from previous exam. Can move toes and ankles. Pain to posterior lateral left hip with limited ROM.     Procedures    X-ray- negative for acute fracture.     Imaging Results (Most Recent)     None           Result Review :       MRI Pelvis With & Without Contrast    Addendum Date: 10/20/2021 Addendum:   ADDENDUM:  After discussion with consulting physician, new history was obtained at the patient has undergone proximal left femoral osteotomy and antibiotic bead placement in the past.  The patient's symptoms are also isolated to the right hip and not the left.  The findings at the left hip are more likely to be postoperative rather than representing acute infection.  There do appear to be age-indeterminate erosive changes at the acetabulum, which could also be postoperative in etiology.  There is no obvious cause for right hip pain.  Radiographic correlation of the area of concern would be beneficial.    NELLIE FREGOSO MD       Electronically Signed and Approved By: NELLIE FREGOSO MD on 10/20/2021 at 21:08             Result Date: 10/20/2021  Narrative: PROCEDURE: MRI PELVIS W WO CONTRAST  COMPARISON: [facility], CT, CT CHEST PULMONARY EMBOLISM, 10/20/2021, 4:05.  [facility], CT, CTA ABD RUNOFF, 12/15/2015, 19:45.  INDICATIONS: Limping, acute, hip pain, infection suspected (Ped 0-5y)  CONTRAST: 20 ml  Multihance I.V.  TECHNIQUE: Multiplanar multisequence images of the pelvis with and without intravenous gadolinium.  FINDINGS: There is osseous destruction of the left femoral head and left femoral neck.  There is extensive enhancement suggesting synovitis with a few fluid collections in the left hip measuring up to 2.3 cm.  The endometrium appears thickened for a patient of this age measuring 1.5 cm.  There is mild bilateral inguinal adenopathy.   Degenerative changes are present in the visualized lower lumbar spine.  IMPRESSION:  1. Findings consistent with septic arthritis of the left hip with osseous destruction of the left femoral head and left femoral neck.  2. Mild bilateral inguinal adenopathy.  3. The endometrium appears thickened for a patient of this age.  Recommend a pelvic ultrasound examination for further evaluation to evaluate for endometrial pathology.   BUCKY WHITESIDE MD       Electronically Signed and Approved By: BUCKY WHITESIDE MD on 10/20/2021 at 16:01             US Liver    Result Date: 10/21/2021  Narrative: PROCEDURE: US LIVER  COMPARISON: [facility], CT, CT CHEST PULMONARY EMBOLISM, 10/20/2021, 4:05.  INDICATIONS: abnormal CT chest  FINDINGS: Ultrasound right upper quadrant the abdomen is compared to a CT of the chest that included the superior aspect the abdomen performed on 10/20/2021.  Today's study reveals that the body of the pancreas is normal.  No evidence of ascites.  The surface the liver is lobulated in there is inhomogeneous echotexture throughout the liver consistent with cirrhosis liver.  No evidence of liver mass.  The gallbladder is largely distended with bile and measures 16.4 cm in length by 5.7 cm in transverse measurement.  Common bile duct measures 3.9 mm in diameter and is normal.  No evidence of mass or obstruction right kidney.  Ultrasound Doppler shows normal antegrade blood flow in the portal vein toward the liver and shows normal antegrade blood flow in hepatic veins away from the liver the right lobe of the liver measures 18.6 cm in the sagittal imaging plane and is mildly enlarged.  CONCLUSION:  1. The gallbladder is largely distended but otherwise appears to be normal. 2. Mild enlargement of the liver that has a lobulated surface and in homogeneous echotexture without evidence of mass consistent with cirrhosis liver. 3. No evidence of dilatation of the bile ducts. 4. No evidence of  ascites      SERAFIN DOUGLAS MD       Electronically Signed and Approved By: SERAFIN DOUGLAS MD on 10/21/2021 at 9:23             XR Chest 1 View    Result Date: 10/25/2021  Narrative: PROCEDURE: XR CHEST 1 VW  COMPARISON: [facility], CR, XR CHEST 1 VW, 10/19/2021, 22:54.  INDICATIONS: soa  FINDINGS:  Heart size is unchanged.  No new dense consolidation.  No pleural fluid or pneumothorax.  CONCLUSION: No change from 10/19/2021       DAYAMI GOMEZ MD       Electronically Signed and Approved By: DAYAMI GOMEZ MD on 10/25/2021 at 7:23             IR Inject/asp large joint or bursa    Result Date: 10/21/2021  Narrative: PROCEDURE: IR INJECT/ASP LARGE JOINT OR BURSA  COMPARISON: None  INDICATIONS: Rule out Septic Arthritis. LEFT HIP ASPIRATION. LESS THAN 1/4 CC OF FLUID SAMPLE WAS SENT TO LAB. FLUORO TIME- .2 MINUTES. FLUORO IMAGE-1. 15 mGy.   FINDINGS:   The skin overlying the left hip joint was prepped and draped in normal sterile fashion.  2% preservative-free lidocaine was injected along the anticipated tract of the needle.  A 22 gauge needle was advanced into the region of the left hip joint.  A Girdlestone procedure has been performed.  Approximately 1 cc bloody appearing fluid was aspirated.  The needle was withdrawn.  Hemostasis was achieved.  The patient was transferred back to the floor in stable condition.   CONCLUSION:  1. Aspiration of bloody appearing fluid from the left hip region.  This was sent for culture and sensitivity.     Chava Sol M.D.       Electronically Signed and Approved By: Chava Sol M.D. on 10/21/2021 at 16:50                    Assessment and Plan     DX: Left hip pain    Discussed the treatment plan with the patient.  Order for physical therapy given today.     Call or return if worsening symptoms.    Follow Up     8 weeks      Patient was given instructions and counseling regarding her condition or for health maintenance advice. Please see specific  information pulled into the AVS if appropriate.     Scribed for Milo Chew MD by Maggie Soni.  11/19/21   10:56 EST    I have personally performed the services described in this document as scribed by the above individual and it is both accurate and complete. Milo Chew MD 11/21/21

## 2022-01-18 ENCOUNTER — OFFICE VISIT (OUTPATIENT)
Dept: ORTHOPEDIC SURGERY | Facility: CLINIC | Age: 67
End: 2022-01-18

## 2022-01-18 VITALS — OXYGEN SATURATION: 97 % | BODY MASS INDEX: 47.12 KG/M2 | HEART RATE: 82 BPM | HEIGHT: 60 IN | WEIGHT: 240 LBS

## 2022-01-18 DIAGNOSIS — M25.552 LEFT HIP PAIN: ICD-10-CM

## 2022-01-18 DIAGNOSIS — M25.511 RIGHT SHOULDER PAIN, UNSPECIFIED CHRONICITY: Primary | ICD-10-CM

## 2022-01-18 DIAGNOSIS — M19.011 PRIMARY OSTEOARTHRITIS OF RIGHT SHOULDER: ICD-10-CM

## 2022-01-18 PROCEDURE — 20610 DRAIN/INJ JOINT/BURSA W/O US: CPT | Performed by: ORTHOPAEDIC SURGERY

## 2022-01-18 PROCEDURE — 99213 OFFICE O/P EST LOW 20 MIN: CPT | Performed by: ORTHOPAEDIC SURGERY

## 2022-01-18 RX ORDER — TIOTROPIUM BROMIDE 18 UG/1
1 CAPSULE ORAL; RESPIRATORY (INHALATION)
COMMUNITY
Start: 2022-01-12

## 2022-01-18 RX ORDER — FLUTICASONE PROPIONATE 50 MCG
2 SPRAY, SUSPENSION (ML) NASAL DAILY
COMMUNITY
Start: 2022-01-14

## 2022-01-18 RX ADMIN — BUPIVACAINE HYDROCHLORIDE 5 ML: 2.5 INJECTION, SOLUTION INFILTRATION; PERINEURAL at 15:09

## 2022-01-18 RX ADMIN — TRIAMCINOLONE ACETONIDE 40 MG: 40 INJECTION, SUSPENSION INTRA-ARTICULAR; INTRAMUSCULAR at 15:09

## 2022-01-18 NOTE — PROGRESS NOTES
"Chief Complaint  Pain of the Left Hip and Pain of the Right Shoulder     Subjective      Josseline Vargas presents to [facility] for follow up evaluation of the left hip and evaluation of the right shoulder. She previously fell on her left hip. We previously gave her an order for therapy for her left hip. She also reports right shoulder pain for a few days now.     Allergies   Allergen Reactions   • Latex Hives   • Penicillins Rash        Social History     Socioeconomic History   • Marital status:    • Number of children: 9   • Years of education: 12   Tobacco Use   • Smoking status: Former Smoker     Packs/day: 2.50     Start date:      Quit date:      Years since quittin.0   • Smokeless tobacco: Never Used   Vaping Use   • Vaping Use: Never used   Substance and Sexual Activity   • Alcohol use: Yes     Comment: rarely   • Drug use: No   • Sexual activity: Defer        Review of Systems     Objective   Vital Signs:   Pulse 82   Ht 152.4 cm (60\")   Wt 109 kg (240 lb)   SpO2 97%   BMI 46.87 kg/m²       Physical Exam  Constitutional:       Appearance: Normal appearance. The patient is well-developed and normal weight.   HENT:      Head: Normocephalic.      Right Ear: Hearing and external ear normal.      Left Ear: Hearing and external ear normal.      Nose: Nose normal.   Eyes:      Conjunctiva/sclera: Conjunctivae normal.   Cardiovascular:      Rate and Rhythm: Normal rate.   Pulmonary:      Effort: Pulmonary effort is normal.      Breath sounds: No wheezing or rales.   Abdominal:      Palpations: Abdomen is soft.      Tenderness: There is no abdominal tenderness.   Musculoskeletal:      Cervical back: Normal range of motion.   Skin:     Findings: No rash.   Neurological:      Mental Status: The patient is alert and oriented to person, place, and time.   Psychiatric:         Mood and Affect: Mood and affect normal.         Judgment: Judgment normal.       Ortho " Exam      Right shoulder- Forward elevation 150. Pain to anterior lateral shoulder with tenderness. Abduction 100. External Rotation 55. Internal rotation 35. Neurovascularly intact. 4/5 supraspinatus strength.4+ infraspinatus  And subscapularis. Positive impingement signs. Neurovascularly intact.     Right shoulder  Date/Time: 1/18/2022 3:09 PM  Consent given by: patient  Site marked: site marked  Timeout: Immediately prior to procedure a time out was called to verify the correct patient, procedure, equipment, support staff and site/side marked as required   Supporting Documentation  Indications: pain   Procedure Details  Location: shoulder (Right shoulder) -   Needle gauge: 21G.  Medications administered: 5 mL bupivacaine 0.25 %; 40 mg triamcinolone acetonide 40 MG/ML  Patient tolerance: patient tolerated the procedure well with no immediate complications          X-Ray Report:  Right shoulder(s) X-Ray  Indication: Evaluation of right shoulder pain  AP and Lateral view(s)  Findings: no acute saurabh abnormality. No dislocation. Degenerative changes to the shoulder. Soft tissues obscure fine saurabh detail.   Prior studies available for comparison: no         Imaging Results (Most Recent)     Procedure Component Value Units Date/Time    XR Scapula Right [[id number]] Resulted: 01/18/22 1414     Updated: 01/18/22 1426           Result Review :       No results found.           Assessment and Plan     DX: left hip pain. Right shoulder osteoarthritis     Discussed the treatment plan with the patient.  Discussed the risks and benefits of a right shoulder injection. The patient expressed understanding and wished to proceed. She tolerated the injection well.     Call or return if worsening symptoms.    Follow Up     6-8 weeks      Patient was given instructions and counseling regarding her condition or for health maintenance advice. Please see specific information pulled into the AVS if appropriate.     Scribed for Milo WOOD  MD Naina by Maggie Soni.  01/18/22   14:26 EST    I have personally performed the services described in this document as scribed by the above individual and it is both accurate and complete. Milo Chew MD 01/19/22

## 2022-01-19 RX ORDER — TRIAMCINOLONE ACETONIDE 40 MG/ML
40 INJECTION, SUSPENSION INTRA-ARTICULAR; INTRAMUSCULAR
Status: COMPLETED | OUTPATIENT
Start: 2022-01-18 | End: 2022-01-18

## 2022-01-19 RX ORDER — BUPIVACAINE HYDROCHLORIDE 2.5 MG/ML
5 INJECTION, SOLUTION INFILTRATION; PERINEURAL
Status: COMPLETED | OUTPATIENT
Start: 2022-01-18 | End: 2022-01-18

## 2022-03-31 PROBLEM — M25.511 RIGHT SHOULDER PAIN: Status: ACTIVE | Noted: 2022-03-31

## 2022-04-28 ENCOUNTER — OFFICE VISIT (OUTPATIENT)
Dept: ORTHOPEDIC SURGERY | Facility: CLINIC | Age: 67
End: 2022-04-28

## 2022-04-28 VITALS — HEART RATE: 87 BPM | HEIGHT: 60 IN | WEIGHT: 240 LBS | BODY MASS INDEX: 47.12 KG/M2 | OXYGEN SATURATION: 99 %

## 2022-04-28 DIAGNOSIS — M19.011 PRIMARY OSTEOARTHRITIS OF RIGHT SHOULDER: ICD-10-CM

## 2022-04-28 DIAGNOSIS — M25.511 RIGHT SHOULDER PAIN, UNSPECIFIED CHRONICITY: Primary | ICD-10-CM

## 2022-04-28 PROCEDURE — 20610 DRAIN/INJ JOINT/BURSA W/O US: CPT | Performed by: PHYSICIAN ASSISTANT

## 2022-04-28 RX ORDER — HYDROXYZINE HYDROCHLORIDE 25 MG/1
25 TABLET, FILM COATED ORAL 3 TIMES DAILY
COMMUNITY
Start: 2022-04-20

## 2022-04-28 RX ORDER — LIDOCAINE HYDROCHLORIDE 10 MG/ML
9 INJECTION, SOLUTION INFILTRATION; PERINEURAL
Status: COMPLETED | OUTPATIENT
Start: 2022-04-28 | End: 2022-04-28

## 2022-04-28 RX ORDER — BUSPIRONE HYDROCHLORIDE 10 MG/1
10 TABLET ORAL 3 TIMES DAILY
COMMUNITY
Start: 2022-04-16

## 2022-04-28 RX ORDER — DOXYCYCLINE HYCLATE 100 MG/1
CAPSULE ORAL
Status: ON HOLD | COMMUNITY
Start: 2022-03-12 | End: 2022-09-21

## 2022-04-28 RX ORDER — IBUPROFEN 600 MG/1
TABLET ORAL
Status: ON HOLD | COMMUNITY
Start: 2022-02-04 | End: 2022-09-21

## 2022-04-28 RX ORDER — INSULIN GLARGINE-YFGN 100 [IU]/ML
INJECTION, SOLUTION SUBCUTANEOUS
Status: ON HOLD | COMMUNITY
Start: 2022-03-18 | End: 2022-09-21

## 2022-04-28 RX ORDER — OFLOXACIN 3 MG/ML
SOLUTION AURICULAR (OTIC)
Status: ON HOLD | COMMUNITY
Start: 2022-04-19 | End: 2022-09-21

## 2022-04-28 RX ORDER — TRIAMCINOLONE ACETONIDE 40 MG/ML
40 INJECTION, SUSPENSION INTRA-ARTICULAR; INTRAMUSCULAR
Status: COMPLETED | OUTPATIENT
Start: 2022-04-28 | End: 2022-04-28

## 2022-04-28 RX ORDER — TRAZODONE HYDROCHLORIDE 50 MG/1
50 TABLET ORAL NIGHTLY
COMMUNITY
Start: 2022-04-27

## 2022-04-28 RX ADMIN — LIDOCAINE HYDROCHLORIDE 9 ML: 10 INJECTION, SOLUTION INFILTRATION; PERINEURAL at 16:16

## 2022-04-28 RX ADMIN — TRIAMCINOLONE ACETONIDE 40 MG: 40 INJECTION, SUSPENSION INTRA-ARTICULAR; INTRAMUSCULAR at 16:16

## 2022-04-28 NOTE — PROGRESS NOTES
"Chief Complaint  Follow-up of the Right Shoulder    Subjective          Josseline Vargas is a 66 y.o. female  presents to [facility] for   History of Present Illness    Patient presents for follow-up evaluation of right shoulder pain, she was last seen by Dr. Chew on 2022 and he gave her a right shoulder steroid injection which she states helped her shoulder but it has returned recently.  Patient is a resident at [facility] she states they give her pain medication for her generalized body pain she is requesting right shoulder injection today.  Allergies   Allergen Reactions   • Latex Hives   • Penicillins Rash        Social History     Socioeconomic History   • Marital status:    • Number of children: 9   • Years of education: 12   Tobacco Use   • Smoking status: Former Smoker     Packs/day: 2.50     Start date:      Quit date:      Years since quittin.3   • Smokeless tobacco: Never Used   Vaping Use   • Vaping Use: Never used   Substance and Sexual Activity   • Alcohol use: Yes     Comment: rarely   • Drug use: No   • Sexual activity: Defer        REVIEW OF SYSTEMS    Constitutional: Denies fevers, chills, weight loss  Cardiovascular: Denies chest pain, shortness of breath  Skin: Denies rashes, acute skin changes  Neurologic: Denies headache, loss of consciousness  MSK: Right shoulder pain      Objective   Vital Signs:   Pulse 87   Ht 152.4 cm (60\")   Wt 109 kg (240 lb)   SpO2 99%   BMI 46.87 kg/m²     Body mass index is 46.87 kg/m².    Physical Exam    Right shoulder: Tender palpation anterior lateral shoulder, mild pain with range of motion, active forward elevation 150, active abduction 100, external rotation with abduction 55, internal rotation to her side, 4 out of 5 strength, neurovascular intact.    Large Joint Arthrocentesis: R subacromial bursa  Date/Time: 2022 4:16 PM  Consent given by: patient  Site marked: site marked  Timeout: " Immediately prior to procedure a time out was called to verify the correct patient, procedure, equipment, support staff and site/side marked as required   Supporting Documentation  Indications: pain   Procedure Details  Location: shoulder - R subacromial bursa  Preparation: Patient was prepped and draped in the usual sterile fashion  Needle gauge: 21G.  Medications administered: 9 mL lidocaine 1 %; 40 mg triamcinolone acetonide 40 MG/ML  Patient tolerance: patient tolerated the procedure well with no immediate complications          Imaging Results (Most Recent)     None           Result Review :   The following data was reviewed by: Rozina Nolasco MA on 04/28/2022:               Assessment and Plan    Diagnoses and all orders for this visit:    1. Right shoulder pain, unspecified chronicity (Primary)    2. Primary osteoarthritis of right shoulder        Discussed diagnosis and treatment options with the patient she elected to have right shoulder injection today which she tolerated well, follow-up in 3 months for recheck with possible injection.    Call or return if worsening symptoms.    Follow Up   Return in about 3 months (around 7/28/2022).  Patient was given instructions and counseling regarding her condition or for health maintenance advice. Please see specific information pulled into the AVS if appropriate.

## 2022-06-04 ENCOUNTER — APPOINTMENT (OUTPATIENT)
Dept: GENERAL RADIOLOGY | Facility: HOSPITAL | Age: 67
End: 2022-06-04

## 2022-06-04 ENCOUNTER — HOSPITAL ENCOUNTER (EMERGENCY)
Facility: HOSPITAL | Age: 67
Discharge: NURSING, REHAB OR LONG-TERM CARE | End: 2022-06-04
Attending: EMERGENCY MEDICINE | Admitting: EMERGENCY MEDICINE

## 2022-06-04 VITALS
SYSTOLIC BLOOD PRESSURE: 162 MMHG | DIASTOLIC BLOOD PRESSURE: 78 MMHG | RESPIRATION RATE: 18 BRPM | WEIGHT: 254.41 LBS | TEMPERATURE: 98.2 F | OXYGEN SATURATION: 96 % | BODY MASS INDEX: 49.95 KG/M2 | HEIGHT: 60 IN | HEART RATE: 87 BPM

## 2022-06-04 DIAGNOSIS — N39.0 URINARY TRACT INFECTION WITHOUT HEMATURIA, SITE UNSPECIFIED: ICD-10-CM

## 2022-06-04 DIAGNOSIS — M25.551 RIGHT HIP PAIN: Primary | ICD-10-CM

## 2022-06-04 LAB
ALBUMIN SERPL-MCNC: 3.9 G/DL (ref 3.5–5.2)
ALBUMIN/GLOB SERPL: 0.9 G/DL
ALP SERPL-CCNC: 100 U/L (ref 39–117)
ALT SERPL W P-5'-P-CCNC: 7 U/L (ref 1–33)
ANION GAP SERPL CALCULATED.3IONS-SCNC: 13.7 MMOL/L (ref 5–15)
AST SERPL-CCNC: 24 U/L (ref 1–32)
BACTERIA UR QL AUTO: ABNORMAL /HPF
BASOPHILS # BLD AUTO: 0.02 10*3/MM3 (ref 0–0.2)
BASOPHILS NFR BLD AUTO: 0.5 % (ref 0–1.5)
BILIRUB SERPL-MCNC: 0.3 MG/DL (ref 0–1.2)
BILIRUB UR QL STRIP: NEGATIVE
BUN SERPL-MCNC: 26 MG/DL (ref 8–23)
BUN/CREAT SERPL: 26.5 (ref 7–25)
CALCIUM SPEC-SCNC: 10.1 MG/DL (ref 8.6–10.5)
CHLORIDE SERPL-SCNC: 105 MMOL/L (ref 98–107)
CLARITY UR: CLEAR
CO2 SERPL-SCNC: 26.3 MMOL/L (ref 22–29)
COLOR UR: YELLOW
CREAT SERPL-MCNC: 0.98 MG/DL (ref 0.57–1)
DEPRECATED RDW RBC AUTO: 49.8 FL (ref 37–54)
EGFRCR SERPLBLD CKD-EPI 2021: 63 ML/MIN/1.73
EOSINOPHIL # BLD AUTO: 0.13 10*3/MM3 (ref 0–0.4)
EOSINOPHIL NFR BLD AUTO: 3.3 % (ref 0.3–6.2)
ERYTHROCYTE [DISTWIDTH] IN BLOOD BY AUTOMATED COUNT: 15.8 % (ref 12.3–15.4)
GLOBULIN UR ELPH-MCNC: 4.2 GM/DL
GLUCOSE SERPL-MCNC: 122 MG/DL (ref 65–99)
GLUCOSE UR STRIP-MCNC: NEGATIVE MG/DL
HCT VFR BLD AUTO: 42.2 % (ref 34–46.6)
HGB BLD-MCNC: 13 G/DL (ref 12–15.9)
HGB UR QL STRIP.AUTO: NEGATIVE
HOLD SPECIMEN: NORMAL
HOLD SPECIMEN: NORMAL
HYALINE CASTS UR QL AUTO: ABNORMAL /LPF
IMM GRANULOCYTES # BLD AUTO: 0.01 10*3/MM3 (ref 0–0.05)
IMM GRANULOCYTES NFR BLD AUTO: 0.3 % (ref 0–0.5)
KETONES UR QL STRIP: NEGATIVE
LEUKOCYTE ESTERASE UR QL STRIP.AUTO: ABNORMAL
LIPASE SERPL-CCNC: 39 U/L (ref 13–60)
LYMPHOCYTES # BLD AUTO: 0.79 10*3/MM3 (ref 0.7–3.1)
LYMPHOCYTES NFR BLD AUTO: 20.3 % (ref 19.6–45.3)
MCH RBC QN AUTO: 26.9 PG (ref 26.6–33)
MCHC RBC AUTO-ENTMCNC: 30.8 G/DL (ref 31.5–35.7)
MCV RBC AUTO: 87.2 FL (ref 79–97)
MONOCYTES # BLD AUTO: 0.27 10*3/MM3 (ref 0.1–0.9)
MONOCYTES NFR BLD AUTO: 6.9 % (ref 5–12)
NEUTROPHILS NFR BLD AUTO: 2.67 10*3/MM3 (ref 1.7–7)
NEUTROPHILS NFR BLD AUTO: 68.7 % (ref 42.7–76)
NITRITE UR QL STRIP: NEGATIVE
NRBC BLD AUTO-RTO: 0 /100 WBC (ref 0–0.2)
PH UR STRIP.AUTO: 7.5 [PH] (ref 5–8)
PLATELET # BLD AUTO: 152 10*3/MM3 (ref 140–450)
PMV BLD AUTO: 11.8 FL (ref 6–12)
POTASSIUM SERPL-SCNC: 3.9 MMOL/L (ref 3.5–5.2)
PROT SERPL-MCNC: 8.1 G/DL (ref 6–8.5)
PROT UR QL STRIP: ABNORMAL
RBC # BLD AUTO: 4.84 10*6/MM3 (ref 3.77–5.28)
RBC # UR STRIP: ABNORMAL /HPF
REF LAB TEST METHOD: ABNORMAL
SODIUM SERPL-SCNC: 145 MMOL/L (ref 136–145)
SP GR UR STRIP: 1.02 (ref 1–1.03)
SQUAMOUS #/AREA URNS HPF: ABNORMAL /HPF
UROBILINOGEN UR QL STRIP: ABNORMAL
WBC # UR STRIP: ABNORMAL /HPF
WBC NRBC COR # BLD: 3.89 10*3/MM3 (ref 3.4–10.8)
WHOLE BLOOD HOLD COAG: NORMAL
WHOLE BLOOD HOLD SPECIMEN: NORMAL

## 2022-06-04 PROCEDURE — 83690 ASSAY OF LIPASE: CPT | Performed by: EMERGENCY MEDICINE

## 2022-06-04 PROCEDURE — 73502 X-RAY EXAM HIP UNI 2-3 VIEWS: CPT

## 2022-06-04 PROCEDURE — 96365 THER/PROPH/DIAG IV INF INIT: CPT

## 2022-06-04 PROCEDURE — 99283 EMERGENCY DEPT VISIT LOW MDM: CPT

## 2022-06-04 PROCEDURE — 85025 COMPLETE CBC W/AUTO DIFF WBC: CPT | Performed by: EMERGENCY MEDICINE

## 2022-06-04 PROCEDURE — 96375 TX/PRO/DX INJ NEW DRUG ADDON: CPT

## 2022-06-04 PROCEDURE — 80053 COMPREHEN METABOLIC PANEL: CPT | Performed by: EMERGENCY MEDICINE

## 2022-06-04 PROCEDURE — 25010000002 CEFTRIAXONE PER 250 MG: Performed by: NURSE PRACTITIONER

## 2022-06-04 PROCEDURE — 87086 URINE CULTURE/COLONY COUNT: CPT | Performed by: EMERGENCY MEDICINE

## 2022-06-04 PROCEDURE — 25010000002 KETOROLAC TROMETHAMINE PER 15 MG: Performed by: NURSE PRACTITIONER

## 2022-06-04 PROCEDURE — 81001 URINALYSIS AUTO W/SCOPE: CPT | Performed by: EMERGENCY MEDICINE

## 2022-06-04 RX ORDER — SODIUM CHLORIDE 0.9 % (FLUSH) 0.9 %
10 SYRINGE (ML) INJECTION AS NEEDED
Status: DISCONTINUED | OUTPATIENT
Start: 2022-06-04 | End: 2022-06-05 | Stop reason: HOSPADM

## 2022-06-04 RX ORDER — CEFTRIAXONE SODIUM 1 G/50ML
1 INJECTION, SOLUTION INTRAVENOUS ONCE
Status: COMPLETED | OUTPATIENT
Start: 2022-06-04 | End: 2022-06-04

## 2022-06-04 RX ORDER — CEPHALEXIN 500 MG/1
500 CAPSULE ORAL 4 TIMES DAILY
Qty: 28 CAPSULE | Refills: 0 | Status: SHIPPED | OUTPATIENT
Start: 2022-06-04 | End: 2022-06-11

## 2022-06-04 RX ORDER — CEPHALEXIN 500 MG/1
500 CAPSULE ORAL 4 TIMES DAILY
Qty: 28 CAPSULE | Refills: 0 | Status: SHIPPED | OUTPATIENT
Start: 2022-06-04 | End: 2022-06-04 | Stop reason: SDUPTHER

## 2022-06-04 RX ORDER — KETOROLAC TROMETHAMINE 15 MG/ML
15 INJECTION, SOLUTION INTRAMUSCULAR; INTRAVENOUS ONCE
Status: COMPLETED | OUTPATIENT
Start: 2022-06-04 | End: 2022-06-04

## 2022-06-04 RX ADMIN — CEFTRIAXONE SODIUM 1 G: 1 INJECTION, SOLUTION INTRAVENOUS at 21:34

## 2022-06-04 RX ADMIN — KETOROLAC TROMETHAMINE 15 MG: 15 INJECTION, SOLUTION INTRAMUSCULAR; INTRAVENOUS at 21:34

## 2022-06-05 LAB — BACTERIA SPEC AEROBE CULT: NORMAL

## 2022-06-05 NOTE — ED PROVIDER NOTES
Time: 21:42 EDT  Arrived by: EMS  Chief Complaint: Right hip pain  History provided by: patient  History is limited by: N/A    History of Present Illness:  Patient is a 66 y.o. year old female that presents to the emergency department with right hip pain for 2 weeks.      Hip Pain  Location:  Right hip pain and suprapubic pain   Quality:  Aching pain for 2 weeks  Severity:  Moderate  Onset quality:  Sudden  Duration:  2 weeks  Timing:  Intermittent  Progression:  Unable to specify  Chronicity:  New  Context:  Pain in above right hip area and suprapubic pain  Relieved by:  Rest  Worsened by:  Nothing   Ineffective treatments:  Acetamephen   Associated symptoms: abdominal pain (suprapubic)    Associated symptoms: no chest pain, no congestion, no cough, no diarrhea, no ear pain, no fatigue, no fever, no headaches, no myalgias, no rash, no rhinorrhea, no shortness of breath, no sore throat, no vomiting and no wheezing    Flank Pain  Associated symptoms: no chest pain, no chills, no cough, no diarrhea, no fatigue, no fever, no shortness of breath, no sore throat and no vomiting        Similar Symptoms Previously: no  Recently seen: no      Patient Care Team  Primary Care Provider: CATARINA MARTIN         Past Medical History:     Allergies   Allergen Reactions   • Latex Hives   • Penicillins Rash     Past Medical History:   Diagnosis Date   • Cancer (HCC)     Left breast   • Depressed    • Diabetes (HCC)     TYPE 2   • Flea bite of multiple sites of lower extremity     PT INSTRUCTED TO INFORM DR TRINH OF BITES PRIOR TO SURGERY   • H/O meningitis 1987   • History of Bell's palsy    • Migraine headache    • Polio    • Seizures (HCC)    • Stroke (HCC) 1987     Past Surgical History:   Procedure Laterality Date   • APPENDECTOMY  1987   • BREAST SURGERY Left 10/2015    Biopsy, benign   • CATARACT EXTRACTION     • EAR TUBES     • KNEE MENISCECTOMY     • MASTECTOMY     • MASTECTOMY WITH SENTINEL NODE BIOPSY AND AXILLARY NODE  DISSECTION Left 1/11/2018    Procedure: Left total mastectomy, left sentinel lymph node biopsy to include retrieval of prior clip, axillary lymph node dissection, no reconstruction. ;  Surgeon: Yuli Garcias MD;  Location: [facility] OR [facility] [place];  Service:    • OVARIAN CYST REMOVAL     • WA INSJ TUNNELED CVC W/O SUBQ PORT/ AGE 3 YR/> Right 5/18/2017    Procedure: INSERTION RIGHT VENOUS ACCESS DEVICE;  Surgeon: Yuli Garcias MD;  Location: [facility] OR [facility] [place];  Service: General   • TONSILLECTOMY     • TUBAL ABDOMINAL LIGATION     • VENOUS ACCESS DEVICE (PORT) REMOVAL Right 8/8/2019    Procedure: RIGHT port removal,;  Surgeon: Yuli Garcias MD;  Location: [facility] OR [facility] [place];  Service: General     Family History   Problem Relation Age of Onset   • Asthma Mother    • Diabetes Mother    • Hearing loss Mother    • Heart attack Mother    • Heart failure Mother    • Breast cancer Mother 55   • Hypertension Mother    • Alcohol abuse Father    • Diabetes Sister    • Diabetes Brother    • Brain cancer Brother 56   • Cancer Brother 56        bladder   • Malig Hyperthermia Neg Hx        Home Medications:  Prior to Admission medications    Medication Sig Start Date End Date Taking? Authorizing Provider   acetaminophen (TYLENOL) 325 MG tablet Take 650 mg by mouth Every 6 (Six) Hours As Needed for Mild Pain .    ProviderRoland MD   albuterol sulfate  (90 Base) MCG/ACT inhaler  10/26/21   ProviderRoland MD   anastrozole (ARIMIDEX) 1 MG tablet Take 1 mg by mouth Daily.    ProviderRoland MD   busPIRone (BUSPAR) 10 MG tablet  4/16/22   ProviderRoland MD   doxycycline (VIBRAMYCIN) 100 MG capsule  3/12/22   Roland Rankin MD   Eliquis 2.5 MG tablet tablet  11/17/21   Roland Rankin MD   fluticasone (FLONASE) 50 MCG/ACT nasal spray  1/14/22   Roland Rankin MD   furosemide (LASIX) 40 MG tablet Take 20 mg by mouth Daily.    ProviderRoland MD   gabapentin (NEURONTIN) 300 MG  capsule Take 300 mg by mouth 2 (Two) Times a Day. 8/18/16   Roland Rankin MD   Glucagon, rDNA, (Glucagon Emergency) 1 MG kit  2/4/22   Roland Rankin MD   HYDROcodone-acetaminophen (NORCO) 5-325 MG per tablet Take 1 tablet by mouth Every 8 (Eight) Hours As Needed. 1/12/18   Roland Rankin MD   HYDROcodone-acetaminophen (NORCO) 5-325 MG per tablet Take 1 tablet by mouth Every 6 (Six) Hours As Needed for Moderate Pain . 10/25/21   Reinier Luna MD   hydrOXYzine (ATARAX) 25 MG tablet  4/20/22   Roland Rankin MD   insulin glargine (LANTUS, SEMGLEE) 100 UNIT/ML injection Inject 25 Units under the skin into the appropriate area as directed Every Night.    Roland Rankin MD   Insulin Glargine-yfgn 100 UNIT/ML solution pen-injector  3/18/22   Roland Rankin MD   KLOR-CON 10 MEQ CR tablet Take 10 mEq by mouth Daily. 9/9/16   Roland Rankin MD   magnesium oxide (MAGOX) 400 (241.3 Mg) MG tablet tablet Take 400 mg by mouth Daily.    Roland Rankin MD   metFORMIN (GLUCOPHAGE) 1000 MG tablet  11/5/21   Roland Rankin MD   metFORMIN (GLUCOPHAGE) 500 MG tablet Take 500 mg by mouth 2 (Two) Times a Day With Meals. 9/9/16   Roland Rankin MD   nystatin 159697 UNIT/GM powder  9/7/21   Roland Rankin MD   ofloxacin (FLOXIN) 0.3 % otic solution  4/19/22   Roland Rankin MD   phenytoin (DILANTIN) 100 MG ER capsule TAKE 1 CAPSULE BY MOUTH AT BEDTIME  Patient taking differently: Take 200 mg by mouth Every Night. 2/8/19   Humberto Henry Jr., MD   Phenytoin Infatabs 50 MG chewable tablet  11/1/21   Roland Rankin MD   predniSONE (DELTASONE) 10 MG tablet  11/5/21   Roland Rankin MD   rosuvastatin (CRESTOR) 20 MG tablet  11/8/21   Roland Rankin MD   Spiriva HandiHaler 18 MCG per inhalation capsule  1/12/22   Rolnad Rankin MD   topiramate (TOPAMAX) 50 MG tablet Take 75 mg by mouth 2 (Two) Times a Day.    Chucho Historical,  "MD   traZODone (DESYREL) 50 MG tablet  4/27/22   Provider, Roland, MD   venlafaxine (EFFEXOR) 37.5 MG tablet Take 37.5 mg by mouth Daily.    Provider, Historical, MD        Social History:   PT  reports that she quit smoking about 19 years ago. She started smoking about 50 years ago. She smoked 2.50 packs per day. She has never used smokeless tobacco. She reports current alcohol use. She reports that she does not use drugs.    Record Review:  I have reviewed the patient's records in [facility].     Review of Systems  Review of Systems   Constitutional: Negative for chills, fatigue and fever.   HENT: Negative for congestion, ear pain, rhinorrhea and sore throat.    Eyes: Negative for visual disturbance.   Respiratory: Negative for cough, shortness of breath and wheezing.    Cardiovascular: Negative for chest pain.   Gastrointestinal: Positive for abdominal pain (suprapubic). Negative for diarrhea and vomiting.   Genitourinary: Positive for flank pain (right side). Negative for difficulty urinating.   Musculoskeletal: Positive for arthralgias (right hip). Negative for back pain and myalgias.   Skin: Negative for rash.   Neurological: Negative for weakness, light-headedness, numbness and headaches.   Hematological: Negative for adenopathy.   Psychiatric/Behavioral: Negative.         Physical Exam  /70   Pulse 95   Temp 98.5 °F (36.9 °C) (Oral)   Resp 18   Ht 152.4 cm (60\")   Wt 115 kg (254 lb 6.6 oz)   SpO2 94%   BMI 49.69 kg/m²     Physical Exam  Vitals and nursing note reviewed.   Constitutional:       General: She is not in acute distress.     Appearance: Normal appearance. She is not toxic-appearing.   HENT:      Head: Normocephalic and atraumatic.   Cardiovascular:      Rate and Rhythm: Normal rate and regular rhythm.      Pulses: Normal pulses.      Heart sounds: Normal heart sounds. No murmur heard.    No gallop.   Pulmonary:      Effort: Pulmonary effort is normal.      Breath sounds: Normal breath " "sounds.   Abdominal:      General: Bowel sounds are normal. There is no distension.      Palpations: Abdomen is soft.      Tenderness: There is no abdominal tenderness. There is no right CVA tenderness, left CVA tenderness or rebound.   Musculoskeletal:         General: Tenderness (right hip) present. Normal range of motion.      Cervical back: Normal range of motion.   Skin:     General: Skin is warm and dry.      Capillary Refill: Capillary refill takes less than 2 seconds.   Neurological:      General: No focal deficit present.      Mental Status: She is alert and oriented to person, place, and time. Mental status is at baseline.      Cranial Nerves: No cranial nerve deficit.      Sensory: No sensory deficit.      Motor: No weakness.   Psychiatric:         Mood and Affect: Mood normal.         Behavior: Behavior normal.         Thought Content: Thought content normal.         Judgment: Judgment normal.          ED Course  /70   Pulse 95   Temp 98.5 °F (36.9 °C) (Oral)   Resp 18   Ht 152.4 cm (60\")   Wt 115 kg (254 lb 6.6 oz)   SpO2 94%   BMI 49.69 kg/m²   Results for orders placed or performed during the hospital encounter of 06/04/22   Comprehensive Metabolic Panel    Specimen: Blood   Result Value Ref Range    Glucose 122 (H) 65 - 99 mg/dL    BUN 26 (H) 8 - 23 mg/dL    Creatinine 0.98 0.57 - 1.00 mg/dL    Sodium 145 136 - 145 mmol/L    Potassium 3.9 3.5 - 5.2 mmol/L    Chloride 105 98 - 107 mmol/L    CO2 26.3 22.0 - 29.0 mmol/L    Calcium 10.1 8.6 - 10.5 mg/dL    Total Protein 8.1 6.0 - 8.5 g/dL    Albumin 3.90 3.50 - 5.20 g/dL    ALT (SGPT) 7 1 - 33 U/L    AST (SGOT) 24 1 - 32 U/L    Alkaline Phosphatase 100 39 - 117 U/L    Total Bilirubin 0.3 0.0 - 1.2 mg/dL    Globulin 4.2 gm/dL    A/G Ratio 0.9 g/dL    BUN/Creatinine Ratio 26.5 (H) 7.0 - 25.0    Anion Gap 13.7 5.0 - 15.0 mmol/L    eGFR 63.0 >60.0 mL/min/1.73   Lipase    Specimen: Blood   Result Value Ref Range    Lipase 39 13 - 60 U/L "   Urinalysis With Microscopic If Indicated (No Culture) - Urine, Clean Catch    Specimen: Urine, Clean Catch   Result Value Ref Range    Color, UA Yellow Yellow, Straw    Appearance, UA Clear Clear    pH, UA 7.5 5.0 - 8.0    Specific Gravity, UA 1.016 1.005 - 1.030    Glucose, UA Negative Negative    Ketones, UA Negative Negative    Bilirubin, UA Negative Negative    Blood, UA Negative Negative    Protein, UA Trace (A) Negative    Leuk Esterase, UA Small (1+) (A) Negative    Nitrite, UA Negative Negative    Urobilinogen, UA 1.0 E.U./dL 0.2 - 1.0 E.U./dL   CBC Auto Differential    Specimen: Blood   Result Value Ref Range    WBC 3.89 3.40 - 10.80 10*3/mm3    RBC 4.84 3.77 - 5.28 10*6/mm3    Hemoglobin 13.0 12.0 - 15.9 g/dL    Hematocrit 42.2 34.0 - 46.6 %    MCV 87.2 79.0 - 97.0 fL    MCH 26.9 26.6 - 33.0 pg    MCHC 30.8 (L) 31.5 - 35.7 g/dL    RDW 15.8 (H) 12.3 - 15.4 %    RDW-SD 49.8 37.0 - 54.0 fl    MPV 11.8 6.0 - 12.0 fL    Platelets 152 140 - 450 10*3/mm3    Neutrophil % 68.7 42.7 - 76.0 %    Lymphocyte % 20.3 19.6 - 45.3 %    Monocyte % 6.9 5.0 - 12.0 %    Eosinophil % 3.3 0.3 - 6.2 %    Basophil % 0.5 0.0 - 1.5 %    Immature Grans % 0.3 0.0 - 0.5 %    Neutrophils, Absolute 2.67 1.70 - 7.00 10*3/mm3    Lymphocytes, Absolute 0.79 0.70 - 3.10 10*3/mm3    Monocytes, Absolute 0.27 0.10 - 0.90 10*3/mm3    Eosinophils, Absolute 0.13 0.00 - 0.40 10*3/mm3    Basophils, Absolute 0.02 0.00 - 0.20 10*3/mm3    Immature Grans, Absolute 0.01 0.00 - 0.05 10*3/mm3    nRBC 0.0 0.0 - 0.2 /100 WBC   Urinalysis, Microscopic Only - Urine, Clean Catch    Specimen: Urine, Clean Catch   Result Value Ref Range    RBC, UA 0-2 (A) None Seen /HPF    WBC, UA 6-12 (A) None Seen /HPF    Bacteria, UA None Seen None Seen /HPF    Squamous Epithelial Cells, UA 0-2 None Seen, 0-2 /HPF    Hyaline Casts, UA None Seen None Seen /LPF    Methodology Automated Microscopy    Green Top (Gel)   Result Value Ref Range    Extra Tube Hold for add-ons.     Lavender Top   Result Value Ref Range    Extra Tube hold for add-on    Gold Top - SST   Result Value Ref Range    Extra Tube Hold for add-ons.    Light Blue Top   Result Value Ref Range    Extra Tube Hold for add-ons.      Medications   sodium chloride 0.9 % flush 10 mL (has no administration in time range)   cefTRIAXone (ROCEPHIN) IVPB 1 g (1 g Intravenous New Bag 6/4/22 2134)   ketorolac (TORADOL) injection 15 mg (15 mg Intravenous Given 6/4/22 2134)     XR Hip With or Without Pelvis 2 - 3 View Right    Result Date: 6/4/2022  Narrative: PROCEDURE: XR HIP W OR WO PELVIS 2-3 VIEW RIGHT  COMPARISON: None.  INDICATIONS: RIGHT HIP PAIN WITH NO RECENT INJURY.   FINDINGS:  3 views were obtained.  No acute fracture or acute malalignment is identified.  There may be mild-to-moderate degenerative changes of the right hip joint.  Rheumatoid arthritis involving the right hip joint would be in the differential diagnosis.  It is suspected that the patient has undergone a remote Girdlestone procedure on the left.  Generalized osteopenia is suspected, as well.  There are pelvic phleboliths.  Arterial calcifications are seen.  Degenerative changes involve the bilateral sacroiliac joints.  There are degenerative changes of partially imaged lumbar spine.  Pubic osteitis is noted.  If symptoms or clinical concerns persist, consider imaging follow-up.      Impression:   No acute fracture or acute malalignment is identified.  Please see above comments for further detail.     COMMENT:  Part of this note is an electronic transcription of spoken language to printed text. The electronic translation/transcription may permit erroneous, or at times, nonsensical (or even sensical) words or phrases to be inadvertently transcribed or omitted; this  has reviewed the note for such errors (as well as additional errors); however, some may still exist.  VIRI KIM JR, MD       Electronically Signed and Approved By: VIRI KIM JR  MD on 6/04/2022 at 21:32                Procedures/EKGs:  Procedures    EKG:            Medical Decision Making:                     MDM  Number of Diagnoses or Management Options  Right hip pain  Urinary tract infection without hematuria, site unspecified  Diagnosis management comments: I have spoken with the patient. I have explained the patient´s condition, diagnoses and treatment plan based on the information available to me at this time. I have answered the patient's questions and addressed any concerns. The patient has a good  understanding of the patient´s diagnosis, condition, and treatment plan as can be expected at this point. The vital signs have been stable. The patient´s condition is stable and appropriate for discharge from the emergency department.      The patient will pursue further outpatient evaluation with the primary care physician or other designated or consulting physician as outlined in the discharge instructions. They are agreeable to this plan of care and follow-up instructions have been explained in detail. The patient has received these instructions in written format and have expressed an understanding of the discharge instructions. The patient is aware that any significant change in condition or worsening of symptoms should prompt an immediate return to this or the closest emergency department or call to 911.         Amount and/or Complexity of Data Reviewed  Clinical lab tests: ordered and reviewed  Tests in the radiology section of CPT®: ordered and reviewed  Tests in the medicine section of CPT®: ordered and reviewed    Risk of Complications, Morbidity, and/or Mortality  Presenting problems: low  Diagnostic procedures: low  Management options: low    Patient Progress  Patient progress: stable       Final diagnoses:   Right hip pain   Urinary tract infection without hematuria, site unspecified        Disposition:  ED Disposition     ED Disposition   Discharge    Condition   Stable     Comment   --              Patricia Witt, APRN  06/04/22 2146

## 2022-06-05 NOTE — DISCHARGE INSTRUCTIONS
X-rays were negative.  Labs other than urine were normal.  Urine shows mild signs of infection.    Take medication as prescribed for UTI.    Tylenol for pain or NSAID of choice    F/u with pcp    Return for new/worse symtpoms

## 2022-06-05 NOTE — ED TRIAGE NOTES
Pt was brought by [facility] from [facility]. Pt complains of right his pain and suprapubic tenderness upon palpation.

## 2022-07-13 ENCOUNTER — TRANSCRIBE ORDERS (OUTPATIENT)
Dept: ADMINISTRATIVE | Facility: HOSPITAL | Age: 67
End: 2022-07-13

## 2022-07-13 DIAGNOSIS — Z13.820 OSTEOPOROSIS SCREENING: ICD-10-CM

## 2022-07-13 DIAGNOSIS — Z92.89 HISTORY OF MAMMOGRAPHY, SCREENING: Primary | ICD-10-CM

## 2022-07-28 ENCOUNTER — OFFICE VISIT (OUTPATIENT)
Dept: ORTHOPEDIC SURGERY | Facility: CLINIC | Age: 67
End: 2022-07-28

## 2022-07-28 VITALS — WEIGHT: 251 LBS | BODY MASS INDEX: 49.28 KG/M2 | HEIGHT: 60 IN | OXYGEN SATURATION: 94 % | HEART RATE: 77 BPM

## 2022-07-28 DIAGNOSIS — M25.511 RIGHT SHOULDER PAIN, UNSPECIFIED CHRONICITY: Primary | ICD-10-CM

## 2022-07-28 DIAGNOSIS — M19.011 PRIMARY OSTEOARTHRITIS OF RIGHT SHOULDER: ICD-10-CM

## 2022-07-28 PROCEDURE — 20610 DRAIN/INJ JOINT/BURSA W/O US: CPT | Performed by: PHYSICIAN ASSISTANT

## 2022-07-28 RX ORDER — LIDOCAINE HYDROCHLORIDE 10 MG/ML
9 INJECTION, SOLUTION INFILTRATION; PERINEURAL
Status: COMPLETED | OUTPATIENT
Start: 2022-07-28 | End: 2022-07-28

## 2022-07-28 RX ORDER — TRIAMCINOLONE ACETONIDE 40 MG/ML
40 INJECTION, SUSPENSION INTRA-ARTICULAR; INTRAMUSCULAR
Status: COMPLETED | OUTPATIENT
Start: 2022-07-28 | End: 2022-07-28

## 2022-07-28 RX ADMIN — TRIAMCINOLONE ACETONIDE 40 MG: 40 INJECTION, SUSPENSION INTRA-ARTICULAR; INTRAMUSCULAR at 13:16

## 2022-07-28 RX ADMIN — LIDOCAINE HYDROCHLORIDE 9 ML: 10 INJECTION, SOLUTION INFILTRATION; PERINEURAL at 13:16

## 2022-07-28 NOTE — PROGRESS NOTES
"Chief Complaint  Pain and Follow-up of the Right Shoulder    Subjective          Josseline Vargas is a 66 y.o. female  presents to [facility] for   History of Present Illness      Patient presents for follow-up evaluation of right shoulder pain, right shoulder osteoarthritis.  She has been treating her right shoulder pain with injections every 3 months she was last seen 2022 received a right shoulder injection which she states does help her shoulder pain.  She presents in a wheelchair she is a resident at [place], she states she can still move the shoulder but it hurts with certain activities.  They give her pain medication for generalized body aches and pains.  She is requesting right shoulder injection today.      Allergies   Allergen Reactions   • Latex Hives   • Penicillins Rash        Social History     Socioeconomic History   • Marital status:    • Number of children: 9   • Years of education: 12   Tobacco Use   • Smoking status: Former Smoker     Packs/day: 2.50     Start date:      Quit date:      Years since quittin.5   • Smokeless tobacco: Never Used   Vaping Use   • Vaping Use: Never used   Substance and Sexual Activity   • Alcohol use: Yes     Comment: rarely   • Drug use: No   • Sexual activity: Defer        REVIEW OF SYSTEMS    Constitutional: Denies fevers, chills, weight loss  Cardiovascular: Denies chest pain, shortness of breath  Skin: Denies rashes, acute skin changes  Neurologic: Denies headache, loss of consciousness  MSK: Right shoulder pain      Objective   Vital Signs:   Pulse 77   Ht 152.4 cm (60\")   Wt 114 kg (251 lb)   SpO2 94%   BMI 49.02 kg/m²     Body mass index is 49.02 kg/m².    Physical Exam    Right shoulder: Tender palpation anterior lateral and posterior shoulder, mild pain with range of motion, active forward elevation 150, active abduction 100, external rotation with abduction 55, internal rotation to her side, 4-5 " strength, neurovascular intact.    Large Joint Arthrocentesis  Date/Time: 7/28/2022 1:16 PM  Consent given by: patient  Site marked: site marked  Timeout: Immediately prior to procedure a time out was called to verify the correct patient, procedure, equipment, support staff and site/side marked as required   Supporting Documentation  Indications: pain   Procedure Details  Location: shoulder (RIGHT) -   Needle gauge: 21 G.  Medications administered: 40 mg triamcinolone acetonide 40 MG/ML; 9 mL lidocaine 1 %  Patient tolerance: patient tolerated the procedure well with no immediate complications          Imaging Results (Most Recent)     None           Result Review :   The following data was reviewed by: HAWA Bishop on 07/28/2022:               Assessment and Plan    Diagnoses and all orders for this visit:    1. Right shoulder pain, unspecified chronicity (Primary)    2. Primary osteoarthritis of right shoulder        Discussed diagnosis and treatment options with the patient she elected to have right shoulder steroid injection today which she tolerated well, follow-up in 3 months for reevaluation follow-up sooner if any new or concerning symptoms occur, patient agreed.    Call or return if worsening symptoms.    Follow Up   Return in about 3 months (around 10/28/2022) for Recheck.  Patient was given instructions and counseling regarding her condition or for health maintenance advice. Please see specific information pulled into the AVS if appropriate.

## 2022-09-20 ENCOUNTER — APPOINTMENT (OUTPATIENT)
Dept: GENERAL RADIOLOGY | Facility: HOSPITAL | Age: 67
End: 2022-09-20

## 2022-09-20 ENCOUNTER — HOSPITAL ENCOUNTER (INPATIENT)
Facility: HOSPITAL | Age: 67
LOS: 9 days | Discharge: NURSING, REHAB OR LONG-TERM CARE | End: 2022-09-29
Attending: EMERGENCY MEDICINE | Admitting: INTERNAL MEDICINE

## 2022-09-20 DIAGNOSIS — G89.29 CHRONIC ARTHRALGIAS OF KNEES AND HIPS: ICD-10-CM

## 2022-09-20 DIAGNOSIS — A41.9 SEPSIS WITHOUT ACUTE ORGAN DYSFUNCTION, DUE TO UNSPECIFIED ORGANISM: Primary | ICD-10-CM

## 2022-09-20 DIAGNOSIS — R50.9 FEBRILE ILLNESS, ACUTE: ICD-10-CM

## 2022-09-20 DIAGNOSIS — M25.561 CHRONIC ARTHRALGIAS OF KNEES AND HIPS: ICD-10-CM

## 2022-09-20 DIAGNOSIS — N39.0 URINARY TRACT INFECTION WITHOUT HEMATURIA, SITE UNSPECIFIED: ICD-10-CM

## 2022-09-20 DIAGNOSIS — R19.4 CHANGE IN BOWEL HABIT: ICD-10-CM

## 2022-09-20 DIAGNOSIS — M25.551 CHRONIC ARTHRALGIAS OF KNEES AND HIPS: ICD-10-CM

## 2022-09-20 DIAGNOSIS — M25.552 CHRONIC ARTHRALGIAS OF KNEES AND HIPS: ICD-10-CM

## 2022-09-20 DIAGNOSIS — M25.511 RIGHT SHOULDER PAIN, UNSPECIFIED CHRONICITY: ICD-10-CM

## 2022-09-20 DIAGNOSIS — M25.562 CHRONIC ARTHRALGIAS OF KNEES AND HIPS: ICD-10-CM

## 2022-09-20 DIAGNOSIS — R26.2 DIFFICULTY WALKING: ICD-10-CM

## 2022-09-20 DIAGNOSIS — R92.8 ABNORMAL MAMMOGRAM: ICD-10-CM

## 2022-09-20 DIAGNOSIS — Z78.9 DECREASED ACTIVITIES OF DAILY LIVING (ADL): ICD-10-CM

## 2022-09-20 DIAGNOSIS — K74.69 OTHER CIRRHOSIS OF LIVER: ICD-10-CM

## 2022-09-20 LAB
ALBUMIN SERPL-MCNC: 2.8 G/DL (ref 3.5–5.2)
ALBUMIN/GLOB SERPL: 0.6 G/DL
ALP SERPL-CCNC: 80 U/L (ref 39–117)
ALT SERPL W P-5'-P-CCNC: 5 U/L (ref 1–33)
ANION GAP SERPL CALCULATED.3IONS-SCNC: 9.6 MMOL/L (ref 5–15)
AST SERPL-CCNC: 29 U/L (ref 1–32)
BACTERIA UR QL AUTO: ABNORMAL /HPF
BASOPHILS # BLD AUTO: 0.01 10*3/MM3 (ref 0–0.2)
BASOPHILS NFR BLD AUTO: 0.3 % (ref 0–1.5)
BILIRUB SERPL-MCNC: 0.7 MG/DL (ref 0–1.2)
BILIRUB UR QL STRIP: NEGATIVE
BUN SERPL-MCNC: 20 MG/DL (ref 8–23)
BUN/CREAT SERPL: 20.4 (ref 7–25)
CALCIUM SPEC-SCNC: 8.8 MG/DL (ref 8.6–10.5)
CHLORIDE SERPL-SCNC: 103 MMOL/L (ref 98–107)
CLARITY UR: ABNORMAL
CO2 SERPL-SCNC: 25.4 MMOL/L (ref 22–29)
COLOR UR: YELLOW
CREAT SERPL-MCNC: 0.98 MG/DL (ref 0.57–1)
D-LACTATE SERPL-SCNC: 2.6 MMOL/L (ref 0.5–2)
DEPRECATED RDW RBC AUTO: 50.7 FL (ref 37–54)
EGFRCR SERPLBLD CKD-EPI 2021: 63 ML/MIN/1.73
EOSINOPHIL # BLD AUTO: 0.02 10*3/MM3 (ref 0–0.4)
EOSINOPHIL NFR BLD AUTO: 0.6 % (ref 0.3–6.2)
ERYTHROCYTE [DISTWIDTH] IN BLOOD BY AUTOMATED COUNT: 16.6 % (ref 12.3–15.4)
FLUAV AG NPH QL: NEGATIVE
FLUBV AG NPH QL IA: NEGATIVE
GLOBULIN UR ELPH-MCNC: 4.4 GM/DL
GLUCOSE SERPL-MCNC: 165 MG/DL (ref 65–99)
GLUCOSE UR STRIP-MCNC: NEGATIVE MG/DL
HCT VFR BLD AUTO: 38.4 % (ref 34–46.6)
HGB BLD-MCNC: 12.6 G/DL (ref 12–15.9)
HGB UR QL STRIP.AUTO: ABNORMAL
HOLD SPECIMEN: NORMAL
HOLD SPECIMEN: NORMAL
HYALINE CASTS UR QL AUTO: ABNORMAL /LPF
IMM GRANULOCYTES # BLD AUTO: 0.01 10*3/MM3 (ref 0–0.05)
IMM GRANULOCYTES NFR BLD AUTO: 0.3 % (ref 0–0.5)
KETONES UR QL STRIP: NEGATIVE
LEUKOCYTE ESTERASE UR QL STRIP.AUTO: ABNORMAL
LYMPHOCYTES # BLD AUTO: 0.27 10*3/MM3 (ref 0.7–3.1)
LYMPHOCYTES NFR BLD AUTO: 8.2 % (ref 19.6–45.3)
MCH RBC QN AUTO: 27.6 PG (ref 26.6–33)
MCHC RBC AUTO-ENTMCNC: 32.8 G/DL (ref 31.5–35.7)
MCV RBC AUTO: 84.2 FL (ref 79–97)
MONOCYTES # BLD AUTO: 0.21 10*3/MM3 (ref 0.1–0.9)
MONOCYTES NFR BLD AUTO: 6.4 % (ref 5–12)
NEUTROPHILS NFR BLD AUTO: 2.76 10*3/MM3 (ref 1.7–7)
NEUTROPHILS NFR BLD AUTO: 84.2 % (ref 42.7–76)
NITRITE UR QL STRIP: NEGATIVE
NRBC BLD AUTO-RTO: 0 /100 WBC (ref 0–0.2)
NT-PROBNP SERPL-MCNC: 406.1 PG/ML (ref 0–900)
PH UR STRIP.AUTO: 8 [PH] (ref 5–8)
PLATELET # BLD AUTO: 76 10*3/MM3 (ref 140–450)
PMV BLD AUTO: 11.9 FL (ref 6–12)
POTASSIUM SERPL-SCNC: 3.5 MMOL/L (ref 3.5–5.2)
PROT SERPL-MCNC: 7.2 G/DL (ref 6–8.5)
PROT UR QL STRIP: ABNORMAL
RBC # BLD AUTO: 4.56 10*6/MM3 (ref 3.77–5.28)
RBC # UR STRIP: ABNORMAL /HPF
REF LAB TEST METHOD: ABNORMAL
SODIUM SERPL-SCNC: 138 MMOL/L (ref 136–145)
SP GR UR STRIP: 1.01 (ref 1–1.03)
SQUAMOUS #/AREA URNS HPF: ABNORMAL /HPF
TROPONIN T SERPL-MCNC: <0.01 NG/ML (ref 0–0.03)
UROBILINOGEN UR QL STRIP: ABNORMAL
WBC # UR STRIP: ABNORMAL /HPF
WBC NRBC COR # BLD: 3.28 10*3/MM3 (ref 3.4–10.8)
WHOLE BLOOD HOLD COAG: NORMAL
WHOLE BLOOD HOLD SPECIMEN: NORMAL

## 2022-09-20 PROCEDURE — 87804 INFLUENZA ASSAY W/OPTIC: CPT | Performed by: EMERGENCY MEDICINE

## 2022-09-20 PROCEDURE — 93005 ELECTROCARDIOGRAM TRACING: CPT

## 2022-09-20 PROCEDURE — 85025 COMPLETE CBC W/AUTO DIFF WBC: CPT

## 2022-09-20 PROCEDURE — 83605 ASSAY OF LACTIC ACID: CPT | Performed by: EMERGENCY MEDICINE

## 2022-09-20 PROCEDURE — 25010000002 VANCOMYCIN 5 G RECONSTITUTED SOLUTION: Performed by: EMERGENCY MEDICINE

## 2022-09-20 PROCEDURE — 81001 URINALYSIS AUTO W/SCOPE: CPT | Performed by: EMERGENCY MEDICINE

## 2022-09-20 PROCEDURE — 88312 SPECIAL STAINS GROUP 1: CPT | Performed by: EMERGENCY MEDICINE

## 2022-09-20 PROCEDURE — P9612 CATHETERIZE FOR URINE SPEC: HCPCS

## 2022-09-20 PROCEDURE — 93005 ELECTROCARDIOGRAM TRACING: CPT | Performed by: EMERGENCY MEDICINE

## 2022-09-20 PROCEDURE — 83880 ASSAY OF NATRIURETIC PEPTIDE: CPT | Performed by: EMERGENCY MEDICINE

## 2022-09-20 PROCEDURE — 84484 ASSAY OF TROPONIN QUANT: CPT | Performed by: EMERGENCY MEDICINE

## 2022-09-20 PROCEDURE — 71045 X-RAY EXAM CHEST 1 VIEW: CPT

## 2022-09-20 PROCEDURE — 93010 ELECTROCARDIOGRAM REPORT: CPT | Performed by: INTERNAL MEDICINE

## 2022-09-20 PROCEDURE — 87040 BLOOD CULTURE FOR BACTERIA: CPT | Performed by: EMERGENCY MEDICINE

## 2022-09-20 PROCEDURE — 25010000002 CEFEPIME PER 500 MG: Performed by: EMERGENCY MEDICINE

## 2022-09-20 PROCEDURE — U0004 COV-19 TEST NON-CDC HGH THRU: HCPCS | Performed by: EMERGENCY MEDICINE

## 2022-09-20 PROCEDURE — 80053 COMPREHEN METABOLIC PANEL: CPT | Performed by: EMERGENCY MEDICINE

## 2022-09-20 PROCEDURE — 87150 DNA/RNA AMPLIFIED PROBE: CPT | Performed by: EMERGENCY MEDICINE

## 2022-09-20 PROCEDURE — 87186 SC STD MICRODIL/AGAR DIL: CPT | Performed by: EMERGENCY MEDICINE

## 2022-09-20 PROCEDURE — 99223 1ST HOSP IP/OBS HIGH 75: CPT | Performed by: FAMILY MEDICINE

## 2022-09-20 PROCEDURE — 99285 EMERGENCY DEPT VISIT HI MDM: CPT

## 2022-09-20 PROCEDURE — 87147 CULTURE TYPE IMMUNOLOGIC: CPT | Performed by: EMERGENCY MEDICINE

## 2022-09-20 PROCEDURE — 87086 URINE CULTURE/COLONY COUNT: CPT | Performed by: EMERGENCY MEDICINE

## 2022-09-20 PROCEDURE — 82962 GLUCOSE BLOOD TEST: CPT

## 2022-09-20 RX ORDER — SODIUM CHLORIDE 0.9 % (FLUSH) 0.9 %
10 SYRINGE (ML) INJECTION AS NEEDED
Status: DISCONTINUED | OUTPATIENT
Start: 2022-09-20 | End: 2022-09-29 | Stop reason: HOSPADM

## 2022-09-20 RX ORDER — ACETAMINOPHEN 325 MG/1
650 TABLET ORAL ONCE
Status: COMPLETED | OUTPATIENT
Start: 2022-09-20 | End: 2022-09-20

## 2022-09-20 RX ADMIN — VANCOMYCIN HYDROCHLORIDE 2250 MG: 5 INJECTION, POWDER, LYOPHILIZED, FOR SOLUTION INTRAVENOUS at 21:05

## 2022-09-20 RX ADMIN — CEFEPIME HYDROCHLORIDE 2 G: 2 INJECTION, POWDER, FOR SOLUTION INTRAMUSCULAR; INTRAVENOUS at 20:23

## 2022-09-20 RX ADMIN — ACETAMINOPHEN 650 MG: 325 TABLET ORAL at 20:23

## 2022-09-21 ENCOUNTER — APPOINTMENT (OUTPATIENT)
Dept: CT IMAGING | Facility: HOSPITAL | Age: 67
End: 2022-09-21

## 2022-09-21 LAB
ANION GAP SERPL CALCULATED.3IONS-SCNC: 10.5 MMOL/L (ref 5–15)
BACTERIA BLD CULT: ABNORMAL
BACTERIA BLD CULT: ABNORMAL
BACTERIA ID TEST ISLT QL CULT: ABNORMAL
BACTERIA SPEC AEROBE CULT: NORMAL
BASOPHILS # BLD AUTO: 0.02 10*3/MM3 (ref 0–0.2)
BASOPHILS NFR BLD AUTO: 0.6 % (ref 0–1.5)
BUN SERPL-MCNC: 20 MG/DL (ref 8–23)
BUN/CREAT SERPL: 23.3 (ref 7–25)
CALCIUM SPEC-SCNC: 8.3 MG/DL (ref 8.6–10.5)
CHLORIDE SERPL-SCNC: 100 MMOL/L (ref 98–107)
CO2 SERPL-SCNC: 24.5 MMOL/L (ref 22–29)
CREAT SERPL-MCNC: 0.86 MG/DL (ref 0.57–1)
D-LACTATE SERPL-SCNC: 1.7 MMOL/L (ref 0.5–2)
D-LACTATE SERPL-SCNC: 2 MMOL/L (ref 0.5–2)
D-LACTATE SERPL-SCNC: 2.3 MMOL/L (ref 0.5–2)
DEPRECATED RDW RBC AUTO: 51.2 FL (ref 37–54)
EGFRCR SERPLBLD CKD-EPI 2021: 73.7 ML/MIN/1.73
EOSINOPHIL # BLD AUTO: 0.05 10*3/MM3 (ref 0–0.4)
EOSINOPHIL NFR BLD AUTO: 1.5 % (ref 0.3–6.2)
ERYTHROCYTE [DISTWIDTH] IN BLOOD BY AUTOMATED COUNT: 16.5 % (ref 12.3–15.4)
GLUCOSE BLDC GLUCOMTR-MCNC: 168 MG/DL (ref 70–99)
GLUCOSE SERPL-MCNC: 158 MG/DL (ref 65–99)
HCT VFR BLD AUTO: 39.6 % (ref 34–46.6)
HGB BLD-MCNC: 12.5 G/DL (ref 12–15.9)
IMM GRANULOCYTES # BLD AUTO: 0.02 10*3/MM3 (ref 0–0.05)
IMM GRANULOCYTES NFR BLD AUTO: 0.6 % (ref 0–0.5)
LYMPHOCYTES # BLD AUTO: 0.33 10*3/MM3 (ref 0.7–3.1)
LYMPHOCYTES NFR BLD AUTO: 10.1 % (ref 19.6–45.3)
MCH RBC QN AUTO: 27.2 PG (ref 26.6–33)
MCHC RBC AUTO-ENTMCNC: 31.6 G/DL (ref 31.5–35.7)
MCV RBC AUTO: 86.1 FL (ref 79–97)
MONOCYTES # BLD AUTO: 0.3 10*3/MM3 (ref 0.1–0.9)
MONOCYTES NFR BLD AUTO: 9.1 % (ref 5–12)
MRSA DNA SPEC QL NAA+PROBE: ABNORMAL
NEUTROPHILS NFR BLD AUTO: 2.56 10*3/MM3 (ref 1.7–7)
NEUTROPHILS NFR BLD AUTO: 78.1 % (ref 42.7–76)
NRBC BLD AUTO-RTO: 0 /100 WBC (ref 0–0.2)
PLATELET # BLD AUTO: 71 10*3/MM3 (ref 140–450)
PMV BLD AUTO: ABNORMAL FL
POTASSIUM SERPL-SCNC: 3.4 MMOL/L (ref 3.5–5.2)
QT INTERVAL: 292 MS
RBC # BLD AUTO: 4.6 10*6/MM3 (ref 3.77–5.28)
SARS-COV-2 RNA PNL SPEC NAA+PROBE: NOT DETECTED
SODIUM SERPL-SCNC: 135 MMOL/L (ref 136–145)
VANCOMYCIN SERPL-MCNC: 20.86 MCG/ML (ref 5–40)
WBC NRBC COR # BLD: 3.28 10*3/MM3 (ref 3.4–10.8)

## 2022-09-21 PROCEDURE — 25010000002 CEFEPIME PER 500 MG: Performed by: FAMILY MEDICINE

## 2022-09-21 PROCEDURE — 63710000001 INSULIN LISPRO (HUMAN) PER 5 UNITS: Performed by: INTERNAL MEDICINE

## 2022-09-21 PROCEDURE — 25010000002 VANCOMYCIN 5 G RECONSTITUTED SOLUTION: Performed by: FAMILY MEDICINE

## 2022-09-21 PROCEDURE — 82962 GLUCOSE BLOOD TEST: CPT

## 2022-09-21 PROCEDURE — 85025 COMPLETE CBC W/AUTO DIFF WBC: CPT | Performed by: FAMILY MEDICINE

## 2022-09-21 PROCEDURE — 80048 BASIC METABOLIC PNL TOTAL CA: CPT | Performed by: FAMILY MEDICINE

## 2022-09-21 PROCEDURE — 25010000002 HEPARIN (PORCINE) PER 1000 UNITS: Performed by: FAMILY MEDICINE

## 2022-09-21 PROCEDURE — C1751 CATH, INF, PER/CENT/MIDLINE: HCPCS

## 2022-09-21 PROCEDURE — 99233 SBSQ HOSP IP/OBS HIGH 50: CPT | Performed by: INTERNAL MEDICINE

## 2022-09-21 PROCEDURE — 94799 UNLISTED PULMONARY SVC/PX: CPT

## 2022-09-21 PROCEDURE — 25010000002 MORPHINE PER 10 MG: Performed by: FAMILY MEDICINE

## 2022-09-21 PROCEDURE — 36410 VNPNXR 3YR/> PHY/QHP DX/THER: CPT

## 2022-09-21 PROCEDURE — 87040 BLOOD CULTURE FOR BACTERIA: CPT | Performed by: INTERNAL MEDICINE

## 2022-09-21 PROCEDURE — 70491 CT SOFT TISSUE NECK W/DYE: CPT

## 2022-09-21 PROCEDURE — 94761 N-INVAS EAR/PLS OXIMETRY MLT: CPT

## 2022-09-21 PROCEDURE — 87641 MR-STAPH DNA AMP PROBE: CPT | Performed by: FAMILY MEDICINE

## 2022-09-21 PROCEDURE — 87102 FUNGUS ISOLATION CULTURE: CPT | Performed by: INTERNAL MEDICINE

## 2022-09-21 PROCEDURE — 0 IOPAMIDOL PER 1 ML: Performed by: INTERNAL MEDICINE

## 2022-09-21 PROCEDURE — 83605 ASSAY OF LACTIC ACID: CPT | Performed by: FAMILY MEDICINE

## 2022-09-21 PROCEDURE — 36415 COLL VENOUS BLD VENIPUNCTURE: CPT | Performed by: FAMILY MEDICINE

## 2022-09-21 PROCEDURE — 83605 ASSAY OF LACTIC ACID: CPT | Performed by: INTERNAL MEDICINE

## 2022-09-21 PROCEDURE — 80202 ASSAY OF VANCOMYCIN: CPT | Performed by: FAMILY MEDICINE

## 2022-09-21 RX ORDER — METRONIDAZOLE 500 MG/100ML
500 INJECTION, SOLUTION INTRAVENOUS EVERY 8 HOURS
Status: DISCONTINUED | OUTPATIENT
Start: 2022-09-21 | End: 2022-09-24

## 2022-09-21 RX ORDER — INSULIN GLARGINE 100 [IU]/ML
28 INJECTION, SOLUTION SUBCUTANEOUS DAILY
COMMUNITY

## 2022-09-21 RX ORDER — HEPARIN SODIUM 5000 [USP'U]/ML
5000 INJECTION, SOLUTION INTRAVENOUS; SUBCUTANEOUS EVERY 8 HOURS SCHEDULED
Status: DISCONTINUED | OUTPATIENT
Start: 2022-09-21 | End: 2022-09-29 | Stop reason: HOSPADM

## 2022-09-21 RX ORDER — VENLAFAXINE 37.5 MG/1
37.5 TABLET ORAL DAILY
Status: DISCONTINUED | OUTPATIENT
Start: 2022-09-22 | End: 2022-09-29 | Stop reason: HOSPADM

## 2022-09-21 RX ORDER — ONDANSETRON 2 MG/ML
4 INJECTION INTRAMUSCULAR; INTRAVENOUS EVERY 6 HOURS PRN
Status: DISCONTINUED | OUTPATIENT
Start: 2022-09-21 | End: 2022-09-29 | Stop reason: HOSPADM

## 2022-09-21 RX ORDER — NYSTATIN 100000 [USP'U]/G
1 POWDER TOPICAL 2 TIMES DAILY
COMMUNITY

## 2022-09-21 RX ORDER — CHOLECALCIFEROL (VITAMIN D3) 125 MCG
5 CAPSULE ORAL NIGHTLY PRN
Status: DISCONTINUED | OUTPATIENT
Start: 2022-09-21 | End: 2022-09-24

## 2022-09-21 RX ORDER — BUSPIRONE HYDROCHLORIDE 10 MG/1
10 TABLET ORAL 3 TIMES DAILY
Status: DISCONTINUED | OUTPATIENT
Start: 2022-09-21 | End: 2022-09-29 | Stop reason: HOSPADM

## 2022-09-21 RX ORDER — PHENYTOIN 50 MG/1
100 TABLET, CHEWABLE ORAL NIGHTLY
Status: DISCONTINUED | OUTPATIENT
Start: 2022-09-21 | End: 2022-09-29 | Stop reason: HOSPADM

## 2022-09-21 RX ORDER — OXYCODONE HYDROCHLORIDE 5 MG/1
5 TABLET ORAL EVERY 4 HOURS PRN
Status: DISCONTINUED | OUTPATIENT
Start: 2022-09-21 | End: 2022-09-29 | Stop reason: HOSPADM

## 2022-09-21 RX ORDER — SODIUM CHLORIDE 0.9 % (FLUSH) 0.9 %
10 SYRINGE (ML) INJECTION AS NEEDED
Status: DISCONTINUED | OUTPATIENT
Start: 2022-09-21 | End: 2022-09-29 | Stop reason: HOSPADM

## 2022-09-21 RX ORDER — PHENYTOIN 50 MG/1
100 TABLET, CHEWABLE ORAL NIGHTLY
COMMUNITY

## 2022-09-21 RX ORDER — ACETAMINOPHEN 650 MG/1
650 SUPPOSITORY RECTAL EVERY 4 HOURS PRN
Status: DISCONTINUED | OUTPATIENT
Start: 2022-09-21 | End: 2022-09-29 | Stop reason: HOSPADM

## 2022-09-21 RX ORDER — BISACODYL 10 MG
10 SUPPOSITORY, RECTAL RECTAL DAILY PRN
Status: DISCONTINUED | OUTPATIENT
Start: 2022-09-21 | End: 2022-09-29 | Stop reason: HOSPADM

## 2022-09-21 RX ORDER — BISACODYL 10 MG
10 SUPPOSITORY, RECTAL RECTAL DAILY PRN
COMMUNITY

## 2022-09-21 RX ORDER — SODIUM CHLORIDE 0.9 % (FLUSH) 0.9 %
10 SYRINGE (ML) INJECTION EVERY 12 HOURS SCHEDULED
Status: DISCONTINUED | OUTPATIENT
Start: 2022-09-21 | End: 2022-09-29 | Stop reason: HOSPADM

## 2022-09-21 RX ORDER — ACETAMINOPHEN 325 MG/1
650 TABLET ORAL EVERY 4 HOURS PRN
Status: DISCONTINUED | OUTPATIENT
Start: 2022-09-21 | End: 2022-09-29 | Stop reason: HOSPADM

## 2022-09-21 RX ORDER — BISACODYL 5 MG/1
5 TABLET, DELAYED RELEASE ORAL DAILY PRN
Status: DISCONTINUED | OUTPATIENT
Start: 2022-09-21 | End: 2022-09-29 | Stop reason: HOSPADM

## 2022-09-21 RX ORDER — DOCUSATE SODIUM 100 MG/1
100 CAPSULE, LIQUID FILLED ORAL 2 TIMES DAILY
COMMUNITY

## 2022-09-21 RX ORDER — IPRATROPIUM BROMIDE AND ALBUTEROL SULFATE 2.5; .5 MG/3ML; MG/3ML
3 SOLUTION RESPIRATORY (INHALATION) EVERY 8 HOURS PRN
COMMUNITY

## 2022-09-21 RX ORDER — POLYETHYLENE GLYCOL 3350 17 G/17G
17 POWDER, FOR SOLUTION ORAL DAILY PRN
Status: DISCONTINUED | OUTPATIENT
Start: 2022-09-21 | End: 2022-09-29 | Stop reason: HOSPADM

## 2022-09-21 RX ORDER — AMOXICILLIN 250 MG
2 CAPSULE ORAL 2 TIMES DAILY
Status: DISCONTINUED | OUTPATIENT
Start: 2022-09-21 | End: 2022-09-29 | Stop reason: HOSPADM

## 2022-09-21 RX ORDER — MORPHINE SULFATE 2 MG/ML
2 INJECTION, SOLUTION INTRAMUSCULAR; INTRAVENOUS
Status: DISCONTINUED | OUTPATIENT
Start: 2022-09-21 | End: 2022-09-29 | Stop reason: HOSPADM

## 2022-09-21 RX ORDER — DEXTROSE MONOHYDRATE 25 G/50ML
25 INJECTION, SOLUTION INTRAVENOUS
Status: DISCONTINUED | OUTPATIENT
Start: 2022-09-21 | End: 2022-09-29 | Stop reason: HOSPADM

## 2022-09-21 RX ORDER — ACETAMINOPHEN 500 MG
500 TABLET ORAL EVERY 8 HOURS PRN
COMMUNITY

## 2022-09-21 RX ORDER — LORATADINE 10 MG/1
10 TABLET ORAL DAILY
Status: ON HOLD | COMMUNITY
End: 2022-11-14

## 2022-09-21 RX ORDER — INSULIN LISPRO 100 [IU]/ML
0-7 INJECTION, SOLUTION INTRAVENOUS; SUBCUTANEOUS
Status: DISCONTINUED | OUTPATIENT
Start: 2022-09-21 | End: 2022-09-29 | Stop reason: HOSPADM

## 2022-09-21 RX ORDER — ANASTROZOLE 1 MG/1
1 TABLET ORAL DAILY
Status: DISCONTINUED | OUTPATIENT
Start: 2022-09-22 | End: 2022-09-29 | Stop reason: HOSPADM

## 2022-09-21 RX ORDER — SODIUM CHLORIDE 9 MG/ML
40 INJECTION, SOLUTION INTRAVENOUS AS NEEDED
Status: DISCONTINUED | OUTPATIENT
Start: 2022-09-21 | End: 2022-09-29 | Stop reason: HOSPADM

## 2022-09-21 RX ORDER — SIMETHICONE 125 MG
125 TABLET,CHEWABLE ORAL EVERY 6 HOURS PRN
COMMUNITY

## 2022-09-21 RX ORDER — DOXYCYCLINE HYCLATE 100 MG/1
100 CAPSULE ORAL 2 TIMES DAILY
COMMUNITY
Start: 2022-09-19 | End: 2022-09-29 | Stop reason: HOSPADM

## 2022-09-21 RX ORDER — TOPIRAMATE 25 MG/1
25 TABLET ORAL EVERY MORNING
COMMUNITY

## 2022-09-21 RX ORDER — PRAZOSIN HYDROCHLORIDE 1 MG/1
1 CAPSULE ORAL NIGHTLY
COMMUNITY

## 2022-09-21 RX ORDER — POTASSIUM CHLORIDE 750 MG/1
10 TABLET, FILM COATED, EXTENDED RELEASE ORAL DAILY
COMMUNITY

## 2022-09-21 RX ORDER — MULTIVIT-MIN/IRON/FOLIC ACID/K 18-600-40
2000 CAPSULE ORAL DAILY
COMMUNITY

## 2022-09-21 RX ORDER — ACETAMINOPHEN 160 MG/5ML
650 SOLUTION ORAL EVERY 4 HOURS PRN
Status: DISCONTINUED | OUTPATIENT
Start: 2022-09-21 | End: 2022-09-29 | Stop reason: HOSPADM

## 2022-09-21 RX ORDER — NICOTINE POLACRILEX 4 MG
15 LOZENGE BUCCAL
Status: DISCONTINUED | OUTPATIENT
Start: 2022-09-21 | End: 2022-09-29 | Stop reason: HOSPADM

## 2022-09-21 RX ORDER — ONDANSETRON 4 MG/1
4 TABLET, FILM COATED ORAL EVERY 8 HOURS PRN
COMMUNITY
Start: 2022-06-19

## 2022-09-21 RX ADMIN — IOPAMIDOL 100 ML: 755 INJECTION, SOLUTION INTRAVENOUS at 07:46

## 2022-09-21 RX ADMIN — HEPARIN SODIUM 5000 UNITS: 5000 INJECTION INTRAVENOUS; SUBCUTANEOUS at 05:04

## 2022-09-21 RX ADMIN — HEPARIN SODIUM 5000 UNITS: 5000 INJECTION INTRAVENOUS; SUBCUTANEOUS at 21:07

## 2022-09-21 RX ADMIN — PHENYTOIN 100 MG: 50 TABLET, CHEWABLE ORAL at 21:07

## 2022-09-21 RX ADMIN — CEFEPIME HYDROCHLORIDE 2 G: 2 INJECTION, POWDER, FOR SOLUTION INTRAMUSCULAR; INTRAVENOUS at 05:04

## 2022-09-21 RX ADMIN — INSULIN LISPRO 2 UNITS: 100 INJECTION, SOLUTION INTRAVENOUS; SUBCUTANEOUS at 17:28

## 2022-09-21 RX ADMIN — METRONIDAZOLE 500 MG: 500 INJECTION, SOLUTION INTRAVENOUS at 18:22

## 2022-09-21 RX ADMIN — CEFEPIME HYDROCHLORIDE 2 G: 2 INJECTION, POWDER, FOR SOLUTION INTRAMUSCULAR; INTRAVENOUS at 13:47

## 2022-09-21 RX ADMIN — SENNOSIDES AND DOCUSATE SODIUM 2 TABLET: 8.6; 5 TABLET ORAL at 21:08

## 2022-09-21 RX ADMIN — MORPHINE SULFATE 2 MG: 2 INJECTION, SOLUTION INTRAMUSCULAR; INTRAVENOUS at 18:26

## 2022-09-21 RX ADMIN — OXYCODONE HYDROCHLORIDE 5 MG: 5 TABLET ORAL at 16:15

## 2022-09-21 RX ADMIN — BUSPIRONE HYDROCHLORIDE 10 MG: 10 TABLET ORAL at 21:07

## 2022-09-21 RX ADMIN — CEFEPIME HYDROCHLORIDE 2 G: 2 INJECTION, POWDER, FOR SOLUTION INTRAMUSCULAR; INTRAVENOUS at 21:07

## 2022-09-21 RX ADMIN — Medication 10 ML: at 01:06

## 2022-09-21 RX ADMIN — MORPHINE SULFATE 2 MG: 2 INJECTION, SOLUTION INTRAMUSCULAR; INTRAVENOUS at 06:39

## 2022-09-21 RX ADMIN — ACETAMINOPHEN 650 MG: 325 TABLET ORAL at 01:26

## 2022-09-21 RX ADMIN — Medication 10 ML: at 21:08

## 2022-09-21 RX ADMIN — METRONIDAZOLE 500 MG: 500 INJECTION, SOLUTION INTRAVENOUS at 13:39

## 2022-09-21 RX ADMIN — HEPARIN SODIUM 5000 UNITS: 5000 INJECTION INTRAVENOUS; SUBCUTANEOUS at 15:05

## 2022-09-21 RX ADMIN — BUSPIRONE HYDROCHLORIDE 10 MG: 10 TABLET ORAL at 17:18

## 2022-09-21 RX ADMIN — VANCOMYCIN HYDROCHLORIDE 1250 MG: 5 INJECTION, POWDER, LYOPHILIZED, FOR SOLUTION INTRAVENOUS at 22:16

## 2022-09-21 RX ADMIN — OXYCODONE HYDROCHLORIDE 5 MG: 5 TABLET ORAL at 09:31

## 2022-09-22 LAB
ALBUMIN SERPL-MCNC: 2.6 G/DL (ref 3.5–5.2)
ALBUMIN/GLOB SERPL: 0.6 G/DL
ALP SERPL-CCNC: 77 U/L (ref 39–117)
ALT SERPL W P-5'-P-CCNC: 6 U/L (ref 1–33)
ANION GAP SERPL CALCULATED.3IONS-SCNC: 7.6 MMOL/L (ref 5–15)
AST SERPL-CCNC: 52 U/L (ref 1–32)
BILIRUB SERPL-MCNC: 0.5 MG/DL (ref 0–1.2)
BUN SERPL-MCNC: 18 MG/DL (ref 8–23)
BUN/CREAT SERPL: 23.1 (ref 7–25)
CALCIUM SPEC-SCNC: 8.6 MG/DL (ref 8.6–10.5)
CHLORIDE SERPL-SCNC: 107 MMOL/L (ref 98–107)
CO2 SERPL-SCNC: 23.4 MMOL/L (ref 22–29)
CREAT SERPL-MCNC: 0.78 MG/DL (ref 0.57–1)
DEPRECATED RDW RBC AUTO: 51.4 FL (ref 37–54)
EGFRCR SERPLBLD CKD-EPI 2021: 82.9 ML/MIN/1.73
ERYTHROCYTE [DISTWIDTH] IN BLOOD BY AUTOMATED COUNT: 16.6 % (ref 12.3–15.4)
GLOBULIN UR ELPH-MCNC: 4.2 GM/DL
GLUCOSE BLDC GLUCOMTR-MCNC: 151 MG/DL (ref 70–99)
GLUCOSE BLDC GLUCOMTR-MCNC: 168 MG/DL (ref 70–99)
GLUCOSE BLDC GLUCOMTR-MCNC: 170 MG/DL (ref 70–99)
GLUCOSE BLDC GLUCOMTR-MCNC: 185 MG/DL (ref 70–99)
GLUCOSE SERPL-MCNC: 165 MG/DL (ref 65–99)
HCT VFR BLD AUTO: 36.3 % (ref 34–46.6)
HGB BLD-MCNC: 11.7 G/DL (ref 12–15.9)
MAGNESIUM SERPL-MCNC: 1.4 MG/DL (ref 1.6–2.4)
MCH RBC QN AUTO: 27.2 PG (ref 26.6–33)
MCHC RBC AUTO-ENTMCNC: 32.2 G/DL (ref 31.5–35.7)
MCV RBC AUTO: 84.4 FL (ref 79–97)
PLATELET # BLD AUTO: 87 10*3/MM3 (ref 140–450)
PMV BLD AUTO: 13.2 FL (ref 6–12)
POTASSIUM SERPL-SCNC: 3.5 MMOL/L (ref 3.5–5.2)
PROT SERPL-MCNC: 6.8 G/DL (ref 6–8.5)
RBC # BLD AUTO: 4.3 10*6/MM3 (ref 3.77–5.28)
SODIUM SERPL-SCNC: 138 MMOL/L (ref 136–145)
WBC NRBC COR # BLD: 3.75 10*3/MM3 (ref 3.4–10.8)

## 2022-09-22 PROCEDURE — 80053 COMPREHEN METABOLIC PANEL: CPT | Performed by: INTERNAL MEDICINE

## 2022-09-22 PROCEDURE — 25010000002 CEFEPIME PER 500 MG: Performed by: FAMILY MEDICINE

## 2022-09-22 PROCEDURE — 99233 SBSQ HOSP IP/OBS HIGH 50: CPT | Performed by: INTERNAL MEDICINE

## 2022-09-22 PROCEDURE — 25010000002 HEPARIN (PORCINE) PER 1000 UNITS: Performed by: FAMILY MEDICINE

## 2022-09-22 PROCEDURE — 63710000001 INSULIN LISPRO (HUMAN) PER 5 UNITS: Performed by: INTERNAL MEDICINE

## 2022-09-22 PROCEDURE — 97161 PT EVAL LOW COMPLEX 20 MIN: CPT

## 2022-09-22 PROCEDURE — 82962 GLUCOSE BLOOD TEST: CPT

## 2022-09-22 PROCEDURE — 97165 OT EVAL LOW COMPLEX 30 MIN: CPT

## 2022-09-22 PROCEDURE — 83735 ASSAY OF MAGNESIUM: CPT | Performed by: INTERNAL MEDICINE

## 2022-09-22 PROCEDURE — 25010000002 VANCOMYCIN 5 G RECONSTITUTED SOLUTION: Performed by: FAMILY MEDICINE

## 2022-09-22 PROCEDURE — 85027 COMPLETE CBC AUTOMATED: CPT | Performed by: INTERNAL MEDICINE

## 2022-09-22 PROCEDURE — 25010000002 MORPHINE PER 10 MG: Performed by: FAMILY MEDICINE

## 2022-09-22 PROCEDURE — 25010000002 MAGNESIUM SULFATE 2 GM/50ML SOLUTION: Performed by: INTERNAL MEDICINE

## 2022-09-22 RX ORDER — MAGNESIUM SULFATE HEPTAHYDRATE 40 MG/ML
2 INJECTION, SOLUTION INTRAVENOUS ONCE
Status: COMPLETED | OUTPATIENT
Start: 2022-09-22 | End: 2022-09-22

## 2022-09-22 RX ORDER — POTASSIUM CHLORIDE 750 MG/1
40 CAPSULE, EXTENDED RELEASE ORAL EVERY 4 HOURS
Status: COMPLETED | OUTPATIENT
Start: 2022-09-22 | End: 2022-09-22

## 2022-09-22 RX ADMIN — VENLAFAXINE HYDROCHLORIDE 37.5 MG: 37.5 TABLET ORAL at 08:52

## 2022-09-22 RX ADMIN — OXYCODONE HYDROCHLORIDE 5 MG: 5 TABLET ORAL at 20:59

## 2022-09-22 RX ADMIN — METRONIDAZOLE 500 MG: 500 INJECTION, SOLUTION INTRAVENOUS at 17:18

## 2022-09-22 RX ADMIN — CEFEPIME HYDROCHLORIDE 2 G: 2 INJECTION, POWDER, FOR SOLUTION INTRAMUSCULAR; INTRAVENOUS at 12:29

## 2022-09-22 RX ADMIN — BUSPIRONE HYDROCHLORIDE 10 MG: 10 TABLET ORAL at 08:51

## 2022-09-22 RX ADMIN — MORPHINE SULFATE 2 MG: 2 INJECTION, SOLUTION INTRAMUSCULAR; INTRAVENOUS at 02:04

## 2022-09-22 RX ADMIN — CEFEPIME HYDROCHLORIDE 2 G: 2 INJECTION, POWDER, FOR SOLUTION INTRAMUSCULAR; INTRAVENOUS at 05:13

## 2022-09-22 RX ADMIN — PHENYTOIN 100 MG: 50 TABLET, CHEWABLE ORAL at 20:54

## 2022-09-22 RX ADMIN — HEPARIN SODIUM 5000 UNITS: 5000 INJECTION INTRAVENOUS; SUBCUTANEOUS at 21:01

## 2022-09-22 RX ADMIN — BUSPIRONE HYDROCHLORIDE 10 MG: 10 TABLET ORAL at 20:54

## 2022-09-22 RX ADMIN — OXYCODONE HYDROCHLORIDE 5 MG: 5 TABLET ORAL at 14:53

## 2022-09-22 RX ADMIN — METRONIDAZOLE 500 MG: 500 INJECTION, SOLUTION INTRAVENOUS at 11:31

## 2022-09-22 RX ADMIN — INSULIN LISPRO 2 UNITS: 100 INJECTION, SOLUTION INTRAVENOUS; SUBCUTANEOUS at 08:52

## 2022-09-22 RX ADMIN — Medication 10 ML: at 08:53

## 2022-09-22 RX ADMIN — MAGNESIUM SULFATE HEPTAHYDRATE 2 G: 40 INJECTION, SOLUTION INTRAVENOUS at 09:18

## 2022-09-22 RX ADMIN — Medication 10 ML: at 22:04

## 2022-09-22 RX ADMIN — BUSPIRONE HYDROCHLORIDE 10 MG: 10 TABLET ORAL at 15:20

## 2022-09-22 RX ADMIN — ANASTROZOLE 1 MG: 1 TABLET ORAL at 08:51

## 2022-09-22 RX ADMIN — Medication 10 ML: at 00:10

## 2022-09-22 RX ADMIN — Medication 10 ML: at 20:54

## 2022-09-22 RX ADMIN — METRONIDAZOLE 500 MG: 500 INJECTION, SOLUTION INTRAVENOUS at 02:30

## 2022-09-22 RX ADMIN — POTASSIUM CHLORIDE 40 MEQ: 10 CAPSULE, COATED, EXTENDED RELEASE ORAL at 08:51

## 2022-09-22 RX ADMIN — Medication 5 MG: at 20:54

## 2022-09-22 RX ADMIN — HEPARIN SODIUM 5000 UNITS: 5000 INJECTION INTRAVENOUS; SUBCUTANEOUS at 05:13

## 2022-09-22 RX ADMIN — INSULIN LISPRO 2 UNITS: 100 INJECTION, SOLUTION INTRAVENOUS; SUBCUTANEOUS at 12:28

## 2022-09-22 RX ADMIN — OXYCODONE HYDROCHLORIDE 5 MG: 5 TABLET ORAL at 07:30

## 2022-09-22 RX ADMIN — VANCOMYCIN HYDROCHLORIDE 1500 MG: 5 INJECTION, POWDER, LYOPHILIZED, FOR SOLUTION INTRAVENOUS at 21:00

## 2022-09-22 RX ADMIN — CEFEPIME HYDROCHLORIDE 2 G: 2 INJECTION, POWDER, FOR SOLUTION INTRAMUSCULAR; INTRAVENOUS at 20:55

## 2022-09-22 RX ADMIN — HEPARIN SODIUM 5000 UNITS: 5000 INJECTION INTRAVENOUS; SUBCUTANEOUS at 13:18

## 2022-09-22 RX ADMIN — SENNOSIDES AND DOCUSATE SODIUM 2 TABLET: 8.6; 5 TABLET ORAL at 20:54

## 2022-09-22 RX ADMIN — POTASSIUM CHLORIDE 40 MEQ: 10 CAPSULE, COATED, EXTENDED RELEASE ORAL at 13:17

## 2022-09-22 RX ADMIN — INSULIN LISPRO 2 UNITS: 100 INJECTION, SOLUTION INTRAVENOUS; SUBCUTANEOUS at 17:18

## 2022-09-22 RX ADMIN — MORPHINE SULFATE 2 MG: 2 INJECTION, SOLUTION INTRAMUSCULAR; INTRAVENOUS at 11:32

## 2022-09-23 LAB
ANION GAP SERPL CALCULATED.3IONS-SCNC: 9.3 MMOL/L (ref 5–15)
BACTERIA SPEC AEROBE CULT: ABNORMAL
BUN SERPL-MCNC: 21 MG/DL (ref 8–23)
BUN/CREAT SERPL: 28.4 (ref 7–25)
CALCIUM SPEC-SCNC: 8.5 MG/DL (ref 8.6–10.5)
CHLORIDE SERPL-SCNC: 107 MMOL/L (ref 98–107)
CO2 SERPL-SCNC: 22.7 MMOL/L (ref 22–29)
CREAT SERPL-MCNC: 0.74 MG/DL (ref 0.57–1)
DEPRECATED RDW RBC AUTO: 52.1 FL (ref 37–54)
EGFRCR SERPLBLD CKD-EPI 2021: 88.3 ML/MIN/1.73
ERYTHROCYTE [DISTWIDTH] IN BLOOD BY AUTOMATED COUNT: 16.7 % (ref 12.3–15.4)
GLUCOSE BLDC GLUCOMTR-MCNC: 143 MG/DL (ref 70–99)
GLUCOSE BLDC GLUCOMTR-MCNC: 148 MG/DL (ref 70–99)
GLUCOSE SERPL-MCNC: 139 MG/DL (ref 65–99)
GRAM STN SPEC: ABNORMAL
HCT VFR BLD AUTO: 35.8 % (ref 34–46.6)
HGB BLD-MCNC: 11.4 G/DL (ref 12–15.9)
ISOLATED FROM: ABNORMAL
MAGNESIUM SERPL-MCNC: 2 MG/DL (ref 1.6–2.4)
MCH RBC QN AUTO: 27 PG (ref 26.6–33)
MCHC RBC AUTO-ENTMCNC: 31.8 G/DL (ref 31.5–35.7)
MCV RBC AUTO: 84.8 FL (ref 79–97)
PLATELET # BLD AUTO: 98 10*3/MM3 (ref 140–450)
PMV BLD AUTO: 11.9 FL (ref 6–12)
POTASSIUM SERPL-SCNC: 4.2 MMOL/L (ref 3.5–5.2)
RBC # BLD AUTO: 4.22 10*6/MM3 (ref 3.77–5.28)
SODIUM SERPL-SCNC: 139 MMOL/L (ref 136–145)
VANCOMYCIN SERPL-MCNC: 22.3 MCG/ML (ref 5–40)
WBC NRBC COR # BLD: 3.44 10*3/MM3 (ref 3.4–10.8)

## 2022-09-23 PROCEDURE — 83735 ASSAY OF MAGNESIUM: CPT | Performed by: INTERNAL MEDICINE

## 2022-09-23 PROCEDURE — 80202 ASSAY OF VANCOMYCIN: CPT | Performed by: FAMILY MEDICINE

## 2022-09-23 PROCEDURE — 25010000002 HEPARIN (PORCINE) PER 1000 UNITS: Performed by: FAMILY MEDICINE

## 2022-09-23 PROCEDURE — 25010000002 CEFTRIAXONE PER 250 MG: Performed by: INTERNAL MEDICINE

## 2022-09-23 PROCEDURE — 82962 GLUCOSE BLOOD TEST: CPT

## 2022-09-23 PROCEDURE — 25010000002 CEFEPIME PER 500 MG: Performed by: FAMILY MEDICINE

## 2022-09-23 PROCEDURE — 25010000002 MORPHINE PER 10 MG: Performed by: FAMILY MEDICINE

## 2022-09-23 PROCEDURE — 25010000002 ONDANSETRON PER 1 MG: Performed by: FAMILY MEDICINE

## 2022-09-23 PROCEDURE — 25010000002 VANCOMYCIN 5 G RECONSTITUTED SOLUTION: Performed by: FAMILY MEDICINE

## 2022-09-23 PROCEDURE — 85027 COMPLETE CBC AUTOMATED: CPT | Performed by: INTERNAL MEDICINE

## 2022-09-23 PROCEDURE — 99233 SBSQ HOSP IP/OBS HIGH 50: CPT | Performed by: INTERNAL MEDICINE

## 2022-09-23 PROCEDURE — 80048 BASIC METABOLIC PNL TOTAL CA: CPT | Performed by: FAMILY MEDICINE

## 2022-09-23 RX ORDER — METHYLPREDNISOLONE SODIUM SUCCINATE 125 MG/2ML
125 INJECTION, POWDER, LYOPHILIZED, FOR SOLUTION INTRAMUSCULAR; INTRAVENOUS AS NEEDED
Status: DISCONTINUED | OUTPATIENT
Start: 2022-09-23 | End: 2022-09-24

## 2022-09-23 RX ORDER — HYDROXYZINE HYDROCHLORIDE 25 MG/1
25 TABLET, FILM COATED ORAL 3 TIMES DAILY
Status: DISCONTINUED | OUTPATIENT
Start: 2022-09-23 | End: 2022-09-29 | Stop reason: HOSPADM

## 2022-09-23 RX ORDER — EPINEPHRINE 0.1 MG/ML
0.3 SYRINGE (ML) INJECTION
Status: DISCONTINUED | OUTPATIENT
Start: 2022-09-23 | End: 2022-09-24

## 2022-09-23 RX ORDER — DIPHENHYDRAMINE HYDROCHLORIDE 50 MG/ML
50 INJECTION INTRAMUSCULAR; INTRAVENOUS AS NEEDED
Status: DISCONTINUED | OUTPATIENT
Start: 2022-09-23 | End: 2022-09-24

## 2022-09-23 RX ORDER — EPINEPHRINE 0.3 MG/.3ML
0.3 INJECTION SUBCUTANEOUS
Status: DISCONTINUED | OUTPATIENT
Start: 2022-09-23 | End: 2022-09-23

## 2022-09-23 RX ORDER — QUETIAPINE FUMARATE 25 MG/1
25 TABLET, FILM COATED ORAL EVERY 12 HOURS PRN
Status: DISCONTINUED | OUTPATIENT
Start: 2022-09-23 | End: 2022-09-24

## 2022-09-23 RX ORDER — LORAZEPAM 0.5 MG/1
0.5 TABLET ORAL 2 TIMES DAILY PRN
Status: DISCONTINUED | OUTPATIENT
Start: 2022-09-23 | End: 2022-09-24

## 2022-09-23 RX ORDER — CEFTRIAXONE SODIUM 1 G/50ML
1 INJECTION, SOLUTION INTRAVENOUS EVERY 24 HOURS
Status: DISCONTINUED | OUTPATIENT
Start: 2022-09-23 | End: 2022-09-24

## 2022-09-23 RX ADMIN — PHENYTOIN 100 MG: 50 TABLET, CHEWABLE ORAL at 21:35

## 2022-09-23 RX ADMIN — MORPHINE SULFATE 2 MG: 2 INJECTION, SOLUTION INTRAMUSCULAR; INTRAVENOUS at 15:29

## 2022-09-23 RX ADMIN — Medication 5 MG: at 21:35

## 2022-09-23 RX ADMIN — CEFEPIME HYDROCHLORIDE 2 G: 2 INJECTION, POWDER, FOR SOLUTION INTRAMUSCULAR; INTRAVENOUS at 03:46

## 2022-09-23 RX ADMIN — MORPHINE SULFATE 2 MG: 2 INJECTION, SOLUTION INTRAMUSCULAR; INTRAVENOUS at 03:46

## 2022-09-23 RX ADMIN — HEPARIN SODIUM 5000 UNITS: 5000 INJECTION INTRAVENOUS; SUBCUTANEOUS at 05:49

## 2022-09-23 RX ADMIN — ONDANSETRON 4 MG: 2 INJECTION INTRAMUSCULAR; INTRAVENOUS at 21:34

## 2022-09-23 RX ADMIN — ANASTROZOLE 1 MG: 1 TABLET ORAL at 10:27

## 2022-09-23 RX ADMIN — SENNOSIDES AND DOCUSATE SODIUM 2 TABLET: 8.6; 5 TABLET ORAL at 21:35

## 2022-09-23 RX ADMIN — CEFTRIAXONE SODIUM 1 G: 1 INJECTION, SOLUTION INTRAVENOUS at 13:05

## 2022-09-23 RX ADMIN — Medication 10 ML: at 10:00

## 2022-09-23 RX ADMIN — OXYCODONE HYDROCHLORIDE 5 MG: 5 TABLET ORAL at 18:44

## 2022-09-23 RX ADMIN — VANCOMYCIN HYDROCHLORIDE 1500 MG: 5 INJECTION, POWDER, LYOPHILIZED, FOR SOLUTION INTRAVENOUS at 21:35

## 2022-09-23 RX ADMIN — Medication 10 ML: at 21:34

## 2022-09-23 RX ADMIN — VENLAFAXINE HYDROCHLORIDE 37.5 MG: 37.5 TABLET ORAL at 09:50

## 2022-09-23 RX ADMIN — QUETIAPINE FUMARATE 25 MG: 25 TABLET ORAL at 18:42

## 2022-09-23 RX ADMIN — BUSPIRONE HYDROCHLORIDE 10 MG: 10 TABLET ORAL at 21:35

## 2022-09-23 RX ADMIN — HYDROXYZINE HYDROCHLORIDE 25 MG: 25 TABLET, FILM COATED ORAL at 21:35

## 2022-09-23 RX ADMIN — METRONIDAZOLE 500 MG: 500 INJECTION, SOLUTION INTRAVENOUS at 03:24

## 2022-09-23 RX ADMIN — HEPARIN SODIUM 5000 UNITS: 5000 INJECTION INTRAVENOUS; SUBCUTANEOUS at 14:17

## 2022-09-23 RX ADMIN — METRONIDAZOLE 500 MG: 500 INJECTION, SOLUTION INTRAVENOUS at 10:02

## 2022-09-23 RX ADMIN — SENNOSIDES AND DOCUSATE SODIUM 2 TABLET: 8.6; 5 TABLET ORAL at 10:00

## 2022-09-23 RX ADMIN — BUSPIRONE HYDROCHLORIDE 10 MG: 10 TABLET ORAL at 10:00

## 2022-09-23 RX ADMIN — HEPARIN SODIUM 5000 UNITS: 5000 INJECTION INTRAVENOUS; SUBCUTANEOUS at 21:34

## 2022-09-23 RX ADMIN — Medication 10 ML: at 09:44

## 2022-09-24 ENCOUNTER — APPOINTMENT (OUTPATIENT)
Dept: CT IMAGING | Facility: HOSPITAL | Age: 67
End: 2022-09-24

## 2022-09-24 LAB
ANION GAP SERPL CALCULATED.3IONS-SCNC: 9.1 MMOL/L (ref 5–15)
ANION GAP SERPL CALCULATED.3IONS-SCNC: 9.3 MMOL/L (ref 5–15)
BUN SERPL-MCNC: 16 MG/DL (ref 8–23)
BUN SERPL-MCNC: 16 MG/DL (ref 8–23)
BUN/CREAT SERPL: 21.9 (ref 7–25)
BUN/CREAT SERPL: 25 (ref 7–25)
CALCIUM SPEC-SCNC: 8.4 MG/DL (ref 8.6–10.5)
CALCIUM SPEC-SCNC: 8.8 MG/DL (ref 8.6–10.5)
CHLORIDE SERPL-SCNC: 107 MMOL/L (ref 98–107)
CHLORIDE SERPL-SCNC: 109 MMOL/L (ref 98–107)
CO2 SERPL-SCNC: 22.7 MMOL/L (ref 22–29)
CO2 SERPL-SCNC: 23.9 MMOL/L (ref 22–29)
CREAT SERPL-MCNC: 0.64 MG/DL (ref 0.57–1)
CREAT SERPL-MCNC: 0.73 MG/DL (ref 0.57–1)
DEPRECATED RDW RBC AUTO: 52.4 FL (ref 37–54)
EGFRCR SERPLBLD CKD-EPI 2021: 89.2 ML/MIN/1.73
EGFRCR SERPLBLD CKD-EPI 2021: 95.8 ML/MIN/1.73
ERYTHROCYTE [DISTWIDTH] IN BLOOD BY AUTOMATED COUNT: 16.9 % (ref 12.3–15.4)
GLUCOSE BLDC GLUCOMTR-MCNC: 112 MG/DL (ref 70–99)
GLUCOSE BLDC GLUCOMTR-MCNC: 127 MG/DL (ref 70–99)
GLUCOSE BLDC GLUCOMTR-MCNC: 93 MG/DL (ref 70–99)
GLUCOSE SERPL-MCNC: 132 MG/DL (ref 65–99)
GLUCOSE SERPL-MCNC: 159 MG/DL (ref 65–99)
HCT VFR BLD AUTO: 36.8 % (ref 34–46.6)
HGB BLD-MCNC: 11.6 G/DL (ref 12–15.9)
MAGNESIUM SERPL-MCNC: 1.6 MG/DL (ref 1.6–2.4)
MCH RBC QN AUTO: 27 PG (ref 26.6–33)
MCHC RBC AUTO-ENTMCNC: 31.5 G/DL (ref 31.5–35.7)
MCV RBC AUTO: 85.6 FL (ref 79–97)
PLATELET # BLD AUTO: 120 10*3/MM3 (ref 140–450)
PMV BLD AUTO: 12.8 FL (ref 6–12)
POTASSIUM SERPL-SCNC: 4 MMOL/L (ref 3.5–5.2)
POTASSIUM SERPL-SCNC: 4.1 MMOL/L (ref 3.5–5.2)
RBC # BLD AUTO: 4.3 10*6/MM3 (ref 3.77–5.28)
SODIUM SERPL-SCNC: 140 MMOL/L (ref 136–145)
SODIUM SERPL-SCNC: 141 MMOL/L (ref 136–145)
WBC NRBC COR # BLD: 2.69 10*3/MM3 (ref 3.4–10.8)

## 2022-09-24 PROCEDURE — 25010000002 CEFTRIAXONE PER 250 MG: Performed by: INTERNAL MEDICINE

## 2022-09-24 PROCEDURE — 71250 CT THORAX DX C-: CPT

## 2022-09-24 PROCEDURE — 83735 ASSAY OF MAGNESIUM: CPT | Performed by: INTERNAL MEDICINE

## 2022-09-24 PROCEDURE — 82962 GLUCOSE BLOOD TEST: CPT

## 2022-09-24 PROCEDURE — 25010000002 VANCOMYCIN 5 G RECONSTITUTED SOLUTION: Performed by: FAMILY MEDICINE

## 2022-09-24 PROCEDURE — 25010000002 MORPHINE PER 10 MG: Performed by: FAMILY MEDICINE

## 2022-09-24 PROCEDURE — 94799 UNLISTED PULMONARY SVC/PX: CPT

## 2022-09-24 PROCEDURE — 80048 BASIC METABOLIC PNL TOTAL CA: CPT | Performed by: INTERNAL MEDICINE

## 2022-09-24 PROCEDURE — 70491 CT SOFT TISSUE NECK W/DYE: CPT

## 2022-09-24 PROCEDURE — 25010000002 AMPICILLIN-SULBACTAM PER 1.5 G: Performed by: INTERNAL MEDICINE

## 2022-09-24 PROCEDURE — 74176 CT ABD & PELVIS W/O CONTRAST: CPT

## 2022-09-24 PROCEDURE — 25010000002 HEPARIN (PORCINE) PER 1000 UNITS: Performed by: FAMILY MEDICINE

## 2022-09-24 PROCEDURE — 25010000002 ONDANSETRON PER 1 MG: Performed by: FAMILY MEDICINE

## 2022-09-24 PROCEDURE — 99233 SBSQ HOSP IP/OBS HIGH 50: CPT | Performed by: INTERNAL MEDICINE

## 2022-09-24 PROCEDURE — 85027 COMPLETE CBC AUTOMATED: CPT | Performed by: INTERNAL MEDICINE

## 2022-09-24 PROCEDURE — 0 IOPAMIDOL PER 1 ML: Performed by: INTERNAL MEDICINE

## 2022-09-24 RX ORDER — CHOLECALCIFEROL (VITAMIN D3) 125 MCG
5 CAPSULE ORAL NIGHTLY
Status: DISCONTINUED | OUTPATIENT
Start: 2022-09-24 | End: 2022-09-29 | Stop reason: HOSPADM

## 2022-09-24 RX ORDER — QUETIAPINE FUMARATE 25 MG/1
25 TABLET, FILM COATED ORAL NIGHTLY
Status: DISCONTINUED | OUTPATIENT
Start: 2022-09-24 | End: 2022-09-29 | Stop reason: HOSPADM

## 2022-09-24 RX ADMIN — BUSPIRONE HYDROCHLORIDE 10 MG: 10 TABLET ORAL at 16:51

## 2022-09-24 RX ADMIN — CEFTRIAXONE SODIUM 1 G: 1 INJECTION, SOLUTION INTRAVENOUS at 13:03

## 2022-09-24 RX ADMIN — VENLAFAXINE HYDROCHLORIDE 37.5 MG: 37.5 TABLET ORAL at 10:50

## 2022-09-24 RX ADMIN — IOPAMIDOL 100 ML: 755 INJECTION, SOLUTION INTRAVENOUS at 10:38

## 2022-09-24 RX ADMIN — HYDROXYZINE HYDROCHLORIDE 25 MG: 25 TABLET, FILM COATED ORAL at 10:50

## 2022-09-24 RX ADMIN — HEPARIN SODIUM 5000 UNITS: 5000 INJECTION INTRAVENOUS; SUBCUTANEOUS at 05:52

## 2022-09-24 RX ADMIN — BUSPIRONE HYDROCHLORIDE 10 MG: 10 TABLET ORAL at 10:50

## 2022-09-24 RX ADMIN — AMPICILLIN SODIUM AND SULBACTAM SODIUM 30 MG: 2; 1 INJECTION, POWDER, FOR SOLUTION INTRAMUSCULAR; INTRAVENOUS at 16:45

## 2022-09-24 RX ADMIN — HEPARIN SODIUM 5000 UNITS: 5000 INJECTION INTRAVENOUS; SUBCUTANEOUS at 21:24

## 2022-09-24 RX ADMIN — SENNOSIDES AND DOCUSATE SODIUM 2 TABLET: 8.6; 5 TABLET ORAL at 21:24

## 2022-09-24 RX ADMIN — ANASTROZOLE 1 MG: 1 TABLET ORAL at 10:50

## 2022-09-24 RX ADMIN — VANCOMYCIN HYDROCHLORIDE 1500 MG: 5 INJECTION, POWDER, LYOPHILIZED, FOR SOLUTION INTRAVENOUS at 22:05

## 2022-09-24 RX ADMIN — QUETIAPINE FUMARATE 25 MG: 25 TABLET ORAL at 06:34

## 2022-09-24 RX ADMIN — Medication 5 MG: at 21:25

## 2022-09-24 RX ADMIN — QUETIAPINE FUMARATE 25 MG: 25 TABLET ORAL at 21:25

## 2022-09-24 RX ADMIN — HYDROXYZINE HYDROCHLORIDE 25 MG: 25 TABLET, FILM COATED ORAL at 21:24

## 2022-09-24 RX ADMIN — METRONIDAZOLE 500 MG: 500 INJECTION, SOLUTION INTRAVENOUS at 02:13

## 2022-09-24 RX ADMIN — HYDROXYZINE HYDROCHLORIDE 25 MG: 25 TABLET, FILM COATED ORAL at 16:51

## 2022-09-24 RX ADMIN — OXYCODONE HYDROCHLORIDE 5 MG: 5 TABLET ORAL at 06:34

## 2022-09-24 RX ADMIN — AMPICILLIN SODIUM AND SULBACTAM SODIUM 2670 MG: 2; 1 INJECTION, POWDER, FOR SOLUTION INTRAMUSCULAR; INTRAVENOUS at 19:03

## 2022-09-24 RX ADMIN — Medication 10 ML: at 10:50

## 2022-09-24 RX ADMIN — OXYCODONE HYDROCHLORIDE 5 MG: 5 TABLET ORAL at 19:01

## 2022-09-24 RX ADMIN — ONDANSETRON 4 MG: 2 INJECTION INTRAMUSCULAR; INTRAVENOUS at 06:34

## 2022-09-24 RX ADMIN — METRONIDAZOLE 500 MG: 500 INJECTION, SOLUTION INTRAVENOUS at 10:50

## 2022-09-24 RX ADMIN — AMPICILLIN SODIUM AND SULBACTAM SODIUM 300 MG: 2; 1 INJECTION, POWDER, FOR SOLUTION INTRAMUSCULAR; INTRAVENOUS at 17:48

## 2022-09-24 RX ADMIN — Medication 10 ML: at 21:25

## 2022-09-24 RX ADMIN — MORPHINE SULFATE 2 MG: 2 INJECTION, SOLUTION INTRAMUSCULAR; INTRAVENOUS at 22:12

## 2022-09-24 RX ADMIN — PHENYTOIN 100 MG: 50 TABLET, CHEWABLE ORAL at 21:24

## 2022-09-24 RX ADMIN — BUSPIRONE HYDROCHLORIDE 10 MG: 10 TABLET ORAL at 21:26

## 2022-09-24 RX ADMIN — Medication 10 ML: at 21:26

## 2022-09-24 RX ADMIN — HEPARIN SODIUM 5000 UNITS: 5000 INJECTION INTRAVENOUS; SUBCUTANEOUS at 16:51

## 2022-09-25 LAB
DEPRECATED RDW RBC AUTO: 53.3 FL (ref 37–54)
ERYTHROCYTE [DISTWIDTH] IN BLOOD BY AUTOMATED COUNT: 16.9 % (ref 12.3–15.4)
GLUCOSE BLDC GLUCOMTR-MCNC: 113 MG/DL (ref 70–99)
GLUCOSE BLDC GLUCOMTR-MCNC: 134 MG/DL (ref 70–99)
GLUCOSE BLDC GLUCOMTR-MCNC: 145 MG/DL (ref 70–99)
HCT VFR BLD AUTO: 37.6 % (ref 34–46.6)
HGB BLD-MCNC: 11.8 G/DL (ref 12–15.9)
MAGNESIUM SERPL-MCNC: 1.7 MG/DL (ref 1.6–2.4)
MCH RBC QN AUTO: 26.8 PG (ref 26.6–33)
MCHC RBC AUTO-ENTMCNC: 31.4 G/DL (ref 31.5–35.7)
MCV RBC AUTO: 85.5 FL (ref 79–97)
PLATELET # BLD AUTO: 166 10*3/MM3 (ref 140–450)
PMV BLD AUTO: ABNORMAL FL
RBC # BLD AUTO: 4.4 10*6/MM3 (ref 3.77–5.28)
WBC NRBC COR # BLD: 3.07 10*3/MM3 (ref 3.4–10.8)

## 2022-09-25 PROCEDURE — 82962 GLUCOSE BLOOD TEST: CPT

## 2022-09-25 PROCEDURE — 85027 COMPLETE CBC AUTOMATED: CPT | Performed by: INTERNAL MEDICINE

## 2022-09-25 PROCEDURE — 25010000002 MORPHINE PER 10 MG: Performed by: FAMILY MEDICINE

## 2022-09-25 PROCEDURE — 83735 ASSAY OF MAGNESIUM: CPT | Performed by: INTERNAL MEDICINE

## 2022-09-25 PROCEDURE — 25010000002 VANCOMYCIN 5 G RECONSTITUTED SOLUTION: Performed by: FAMILY MEDICINE

## 2022-09-25 PROCEDURE — 25010000002 HEPARIN (PORCINE) PER 1000 UNITS: Performed by: FAMILY MEDICINE

## 2022-09-25 PROCEDURE — 25010000002 AMPICILLIN-SULBACTAM PER 1.5 G: Performed by: INTERNAL MEDICINE

## 2022-09-25 PROCEDURE — 99233 SBSQ HOSP IP/OBS HIGH 50: CPT | Performed by: INTERNAL MEDICINE

## 2022-09-25 RX ADMIN — Medication 5 MG: at 21:03

## 2022-09-25 RX ADMIN — AMPICILLIN SODIUM AND SULBACTAM SODIUM 3 G: 2; 1 INJECTION, POWDER, FOR SOLUTION INTRAMUSCULAR; INTRAVENOUS at 00:21

## 2022-09-25 RX ADMIN — HEPARIN SODIUM 5000 UNITS: 5000 INJECTION INTRAVENOUS; SUBCUTANEOUS at 18:09

## 2022-09-25 RX ADMIN — BUSPIRONE HYDROCHLORIDE 10 MG: 10 TABLET ORAL at 18:09

## 2022-09-25 RX ADMIN — HYDROXYZINE HYDROCHLORIDE 25 MG: 25 TABLET, FILM COATED ORAL at 18:09

## 2022-09-25 RX ADMIN — Medication 10 ML: at 21:05

## 2022-09-25 RX ADMIN — MORPHINE SULFATE 2 MG: 2 INJECTION, SOLUTION INTRAMUSCULAR; INTRAVENOUS at 19:13

## 2022-09-25 RX ADMIN — HYDROXYZINE HYDROCHLORIDE 25 MG: 25 TABLET, FILM COATED ORAL at 21:03

## 2022-09-25 RX ADMIN — SENNOSIDES AND DOCUSATE SODIUM 2 TABLET: 8.6; 5 TABLET ORAL at 21:03

## 2022-09-25 RX ADMIN — AMPICILLIN SODIUM AND SULBACTAM SODIUM 3 G: 2; 1 INJECTION, POWDER, FOR SOLUTION INTRAMUSCULAR; INTRAVENOUS at 23:25

## 2022-09-25 RX ADMIN — QUETIAPINE FUMARATE 25 MG: 25 TABLET ORAL at 21:03

## 2022-09-25 RX ADMIN — VANCOMYCIN HYDROCHLORIDE 1500 MG: 5 INJECTION, POWDER, LYOPHILIZED, FOR SOLUTION INTRAVENOUS at 21:25

## 2022-09-25 RX ADMIN — Medication 10 ML: at 10:11

## 2022-09-25 RX ADMIN — AMPICILLIN SODIUM AND SULBACTAM SODIUM 3 G: 2; 1 INJECTION, POWDER, FOR SOLUTION INTRAMUSCULAR; INTRAVENOUS at 05:36

## 2022-09-25 RX ADMIN — HEPARIN SODIUM 5000 UNITS: 5000 INJECTION INTRAVENOUS; SUBCUTANEOUS at 05:37

## 2022-09-25 RX ADMIN — AMPICILLIN SODIUM AND SULBACTAM SODIUM 3 G: 2; 1 INJECTION, POWDER, FOR SOLUTION INTRAMUSCULAR; INTRAVENOUS at 18:10

## 2022-09-25 RX ADMIN — AMPICILLIN SODIUM AND SULBACTAM SODIUM 3 G: 2; 1 INJECTION, POWDER, FOR SOLUTION INTRAMUSCULAR; INTRAVENOUS at 13:18

## 2022-09-25 RX ADMIN — Medication 10 ML: at 21:04

## 2022-09-25 RX ADMIN — HYDROXYZINE HYDROCHLORIDE 25 MG: 25 TABLET, FILM COATED ORAL at 10:10

## 2022-09-25 RX ADMIN — BUSPIRONE HYDROCHLORIDE 10 MG: 10 TABLET ORAL at 10:10

## 2022-09-25 RX ADMIN — VENLAFAXINE HYDROCHLORIDE 37.5 MG: 37.5 TABLET ORAL at 10:10

## 2022-09-25 RX ADMIN — HEPARIN SODIUM 5000 UNITS: 5000 INJECTION INTRAVENOUS; SUBCUTANEOUS at 21:05

## 2022-09-25 RX ADMIN — MORPHINE SULFATE 2 MG: 2 INJECTION, SOLUTION INTRAMUSCULAR; INTRAVENOUS at 10:12

## 2022-09-25 RX ADMIN — PHENYTOIN 100 MG: 50 TABLET, CHEWABLE ORAL at 21:03

## 2022-09-25 RX ADMIN — BUSPIRONE HYDROCHLORIDE 10 MG: 10 TABLET ORAL at 21:02

## 2022-09-25 RX ADMIN — ANASTROZOLE 1 MG: 1 TABLET ORAL at 10:10

## 2022-09-26 ENCOUNTER — APPOINTMENT (OUTPATIENT)
Dept: NUCLEAR MEDICINE | Facility: HOSPITAL | Age: 67
End: 2022-09-26

## 2022-09-26 LAB
ALBUMIN SERPL-MCNC: 2.7 G/DL (ref 3.5–5.2)
ALBUMIN/GLOB SERPL: 0.6 G/DL
ALP SERPL-CCNC: 88 U/L (ref 39–117)
ALPHA-FETOPROTEIN: 2.83 NG/ML (ref 0–8.3)
ALT SERPL W P-5'-P-CCNC: 5 U/L (ref 1–33)
ANION GAP SERPL CALCULATED.3IONS-SCNC: 7.1 MMOL/L (ref 5–15)
AST SERPL-CCNC: 29 U/L (ref 1–32)
BACTERIA SPEC AEROBE CULT: NORMAL
BACTERIA SPEC AEROBE CULT: NORMAL
BASOPHILS # BLD AUTO: 0.03 10*3/MM3 (ref 0–0.2)
BASOPHILS NFR BLD AUTO: 0.9 % (ref 0–1.5)
BILIRUB SERPL-MCNC: 0.3 MG/DL (ref 0–1.2)
BUN SERPL-MCNC: 11 MG/DL (ref 8–23)
BUN/CREAT SERPL: 16.7 (ref 7–25)
CALCIUM SPEC-SCNC: 8.8 MG/DL (ref 8.6–10.5)
CANCER AG15-3 SERPL-ACNC: 30.9 U/ML
CEA SERPL-MCNC: 17.9 NG/ML
CHLORIDE SERPL-SCNC: 108 MMOL/L (ref 98–107)
CO2 SERPL-SCNC: 24.9 MMOL/L (ref 22–29)
CREAT SERPL-MCNC: 0.66 MG/DL (ref 0.57–1)
DEPRECATED RDW RBC AUTO: 52.9 FL (ref 37–54)
EGFRCR SERPLBLD CKD-EPI 2021: 95.1 ML/MIN/1.73
EOSINOPHIL # BLD AUTO: 0.15 10*3/MM3 (ref 0–0.4)
EOSINOPHIL NFR BLD AUTO: 4.3 % (ref 0.3–6.2)
ERYTHROCYTE [DISTWIDTH] IN BLOOD BY AUTOMATED COUNT: 17.4 % (ref 12.3–15.4)
FERRITIN SERPL-MCNC: 74.52 NG/ML (ref 13–150)
FOLATE SERPL-MCNC: >20 NG/ML (ref 4.78–24.2)
GLOBULIN UR ELPH-MCNC: 4.7 GM/DL
GLUCOSE BLDC GLUCOMTR-MCNC: 123 MG/DL (ref 70–99)
GLUCOSE BLDC GLUCOMTR-MCNC: 134 MG/DL (ref 70–99)
GLUCOSE BLDC GLUCOMTR-MCNC: 146 MG/DL (ref 70–99)
GLUCOSE SERPL-MCNC: 138 MG/DL (ref 65–99)
HAV IGM SERPL QL IA: NORMAL
HBV CORE IGM SERPL QL IA: NORMAL
HBV SURFACE AG SERPL QL IA: NORMAL
HCT VFR BLD AUTO: 37.1 % (ref 34–46.6)
HCV AB SER DONR QL: NORMAL
HCYS SERPL-MCNC: 6.8 UMOL/L (ref 0–15)
HGB BLD-MCNC: 11.7 G/DL (ref 12–15.9)
IMM GRANULOCYTES # BLD AUTO: 0.08 10*3/MM3 (ref 0–0.05)
IMM GRANULOCYTES NFR BLD AUTO: 2.3 % (ref 0–0.5)
IRON 24H UR-MRATE: 38 MCG/DL (ref 37–145)
IRON SATN MFR SERPL: 12 % (ref 20–50)
LYMPHOCYTES # BLD AUTO: 0.59 10*3/MM3 (ref 0.7–3.1)
LYMPHOCYTES NFR BLD AUTO: 17 % (ref 19.6–45.3)
MCH RBC QN AUTO: 26.8 PG (ref 26.6–33)
MCHC RBC AUTO-ENTMCNC: 31.5 G/DL (ref 31.5–35.7)
MCV RBC AUTO: 85.1 FL (ref 79–97)
MONOCYTES # BLD AUTO: 0.27 10*3/MM3 (ref 0.1–0.9)
MONOCYTES NFR BLD AUTO: 7.8 % (ref 5–12)
NEUTROPHILS NFR BLD AUTO: 2.35 10*3/MM3 (ref 1.7–7)
NEUTROPHILS NFR BLD AUTO: 67.7 % (ref 42.7–76)
NRBC BLD AUTO-RTO: 0 /100 WBC (ref 0–0.2)
PLATELET # BLD AUTO: 195 10*3/MM3 (ref 140–450)
PMV BLD AUTO: 12.5 FL (ref 6–12)
POTASSIUM SERPL-SCNC: 3.9 MMOL/L (ref 3.5–5.2)
PROT SERPL-MCNC: 7.4 G/DL (ref 6–8.5)
RBC # BLD AUTO: 4.36 10*6/MM3 (ref 3.77–5.28)
RETICS # AUTO: 0.07 10*6/MM3 (ref 0.02–0.13)
RETICS/RBC NFR AUTO: 1.67 % (ref 0.7–1.9)
SODIUM SERPL-SCNC: 140 MMOL/L (ref 136–145)
TIBC SERPL-MCNC: 314 MCG/DL (ref 298–536)
TRANSFERRIN SERPL-MCNC: 211 MG/DL (ref 200–360)
VIT B12 BLD-MCNC: 1079 PG/ML (ref 211–946)
WBC NRBC COR # BLD: 3.47 10*3/MM3 (ref 3.4–10.8)

## 2022-09-26 PROCEDURE — 84466 ASSAY OF TRANSFERRIN: CPT | Performed by: INTERNAL MEDICINE

## 2022-09-26 PROCEDURE — 86300 IMMUNOASSAY TUMOR CA 15-3: CPT | Performed by: INTERNAL MEDICINE

## 2022-09-26 PROCEDURE — 86334 IMMUNOFIX E-PHORESIS SERUM: CPT | Performed by: INTERNAL MEDICINE

## 2022-09-26 PROCEDURE — 78306 BONE IMAGING WHOLE BODY: CPT

## 2022-09-26 PROCEDURE — 85025 COMPLETE CBC W/AUTO DIFF WBC: CPT | Performed by: INTERNAL MEDICINE

## 2022-09-26 PROCEDURE — 25010000002 HEPARIN (PORCINE) PER 1000 UNITS: Performed by: FAMILY MEDICINE

## 2022-09-26 PROCEDURE — 83921 ORGANIC ACID SINGLE QUANT: CPT | Performed by: INTERNAL MEDICINE

## 2022-09-26 PROCEDURE — 25010000002 MORPHINE PER 10 MG: Performed by: FAMILY MEDICINE

## 2022-09-26 PROCEDURE — 85045 AUTOMATED RETICULOCYTE COUNT: CPT | Performed by: INTERNAL MEDICINE

## 2022-09-26 PROCEDURE — 83540 ASSAY OF IRON: CPT | Performed by: INTERNAL MEDICINE

## 2022-09-26 PROCEDURE — 99233 SBSQ HOSP IP/OBS HIGH 50: CPT | Performed by: INTERNAL MEDICINE

## 2022-09-26 PROCEDURE — 88184 FLOWCYTOMETRY/ TC 1 MARKER: CPT

## 2022-09-26 PROCEDURE — 88185 FLOWCYTOMETRY/TC ADD-ON: CPT | Performed by: INTERNAL MEDICINE

## 2022-09-26 PROCEDURE — 82607 VITAMIN B-12: CPT | Performed by: INTERNAL MEDICINE

## 2022-09-26 PROCEDURE — 80074 ACUTE HEPATITIS PANEL: CPT | Performed by: INTERNAL MEDICINE

## 2022-09-26 PROCEDURE — A9503 TC99M MEDRONATE: HCPCS | Performed by: INTERNAL MEDICINE

## 2022-09-26 PROCEDURE — 80053 COMPREHEN METABOLIC PANEL: CPT | Performed by: INTERNAL MEDICINE

## 2022-09-26 PROCEDURE — 25010000002 AMPICILLIN-SULBACTAM PER 1.5 G: Performed by: INTERNAL MEDICINE

## 2022-09-26 PROCEDURE — 25010000002 VANCOMYCIN 5 G RECONSTITUTED SOLUTION: Performed by: FAMILY MEDICINE

## 2022-09-26 PROCEDURE — 0 TECHNETIUM MEDRONATE KIT: Performed by: INTERNAL MEDICINE

## 2022-09-26 PROCEDURE — 82962 GLUCOSE BLOOD TEST: CPT

## 2022-09-26 PROCEDURE — 82378 CARCINOEMBRYONIC ANTIGEN: CPT | Performed by: INTERNAL MEDICINE

## 2022-09-26 PROCEDURE — 82784 ASSAY IGA/IGD/IGG/IGM EACH: CPT | Performed by: INTERNAL MEDICINE

## 2022-09-26 PROCEDURE — 82746 ASSAY OF FOLIC ACID SERUM: CPT | Performed by: INTERNAL MEDICINE

## 2022-09-26 PROCEDURE — 83521 IG LIGHT CHAINS FREE EACH: CPT | Performed by: INTERNAL MEDICINE

## 2022-09-26 PROCEDURE — 82728 ASSAY OF FERRITIN: CPT | Performed by: INTERNAL MEDICINE

## 2022-09-26 PROCEDURE — 82105 ALPHA-FETOPROTEIN SERUM: CPT | Performed by: INTERNAL MEDICINE

## 2022-09-26 PROCEDURE — 83090 ASSAY OF HOMOCYSTEINE: CPT | Performed by: INTERNAL MEDICINE

## 2022-09-26 RX ORDER — TC 99M MEDRONATE 20 MG/10ML
22 INJECTION, POWDER, LYOPHILIZED, FOR SOLUTION INTRAVENOUS
Status: COMPLETED | OUTPATIENT
Start: 2022-09-26 | End: 2022-09-26

## 2022-09-26 RX ADMIN — AMPICILLIN SODIUM AND SULBACTAM SODIUM 3 G: 2; 1 INJECTION, POWDER, FOR SOLUTION INTRAMUSCULAR; INTRAVENOUS at 23:48

## 2022-09-26 RX ADMIN — BUSPIRONE HYDROCHLORIDE 10 MG: 10 TABLET ORAL at 08:17

## 2022-09-26 RX ADMIN — HYDROXYZINE HYDROCHLORIDE 25 MG: 25 TABLET, FILM COATED ORAL at 08:17

## 2022-09-26 RX ADMIN — AMPICILLIN SODIUM AND SULBACTAM SODIUM 3 G: 2; 1 INJECTION, POWDER, FOR SOLUTION INTRAMUSCULAR; INTRAVENOUS at 11:07

## 2022-09-26 RX ADMIN — MORPHINE SULFATE 2 MG: 2 INJECTION, SOLUTION INTRAMUSCULAR; INTRAVENOUS at 23:49

## 2022-09-26 RX ADMIN — QUETIAPINE FUMARATE 25 MG: 25 TABLET ORAL at 20:57

## 2022-09-26 RX ADMIN — PHENYTOIN 100 MG: 50 TABLET, CHEWABLE ORAL at 20:58

## 2022-09-26 RX ADMIN — AMPICILLIN SODIUM AND SULBACTAM SODIUM 3 G: 2; 1 INJECTION, POWDER, FOR SOLUTION INTRAMUSCULAR; INTRAVENOUS at 18:04

## 2022-09-26 RX ADMIN — Medication 5 MG: at 20:56

## 2022-09-26 RX ADMIN — VANCOMYCIN HYDROCHLORIDE 1500 MG: 5 INJECTION, POWDER, LYOPHILIZED, FOR SOLUTION INTRAVENOUS at 21:02

## 2022-09-26 RX ADMIN — TC 99M MEDRONATE 22 MILLICURIE: 20 INJECTION, POWDER, LYOPHILIZED, FOR SOLUTION INTRAVENOUS at 09:44

## 2022-09-26 RX ADMIN — BUSPIRONE HYDROCHLORIDE 10 MG: 10 TABLET ORAL at 15:03

## 2022-09-26 RX ADMIN — HYDROXYZINE HYDROCHLORIDE 25 MG: 25 TABLET, FILM COATED ORAL at 15:03

## 2022-09-26 RX ADMIN — AMPICILLIN SODIUM AND SULBACTAM SODIUM 3 G: 2; 1 INJECTION, POWDER, FOR SOLUTION INTRAMUSCULAR; INTRAVENOUS at 05:49

## 2022-09-26 RX ADMIN — ACETAMINOPHEN 650 MG: 325 TABLET ORAL at 13:53

## 2022-09-26 RX ADMIN — HEPARIN SODIUM 5000 UNITS: 5000 INJECTION INTRAVENOUS; SUBCUTANEOUS at 13:36

## 2022-09-26 RX ADMIN — OXYCODONE HYDROCHLORIDE 5 MG: 5 TABLET ORAL at 17:15

## 2022-09-26 RX ADMIN — BUSPIRONE HYDROCHLORIDE 10 MG: 10 TABLET ORAL at 20:57

## 2022-09-26 RX ADMIN — ANASTROZOLE 1 MG: 1 TABLET ORAL at 08:17

## 2022-09-26 RX ADMIN — OXYCODONE HYDROCHLORIDE 5 MG: 5 TABLET ORAL at 20:57

## 2022-09-26 RX ADMIN — Medication 10 ML: at 20:58

## 2022-09-26 RX ADMIN — VENLAFAXINE HYDROCHLORIDE 37.5 MG: 37.5 TABLET ORAL at 08:17

## 2022-09-26 RX ADMIN — HYDROXYZINE HYDROCHLORIDE 25 MG: 25 TABLET, FILM COATED ORAL at 20:56

## 2022-09-26 RX ADMIN — OXYCODONE HYDROCHLORIDE 5 MG: 5 TABLET ORAL at 06:05

## 2022-09-26 RX ADMIN — HEPARIN SODIUM 5000 UNITS: 5000 INJECTION INTRAVENOUS; SUBCUTANEOUS at 05:49

## 2022-09-26 RX ADMIN — HEPARIN SODIUM 5000 UNITS: 5000 INJECTION INTRAVENOUS; SUBCUTANEOUS at 21:00

## 2022-09-27 ENCOUNTER — PREP FOR SURGERY (OUTPATIENT)
Dept: OTHER | Facility: HOSPITAL | Age: 67
End: 2022-09-27

## 2022-09-27 DIAGNOSIS — K74.69 OTHER CIRRHOSIS OF LIVER: ICD-10-CM

## 2022-09-27 DIAGNOSIS — R19.4 CHANGE IN BOWEL HABIT: ICD-10-CM

## 2022-09-27 DIAGNOSIS — R92.8 ABNORMAL MAMMOGRAM: Primary | ICD-10-CM

## 2022-09-27 LAB
ANION GAP SERPL CALCULATED.3IONS-SCNC: 12.4 MMOL/L (ref 5–15)
BACTERIA ISLT: NORMAL
BUN SERPL-MCNC: 11 MG/DL (ref 8–23)
BUN/CREAT SERPL: 18.3 (ref 7–25)
CALCIUM SPEC-SCNC: 8.6 MG/DL (ref 8.6–10.5)
CANCER AG27-29 SERPL-ACNC: 47.9 U/ML (ref 0–38.6)
CHLORIDE SERPL-SCNC: 108 MMOL/L (ref 98–107)
CO2 SERPL-SCNC: 20.6 MMOL/L (ref 22–29)
CREAT SERPL-MCNC: 0.6 MG/DL (ref 0.57–1)
DEPRECATED RDW RBC AUTO: 52.9 FL (ref 37–54)
EGFRCR SERPLBLD CKD-EPI 2021: 97.3 ML/MIN/1.73
EOSINOPHIL # BLD MANUAL: 0.11 10*3/MM3 (ref 0–0.4)
EOSINOPHIL NFR BLD MANUAL: 3 % (ref 0.3–6.2)
ERYTHROCYTE [DISTWIDTH] IN BLOOD BY AUTOMATED COUNT: 17.3 % (ref 12.3–15.4)
GLUCOSE BLDC GLUCOMTR-MCNC: 109 MG/DL (ref 70–99)
GLUCOSE BLDC GLUCOMTR-MCNC: 119 MG/DL (ref 70–99)
GLUCOSE BLDC GLUCOMTR-MCNC: 166 MG/DL (ref 70–99)
GLUCOSE SERPL-MCNC: 183 MG/DL (ref 65–99)
HCT VFR BLD AUTO: 38.1 % (ref 34–46.6)
HGB BLD-MCNC: 12.3 G/DL (ref 12–15.9)
IGA SERPL-MCNC: <5 MG/DL (ref 87–352)
IGG SERPL-MCNC: 1775 MG/DL (ref 586–1602)
IGM SERPL-MCNC: 887 MG/DL (ref 26–217)
KAPPA LC FREE SER-MCNC: 83.4 MG/L (ref 3.3–19.4)
KAPPA LC FREE/LAMBDA FREE SER: 1.19 {RATIO} (ref 0.26–1.65)
LAMBDA LC FREE SERPL-MCNC: 70.2 MG/L (ref 5.7–26.3)
LYMPHOCYTES # BLD MANUAL: 0.88 10*3/MM3 (ref 0.7–3.1)
LYMPHOCYTES NFR BLD MANUAL: 14 % (ref 5–12)
MAGNESIUM SERPL-MCNC: 1.6 MG/DL (ref 1.6–2.4)
MCH RBC QN AUTO: 27.5 PG (ref 26.6–33)
MCHC RBC AUTO-ENTMCNC: 32.3 G/DL (ref 31.5–35.7)
MCV RBC AUTO: 85 FL (ref 79–97)
MONOCYTES # BLD: 0.51 10*3/MM3 (ref 0.1–0.9)
NEUTROPHILS # BLD AUTO: 2.17 10*3/MM3 (ref 1.7–7)
NEUTROPHILS NFR BLD MANUAL: 53 % (ref 42.7–76)
NEUTS BAND NFR BLD MANUAL: 6 % (ref 0–5)
PLATELET # BLD AUTO: 168 10*3/MM3 (ref 140–450)
PMV BLD AUTO: 11.9 FL (ref 6–12)
POTASSIUM SERPL-SCNC: 4.7 MMOL/L (ref 3.5–5.2)
PROT PATTERN SERPL IFE-IMP: ABNORMAL
RBC # BLD AUTO: 4.48 10*6/MM3 (ref 3.77–5.28)
RBC MORPH BLD: NORMAL
SMALL PLATELETS BLD QL SMEAR: ADEQUATE
SODIUM SERPL-SCNC: 141 MMOL/L (ref 136–145)
VARIANT LYMPHS NFR BLD MANUAL: 1 % (ref 0–5)
VARIANT LYMPHS NFR BLD MANUAL: 23 % (ref 19.6–45.3)
WBC MORPH BLD: NORMAL
WBC NRBC COR # BLD: 3.67 10*3/MM3 (ref 3.4–10.8)

## 2022-09-27 PROCEDURE — 85007 BL SMEAR W/DIFF WBC COUNT: CPT | Performed by: INTERNAL MEDICINE

## 2022-09-27 PROCEDURE — 94799 UNLISTED PULMONARY SVC/PX: CPT

## 2022-09-27 PROCEDURE — 99233 SBSQ HOSP IP/OBS HIGH 50: CPT | Performed by: INTERNAL MEDICINE

## 2022-09-27 PROCEDURE — 25010000002 AMPICILLIN-SULBACTAM PER 1.5 G: Performed by: INTERNAL MEDICINE

## 2022-09-27 PROCEDURE — 25010000002 HEPARIN (PORCINE) PER 1000 UNITS: Performed by: FAMILY MEDICINE

## 2022-09-27 PROCEDURE — 25010000002 MORPHINE PER 10 MG: Performed by: INTERNAL MEDICINE

## 2022-09-27 PROCEDURE — 80048 BASIC METABOLIC PNL TOTAL CA: CPT | Performed by: INTERNAL MEDICINE

## 2022-09-27 PROCEDURE — 82962 GLUCOSE BLOOD TEST: CPT

## 2022-09-27 PROCEDURE — 83735 ASSAY OF MAGNESIUM: CPT | Performed by: INTERNAL MEDICINE

## 2022-09-27 PROCEDURE — 99222 1ST HOSP IP/OBS MODERATE 55: CPT | Performed by: INTERNAL MEDICINE

## 2022-09-27 PROCEDURE — 85025 COMPLETE CBC W/AUTO DIFF WBC: CPT | Performed by: INTERNAL MEDICINE

## 2022-09-27 PROCEDURE — 94761 N-INVAS EAR/PLS OXIMETRY MLT: CPT

## 2022-09-27 RX ORDER — DOXYCYCLINE 100 MG/1
100 CAPSULE ORAL EVERY 12 HOURS SCHEDULED
Status: DISCONTINUED | OUTPATIENT
Start: 2022-09-27 | End: 2022-09-29 | Stop reason: HOSPADM

## 2022-09-27 RX ORDER — SIMETHICONE 80 MG
80 TABLET,CHEWABLE ORAL
Status: DISCONTINUED | OUTPATIENT
Start: 2022-09-27 | End: 2022-09-29 | Stop reason: HOSPADM

## 2022-09-27 RX ORDER — AMOXICILLIN AND CLAVULANATE POTASSIUM 875; 125 MG/1; MG/1
1 TABLET, FILM COATED ORAL EVERY 12 HOURS SCHEDULED
Status: DISCONTINUED | OUTPATIENT
Start: 2022-09-27 | End: 2022-09-29 | Stop reason: HOSPADM

## 2022-09-27 RX ADMIN — HYDROXYZINE HYDROCHLORIDE 25 MG: 25 TABLET, FILM COATED ORAL at 15:16

## 2022-09-27 RX ADMIN — BUSPIRONE HYDROCHLORIDE 10 MG: 10 TABLET ORAL at 09:23

## 2022-09-27 RX ADMIN — Medication 5 MG: at 20:08

## 2022-09-27 RX ADMIN — SENNOSIDES AND DOCUSATE SODIUM 2 TABLET: 8.6; 5 TABLET ORAL at 09:23

## 2022-09-27 RX ADMIN — DOXYCYCLINE 100 MG: 100 CAPSULE ORAL at 09:23

## 2022-09-27 RX ADMIN — HYDROXYZINE HYDROCHLORIDE 25 MG: 25 TABLET, FILM COATED ORAL at 20:10

## 2022-09-27 RX ADMIN — HEPARIN SODIUM 5000 UNITS: 5000 INJECTION INTRAVENOUS; SUBCUTANEOUS at 15:16

## 2022-09-27 RX ADMIN — AMPICILLIN SODIUM AND SULBACTAM SODIUM 3 G: 2; 1 INJECTION, POWDER, FOR SOLUTION INTRAMUSCULAR; INTRAVENOUS at 05:05

## 2022-09-27 RX ADMIN — SIMETHICONE 80 MG: 80 TABLET, CHEWABLE ORAL at 20:08

## 2022-09-27 RX ADMIN — QUETIAPINE FUMARATE 25 MG: 25 TABLET ORAL at 20:10

## 2022-09-27 RX ADMIN — OXYCODONE HYDROCHLORIDE 5 MG: 5 TABLET ORAL at 09:23

## 2022-09-27 RX ADMIN — AMOXICILLIN AND CLAVULANATE POTASSIUM 1 TABLET: 875; 125 TABLET, FILM COATED ORAL at 20:10

## 2022-09-27 RX ADMIN — Medication 10 ML: at 20:11

## 2022-09-27 RX ADMIN — PHENYTOIN 100 MG: 50 TABLET, CHEWABLE ORAL at 20:08

## 2022-09-27 RX ADMIN — Medication 10 ML: at 09:24

## 2022-09-27 RX ADMIN — OXYCODONE HYDROCHLORIDE 5 MG: 5 TABLET ORAL at 05:05

## 2022-09-27 RX ADMIN — DOXYCYCLINE 100 MG: 100 CAPSULE ORAL at 20:10

## 2022-09-27 RX ADMIN — VENLAFAXINE HYDROCHLORIDE 37.5 MG: 37.5 TABLET ORAL at 09:23

## 2022-09-27 RX ADMIN — BUSPIRONE HYDROCHLORIDE 10 MG: 10 TABLET ORAL at 15:19

## 2022-09-27 RX ADMIN — HEPARIN SODIUM 5000 UNITS: 5000 INJECTION INTRAVENOUS; SUBCUTANEOUS at 05:05

## 2022-09-27 RX ADMIN — SIMETHICONE 80 MG: 80 TABLET, CHEWABLE ORAL at 15:16

## 2022-09-27 RX ADMIN — BUSPIRONE HYDROCHLORIDE 10 MG: 10 TABLET ORAL at 20:09

## 2022-09-27 RX ADMIN — ANASTROZOLE 1 MG: 1 TABLET ORAL at 09:23

## 2022-09-27 RX ADMIN — HYDROXYZINE HYDROCHLORIDE 25 MG: 25 TABLET, FILM COATED ORAL at 09:23

## 2022-09-27 RX ADMIN — MORPHINE SULFATE 2 MG: 2 INJECTION, SOLUTION INTRAMUSCULAR; INTRAVENOUS at 12:27

## 2022-09-27 RX ADMIN — POLYETHYLENE GLYCOL 3350, SODIUM SULFATE ANHYDROUS, SODIUM BICARBONATE, SODIUM CHLORIDE, POTASSIUM CHLORIDE 4000 ML: 236; 22.74; 6.74; 5.86; 2.97 POWDER, FOR SOLUTION ORAL at 15:17

## 2022-09-27 RX ADMIN — AMOXICILLIN AND CLAVULANATE POTASSIUM 1 TABLET: 875; 125 TABLET, FILM COATED ORAL at 09:22

## 2022-09-28 ENCOUNTER — ANESTHESIA EVENT (OUTPATIENT)
Dept: GASTROENTEROLOGY | Facility: HOSPITAL | Age: 67
End: 2022-09-28

## 2022-09-28 ENCOUNTER — APPOINTMENT (OUTPATIENT)
Dept: CT IMAGING | Facility: HOSPITAL | Age: 67
End: 2022-09-28

## 2022-09-28 ENCOUNTER — ANESTHESIA (OUTPATIENT)
Dept: GASTROENTEROLOGY | Facility: HOSPITAL | Age: 67
End: 2022-09-28

## 2022-09-28 PROBLEM — R19.4 CHANGE IN BOWEL HABIT: Status: ACTIVE | Noted: 2022-09-20

## 2022-09-28 LAB
ANION GAP SERPL CALCULATED.3IONS-SCNC: 8.1 MMOL/L (ref 5–15)
BASOPHILS # BLD AUTO: 0.02 10*3/MM3 (ref 0–0.2)
BASOPHILS NFR BLD AUTO: 0.7 % (ref 0–1.5)
BUN SERPL-MCNC: 10 MG/DL (ref 8–23)
BUN/CREAT SERPL: 19.6 (ref 7–25)
CALCIUM SPEC-SCNC: 8.6 MG/DL (ref 8.6–10.5)
CHLORIDE SERPL-SCNC: 107 MMOL/L (ref 98–107)
CO2 SERPL-SCNC: 24.9 MMOL/L (ref 22–29)
CREAT SERPL-MCNC: 0.51 MG/DL (ref 0.57–1)
DEPRECATED RDW RBC AUTO: 54.1 FL (ref 37–54)
EGFRCR SERPLBLD CKD-EPI 2021: 101.2 ML/MIN/1.73
EOSINOPHIL # BLD AUTO: 0.15 10*3/MM3 (ref 0–0.4)
EOSINOPHIL # BLD MANUAL: 0.05 10*3/MM3 (ref 0–0.4)
EOSINOPHIL NFR BLD AUTO: 4.9 % (ref 0.3–6.2)
EOSINOPHIL NFR BLD MANUAL: 1.8 % (ref 0.3–6.2)
ERYTHROCYTE [DISTWIDTH] IN BLOOD BY AUTOMATED COUNT: 17.7 % (ref 12.3–15.4)
GIANT PLATELETS: ABNORMAL
GLUCOSE BLDC GLUCOMTR-MCNC: 120 MG/DL (ref 70–99)
GLUCOSE BLDC GLUCOMTR-MCNC: 127 MG/DL (ref 70–99)
GLUCOSE BLDC GLUCOMTR-MCNC: 92 MG/DL (ref 70–99)
GLUCOSE SERPL-MCNC: 139 MG/DL (ref 65–99)
HCT VFR BLD AUTO: 38.5 % (ref 34–46.6)
HGB BLD-MCNC: 12.1 G/DL (ref 12–15.9)
IMM GRANULOCYTES # BLD AUTO: 0.13 10*3/MM3 (ref 0–0.05)
IMM GRANULOCYTES NFR BLD AUTO: 4.3 % (ref 0–0.5)
LYMPHOCYTES # BLD AUTO: 0.64 10*3/MM3 (ref 0.7–3.1)
LYMPHOCYTES # BLD MANUAL: 1.03 10*3/MM3 (ref 0.7–3.1)
LYMPHOCYTES NFR BLD AUTO: 21.1 % (ref 19.6–45.3)
LYMPHOCYTES NFR BLD MANUAL: 5.4 % (ref 5–12)
MAGNESIUM SERPL-MCNC: 1.5 MG/DL (ref 1.6–2.4)
MCH RBC QN AUTO: 27 PG (ref 26.6–33)
MCHC RBC AUTO-ENTMCNC: 31.4 G/DL (ref 31.5–35.7)
MCV RBC AUTO: 85.9 FL (ref 79–97)
MICROCYTES BLD QL: ABNORMAL
MONOCYTES # BLD AUTO: 0.34 10*3/MM3 (ref 0.1–0.9)
MONOCYTES # BLD: 0.16 10*3/MM3 (ref 0.1–0.9)
MONOCYTES NFR BLD AUTO: 11.2 % (ref 5–12)
MYELOCYTES NFR BLD MANUAL: 1.8 % (ref 0–0)
NEUTROPHILS # BLD AUTO: 1.74 10*3/MM3 (ref 1.7–7)
NEUTROPHILS NFR BLD AUTO: 1.76 10*3/MM3 (ref 1.7–7)
NEUTROPHILS NFR BLD AUTO: 57.8 % (ref 42.7–76)
NEUTROPHILS NFR BLD MANUAL: 57.1 % (ref 42.7–76)
NRBC BLD AUTO-RTO: 0 /100 WBC (ref 0–0.2)
PHOSPHATE SERPL-MCNC: 2.8 MG/DL (ref 2.5–4.5)
PLATELET # BLD AUTO: 186 10*3/MM3 (ref 140–450)
PMV BLD AUTO: 12.6 FL (ref 6–12)
POTASSIUM SERPL-SCNC: 3.7 MMOL/L (ref 3.5–5.2)
RBC # BLD AUTO: 4.48 10*6/MM3 (ref 3.77–5.28)
SMALL PLATELETS BLD QL SMEAR: ADEQUATE
SODIUM SERPL-SCNC: 140 MMOL/L (ref 136–145)
VARIANT LYMPHS NFR BLD MANUAL: 33.9 % (ref 19.6–45.3)
WBC MORPH BLD: NORMAL
WBC NRBC COR # BLD: 3.04 10*3/MM3 (ref 3.4–10.8)

## 2022-09-28 PROCEDURE — 25010000002 PROPOFOL 10 MG/ML EMULSION: Performed by: NURSE ANESTHETIST, CERTIFIED REGISTERED

## 2022-09-28 PROCEDURE — 88342 IMHCHEM/IMCYTCHM 1ST ANTB: CPT | Performed by: INTERNAL MEDICINE

## 2022-09-28 PROCEDURE — 88271 CYTOGENETICS DNA PROBE: CPT

## 2022-09-28 PROCEDURE — 99233 SBSQ HOSP IP/OBS HIGH 50: CPT | Performed by: INTERNAL MEDICINE

## 2022-09-28 PROCEDURE — 0DB68ZX EXCISION OF STOMACH, VIA NATURAL OR ARTIFICIAL OPENING ENDOSCOPIC, DIAGNOSTIC: ICD-10-PCS | Performed by: INTERNAL MEDICINE

## 2022-09-28 PROCEDURE — 80048 BASIC METABOLIC PNL TOTAL CA: CPT | Performed by: INTERNAL MEDICINE

## 2022-09-28 PROCEDURE — 82962 GLUCOSE BLOOD TEST: CPT

## 2022-09-28 PROCEDURE — 88264 CHROMOSOME ANALYSIS 20-25: CPT

## 2022-09-28 PROCEDURE — 07DR3ZX EXTRACTION OF ILIAC BONE MARROW, PERCUTANEOUS APPROACH, DIAGNOSTIC: ICD-10-PCS | Performed by: INTERNAL MEDICINE

## 2022-09-28 PROCEDURE — 88305 TISSUE EXAM BY PATHOLOGIST: CPT | Performed by: INTERNAL MEDICINE

## 2022-09-28 PROCEDURE — 45385 COLONOSCOPY W/LESION REMOVAL: CPT | Performed by: INTERNAL MEDICINE

## 2022-09-28 PROCEDURE — 88311 DECALCIFY TISSUE: CPT | Performed by: INTERNAL MEDICINE

## 2022-09-28 PROCEDURE — 88275 CYTOGENETICS 100-300: CPT | Performed by: INTERNAL MEDICINE

## 2022-09-28 PROCEDURE — 88360 TUMOR IMMUNOHISTOCHEM/MANUAL: CPT | Performed by: INTERNAL MEDICINE

## 2022-09-28 PROCEDURE — 25010000002 MORPHINE PER 10 MG: Performed by: INTERNAL MEDICINE

## 2022-09-28 PROCEDURE — 77012 CT SCAN FOR NEEDLE BIOPSY: CPT

## 2022-09-28 PROCEDURE — 0DBN8ZX EXCISION OF SIGMOID COLON, VIA NATURAL OR ARTIFICIAL OPENING ENDOSCOPIC, DIAGNOSTIC: ICD-10-PCS | Performed by: INTERNAL MEDICINE

## 2022-09-28 PROCEDURE — 84100 ASSAY OF PHOSPHORUS: CPT | Performed by: INTERNAL MEDICINE

## 2022-09-28 PROCEDURE — 88341 IMHCHEM/IMCYTCHM EA ADD ANTB: CPT | Performed by: INTERNAL MEDICINE

## 2022-09-28 PROCEDURE — 43239 EGD BIOPSY SINGLE/MULTIPLE: CPT | Performed by: INTERNAL MEDICINE

## 2022-09-28 PROCEDURE — 25010000002 FENTANYL CITRATE (PF) 50 MCG/ML SOLUTION: Performed by: RADIOLOGY

## 2022-09-28 PROCEDURE — 83735 ASSAY OF MAGNESIUM: CPT | Performed by: INTERNAL MEDICINE

## 2022-09-28 PROCEDURE — 85027 COMPLETE CBC AUTOMATED: CPT | Performed by: INTERNAL MEDICINE

## 2022-09-28 PROCEDURE — 25010000002 MAGNESIUM SULFATE 2 GM/50ML SOLUTION: Performed by: INTERNAL MEDICINE

## 2022-09-28 PROCEDURE — 88313 SPECIAL STAINS GROUP 2: CPT | Performed by: INTERNAL MEDICINE

## 2022-09-28 PROCEDURE — 25010000002 MIDAZOLAM PER 1 MG: Performed by: RADIOLOGY

## 2022-09-28 PROCEDURE — 88237 TISSUE CULTURE BONE MARROW: CPT | Performed by: INTERNAL MEDICINE

## 2022-09-28 PROCEDURE — 88184 FLOWCYTOMETRY/ TC 1 MARKER: CPT

## 2022-09-28 PROCEDURE — 25010000002 HEPARIN (PORCINE) PER 1000 UNITS: Performed by: INTERNAL MEDICINE

## 2022-09-28 PROCEDURE — 88185 FLOWCYTOMETRY/TC ADD-ON: CPT | Performed by: INTERNAL MEDICINE

## 2022-09-28 PROCEDURE — 85007 BL SMEAR W/DIFF WBC COUNT: CPT | Performed by: INTERNAL MEDICINE

## 2022-09-28 RX ORDER — MAGNESIUM SULFATE HEPTAHYDRATE 40 MG/ML
2 INJECTION, SOLUTION INTRAVENOUS ONCE
Status: COMPLETED | OUTPATIENT
Start: 2022-09-28 | End: 2022-09-28

## 2022-09-28 RX ORDER — MIDAZOLAM HYDROCHLORIDE 1 MG/ML
INJECTION INTRAMUSCULAR; INTRAVENOUS
Status: COMPLETED | OUTPATIENT
Start: 2022-09-28 | End: 2022-09-28

## 2022-09-28 RX ORDER — PROPOFOL 10 MG/ML
VIAL (ML) INTRAVENOUS AS NEEDED
Status: DISCONTINUED | OUTPATIENT
Start: 2022-09-28 | End: 2022-09-28 | Stop reason: SURG

## 2022-09-28 RX ORDER — KETAMINE HCL IN NACL, ISO-OSM 100MG/10ML
SYRINGE (ML) INJECTION AS NEEDED
Status: DISCONTINUED | OUTPATIENT
Start: 2022-09-28 | End: 2022-09-28 | Stop reason: SURG

## 2022-09-28 RX ORDER — LIDOCAINE HYDROCHLORIDE 20 MG/ML
INJECTION, SOLUTION INFILTRATION; PERINEURAL
Status: DISPENSED
Start: 2022-09-28 | End: 2022-09-29

## 2022-09-28 RX ORDER — SODIUM CHLORIDE, SODIUM LACTATE, POTASSIUM CHLORIDE, CALCIUM CHLORIDE 600; 310; 30; 20 MG/100ML; MG/100ML; MG/100ML; MG/100ML
30 INJECTION, SOLUTION INTRAVENOUS CONTINUOUS
Status: DISCONTINUED | OUTPATIENT
Start: 2022-09-28 | End: 2022-09-29 | Stop reason: HOSPADM

## 2022-09-28 RX ORDER — FENTANYL CITRATE 50 UG/ML
INJECTION, SOLUTION INTRAMUSCULAR; INTRAVENOUS
Status: COMPLETED | OUTPATIENT
Start: 2022-09-28 | End: 2022-09-28

## 2022-09-28 RX ORDER — BISACODYL 5 MG/1
20 TABLET, DELAYED RELEASE ORAL DAILY PRN
Status: DISCONTINUED | OUTPATIENT
Start: 2022-09-28 | End: 2022-09-29 | Stop reason: HOSPADM

## 2022-09-28 RX ORDER — LIDOCAINE HYDROCHLORIDE 20 MG/ML
INJECTION, SOLUTION EPIDURAL; INFILTRATION; INTRACAUDAL; PERINEURAL AS NEEDED
Status: DISCONTINUED | OUTPATIENT
Start: 2022-09-28 | End: 2022-09-28 | Stop reason: SURG

## 2022-09-28 RX ORDER — ETOMIDATE 2 MG/ML
INJECTION INTRAVENOUS AS NEEDED
Status: DISCONTINUED | OUTPATIENT
Start: 2022-09-28 | End: 2022-09-28 | Stop reason: SURG

## 2022-09-28 RX ADMIN — PROPOFOL 50 MG: 10 INJECTION, EMULSION INTRAVENOUS at 15:37

## 2022-09-28 RX ADMIN — Medication 5 MG: at 22:18

## 2022-09-28 RX ADMIN — BUSPIRONE HYDROCHLORIDE 10 MG: 10 TABLET ORAL at 08:28

## 2022-09-28 RX ADMIN — BUSPIRONE HYDROCHLORIDE 10 MG: 10 TABLET ORAL at 22:17

## 2022-09-28 RX ADMIN — MIDAZOLAM HYDROCHLORIDE 1 MG: 1 INJECTION, SOLUTION INTRAMUSCULAR; INTRAVENOUS at 13:50

## 2022-09-28 RX ADMIN — LIDOCAINE HYDROCHLORIDE 100 MG: 20 INJECTION, SOLUTION EPIDURAL; INFILTRATION; INTRACAUDAL; PERINEURAL at 15:37

## 2022-09-28 RX ADMIN — Medication 10 MG: at 15:37

## 2022-09-28 RX ADMIN — BISACODYL 20 MG: 5 TABLET, COATED ORAL at 08:25

## 2022-09-28 RX ADMIN — HEPARIN SODIUM 5000 UNITS: 5000 INJECTION INTRAVENOUS; SUBCUTANEOUS at 22:18

## 2022-09-28 RX ADMIN — HYDROXYZINE HYDROCHLORIDE 25 MG: 25 TABLET, FILM COATED ORAL at 22:17

## 2022-09-28 RX ADMIN — DOXYCYCLINE 100 MG: 100 CAPSULE ORAL at 22:17

## 2022-09-28 RX ADMIN — ANASTROZOLE 1 MG: 1 TABLET ORAL at 08:27

## 2022-09-28 RX ADMIN — Medication 10 ML: at 22:18

## 2022-09-28 RX ADMIN — Medication 10 MG: at 15:48

## 2022-09-28 RX ADMIN — QUETIAPINE FUMARATE 25 MG: 25 TABLET ORAL at 22:17

## 2022-09-28 RX ADMIN — HYDROXYZINE HYDROCHLORIDE 25 MG: 25 TABLET, FILM COATED ORAL at 08:25

## 2022-09-28 RX ADMIN — VENLAFAXINE HYDROCHLORIDE 37.5 MG: 37.5 TABLET ORAL at 08:27

## 2022-09-28 RX ADMIN — SENNOSIDES AND DOCUSATE SODIUM 2 TABLET: 8.6; 5 TABLET ORAL at 08:26

## 2022-09-28 RX ADMIN — Medication 10 ML: at 08:27

## 2022-09-28 RX ADMIN — FENTANYL CITRATE 50 MCG: 50 INJECTION, SOLUTION INTRAMUSCULAR; INTRAVENOUS at 13:48

## 2022-09-28 RX ADMIN — AMOXICILLIN AND CLAVULANATE POTASSIUM 1 TABLET: 875; 125 TABLET, FILM COATED ORAL at 22:16

## 2022-09-28 RX ADMIN — OXYCODONE HYDROCHLORIDE 5 MG: 5 TABLET ORAL at 22:22

## 2022-09-28 RX ADMIN — SODIUM CHLORIDE, POTASSIUM CHLORIDE, SODIUM LACTATE AND CALCIUM CHLORIDE: 600; 310; 30; 20 INJECTION, SOLUTION INTRAVENOUS at 15:16

## 2022-09-28 RX ADMIN — PHENYTOIN 100 MG: 50 TABLET, CHEWABLE ORAL at 22:16

## 2022-09-28 RX ADMIN — ETOMIDATE 4 MG: 40 INJECTION, SOLUTION INTRAVENOUS at 15:40

## 2022-09-28 RX ADMIN — MIDAZOLAM HYDROCHLORIDE 1 MG: 1 INJECTION, SOLUTION INTRAMUSCULAR; INTRAVENOUS at 13:46

## 2022-09-28 RX ADMIN — ETOMIDATE 4 MG: 40 INJECTION, SOLUTION INTRAVENOUS at 15:45

## 2022-09-28 RX ADMIN — BISACODYL 5 MG: 5 TABLET, COATED ORAL at 10:33

## 2022-09-28 RX ADMIN — Medication 20 MG: at 15:32

## 2022-09-28 RX ADMIN — MAGNESIUM SULFATE HEPTAHYDRATE 2 G: 2 INJECTION, SOLUTION INTRAVENOUS at 11:04

## 2022-09-28 RX ADMIN — DOXYCYCLINE 100 MG: 100 CAPSULE ORAL at 08:28

## 2022-09-28 RX ADMIN — PROPOFOL 50 MCG/KG/MIN: 10 INJECTION, EMULSION INTRAVENOUS at 15:37

## 2022-09-28 RX ADMIN — AMOXICILLIN AND CLAVULANATE POTASSIUM 1 TABLET: 875; 125 TABLET, FILM COATED ORAL at 08:27

## 2022-09-28 RX ADMIN — MORPHINE SULFATE 2 MG: 2 INJECTION, SOLUTION INTRAMUSCULAR; INTRAVENOUS at 08:26

## 2022-09-28 RX ADMIN — SIMETHICONE 80 MG: 80 TABLET, CHEWABLE ORAL at 22:16

## 2022-09-28 NOTE — ANESTHESIA PREPROCEDURE EVALUATION
Anesthesia Evaluation     Patient summary reviewed and Nursing notes reviewed   no history of anesthetic complications:  NPO Solid Status: > 8 hours  NPO Liquid Status: > 2 hours           Airway   Mallampati: II  TM distance: >3 FB  Neck ROM: full  No difficulty expected  Dental      Pulmonary - normal exam    breath sounds clear to auscultation  (+) a smoker Former,   Cardiovascular - normal exam  Exercise tolerance: poor (<4 METS)    Rhythm: regular  Rate: normal        Neuro/Psych  (+) seizures, CVA,      ROS Comment: H/o polio  In Nursing home  GI/Hepatic/Renal/Endo    (+)   liver disease cirrhosis, diabetes mellitus,     Musculoskeletal     Abdominal    Substance History      OB/GYN          Other        ROS/Med Hx Other: PAT Nursing Notes unavailable.                   Anesthesia Plan    ASA 4     general   total IV anesthesia    Anesthetic plan, risks, benefits, and alternatives have been provided, discussed and informed consent has been obtained with: patient.        CODE STATUS:    Code Status (Patient has no pulse and is not breathing): CPR (Attempt to Resuscitate)  Medical Interventions (Patient has pulse or is breathing): Full Support

## 2022-09-28 NOTE — ANESTHESIA POSTPROCEDURE EVALUATION
Patient: Josseline Vargas    Procedure Summary     Date: 09/28/22 Room / Location: [facility] ENDOSCOPY 2 / [facility] ENDOSCOPY    Anesthesia Start: 1516 Anesthesia Stop: 1600    Procedures:       ESOPHAGOGASTRODUODENOSCOPY WITH BIOPSIES (N/A )      COLONOSCOPY WITH POLYPECTOMY (N/A ) Diagnosis:       Abnormal mammogram      Other cirrhosis of liver (HCC)      Change in bowel habit      (Abnormal mammogram [R92.8])      (Other cirrhosis of liver (HCC) [K74.69])      (Change in bowel habit [R19.4])    Surgeons: Alfonso Fitzgerald MD Provider: Peter Flowers MD    Anesthesia Type: general ASA Status: 4          Anesthesia Type: general    Vitals  Vitals Value Taken Time   /68 09/28/22 1604   Temp 36.2 °C (97.2 °F) 09/28/22 1559   Pulse 89 09/28/22 1604   Resp 17 09/28/22 1604   SpO2 99 % 09/28/22 1604           Post Anesthesia Care and Evaluation    Patient location during evaluation: bedside  Patient participation: complete - patient participated  Level of consciousness: awake  Pain management: adequate    Airway patency: patent  Anesthetic complications: No anesthetic complications  PONV Status: none  Cardiovascular status: acceptable and stable  Respiratory status: acceptable  Hydration status: acceptable    Comments: An Anesthesiologist personally participated in the most demanding procedures (including induction and emergence if applicable) in the anesthesia plan, monitored the course of anesthesia administration at frequent intervals and remained physically present and available for immediate diagnosis and treatment of emergencies.

## 2022-09-29 VITALS
OXYGEN SATURATION: 94 % | TEMPERATURE: 97.6 F | HEIGHT: 60 IN | DIASTOLIC BLOOD PRESSURE: 72 MMHG | WEIGHT: 225.97 LBS | RESPIRATION RATE: 16 BRPM | SYSTOLIC BLOOD PRESSURE: 160 MMHG | HEART RATE: 90 BPM | BODY MASS INDEX: 44.36 KG/M2

## 2022-09-29 LAB
ASSESSMENT OF LEUKOCYTES: NORMAL
CLINICAL INFO: NORMAL
GATING STRATEGY: NORMAL
GLUCOSE BLDC GLUCOMTR-MCNC: 118 MG/DL (ref 70–99)
GLUCOSE BLDC GLUCOMTR-MCNC: 128 MG/DL (ref 70–99)
IMMUNOPHENOTYPING STUDY: NORMAL
LABORATORY COMMENT REPORT: NORMAL
Lab: NORMAL
METHYLMALONATE SERPL-SCNC: 224 NMOL/L (ref 0–378)
PATH INTERP SPEC-IMP: NORMAL
PATHOLOGIST NAME: NORMAL
SPECIMEN SOURCE: NORMAL
VIABLE CELLS NFR SPEC: NORMAL %

## 2022-09-29 PROCEDURE — 99239 HOSP IP/OBS DSCHRG MGMT >30: CPT | Performed by: INTERNAL MEDICINE

## 2022-09-29 PROCEDURE — 82962 GLUCOSE BLOOD TEST: CPT

## 2022-09-29 PROCEDURE — 94799 UNLISTED PULMONARY SVC/PX: CPT

## 2022-09-29 PROCEDURE — 25010000002 HEPARIN (PORCINE) PER 1000 UNITS: Performed by: INTERNAL MEDICINE

## 2022-09-29 RX ORDER — AMOXICILLIN AND CLAVULANATE POTASSIUM 875; 125 MG/1; MG/1
1 TABLET, FILM COATED ORAL EVERY 12 HOURS SCHEDULED
Qty: 5 TABLET | Refills: 0 | Status: SHIPPED | OUTPATIENT
Start: 2022-09-29 | End: 2022-10-02

## 2022-09-29 RX ORDER — HYDROCODONE BITARTRATE AND ACETAMINOPHEN 5; 325 MG/1; MG/1
1 TABLET ORAL EVERY 6 HOURS PRN
Qty: 10 TABLET | Refills: 0 | Status: SHIPPED | OUTPATIENT
Start: 2022-09-29 | End: 2022-09-29 | Stop reason: SDUPTHER

## 2022-09-29 RX ORDER — HYDROCODONE BITARTRATE AND ACETAMINOPHEN 5; 325 MG/1; MG/1
1 TABLET ORAL EVERY 6 HOURS PRN
Qty: 10 TABLET | Refills: 0 | Status: SHIPPED | OUTPATIENT
Start: 2022-09-29

## 2022-09-29 RX ORDER — GABAPENTIN 300 MG/1
300 CAPSULE ORAL 2 TIMES DAILY
Qty: 20 CAPSULE | Refills: 0 | Status: ON HOLD | OUTPATIENT
Start: 2022-09-29 | End: 2022-11-14

## 2022-09-29 RX ORDER — GABAPENTIN 300 MG/1
300 CAPSULE ORAL 2 TIMES DAILY
Qty: 20 CAPSULE | Refills: 0 | Status: SHIPPED | OUTPATIENT
Start: 2022-09-29 | End: 2022-09-29 | Stop reason: SDUPTHER

## 2022-09-29 RX ORDER — DOXYCYCLINE 100 MG/1
100 CAPSULE ORAL EVERY 12 HOURS SCHEDULED
Qty: 5 CAPSULE | Refills: 0 | Status: SHIPPED | OUTPATIENT
Start: 2022-09-29 | End: 2022-10-02

## 2022-09-29 RX ADMIN — HYDROXYZINE HYDROCHLORIDE 25 MG: 25 TABLET, FILM COATED ORAL at 08:51

## 2022-09-29 RX ADMIN — HEPARIN SODIUM 5000 UNITS: 5000 INJECTION INTRAVENOUS; SUBCUTANEOUS at 06:05

## 2022-09-29 RX ADMIN — BUSPIRONE HYDROCHLORIDE 10 MG: 10 TABLET ORAL at 08:51

## 2022-09-29 RX ADMIN — SENNOSIDES AND DOCUSATE SODIUM 2 TABLET: 8.6; 5 TABLET ORAL at 08:51

## 2022-09-29 RX ADMIN — SIMETHICONE 80 MG: 80 TABLET, CHEWABLE ORAL at 08:52

## 2022-09-29 RX ADMIN — VENLAFAXINE HYDROCHLORIDE 37.5 MG: 37.5 TABLET ORAL at 08:52

## 2022-09-29 RX ADMIN — AMOXICILLIN AND CLAVULANATE POTASSIUM 1 TABLET: 875; 125 TABLET, FILM COATED ORAL at 08:51

## 2022-09-29 RX ADMIN — Medication 10 ML: at 08:56

## 2022-09-29 RX ADMIN — ANASTROZOLE 1 MG: 1 TABLET ORAL at 08:51

## 2022-09-29 RX ADMIN — DOXYCYCLINE 100 MG: 100 CAPSULE ORAL at 08:52

## 2022-09-29 RX ADMIN — Medication 10 ML: at 08:55

## 2022-09-29 RX ADMIN — SIMETHICONE 80 MG: 80 TABLET, CHEWABLE ORAL at 12:35

## 2022-09-30 LAB
CYTO UR: NORMAL
LAB AP CASE REPORT: NORMAL
LAB AP CLINICAL INFORMATION: NORMAL
LAB AP SPECIAL STAINS: NORMAL
PATH REPORT.FINAL DX SPEC: NORMAL
PATH REPORT.GROSS SPEC: NORMAL

## 2022-10-05 LAB — REF LAB TEST METHOD: NORMAL

## 2022-10-06 LAB — CYTOGENETICS RESULT: NORMAL

## 2022-10-12 LAB
CYTO UR: NORMAL
LAB AP CASE REPORT: NORMAL
LAB AP CBC AND DIFFERENTIAL: NORMAL
LAB AP CLINICAL INFORMATION: NORMAL
LAB AP DIAGNOSIS COMMENT: NORMAL
LAB AP SPECIAL STAINS: NORMAL
PATH REPORT.FINAL DX SPEC: NORMAL
PATH REPORT.GROSS SPEC: NORMAL

## 2022-10-14 ENCOUNTER — TELEPHONE (OUTPATIENT)
Dept: GASTROENTEROLOGY | Facility: CLINIC | Age: 67
End: 2022-10-14

## 2022-10-14 NOTE — TELEPHONE ENCOUNTER
I have reviewed the patients upper endoscopy and pathology.  Biopsies consistent with chronic focally active gastritis, continue present medication.  Biopsies are negative for H. Pylori, dysplasia, metaplasia, and malignancy.    I have reviewed the patient colonoscopy and pathology report. Patient has a tubular adenoma polyp on pathology report. It is recommended the patient be on a 5 year recall. Please place in the recall system.

## 2022-10-20 ENCOUNTER — HOSPITAL ENCOUNTER (OUTPATIENT)
Dept: BONE DENSITY | Facility: HOSPITAL | Age: 67
Discharge: HOME | End: 2022-10-20

## 2022-10-20 ENCOUNTER — HOSPITAL ENCOUNTER (OUTPATIENT)
Dept: MAMMOGRAPHY | Facility: HOSPITAL | Age: 67
Discharge: HOME | End: 2022-10-20

## 2022-10-20 DIAGNOSIS — Z92.89 HISTORY OF MAMMOGRAPHY, SCREENING: ICD-10-CM

## 2022-10-20 DIAGNOSIS — Z13.820 OSTEOPOROSIS SCREENING: ICD-10-CM

## 2022-10-20 PROCEDURE — 77067 SCR MAMMO BI INCL CAD: CPT

## 2022-10-20 PROCEDURE — 77063 BREAST TOMOSYNTHESIS BI: CPT

## 2022-10-20 PROCEDURE — 77080 DXA BONE DENSITY AXIAL: CPT

## 2022-11-03 ENCOUNTER — OFFICE VISIT (OUTPATIENT)
Dept: ORTHOPEDIC SURGERY | Facility: CLINIC | Age: 67
End: 2022-11-03

## 2022-11-03 ENCOUNTER — TELEPHONE (OUTPATIENT)
Dept: ORTHOPEDIC SURGERY | Facility: CLINIC | Age: 67
End: 2022-11-03

## 2022-11-03 VITALS — HEART RATE: 90 BPM | BODY MASS INDEX: 44.17 KG/M2 | HEIGHT: 60 IN | OXYGEN SATURATION: 96 % | WEIGHT: 225 LBS

## 2022-11-03 DIAGNOSIS — S40.011A CONTUSION OF RIGHT SHOULDER, INITIAL ENCOUNTER: ICD-10-CM

## 2022-11-03 DIAGNOSIS — M25.511 RIGHT SHOULDER PAIN, UNSPECIFIED CHRONICITY: Primary | ICD-10-CM

## 2022-11-03 DIAGNOSIS — M19.011 PRIMARY OSTEOARTHRITIS OF RIGHT SHOULDER: ICD-10-CM

## 2022-11-03 DIAGNOSIS — M25.551 RIGHT HIP PAIN: ICD-10-CM

## 2022-11-03 DIAGNOSIS — S70.01XA CONTUSION OF RIGHT HIP, INITIAL ENCOUNTER: ICD-10-CM

## 2022-11-03 PROCEDURE — 99213 OFFICE O/P EST LOW 20 MIN: CPT | Performed by: PHYSICIAN ASSISTANT

## 2022-11-03 PROCEDURE — 20610 DRAIN/INJ JOINT/BURSA W/O US: CPT | Performed by: PHYSICIAN ASSISTANT

## 2022-11-03 RX ORDER — TRIAMCINOLONE ACETONIDE 40 MG/ML
40 INJECTION, SUSPENSION INTRA-ARTICULAR; INTRAMUSCULAR
Status: COMPLETED | OUTPATIENT
Start: 2022-11-03 | End: 2022-11-03

## 2022-11-03 RX ORDER — LIDOCAINE HYDROCHLORIDE 10 MG/ML
5 INJECTION, SOLUTION INFILTRATION; PERINEURAL
Status: COMPLETED | OUTPATIENT
Start: 2022-11-03 | End: 2022-11-03

## 2022-11-03 RX ORDER — AZITHROMYCIN 250 MG/1
TABLET, FILM COATED ORAL
COMMUNITY
Start: 2022-10-21

## 2022-11-03 RX ADMIN — TRIAMCINOLONE ACETONIDE 40 MG: 40 INJECTION, SUSPENSION INTRA-ARTICULAR; INTRAMUSCULAR at 14:58

## 2022-11-03 RX ADMIN — LIDOCAINE HYDROCHLORIDE 5 ML: 10 INJECTION, SOLUTION INFILTRATION; PERINEURAL at 14:58

## 2022-11-03 NOTE — PROGRESS NOTES
"Chief Complaint  Follow-up and Pain of the Right Shoulder and Pain and Initial Evaluation of the Right Hip    Subjective          Josseline Vargas is a 67 y.o. female  presents to [facility] for   History of Present Illness      Patient presents for evaluation of right shoulder pain, right shoulder osteoarthritis and complaints of new right shoulder pain due to injury/fall also complaining of head injury and right hip pain.  Patient states she fell while getting out of bed this past Tuesday morning she states she hit her head, landed on her right side and injured her right shoulder and right hip.  She states she was evaluated at her care facility, she states \"no one listens to me \"and she says she told them she hurt her head and that her shoulder was hurting.  She also states she has had right hip pain she points to lateral posterior hip and anterior lateral shoulder as her area of pain she points to her posterior head as her area of pain.  She states her care facility did not take her to an urgent care or ER for evaluation.  Patient states I am the first 1 to hear about her hip pain because she does not feel like her caregiver is at her facility listens to her.  She presents in a wheelchair.      Allergies   Allergen Reactions   • Latex Hives   • Penicillins Rash     Pt tolerated graded dose challenge to amp-sulbactam on 2022        Social History     Socioeconomic History   • Marital status:    • Number of children: 9   • Years of education: 12   Tobacco Use   • Smoking status: Former     Packs/day: 2.50     Types: Cigarettes     Start date:      Quit date:      Years since quittin.8   • Smokeless tobacco: Never   Vaping Use   • Vaping Use: Never used   Substance and Sexual Activity   • Alcohol use: Yes     Comment: rarely   • Drug use: No   • Sexual activity: Defer        REVIEW OF SYSTEMS    Constitutional: Denies fevers, chills, weight loss  Cardiovascular: " "Denies chest pain, shortness of breath  Skin: Denies rashes, acute skin changes  Neurologic: Denies headache, loss of consciousness  MSK: Right shoulder pain, right hip pain, head injury      Objective   Vital Signs:   Pulse 90   Ht 152.4 cm (60\")   Wt 102 kg (225 lb)   SpO2 96%   BMI 43.94 kg/m²     Body mass index is 43.94 kg/m².    Physical Exam    Right hip: Tender to palpation of lateral posterior hip, range of motion intact/appropriate, neurovascular intact, strength appropriate.  No pain with rotation, active flexion, flexion 110.  Nontender calf, negative Nichole testing.    Right shoulder: Tender palpation anterior lateral shoulder, skin is intact, no erythema, no ecchymosis, no swelling, active forward elevation 130 with pain, active abduction 90 with pain, elbow wrist hand range of motion intact.    Cranium: Tender palpation of posterior cranial region, skin is intact, no swelling, no erythema, no skin irritation or laceration.    Large Joint Arthrocentesis  Date/Time: 11/3/2022 2:58 PM  Consent given by: patient  Site marked: site marked  Timeout: Immediately prior to procedure a time out was called to verify the correct patient, procedure, equipment, support staff and site/side marked as required   Supporting Documentation  Indications: pain   Procedure Details  Location: shoulder (right) -   Needle gauge: 21g.  Medications administered: 5 mL lidocaine 1 %; 40 mg triamcinolone acetonide 40 MG/ML  Patient tolerance: patient tolerated the procedure well with no immediate complications          Imaging Results (Most Recent)     Procedure Component Value Units Date/Time    XR Hip With or Without Pelvis 2 - 3 View Right [[id number]] Resulted: 11/03/22 1747     Updated: 11/03/22 1748    Narrative:      • View:AP/Lateral view(s)  • Site: Right hip  • Indication: Right hip pain  • Study: X-rays ordered, taken in the office, and reviewed today  • X-ray: No fracture, no dislocation, no acute osseous abnormality, " "mild   to moderate degenerative changes.  • Comparative data: No comparative data found      XR Scapula Right [[id number]] Resulted: 11/03/22 1747     Updated: 11/03/22 1747    Narrative:      • View:AP/Lateral view(s)  • Site: Right shoulder  • Indication: Right shoulder pain  • Study: X-rays ordered, taken in the office, and reviewed today  • X-ray: No fracture, no dislocation, no acute osseous abnormality, mild   degenerative changes of glenohumeral joint and AC joint.  • Comparative data: No comparative data found           Result Review :   The following data was reviewed by: HAWA Bishop on 11/03/2022:  Data reviewed: Radiologic studies Reviewed by me with the patient, Dr. Chew also reviewed             Assessment and Plan    Diagnoses and all orders for this visit:    1. Right shoulder pain, unspecified chronicity (Primary)  -     XR Scapula Right    2. Primary osteoarthritis of right shoulder    3. Right hip pain  -     XR Hip With or Without Pelvis 2 - 3 View Right    4. Contusion of right shoulder, initial encounter    5. Contusion of right hip, initial encounter    Other orders  -     Large Joint Arthrocentesis        Discussed diagnosis/treatment options with the patient, based on patient history/physical exam I called the care facility spoke with the patient's nurse Patricia who states that she was aware of the patient shoulder pain as well as the head injury she states she did not know that the patient had right hip pain or injury.  Patient caregiver Patricia states that the patient has had \"neurochecks \"since her fall and they have not had any concerns to take her to be evaluated at an ER or an urgent care.  Patricia was advised that we will do x-rays of the hip and shoulder here today.  Reviewed x-rays of the hip and the shoulder with the patient.  Patient elected to have right shoulder steroid injection which she tolerated well, continue activity as tolerated for the hip and shoulder if " any new or concerning symptoms occur follow-up sooner otherwise follow-up in 3 months.    Call or return if worsening symptoms.    Follow Up   Return in about 3 months (around 2/3/2023) for Recheck.  Patient was given instructions and counseling regarding her condition or for health maintenance advice. Please see specific information pulled into the AVS if appropriate.

## 2022-11-14 ENCOUNTER — APPOINTMENT (OUTPATIENT)
Dept: GENERAL RADIOLOGY | Facility: HOSPITAL | Age: 67
End: 2022-11-14

## 2022-11-14 ENCOUNTER — HOSPITAL ENCOUNTER (INPATIENT)
Facility: HOSPITAL | Age: 67
LOS: 4 days | Discharge: NURSING, REHAB OR LONG-TERM CARE | End: 2022-11-18
Attending: EMERGENCY MEDICINE | Admitting: FAMILY MEDICINE

## 2022-11-14 DIAGNOSIS — R26.2 DIFFICULTY WALKING: ICD-10-CM

## 2022-11-14 DIAGNOSIS — E87.20 LACTIC ACIDOSIS: ICD-10-CM

## 2022-11-14 DIAGNOSIS — A41.9 SEPSIS, DUE TO UNSPECIFIED ORGANISM, UNSPECIFIED WHETHER ACUTE ORGAN DYSFUNCTION PRESENT: ICD-10-CM

## 2022-11-14 DIAGNOSIS — Z78.9 DECREASED ACTIVITIES OF DAILY LIVING (ADL): ICD-10-CM

## 2022-11-14 DIAGNOSIS — N30.90 CYSTITIS: Primary | ICD-10-CM

## 2022-11-14 PROBLEM — E11.9 TYPE 2 DIABETES MELLITUS WITHOUT COMPLICATION, WITHOUT LONG-TERM CURRENT USE OF INSULIN: Status: ACTIVE | Noted: 2022-11-14

## 2022-11-14 PROBLEM — N17.9 ACUTE KIDNEY INJURY (HCC): Status: ACTIVE | Noted: 2022-11-14

## 2022-11-14 PROBLEM — N30.00 ACUTE CYSTITIS: Status: ACTIVE | Noted: 2022-11-14

## 2022-11-14 LAB
ALBUMIN SERPL-MCNC: 3.2 G/DL (ref 3.5–5.2)
ALBUMIN/GLOB SERPL: 0.7 G/DL
ALP SERPL-CCNC: 117 U/L (ref 39–117)
ALT SERPL W P-5'-P-CCNC: 5 U/L (ref 1–33)
ANION GAP SERPL CALCULATED.3IONS-SCNC: 14.5 MMOL/L (ref 5–15)
ANISOCYTOSIS BLD QL: NORMAL
AST SERPL-CCNC: 36 U/L (ref 1–32)
BACTERIA BLD CULT: ABNORMAL
BACTERIA UR QL AUTO: ABNORMAL /HPF
BASOPHILS # BLD AUTO: 0.03 10*3/MM3 (ref 0–0.2)
BASOPHILS NFR BLD AUTO: 0.4 % (ref 0–1.5)
BILIRUB SERPL-MCNC: 0.9 MG/DL (ref 0–1.2)
BILIRUB UR QL STRIP: ABNORMAL
BUN SERPL-MCNC: 51 MG/DL (ref 8–23)
BUN/CREAT SERPL: 23.7 (ref 7–25)
BURR CELLS BLD QL SMEAR: NORMAL
CALCIUM SPEC-SCNC: 9.7 MG/DL (ref 8.6–10.5)
CHLORIDE SERPL-SCNC: 104 MMOL/L (ref 98–107)
CLARITY UR: ABNORMAL
CO2 SERPL-SCNC: 20.5 MMOL/L (ref 22–29)
COLOR UR: ABNORMAL
CREAT SERPL-MCNC: 2.15 MG/DL (ref 0.57–1)
D-LACTATE SERPL-SCNC: 4.3 MMOL/L (ref 0.5–2)
D-LACTATE SERPL-SCNC: 4.3 MMOL/L (ref 0.5–2)
DEPRECATED RDW RBC AUTO: 51.4 FL (ref 37–54)
EGFRCR SERPLBLD CKD-EPI 2021: 24.4 ML/MIN/1.73
EOSINOPHIL # BLD AUTO: 0.01 10*3/MM3 (ref 0–0.4)
EOSINOPHIL NFR BLD AUTO: 0.1 % (ref 0.3–6.2)
ERYTHROCYTE [DISTWIDTH] IN BLOOD BY AUTOMATED COUNT: 16.5 % (ref 12.3–15.4)
FLUAV AG NPH QL: NEGATIVE
FLUBV AG NPH QL IA: NEGATIVE
GLOBULIN UR ELPH-MCNC: 4.7 GM/DL
GLUCOSE SERPL-MCNC: 156 MG/DL (ref 65–99)
GLUCOSE UR STRIP-MCNC: NEGATIVE MG/DL
GRAN CASTS URNS QL MICRO: ABNORMAL /LPF
HCT VFR BLD AUTO: 39.4 % (ref 34–46.6)
HGB BLD-MCNC: 12.3 G/DL (ref 12–15.9)
HGB UR QL STRIP.AUTO: ABNORMAL
HOLD SPECIMEN: NORMAL
HOLD SPECIMEN: NORMAL
HYALINE CASTS UR QL AUTO: ABNORMAL /LPF
IMM GRANULOCYTES # BLD AUTO: 0.04 10*3/MM3 (ref 0–0.05)
IMM GRANULOCYTES NFR BLD AUTO: 0.5 % (ref 0–0.5)
KETONES UR QL STRIP: ABNORMAL
LARGE PLATELETS: NORMAL
LEUKOCYTE ESTERASE UR QL STRIP.AUTO: ABNORMAL
LYMPHOCYTES # BLD AUTO: 0.33 10*3/MM3 (ref 0.7–3.1)
LYMPHOCYTES NFR BLD AUTO: 4 % (ref 19.6–45.3)
MACROCYTES BLD QL SMEAR: NORMAL
MAGNESIUM SERPL-MCNC: 1.4 MG/DL (ref 1.6–2.4)
MCH RBC QN AUTO: 26.9 PG (ref 26.6–33)
MCHC RBC AUTO-ENTMCNC: 31.2 G/DL (ref 31.5–35.7)
MCV RBC AUTO: 86.2 FL (ref 79–97)
MICROCYTES BLD QL: NORMAL
MONOCYTES # BLD AUTO: 0.32 10*3/MM3 (ref 0.1–0.9)
MONOCYTES NFR BLD AUTO: 3.9 % (ref 5–12)
NEUTROPHILS NFR BLD AUTO: 7.53 10*3/MM3 (ref 1.7–7)
NEUTROPHILS NFR BLD AUTO: 91.1 % (ref 42.7–76)
NITRITE UR QL STRIP: NEGATIVE
NRBC BLD AUTO-RTO: 0 /100 WBC (ref 0–0.2)
NT-PROBNP SERPL-MCNC: 884.8 PG/ML (ref 0–900)
OVALOCYTES BLD QL SMEAR: NORMAL
PH UR STRIP.AUTO: <=5 [PH] (ref 5–8)
PHENYTOIN SERPL-MCNC: 1.2 MCG/ML (ref 10–20)
PLATELET # BLD AUTO: 112 10*3/MM3 (ref 140–450)
PMV BLD AUTO: ABNORMAL FL
POTASSIUM SERPL-SCNC: 4.6 MMOL/L (ref 3.5–5.2)
PROT SERPL-MCNC: 7.9 G/DL (ref 6–8.5)
PROT UR QL STRIP: ABNORMAL
QT INTERVAL: 273 MS
RBC # BLD AUTO: 4.57 10*6/MM3 (ref 3.77–5.28)
RBC # UR STRIP: ABNORMAL /HPF
REF LAB TEST METHOD: ABNORMAL
SARS-COV-2 RNA PNL SPEC NAA+PROBE: NOT DETECTED
SMALL PLATELETS BLD QL SMEAR: NORMAL
SODIUM SERPL-SCNC: 139 MMOL/L (ref 136–145)
SP GR UR STRIP: 1.02 (ref 1–1.03)
SQUAMOUS #/AREA URNS HPF: ABNORMAL /HPF
TROPONIN T SERPL-MCNC: 0.02 NG/ML (ref 0–0.03)
UROBILINOGEN UR QL STRIP: ABNORMAL
WBC # UR STRIP: ABNORMAL /HPF
WBC MORPH BLD: NORMAL
WBC NRBC COR # BLD: 8.26 10*3/MM3 (ref 3.4–10.8)
WHOLE BLOOD HOLD COAG: NORMAL
WHOLE BLOOD HOLD SPECIMEN: NORMAL

## 2022-11-14 PROCEDURE — 87186 SC STD MICRODIL/AGAR DIL: CPT | Performed by: NURSE PRACTITIONER

## 2022-11-14 PROCEDURE — 25010000002 HEPARIN (PORCINE) PER 1000 UNITS: Performed by: HOSPITALIST

## 2022-11-14 PROCEDURE — 83735 ASSAY OF MAGNESIUM: CPT | Performed by: EMERGENCY MEDICINE

## 2022-11-14 PROCEDURE — 87150 DNA/RNA AMPLIFIED PROBE: CPT | Performed by: NURSE PRACTITIONER

## 2022-11-14 PROCEDURE — 99285 EMERGENCY DEPT VISIT HI MDM: CPT

## 2022-11-14 PROCEDURE — 63710000001 INSULIN DETEMIR PER 5 UNITS: Performed by: HOSPITALIST

## 2022-11-14 PROCEDURE — 94640 AIRWAY INHALATION TREATMENT: CPT

## 2022-11-14 PROCEDURE — 93005 ELECTROCARDIOGRAM TRACING: CPT

## 2022-11-14 PROCEDURE — 81001 URINALYSIS AUTO W/SCOPE: CPT | Performed by: EMERGENCY MEDICINE

## 2022-11-14 PROCEDURE — 93005 ELECTROCARDIOGRAM TRACING: CPT | Performed by: EMERGENCY MEDICINE

## 2022-11-14 PROCEDURE — 85025 COMPLETE CBC W/AUTO DIFF WBC: CPT | Performed by: EMERGENCY MEDICINE

## 2022-11-14 PROCEDURE — 94799 UNLISTED PULMONARY SVC/PX: CPT

## 2022-11-14 PROCEDURE — 25010000002 VANCOMYCIN 5 G RECONSTITUTED SOLUTION: Performed by: EMERGENCY MEDICINE

## 2022-11-14 PROCEDURE — U0004 COV-19 TEST NON-CDC HGH THRU: HCPCS | Performed by: NURSE PRACTITIONER

## 2022-11-14 PROCEDURE — 80053 COMPREHEN METABOLIC PANEL: CPT | Performed by: EMERGENCY MEDICINE

## 2022-11-14 PROCEDURE — 87040 BLOOD CULTURE FOR BACTERIA: CPT | Performed by: NURSE PRACTITIONER

## 2022-11-14 PROCEDURE — 80185 ASSAY OF PHENYTOIN TOTAL: CPT | Performed by: NURSE PRACTITIONER

## 2022-11-14 PROCEDURE — 85007 BL SMEAR W/DIFF WBC COUNT: CPT | Performed by: EMERGENCY MEDICINE

## 2022-11-14 PROCEDURE — 83880 ASSAY OF NATRIURETIC PEPTIDE: CPT | Performed by: EMERGENCY MEDICINE

## 2022-11-14 PROCEDURE — 36415 COLL VENOUS BLD VENIPUNCTURE: CPT

## 2022-11-14 PROCEDURE — 93010 ELECTROCARDIOGRAM REPORT: CPT | Performed by: INTERNAL MEDICINE

## 2022-11-14 PROCEDURE — 99223 1ST HOSP IP/OBS HIGH 75: CPT | Performed by: HOSPITALIST

## 2022-11-14 PROCEDURE — 25010000002 CEFEPIME PER 500 MG: Performed by: FAMILY MEDICINE

## 2022-11-14 PROCEDURE — 87086 URINE CULTURE/COLONY COUNT: CPT | Performed by: EMERGENCY MEDICINE

## 2022-11-14 PROCEDURE — 87186 SC STD MICRODIL/AGAR DIL: CPT | Performed by: EMERGENCY MEDICINE

## 2022-11-14 PROCEDURE — 87804 INFLUENZA ASSAY W/OPTIC: CPT | Performed by: NURSE PRACTITIONER

## 2022-11-14 PROCEDURE — 71045 X-RAY EXAM CHEST 1 VIEW: CPT

## 2022-11-14 PROCEDURE — 25010000002 CEFEPIME PER 500 MG: Performed by: EMERGENCY MEDICINE

## 2022-11-14 PROCEDURE — 84484 ASSAY OF TROPONIN QUANT: CPT | Performed by: EMERGENCY MEDICINE

## 2022-11-14 PROCEDURE — 83605 ASSAY OF LACTIC ACID: CPT | Performed by: NURSE PRACTITIONER

## 2022-11-14 PROCEDURE — 88312 SPECIAL STAINS GROUP 1: CPT | Performed by: NURSE PRACTITIONER

## 2022-11-14 RX ORDER — ZINC OXIDE 20 %
OINTMENT (GRAM) TOPICAL EVERY 12 HOURS SCHEDULED
Status: DISCONTINUED | OUTPATIENT
Start: 2022-11-14 | End: 2022-11-18 | Stop reason: HOSPADM

## 2022-11-14 RX ORDER — POLYETHYLENE GLYCOL 3350 17 G/17G
17 POWDER, FOR SOLUTION ORAL DAILY PRN
Status: DISCONTINUED | OUTPATIENT
Start: 2022-11-14 | End: 2022-11-18 | Stop reason: HOSPADM

## 2022-11-14 RX ORDER — TERAZOSIN 1 MG/1
1 CAPSULE ORAL NIGHTLY
Status: DISCONTINUED | OUTPATIENT
Start: 2022-11-14 | End: 2022-11-18 | Stop reason: HOSPADM

## 2022-11-14 RX ORDER — SODIUM CHLORIDE 9 MG/ML
100 INJECTION, SOLUTION INTRAVENOUS CONTINUOUS
Status: DISPENSED | OUTPATIENT
Start: 2022-11-14 | End: 2022-11-14

## 2022-11-14 RX ORDER — CETIRIZINE HYDROCHLORIDE 10 MG/1
10 TABLET ORAL DAILY
Status: DISCONTINUED | OUTPATIENT
Start: 2022-11-14 | End: 2022-11-18 | Stop reason: HOSPADM

## 2022-11-14 RX ORDER — ALBUTEROL SULFATE 90 UG/1
2 AEROSOL, METERED RESPIRATORY (INHALATION) EVERY 6 HOURS PRN
Status: DISCONTINUED | OUTPATIENT
Start: 2022-11-14 | End: 2022-11-14

## 2022-11-14 RX ORDER — TOPIRAMATE 25 MG/1
50 TABLET ORAL NIGHTLY
Status: DISCONTINUED | OUTPATIENT
Start: 2022-11-14 | End: 2022-11-18 | Stop reason: HOSPADM

## 2022-11-14 RX ORDER — ACETAMINOPHEN 650 MG/1
650 SUPPOSITORY RECTAL ONCE
Status: COMPLETED | OUTPATIENT
Start: 2022-11-14 | End: 2022-11-14

## 2022-11-14 RX ORDER — ACETAMINOPHEN 325 MG/1
650 TABLET ORAL EVERY 4 HOURS PRN
Status: DISCONTINUED | OUTPATIENT
Start: 2022-11-14 | End: 2022-11-18 | Stop reason: HOSPADM

## 2022-11-14 RX ORDER — TOPIRAMATE 25 MG/1
25 TABLET ORAL EVERY MORNING
Status: DISCONTINUED | OUTPATIENT
Start: 2022-11-14 | End: 2022-11-18 | Stop reason: HOSPADM

## 2022-11-14 RX ORDER — BISACODYL 5 MG/1
5 TABLET, DELAYED RELEASE ORAL DAILY PRN
Status: DISCONTINUED | OUTPATIENT
Start: 2022-11-14 | End: 2022-11-18 | Stop reason: HOSPADM

## 2022-11-14 RX ORDER — ONDANSETRON 2 MG/ML
4 INJECTION INTRAMUSCULAR; INTRAVENOUS EVERY 6 HOURS PRN
Status: DISCONTINUED | OUTPATIENT
Start: 2022-11-14 | End: 2022-11-18 | Stop reason: HOSPADM

## 2022-11-14 RX ORDER — HEPARIN SODIUM 5000 [USP'U]/ML
5000 INJECTION, SOLUTION INTRAVENOUS; SUBCUTANEOUS EVERY 8 HOURS SCHEDULED
Status: DISCONTINUED | OUTPATIENT
Start: 2022-11-14 | End: 2022-11-18 | Stop reason: HOSPADM

## 2022-11-14 RX ORDER — CEFEPIME 1 G/50ML
2 INJECTION, SOLUTION INTRAVENOUS EVERY 24 HOURS
Status: DISCONTINUED | OUTPATIENT
Start: 2022-11-14 | End: 2022-11-14

## 2022-11-14 RX ORDER — SIMETHICONE 80 MG
125 TABLET,CHEWABLE ORAL EVERY 6 HOURS PRN
Status: DISCONTINUED | OUTPATIENT
Start: 2022-11-14 | End: 2022-11-18 | Stop reason: HOSPADM

## 2022-11-14 RX ORDER — SODIUM CHLORIDE 0.9 % (FLUSH) 0.9 %
10 SYRINGE (ML) INJECTION AS NEEDED
Status: DISCONTINUED | OUTPATIENT
Start: 2022-11-14 | End: 2022-11-18 | Stop reason: HOSPADM

## 2022-11-14 RX ORDER — CEFEPIME 1 G/50ML
2 INJECTION, SOLUTION INTRAVENOUS EVERY 24 HOURS
Status: DISCONTINUED | OUTPATIENT
Start: 2022-11-14 | End: 2022-11-15 | Stop reason: ALTCHOICE

## 2022-11-14 RX ORDER — TRAZODONE HYDROCHLORIDE 50 MG/1
50 TABLET ORAL NIGHTLY
Status: DISCONTINUED | OUTPATIENT
Start: 2022-11-14 | End: 2022-11-18 | Stop reason: HOSPADM

## 2022-11-14 RX ORDER — ROSUVASTATIN CALCIUM 20 MG/1
20 TABLET, COATED ORAL NIGHTLY
Status: DISCONTINUED | OUTPATIENT
Start: 2022-11-14 | End: 2022-11-18 | Stop reason: HOSPADM

## 2022-11-14 RX ORDER — HYDROCODONE BITARTRATE AND ACETAMINOPHEN 5; 325 MG/1; MG/1
1 TABLET ORAL EVERY 6 HOURS PRN
Status: DISCONTINUED | OUTPATIENT
Start: 2022-11-14 | End: 2022-11-18 | Stop reason: HOSPADM

## 2022-11-14 RX ORDER — SODIUM CHLORIDE 0.9 % (FLUSH) 0.9 %
10 SYRINGE (ML) INJECTION EVERY 12 HOURS SCHEDULED
Status: DISCONTINUED | OUTPATIENT
Start: 2022-11-14 | End: 2022-11-18 | Stop reason: HOSPADM

## 2022-11-14 RX ORDER — SODIUM CHLORIDE 9 MG/ML
40 INJECTION, SOLUTION INTRAVENOUS AS NEEDED
Status: DISCONTINUED | OUTPATIENT
Start: 2022-11-14 | End: 2022-11-18 | Stop reason: HOSPADM

## 2022-11-14 RX ORDER — HYDROXYZINE HYDROCHLORIDE 25 MG/1
25 TABLET, FILM COATED ORAL 3 TIMES DAILY
Status: DISCONTINUED | OUTPATIENT
Start: 2022-11-14 | End: 2022-11-18 | Stop reason: HOSPADM

## 2022-11-14 RX ORDER — PHENYTOIN 50 MG/1
100 TABLET, CHEWABLE ORAL NIGHTLY
Status: DISCONTINUED | OUTPATIENT
Start: 2022-11-14 | End: 2022-11-18 | Stop reason: HOSPADM

## 2022-11-14 RX ORDER — BISACODYL 10 MG
10 SUPPOSITORY, RECTAL RECTAL DAILY PRN
Status: DISCONTINUED | OUTPATIENT
Start: 2022-11-14 | End: 2022-11-18 | Stop reason: HOSPADM

## 2022-11-14 RX ORDER — CETIRIZINE HYDROCHLORIDE 10 MG/1
10 TABLET ORAL DAILY
COMMUNITY

## 2022-11-14 RX ORDER — FLUTICASONE PROPIONATE 50 MCG
2 SPRAY, SUSPENSION (ML) NASAL DAILY
Status: DISCONTINUED | OUTPATIENT
Start: 2022-11-14 | End: 2022-11-18 | Stop reason: HOSPADM

## 2022-11-14 RX ORDER — CEFEPIME 1 G/50ML
2 INJECTION, SOLUTION INTRAVENOUS ONCE
Status: COMPLETED | OUTPATIENT
Start: 2022-11-14 | End: 2022-11-14

## 2022-11-14 RX ORDER — ONDANSETRON 4 MG/1
4 TABLET, FILM COATED ORAL EVERY 6 HOURS PRN
Status: DISCONTINUED | OUTPATIENT
Start: 2022-11-14 | End: 2022-11-18 | Stop reason: HOSPADM

## 2022-11-14 RX ORDER — BUSPIRONE HYDROCHLORIDE 10 MG/1
10 TABLET ORAL 3 TIMES DAILY
Status: DISCONTINUED | OUTPATIENT
Start: 2022-11-14 | End: 2022-11-18 | Stop reason: HOSPADM

## 2022-11-14 RX ORDER — NYSTATIN 100000 [USP'U]/G
1 POWDER TOPICAL 2 TIMES DAILY
Status: DISCONTINUED | OUTPATIENT
Start: 2022-11-14 | End: 2022-11-18 | Stop reason: HOSPADM

## 2022-11-14 RX ORDER — AMOXICILLIN 250 MG
2 CAPSULE ORAL 2 TIMES DAILY
Status: DISCONTINUED | OUTPATIENT
Start: 2022-11-14 | End: 2022-11-18 | Stop reason: HOSPADM

## 2022-11-14 RX ORDER — VENLAFAXINE 37.5 MG/1
37.5 TABLET ORAL DAILY
Status: DISCONTINUED | OUTPATIENT
Start: 2022-11-14 | End: 2022-11-18 | Stop reason: HOSPADM

## 2022-11-14 RX ORDER — GABAPENTIN 300 MG/1
300 CAPSULE ORAL EVERY 12 HOURS
COMMUNITY

## 2022-11-14 RX ORDER — IPRATROPIUM BROMIDE AND ALBUTEROL SULFATE 2.5; .5 MG/3ML; MG/3ML
3 SOLUTION RESPIRATORY (INHALATION) EVERY 8 HOURS PRN
Status: DISCONTINUED | OUTPATIENT
Start: 2022-11-14 | End: 2022-11-18 | Stop reason: HOSPADM

## 2022-11-14 RX ORDER — ANASTROZOLE 1 MG/1
1 TABLET ORAL DAILY
Status: DISCONTINUED | OUTPATIENT
Start: 2022-11-14 | End: 2022-11-18 | Stop reason: HOSPADM

## 2022-11-14 RX ORDER — CHOLECALCIFEROL (VITAMIN D3) 125 MCG
5 CAPSULE ORAL NIGHTLY PRN
Status: DISCONTINUED | OUTPATIENT
Start: 2022-11-14 | End: 2022-11-18 | Stop reason: HOSPADM

## 2022-11-14 RX ADMIN — BUSPIRONE HYDROCHLORIDE 10 MG: 10 TABLET ORAL at 20:37

## 2022-11-14 RX ADMIN — HYDROCODONE BITARTRATE AND ACETAMINOPHEN 1 TABLET: 5; 325 TABLET ORAL at 11:30

## 2022-11-14 RX ADMIN — TERAZOSIN HYDROCHLORIDE 1 MG: 1 CAPSULE ORAL at 20:38

## 2022-11-14 RX ADMIN — Medication 10 ML: at 20:39

## 2022-11-14 RX ADMIN — ROSUVASTATIN CALCIUM 20 MG: 20 TABLET, FILM COATED ORAL at 20:37

## 2022-11-14 RX ADMIN — BUSPIRONE HYDROCHLORIDE 10 MG: 10 TABLET ORAL at 16:55

## 2022-11-14 RX ADMIN — SODIUM CHLORIDE 1000 ML: 9 INJECTION, SOLUTION INTRAVENOUS at 05:56

## 2022-11-14 RX ADMIN — IPRATROPIUM BROMIDE 0.5 MG: 0.5 SOLUTION RESPIRATORY (INHALATION) at 11:54

## 2022-11-14 RX ADMIN — BUSPIRONE HYDROCHLORIDE 10 MG: 10 TABLET ORAL at 11:47

## 2022-11-14 RX ADMIN — IPRATROPIUM BROMIDE 0.5 MG: 0.5 SOLUTION RESPIRATORY (INHALATION) at 19:56

## 2022-11-14 RX ADMIN — HYDROXYZINE HYDROCHLORIDE 25 MG: 25 TABLET, FILM COATED ORAL at 11:30

## 2022-11-14 RX ADMIN — INSULIN DETEMIR 28 UNITS: 100 INJECTION, SOLUTION SUBCUTANEOUS at 12:00

## 2022-11-14 RX ADMIN — SENNOSIDES AND DOCUSATE SODIUM 2 TABLET: 8.6; 5 TABLET ORAL at 20:38

## 2022-11-14 RX ADMIN — VENLAFAXINE HYDROCHLORIDE 37.5 MG: 37.5 TABLET ORAL at 11:47

## 2022-11-14 RX ADMIN — NYSTATIN 1 APPLICATION: 100000 POWDER TOPICAL at 20:40

## 2022-11-14 RX ADMIN — CETIRIZINE HYDROCHLORIDE 10 MG: 10 TABLET, FILM COATED ORAL at 11:30

## 2022-11-14 RX ADMIN — PHENYTOIN 100 MG: 50 TABLET, CHEWABLE ORAL at 22:37

## 2022-11-14 RX ADMIN — HEPARIN SODIUM 5000 UNITS: 5000 INJECTION INTRAVENOUS; SUBCUTANEOUS at 11:30

## 2022-11-14 RX ADMIN — CEFEPIME 2 G: 1 INJECTION, SOLUTION INTRAVENOUS at 22:45

## 2022-11-14 RX ADMIN — HYDROXYZINE HYDROCHLORIDE 25 MG: 25 TABLET, FILM COATED ORAL at 20:37

## 2022-11-14 RX ADMIN — Medication 2000 MG: at 05:16

## 2022-11-14 RX ADMIN — ACETAMINOPHEN 650 MG: 650 SUPPOSITORY RECTAL at 05:37

## 2022-11-14 RX ADMIN — RUGBY ZINC OXIDE 20%: 20 OINTMENT TOPICAL at 11:30

## 2022-11-14 RX ADMIN — HEPARIN SODIUM 5000 UNITS: 5000 INJECTION INTRAVENOUS; SUBCUTANEOUS at 22:36

## 2022-11-14 RX ADMIN — HYDROXYZINE HYDROCHLORIDE 25 MG: 25 TABLET, FILM COATED ORAL at 16:55

## 2022-11-14 RX ADMIN — CEFEPIME 2 G: 1 INJECTION, SOLUTION INTRAVENOUS at 04:07

## 2022-11-14 RX ADMIN — ANASTROZOLE 1 MG: 1 TABLET ORAL at 11:30

## 2022-11-14 RX ADMIN — FLUTICASONE PROPIONATE 2 SPRAY: 50 SPRAY, METERED NASAL at 11:47

## 2022-11-14 RX ADMIN — NYSTATIN 1 APPLICATION: 100000 POWDER TOPICAL at 11:47

## 2022-11-14 RX ADMIN — SODIUM CHLORIDE 100 ML/HR: 9 INJECTION, SOLUTION INTRAVENOUS at 07:56

## 2022-11-14 RX ADMIN — SENNOSIDES AND DOCUSATE SODIUM 2 TABLET: 8.6; 5 TABLET ORAL at 11:30

## 2022-11-14 RX ADMIN — TOPIRAMATE 50 MG: 25 TABLET, FILM COATED ORAL at 20:38

## 2022-11-14 RX ADMIN — SODIUM CHLORIDE 1000 ML: 9 INJECTION, SOLUTION INTRAVENOUS at 04:07

## 2022-11-14 RX ADMIN — TRAZODONE HYDROCHLORIDE 50 MG: 50 TABLET ORAL at 20:39

## 2022-11-14 RX ADMIN — TOPIRAMATE 25 MG: 25 TABLET, FILM COATED ORAL at 11:47

## 2022-11-14 NOTE — ED PROVIDER NOTES
Time: 3:28 AM EST  Arrived by: ambulance  Chief Complaint:   Chief Complaint   Patient presents with   • Fever   • Fatigue     History provided by: patient  History is limited by: N/A     History of Present Illness:  Patient is a 67 y.o. year old female that presents to the emergency department with fever and shortness of breath. She states she started experiencing SOB yesterday, and she also admits to a nonproductive cough, mild fever, and nausea. She denies associated vomiting, diarrhea, or abdominal pain. Patient is confused, history limited      HPI    Similar Symptoms Previously: no  Recently seen: yes,11/3/22      Patient Care Team  Primary Care Provider: Jennifer Newman MD    Past Medical History:     Allergies   Allergen Reactions   • Latex Hives   • Penicillins Rash     Pt tolerated graded dose challenge to amp-sulbactam on 9/24/2022     Past Medical History:   Diagnosis Date   • Acute kidney injury (HCC) 11/14/2022   • Cancer (HCC)     Left breast   • Depressed    • Diabetes (HCC)     TYPE 2   • Flea bite of multiple sites of lower extremity     PT INSTRUCTED TO INFORM DR TRINH OF BITES PRIOR TO SURGERY   • H/O meningitis 1987   • History of Bell's palsy    • Migraine headache    • Polio    • Seizures (HCC)    • Stroke (HCC) 1987     Past Surgical History:   Procedure Laterality Date   • APPENDECTOMY  1987   • BREAST SURGERY Left 10/2015    Biopsy, benign   • CATARACT EXTRACTION     • COLONOSCOPY N/A 9/28/2022    Procedure: COLONOSCOPY WITH POLYPECTOMY;  Surgeon: Alfonso Fitzgerald MD;  Location: [facility] ENDOSCOPY;  Service: Gastroenterology;  Laterality: N/A;  COLON POLYP   • EAR TUBES     • ENDOSCOPY N/A 9/28/2022    Procedure: ESOPHAGOGASTRODUODENOSCOPY WITH BIOPSIES;  Surgeon: Alfonso Fitzgerald MD;  Location: [facility] ENDOSCOPY;  Service: Gastroenterology;  Laterality: N/A;  GASTRITIS, VARIX   • KNEE MENISCECTOMY     • MASTECTOMY     • MASTECTOMY WITH SENTINEL NODE BIOPSY AND AXILLARY  NODE DISSECTION Left 1/11/2018    Procedure: Left total mastectomy, left sentinel lymph node biopsy to include retrieval of prior clip, axillary lymph node dissection, no reconstruction. ;  Surgeon: Yuli Garcias MD;  [place] [facility] [place] [facility] [place]  [place]:    • OVARIAN CYST REMOVAL     • ID INSJ TUNNELED CVC W/O SUBQ PORT/ AGE 3 YR/> Right 5/18/2017    Procedure: INSERTION RIGHT VENOUS ACCESS DEVICE;  Surgeon: Yuli Garcias MD;  [place] [facility] [place] [facility] [place]  Service: General   • TONSILLECTOMY     • TUBAL ABDOMINAL LIGATION     • VENOUS ACCESS DEVICE (PORT) REMOVAL Right 8/8/2019    Procedure: RIGHT port removal,;  Surgeon: Yuli Garcias MD;  [place] [facility] [place] [facility] [place]  Service: General     Family History   Problem Relation Age of Onset   • Asthma Mother    • Diabetes Mother    • Hearing loss Mother    • Heart attack Mother    • Heart failure Mother    • Breast cancer Mother 55   • Hypertension Mother    • Alcohol abuse Father    • Diabetes Sister    • Diabetes Brother    • Brain cancer Brother 56   • Cancer Brother 56        bladder   • Malig Hyperthermia Neg Hx        Home Medications:  Prior to Admission medications    Medication Sig Start Date End Date Taking? Authorizing Provider   acetaminophen (TYLENOL) 500 MG tablet Take 500 mg by mouth Every 8 (Eight) Hours As Needed for Mild Pain.    ProviderRoland MD   albuterol sulfate  (90 Base) MCG/ACT inhaler Inhale 2 puffs Every 6 (Six) Hours As Needed for Shortness of Air. 10/26/21   Roland Rankin MD   anastrozole (ARIMIDEX) 1 MG tablet Take 1 mg by mouth Daily.    ProviderRoland MD   azithromycin (ZITHROMAX) 250 MG tablet  10/21/22   Roland Rankin MD   bisacodyl (DULCOLAX) 10 MG suppository Insert 10 mg into the rectum Daily As Needed for Constipation.    Roland Rankin MD   busPIRone (BUSPAR) 10 MG tablet Take 10 mg by mouth 3 (Three) Times a Day. 4/16/22   Roland Rankin MD   docusate sodium (COLACE) 100  MG capsule Take 100 mg by mouth 2 (Two) Times a Day.    Roland Rankin MD   fluticasone (FLONASE) 50 MCG/ACT nasal spray 2 sprays into the nostril(s) as directed by provider Daily. 1/14/22   Roland Rankin MD   furosemide (LASIX) 40 MG tablet Take 40 mg by mouth Daily.    Roland Rankin MD   gabapentin (NEURONTIN) 300 MG capsule Take 1 capsule by mouth 2 (Two) Times a Day for 10 days. 9/29/22 10/9/22  Tim Patel MD   HYDROcodone-acetaminophen (NORCO) 5-325 MG per tablet Take 1 tablet by mouth Every 6 (Six) Hours As Needed for Moderate Pain. 9/29/22   Tim Patel MD   hydrOXYzine (ATARAX) 25 MG tablet Take 25 mg by mouth 3 (Three) Times a Day. 4/20/22   Roland Rankin MD   insulin glargine (LANTUS, SEMGLEE) 100 UNIT/ML injection Inject 28 Units under the skin into the appropriate area as directed Daily.    Roland Rankin MD   ipratropium-albuterol (DUO-NEB) 0.5-2.5 mg/3 ml nebulizer Take 3 mL by nebulization Every 8 (Eight) Hours As Needed for Wheezing.    Roland Rankin MD   loratadine (CLARITIN) 10 MG tablet Take 10 mg by mouth Daily.    Roland Rankin MD   magnesium hydroxide (MILK OF MAGNESIA) 400 MG/5ML suspension Take 30 mL by mouth At Night As Needed for Constipation.    Roland Rankin MD   metFORMIN (GLUCOPHAGE) 1000 MG tablet Take 1,000 mg by mouth 2 (Two) Times a Day With Meals.    Roland Rankin MD   nystatin (MYCOSTATIN) 560762 UNIT/GM powder Apply 1 application topically to the appropriate area as directed 2 (Two) Times a Day.    Roland Rankin MD   ondansetron (ZOFRAN) 4 MG tablet Take 4 mg by mouth Every 8 (Eight) Hours As Needed for Nausea. 6/19/22   Roland Rankin MD   phenytoin (DILANTIN) 50 MG chewable tablet Chew 100 mg Every Night.    Roland Rankin MD   potassium chloride 10 MEQ CR tablet Take 10 mEq by mouth Daily.    Roland Rankin MD   prazosin (MINIPRESS) 1 MG capsule Take 1 mg by mouth Every  Night.    Roland Rankin MD   rosuvastatin (CRESTOR) 20 MG tablet  21   Roland Rankin MD   simethicone (MYLICON) 125 MG chewable tablet Chew 125 mg Every 6 (Six) Hours As Needed for Flatulence.    Roland Rankin MD   Spiriva HandiHaler 18 MCG per inhalation capsule Place 1 capsule into inhaler and inhale Daily. 22   Roland Rankin MD   topiramate (TOPAMAX) 25 MG tablet Take 25 mg by mouth Every Morning.    Roland Rankin MD   topiramate (TOPAMAX) 50 MG tablet Take 50 mg by mouth Every Night.    Roland Rankin MD   traZODone (DESYREL) 50 MG tablet Take 50 mg by mouth Every Night. 22   Roland Rankin MD   venlafaxine (EFFEXOR) 37.5 MG tablet Take 37.5 mg by mouth Daily.    Roland Rankin MD   Vitamin D, Cholecalciferol, 50 MCG (2000 UT) capsule Take 2,000 Units by mouth Daily.    Roland Rankin MD   zinc oxide 13 % cream cream Apply 1 application topically to the appropriate area as directed As Needed (redness).    Roland Rankin MD        Social History:   Social History     Tobacco Use   • Smoking status: Former     Packs/day: 2.50     Types: Cigarettes     Start date:      Quit date:      Years since quittin.8   • Smokeless tobacco: Never   Vaping Use   • Vaping Use: Never used   Substance Use Topics   • Alcohol use: Yes     Comment: rarely   • Drug use: No     Recent travel: not applicable     Review of Systems:  Review of Systems   Constitutional: Positive for fever (mild). Negative for chills.   HENT: Negative for congestion, rhinorrhea and sore throat.    Eyes: Negative for pain and visual disturbance.   Respiratory: Positive for cough (nonproductive) and shortness of breath. Negative for apnea and chest tightness.    Cardiovascular: Negative for chest pain and palpitations.   Gastrointestinal: Positive for nausea. Negative for abdominal pain, diarrhea and vomiting.   Genitourinary: Negative for difficulty  "urinating and dysuria.   Musculoskeletal: Negative for joint swelling and myalgias.   Skin: Negative for color change.   Neurological: Negative for seizures and headaches.   Psychiatric/Behavioral: Negative.    All other systems reviewed and are negative.       Physical Exam:  /76 (BP Location: Right arm, Patient Position: Lying)   Pulse 88   Temp 97.9 °F (36.6 °C)   Resp 26   Ht 152.4 cm (60\")   Wt 103 kg (226 lb 3.1 oz)   SpO2 99%   BMI 44.18 kg/m²     Physical Exam  Vitals and nursing note reviewed.   Constitutional:       General: She is not in acute distress.     Appearance: Normal appearance. She is not toxic-appearing.   HENT:      Head: Normocephalic and atraumatic.      Jaw: There is normal jaw occlusion.      Mouth/Throat:      Mouth: Mucous membranes are dry.   Eyes:      General: Lids are normal.      Extraocular Movements: Extraocular movements intact.      Conjunctiva/sclera: Conjunctivae normal.      Pupils: Pupils are equal, round, and reactive to light.   Cardiovascular:      Rate and Rhythm: Regular rhythm. Tachycardia present.      Pulses: Normal pulses.      Heart sounds: Normal heart sounds.   Pulmonary:      Effort: Pulmonary effort is normal. Tachypnea present. No respiratory distress.      Breath sounds: Rhonchi present. No wheezing.   Abdominal:      General: Abdomen is flat.      Palpations: Abdomen is soft.      Tenderness: There is no abdominal tenderness. There is no guarding or rebound.   Musculoskeletal:         General: Normal range of motion.      Cervical back: Normal range of motion and neck supple.      Right lower leg: Edema (mild) present.      Left lower leg: Edema (mild) present.   Skin:     General: Skin is warm and dry.   Neurological:      Mental Status: She is alert.      Cranial Nerves: No cranial nerve deficit.      Sensory: No sensory deficit.      Motor: No weakness.      Comments: Oriented to self only; No other focal deficits   Psychiatric:         Mood " and Affect: Mood normal.                Medications in the Emergency Department:  Medications   sodium chloride 0.9 % flush 10 mL (has no administration in time range)   anastrozole (ARIMIDEX) tablet 1 mg (1 mg Oral Given 11/14/22 1130)   busPIRone (BUSPAR) tablet 10 mg (10 mg Oral Given 11/14/22 1147)   fluticasone (FLONASE) 50 MCG/ACT nasal spray 2 spray (2 sprays Nasal Given 11/14/22 1147)   HYDROcodone-acetaminophen (NORCO) 5-325 MG per tablet 1 tablet (1 tablet Oral Given 11/14/22 1130)   hydrOXYzine (ATARAX) tablet 25 mg (25 mg Oral Given 11/14/22 1130)   insulin detemir (LEVEMIR) injection 28 Units (28 Units Subcutaneous Incomplete 11/14/22 1200)   ipratropium-albuterol (DUO-NEB) nebulizer solution 3 mL ( Nebulization Canceled Entry 11/14/22 0909)   cetirizine (zyrTEC) tablet 10 mg (10 mg Oral Given 11/14/22 1130)   magnesium hydroxide (MILK OF MAGNESIA) suspension 10 mL (has no administration in time range)   nystatin (MYCOSTATIN) powder 1 application (1 application Topical Given 11/14/22 1147)   phenytoin (DILANTIN) chewable tablet 100 mg (has no administration in time range)   terazosin (HYTRIN) capsule 1 mg (has no administration in time range)   rosuvastatin (CRESTOR) tablet 20 mg (has no administration in time range)   simethicone (MYLICON) chewable tablet 120 mg (has no administration in time range)   ipratropium (ATROVENT) nebulizer solution 0.5 mg ( Nebulization Canceled Entry 11/14/22 1300)   topiramate (TOPAMAX) tablet 25 mg (25 mg Oral Given 11/14/22 1147)   topiramate (TOPAMAX) tablet 50 mg (has no administration in time range)   traZODone (DESYREL) tablet 50 mg (has no administration in time range)   venlafaxine (EFFEXOR) tablet 37.5 mg (37.5 mg Oral Given 11/14/22 1147)   zinc oxide 20 % ointment (has no administration in time range)   sodium chloride 0.9 % flush 10 mL (has no administration in time range)   sodium chloride 0.9 % flush 10 mL (10 mL Intravenous Not Given 11/14/22 1147)   sodium  chloride 0.9 % infusion 40 mL (has no administration in time range)   heparin (porcine) 5000 UNIT/ML injection 5,000 Units (5,000 Units Subcutaneous Given 11/14/22 1130)   sodium chloride 0.9 % infusion (100 mL/hr Intravenous New Bag 11/14/22 0756)   acetaminophen (TYLENOL) tablet 650 mg (has no administration in time range)   melatonin tablet 5 mg (has no administration in time range)   sennosides-docusate (PERICOLACE) 8.6-50 MG per tablet 2 tablet (2 tablets Oral Given 11/14/22 1130)     And   polyethylene glycol (MIRALAX) packet 17 g (has no administration in time range)     And   bisacodyl (DULCOLAX) EC tablet 5 mg (has no administration in time range)     And   bisacodyl (DULCOLAX) suppository 10 mg (has no administration in time range)   ondansetron (ZOFRAN) tablet 4 mg (has no administration in time range)     Or   ondansetron (ZOFRAN) injection 4 mg (has no administration in time range)   cefepime (MAXIPIME) 1 g/100 mL 0.9% NS IVPB (mbp) (has no administration in time range)   cefepime (MAXIPIME) IVPB 2 g (premix) in D5 (has no administration in time range)   Pharmacy to dose vancomycin (has no administration in time range)   vancomycin 2000 mg/500 mL 0.9% NS IVPB ([facility]) (2,000 mg Intravenous New Bag 11/14/22 0516)   cefepime (MAXIPIME) IVPB 2 g (premix) in D5 (0 g Intravenous Stopped 11/14/22 0516)   sodium chloride 0.9 % bolus 1,000 mL (0 mL Intravenous Stopped 11/14/22 0556)   sodium chloride 0.9 % bolus 1,000 mL (0 mL Intravenous Stopped 11/14/22 0749)   acetaminophen (TYLENOL) suppository 650 mg (650 mg Rectal Given 11/14/22 0537)        Labs  Lab Results (last 24 hours)     Procedure Component Value Units Date/Time    CBC & Differential [[id number]]  (Abnormal) Collected: 11/14/22 0247    Specimen: Blood Updated: 11/14/22 0328    Narrative:      The following orders were created for panel order CBC & Differential.  Procedure                               Abnormality         Status                      ---------                               -----------         ------                     CBC Auto Differential[[id number]]        Abnormal            Final result               Scan Slide[[id number]]                                       Final result                 Please view results for these tests on the individual orders.    CBC Auto Differential [[id number]]  (Abnormal) Collected: 11/14/22 0247    Specimen: Blood Updated: 11/14/22 0328     WBC 8.26 10*3/mm3      RBC 4.57 10*6/mm3      Hemoglobin 12.3 g/dL      Hematocrit 39.4 %      MCV 86.2 fL      MCH 26.9 pg      MCHC 31.2 g/dL      RDW 16.5 %      RDW-SD 51.4 fl      MPV --     Comment: Unable to result due to specimen interference         Platelets 112 10*3/mm3      Neutrophil % 91.1 %      Lymphocyte % 4.0 %      Monocyte % 3.9 %      Eosinophil % 0.1 %      Basophil % 0.4 %      Immature Grans % 0.5 %      Neutrophils, Absolute 7.53 10*3/mm3      Lymphocytes, Absolute 0.33 10*3/mm3      Monocytes, Absolute 0.32 10*3/mm3      Eosinophils, Absolute 0.01 10*3/mm3      Basophils, Absolute 0.03 10*3/mm3      Immature Grans, Absolute 0.04 10*3/mm3      nRBC 0.0 /100 WBC     Blood Culture - Blood, Arm, Right [[id number]] Collected: 11/14/22 0247    Specimen: Blood from Arm, Right Updated: 11/14/22 0255    Blood Culture - Blood, Arm, Right [[id number]] Collected: 11/14/22 0247    Specimen: Blood from Arm, Right Updated: 11/14/22 0255    Lactic Acid, Plasma [[id number]]  (Abnormal) Collected: 11/14/22 0247    Specimen: Blood Updated: 11/14/22 0333     Lactate 4.3 mmol/L     Scan Slide [[id number]] Collected: 11/14/22 0247    Specimen: Blood Updated: 11/14/22 0328     Anisocytosis Slight/1+     Crenated RBC's Slight/1+     Microcytes Slight/1+     Macrocytes Slight/1+     Ovalocytes Slight/1+     WBC Morphology Normal     Platelet Estimate Decreased     Large Platelets Slight/1+    Influenza Antigen, Rapid - Swab, Nasopharynx [[id number]]  (Normal) Collected: 11/14/22  0251    Specimen: Swab from Nasopharynx Updated: 11/14/22 0320     Influenza A Ag, EIA Negative     Influenza B Ag, EIA Negative    COVID-19,APTIMA PANTHER([facility]),[facility], NP/OP SWAB IN UTM/VTM/[facility]SPORT MEDIA,24 HR TAT - Swab, Nasal Cavity [[id number]]  (Normal) Collected: 11/14/22 0251    Specimen: Swab from Nasal Cavity Updated: 11/14/22 1401     COVID19 Not Detected    Narrative:      Fact sheet for providers: https://www.fda.gov/media/222652/download     Fact sheet for patients: https://www.fda.gov/media/606415/download    Test performed by RT PCR.    Urinalysis With Culture If Indicated - Urine, Clean Catch [[id number]]  (Abnormal) Collected: 11/14/22 0348    Specimen: Urine, Clean Catch Updated: 11/14/22 0359     Color, UA Dark Yellow     Appearance, UA Turbid     pH, UA <=5.0     Specific Gravity, UA 1.022     Glucose, UA Negative     Ketones, UA Trace     Bilirubin, UA Small (1+)     Blood, UA Small (1+)     Protein,  mg/dL (2+)     Leuk Esterase, UA Large (3+)     Nitrite, UA Negative     Urobilinogen, UA 1.0 E.U./dL    Narrative:      In absence of clinical symptoms, the presence of pyuria, bacteria, and/or nitrites on the urinalysis result does not correlate with infection.    Urinalysis, Microscopic Only - Urine, Clean Catch [[id number]]  (Abnormal) Collected: 11/14/22 0348    Specimen: Urine, Clean Catch Updated: 11/14/22 0408     RBC, UA 13-20 /HPF      WBC, UA Too Numerous to Count /HPF      Bacteria, UA 4+ /HPF      Squamous Epithelial Cells, UA 3-6 /HPF      Hyaline Casts, UA 0-2 /LPF      Granular Casts, UA 3-6 /LPF      Methodology Manual Light Microscopy    Urine Culture - Urine, Urine, Clean Catch [[id number]] Collected: 11/14/22 0348    Specimen: Urine, Clean Catch Updated: 11/14/22 0408    Comprehensive Metabolic Panel [[id number]]  (Abnormal) Collected: 11/14/22 0403    Specimen: Blood from Arm, Right Updated: 11/14/22 0424     Glucose 156 mg/dL      BUN 51 mg/dL      Creatinine  2.15 mg/dL      Sodium 139 mmol/L      Potassium 4.6 mmol/L      Chloride 104 mmol/L      CO2 20.5 mmol/L      Calcium 9.7 mg/dL      Total Protein 7.9 g/dL      Albumin 3.20 g/dL      ALT (SGPT) 5 U/L      AST (SGOT) 36 U/L      Alkaline Phosphatase 117 U/L      Total Bilirubin 0.9 mg/dL      Globulin 4.7 gm/dL      A/G Ratio 0.7 g/dL      BUN/Creatinine Ratio 23.7     Anion Gap 14.5 mmol/L      eGFR 24.4 mL/min/1.73      Comment: National Kidney Foundation and American Society of Nephrology (ASN) Task Force recommended calculation based on the Chronic Kidney Disease Epidemiology Collaboration (CKD-EPI) equation refit without adjustment for race.       Narrative:      GFR Normal >60  Chronic Kidney Disease <60  Kidney Failure <15      Troponin [[id number]]  (Normal) Collected: 11/14/22 0403    Specimen: Blood from Arm, Right Updated: 11/14/22 0424     Troponin T 0.023 ng/mL     Narrative:      Troponin T Reference Range:  <= 0.03 ng/mL-   Negative for AMI  >0.03 ng/mL-     Abnormal for myocardial necrosis.  Clinicians would have to utilize clinical acumen, EKG, Troponin and serial changes to determine if it is an Acute Myocardial Infarction or myocardial injury due to an underlying chronic condition.       Results may be falsely decreased if patient taking Biotin.      Magnesium [[id number]]  (Abnormal) Collected: 11/14/22 0403    Specimen: Blood from Arm, Right Updated: 11/14/22 0424     Magnesium 1.4 mg/dL     Phenytoin Level, Total [[id number]]  (Abnormal) Collected: 11/14/22 0403    Specimen: Blood from Arm, Right Updated: 11/14/22 0424     Phenytoin Level 1.2 mcg/mL     BNP [[id number]]  (Normal) Collected: 11/14/22 0403    Specimen: Blood from Arm, Right Updated: 11/14/22 0423     proBNP 884.8 pg/mL     Narrative:      Among patients with dyspnea, NT-proBNP is highly sensitive for the detection of acute congestive heart failure. In addition NT-proBNP of <300 pg/ml effectively rules out acute congestive heart  failure with 99% negative predictive value.      STAT Lactic Acid, Reflex [[id number]]  (Abnormal) Collected: 11/14/22 0601    Specimen: Blood from Arm, Right Updated: 11/14/22 0645     Lactate 4.3 mmol/L            Imaging:  XR Chest 1 View    Result Date: 11/14/2022  PROCEDURE: XR CHEST 1 VW  COMPARISON: 9/20/2022.  INDICATIONS: 67-YEAR-OLD FEMALE W/ UNSPECIFIED WEAKNESS & DIZZINESS.  FINDINGS: A single AP semi-upright portable chest radiograph was performed.  There is pulmonary hypoinflation.  New or increased bilateral infiltrates are suspected.  The findings may represent mild pulmonary edema with vascular congestion.  Pneumonia cannot be excluded.  Surgical clips are projected over the left chest, including the left axilla and the left breast.  The findings suggest prior left mastectomy with left axillary lymph node dissection.  Please correlate clinically.  The thoracic aorta is atherosclerotic.  External artifacts obscure detail.  Degenerative changes involve the bilateral shoulders.  There may be minimal blunting of the left lateral costophrenic sulcus, which is probably related to low lung volumes.  Pleural-parenchymal scarring is possible.  A tiny left pleural effusion cannot be excluded but is thought to be less likely.  There may be borderline cardiac enlargement.  No pneumothorax.       There may be new mild pulmonary edema with vascular congestion.  Pneumonia cannot be excluded but is thought to be less likely.     Please note that portions of this note were completed with a voice recognition program.  VIRI KIM JR, MD       Electronically Signed and Approved By: VIRI KIM JR, MD on 11/14/2022 at 3:25                Procedures:  Procedures    Progress  ED Course as of 11/14/22 1537   Mon Nov 14, 2022   0300 ECG 12 Lead ED Triage Standing Order; Weak / Dizzy / AMS  Sinus tachycardia with rate of 127.  Normal AK and QTc interval.  Normal QTC.  No acute ST elevation or depression.  EKG  interpreted by me.  No significant change when compared to previous. [LD]      ED Course User Index  [LD] Capo Waggoner MD                            Medical Decision Making:  Sepsis criteria was met in the emergency department and the Sepsis protocol (including antibiotic administration) was initiated.      SIRS criteria considered:   1.  Temperature > 100.4 or <98.6    2.  Heart Rate > 90    3.  Respiratory Rate > 22    4.  WBC > 12K or <4K.             Severe Sepsis:     Respiratory: Mechanical Ventilation or Bipap  Hypotension: SBP > 90 or MAP < 65  Renal: Creatinine > 2  Metabolic: Lactic Acid > 2  Hematologic: Platelets < 100K or INR > 1.5  Hepatic: BILI  >  2  CNS: Sudden AMS     Septic Shock:     Severe Sepsis + Persistent hypotension or Lactic Acid > 4     Normal saline bolus, Antibiotics, and final disposition was based on these definitions.        Sepsis was recognized at 0330    Antibiotics were ordered.     30 cc/kg bolus was indicated.       Patient did not receive the recommended 30ml/kg fluid bolus for sepsis because it would be harmful or detrimental to the patient.    The patient has concern for fluid overload.   The patient was ordered 2L of fluids.    Total Critical Care time of 45 minutes. Total critical care time documented does not include time spent on separately billed procedures for services of nurses or physician assistants. I personally saw and examined the patient. I have reviewed all diagnostic interpretations and treatment plans as written. I was present for the key portions of any procedures performed and the inclusive time noted in any critical care statement. Critical care time includes patient management by me, time spent at the patients bedside,  time to review lab and imaging results, discussing patient care, documentation in the medical record, and time spent with family or caregiver.    MDM  Number of Diagnoses or Management Options  Cystitis  Lactic acidosis  Sepsis,  due to unspecified organism, unspecified whether acute organ dysfunction present (HCC)  Diagnosis management comments: On arrival patient initially was afebrile but repeat vitals showed a temperature of 103.  Patient is confused.  She reports shortness of breath.  Labs were obtained that showed a white count of 8.  Lactic acid elevated 4.3.  UA concerning for urinary tract infection.  COVID and flu both negative.  Patient was started on broad-spectrum antibiotics.  Labs also showed a creatinine of 2.1 which appears to be her baseline.  Chest x-ray concerning for possible pulmonary edema versus pneumonia.  BNP is not significantly elevated.  Patient was also given Tylenol.  I discussed patient with hospitalist and she will be admitted for further care.       Amount and/or Complexity of Data Reviewed  Clinical lab tests: ordered  Tests in the radiology section of CPT®: ordered and reviewed  Review and summarize past medical records: yes  Discuss the patient with other providers: yes  Independent visualization of images, tracings, or specimens: yes    Risk of Complications, Morbidity, and/or Mortality  Presenting problems: moderate  Management options: moderate         Final diagnoses:   Cystitis   Sepsis, due to unspecified organism, unspecified whether acute organ dysfunction present (HCC)   Lactic acidosis        Disposition:  ED Disposition     ED Disposition   Decision to Admit    Condition   --    Comment   Level of Care: Med/Surg [1]   Diagnosis: Acute cystitis [595.0.ICD-9-CM]   Certification: I Certify That Inpatient Hospital Services Are Medically Necessary For Greater Than 2 Midnights                 Documentation assistance provided by Jesús Meyer acting as scribe for Capo Waggoner MD. Information recorded by the scribe was done at my direction and has been verified and validated by me.          Jesús Meyer  11/14/22 [id number]       Capo Waggoner MD  11/14/22 [id number]

## 2022-11-14 NOTE — ED NOTES
Pt sleeping but arouses easily and can answer basic questions. Fluids are infusing and abx is still infusing she has one IV and throughout the evening she has been keeping her arm bent preventing good IV flow. Arm board is on but it is below IV site currently.

## 2022-11-14 NOTE — PROGRESS NOTES
"[facility] Clinical Pharmacy Services: Vancomycin Pharmacokinetic Initial Consult Note    Josseline Vargas is a 67 y.o. female who is on day 1 of pharmacy to dose vancomycin.    Consulting Provider: Pelon  Indication: Sepsis secondary to Urinary Tract Infection  Target: Dose by Levels  Planned Duration of Therapy: 7 days per consult  Receiving Prior to Admission or Consult?: No  Loading Dose Ordered or Given: 2000 mg on  at 0516  Other Antimicrobials: Cefepime    Microbiology Data  MRSA PCR performed: No; Result: Not ordered due to excluded indication or presence of suspected abscess  Culture/Source: Blood and urine cultures pending    Vitals/Labs  Ht: 152.4 cm (60\"); Wt: 106 kg (232 lb 12.9 oz)  Temp (24hrs), Av.4 °F (38.6 °C), Min:98.7 °F (37.1 °C), Max:103.4 °F (39.7 °C)   Estimated Creatinine Clearance: 27.2 mL/min (A) (by C-G formula based on SCr of 2.15 mg/dL (H)).     Results from last 7 days   Lab Units 22  0403 22  0247 22  1350   CREATININE mg/dL 2.15*  --  2.10*   WBC 10*3/mm3  --  8.26 15.07*     Assessment/Plan:  Pulse-dosing secondary to poor renal function  Vancomycin Dose:Given loading dose of 2g today, will obtain random level in the AM tomorrow to determine further dosing  Vanc Random has been ordered for 11/15 at 0600  Patient has order for Complete Metabolic Panel    Pharmacy will follow patient's kidney function and will adjust doses and obtain levels as necessary. Thank you for involving pharmacy in this patient's care. Please contact pharmacy with any questions or concerns.                           Cristina Salinas [facility]  Clinical Pharmacist    "

## 2022-11-14 NOTE — H&P
[facility] HISTORY AND PHYSICAL  Date: 2022   Patient Name: Josseline Vargas  : 1955  MRN: [id number]  Primary Care Physician:  Jennifer Newman MD  Date of admission: 2022    Subjective   Subjective     Chief Complaint: Feeling unwell times several days    HPI:    Josseline Vargas is a 67 y.o. female with medical history significant for type 2 diabetes, seizure disorder, history of CVA; presents with feeling unwell times several days.  Patient states that she currently resides at a nursing home and she has not been feeling herself for the past few days.  Patient notes a decrease in her appetite along with a decrease in her level of activity.  Patient notes having a cough.  Patient also reports having UTI type symptoms, with difficulty urinating, seizure, flank pain and frequency.  Patient reports suprapubic pain along with bilateral flank pain.  Patient reports that she was trying to tell the people at her facility that she was not feeling well, but they did not take her seriously.  Patient has also noted having headache.      Personal History     Past Medical History:  Past Medical History:   Diagnosis Date   • Cancer (HCC)     Left breast   • Depressed    • Diabetes (HCC)     TYPE 2   • Flea bite of multiple sites of lower extremity     PT INSTRUCTED TO INFORM DR TRINH OF BITES PRIOR TO SURGERY   • H/O meningitis    • History of Bell's palsy    • Migraine headache    • Polio    • Seizures (HCC)    • Stroke (HCC)         Past Surgical History:  Past Surgical History:   Procedure Laterality Date   • APPENDECTOMY     • BREAST SURGERY Left 10/2015    Biopsy, benign   • CATARACT EXTRACTION     • COLONOSCOPY N/A 2022    Procedure: COLONOSCOPY WITH POLYPECTOMY;  Surgeon: Alfonso Fitzgerald MD;  Location: [facility] ENDOSCOPY;  Service: Gastroenterology;  Laterality: N/A;  COLON POLYP   • EAR TUBES     • ENDOSCOPY N/A 2022    Procedure:  ESOPHAGOGASTRODUODENOSCOPY WITH BIOPSIES;  Surgeon: Alfonso Fitzgerald MD;  Location: [facility] ENDOSCOPY;  Service: Gastroenterology;  Laterality: N/A;  GASTRITIS, VARIX   • KNEE MENISCECTOMY     • MASTECTOMY     • MASTECTOMY WITH SENTINEL NODE BIOPSY AND AXILLARY NODE DISSECTION Left 2018    Procedure: Left total mastectomy, left sentinel lymph node biopsy to include retrieval of prior clip, axillary lymph node dissection, no reconstruction. ;  Surgeon: Yuli Garcias MD;  [facility] [facility] [place] [facility]  Service:    • OVARIAN CYST REMOVAL     • HI INSJ TUNNELED CVC W/O SUBQ PORT/ AGE 3 YR/> Right 2017    Procedure: INSERTION RIGHT VENOUS ACCESS DEVICE;  Surgeon: Yuli Garcias MD;  Location[place] [facility] [place] [facility]  Service: General   • TONSILLECTOMY     • TUBAL ABDOMINAL LIGATION     • VENOUS ACCESS DEVICE (PORT) REMOVAL Right 2019    Procedure: RIGHT port removal,;  Surgeon: Yuli Garcias MD;  [place] [facility] [place] [facility]  Service: General        Family History:   Family History   Problem Relation Age of Onset   • Asthma Mother    • Diabetes Mother    • Hearing loss Mother    • Heart attack Mother    • Heart failure Mother    • Breast cancer Mother 55   • Hypertension Mother    • Alcohol abuse Father    • Diabetes Sister    • Diabetes Brother    • Brain cancer Brother 56   • Cancer Brother 56        bladder   • Malig Hyperthermia Neg Hx         Social History:   Social History     Socioeconomic History   • Marital status:    • Number of children: 9   • Years of education: 12   Tobacco Use   • Smoking status: Former     Packs/day: 2.50     Types: Cigarettes     Start date:      Quit date:      Years since quittin.8   • Smokeless tobacco: Never   Vaping Use   • Vaping Use: Never used   Substance and Sexual Activity   • Alcohol use: Yes     Comment: rarely   • Drug use: No   • Sexual activity: Defer        Home Medications:  Prior to Admission medications    Medication Sig Start  Date End Date Taking? Authorizing Provider   acetaminophen (TYLENOL) 500 MG tablet Take 500 mg by mouth Every 8 (Eight) Hours As Needed for Mild Pain.    Roland Rankin MD   albuterol sulfate  (90 Base) MCG/ACT inhaler Inhale 2 puffs Every 6 (Six) Hours As Needed for Shortness of Air. 10/26/21   Roland Rankin MD   anastrozole (ARIMIDEX) 1 MG tablet Take 1 mg by mouth Daily.    Roland Rankin MD   azithromycin (ZITHROMAX) 250 MG tablet  10/21/22   Roland Rankin MD   bisacodyl (DULCOLAX) 10 MG suppository Insert 10 mg into the rectum Daily As Needed for Constipation.    Roland Rankin MD   busPIRone (BUSPAR) 10 MG tablet Take 10 mg by mouth 3 (Three) Times a Day. 4/16/22   Roland Rankin MD   docusate sodium (COLACE) 100 MG capsule Take 100 mg by mouth 2 (Two) Times a Day.    Roland Rankin MD   fluticasone (FLONASE) 50 MCG/ACT nasal spray 2 sprays into the nostril(s) as directed by provider Daily. 1/14/22   Roland Rankin MD   furosemide (LASIX) 40 MG tablet Take 40 mg by mouth Daily.    Roland Rankin MD   gabapentin (NEURONTIN) 300 MG capsule Take 1 capsule by mouth 2 (Two) Times a Day for 10 days. 9/29/22 10/9/22  Tim Patel MD   HYDROcodone-acetaminophen (NORCO) 5-325 MG per tablet Take 1 tablet by mouth Every 6 (Six) Hours As Needed for Moderate Pain. 9/29/22   Tim Patel MD   hydrOXYzine (ATARAX) 25 MG tablet Take 25 mg by mouth 3 (Three) Times a Day. 4/20/22   Roland Rankin MD   insulin glargine (LANTUS, SEMGLEE) 100 UNIT/ML injection Inject 28 Units under the skin into the appropriate area as directed Daily.    Roland Rankin MD   ipratropium-albuterol (DUO-NEB) 0.5-2.5 mg/3 ml nebulizer Take 3 mL by nebulization Every 8 (Eight) Hours As Needed for Wheezing.    Roland Rankin MD   loratadine (CLARITIN) 10 MG tablet Take 10 mg by mouth Daily.    Roland Rankin MD   magnesium hydroxide (MILK OF MAGNESIA)  400 MG/5ML suspension Take 30 mL by mouth At Night As Needed for Constipation.    Roland Rankin MD   metFORMIN (GLUCOPHAGE) 1000 MG tablet Take 1,000 mg by mouth 2 (Two) Times a Day With Meals.    Roland Rankin MD   nystatin (MYCOSTATIN) 399045 UNIT/GM powder Apply 1 application topically to the appropriate area as directed 2 (Two) Times a Day.    Roland Rankin MD   ondansetron (ZOFRAN) 4 MG tablet Take 4 mg by mouth Every 8 (Eight) Hours As Needed for Nausea. 6/19/22   Roland Rankin MD   phenytoin (DILANTIN) 50 MG chewable tablet Chew 100 mg Every Night.    Roland Rankin MD   potassium chloride 10 MEQ CR tablet Take 10 mEq by mouth Daily.    Roland Rankin MD   prazosin (MINIPRESS) 1 MG capsule Take 1 mg by mouth Every Night.    Roland Rankin MD   rosuvastatin (CRESTOR) 20 MG tablet  11/8/21   Roland Rankin MD   simethicone (MYLICON) 125 MG chewable tablet Chew 125 mg Every 6 (Six) Hours As Needed for Flatulence.    Roland Rankin MD   Spiriva HandiHaler 18 MCG per inhalation capsule Place 1 capsule into inhaler and inhale Daily. 1/12/22   Roland Rankin MD   topiramate (TOPAMAX) 25 MG tablet Take 25 mg by mouth Every Morning.    Roland Rankin MD   topiramate (TOPAMAX) 50 MG tablet Take 50 mg by mouth Every Night.    Roland Rankin MD   traZODone (DESYREL) 50 MG tablet Take 50 mg by mouth Every Night. 4/27/22   Roland Rankin MD   venlafaxine (EFFEXOR) 37.5 MG tablet Take 37.5 mg by mouth Daily.    Roland Rankin MD   Vitamin D, Cholecalciferol, 50 MCG (2000 UT) capsule Take 2,000 Units by mouth Daily.    Roland Rankin MD   zinc oxide 13 % cream cream Apply 1 application topically to the appropriate area as directed As Needed (redness).    Roland Rankin MD        Allergies:  Allergies   Allergen Reactions   • Latex Hives   • Penicillins Rash     Pt tolerated graded dose challenge to  amp-sulbactam on 9/24/2022       Review of Systems  Review of Systems   Constitutional: Positive for activity change, appetite change, chills, fatigue and fever.   HENT: Negative for congestion, drooling, ear discharge, facial swelling, hearing loss, nosebleeds, postnasal drip, rhinorrhea and sinus pain.    Eyes: Negative for itching.   Respiratory: Positive for cough. Negative for choking, chest tightness, shortness of breath and stridor.    Cardiovascular: Negative for chest pain and leg swelling.   Gastrointestinal: Positive for abdominal pain. Negative for abdominal distention, constipation, diarrhea, nausea and vomiting.   Endocrine: Negative for polydipsia and polyuria.   Genitourinary: Positive for difficulty urinating, dysuria, flank pain, frequency and urgency. Negative for hematuria.   Musculoskeletal: Positive for back pain, gait problem and myalgias. Negative for arthralgias and neck pain.   Neurological: Positive for headaches. Negative for dizziness.   Psychiatric/Behavioral: Negative for agitation and confusion.           Objective   Objective     Vitals:   Temp:  [98.7 °F (37.1 °C)-103.4 °F (39.7 °C)] 103.4 °F (39.7 °C)  Heart Rate:  [114-126] 114  Resp:  [20] 20  BP: (111)/(47) 111/47  Flow (L/min):  [2] 2    Physical Exam   Physical Exam  Constitutional:       General: She is not in acute distress.     Appearance: She is obese. She is not ill-appearing or toxic-appearing.   HENT:      Head: Normocephalic and atraumatic.      Right Ear: External ear normal.      Left Ear: External ear normal.      Nose: No congestion or rhinorrhea.      Mouth/Throat:      Mouth: Mucous membranes are dry.      Pharynx: Oropharynx is clear.   Eyes:      Pupils: Pupils are equal, round, and reactive to light.   Cardiovascular:      Rate and Rhythm: Normal rate.      Pulses: Normal pulses.      Heart sounds: Normal heart sounds.   Pulmonary:      Effort: Pulmonary effort is normal.      Breath sounds: Normal breath  sounds.   Abdominal:      Tenderness: There is abdominal tenderness.   Musculoskeletal:         General: No swelling. Normal range of motion.      Cervical back: Normal range of motion and neck supple.   Skin:     General: Skin is warm.      Capillary Refill: Capillary refill takes less than 2 seconds.   Neurological:      General: No focal deficit present.          Result Review    Result Review:  Glucose   Date Value Ref Range Status   11/14/2022 156 (H) 65 - 99 mg/dL Final     BUN   Date Value Ref Range Status   11/14/2022 51 (H) 8 - 23 mg/dL Final     Creatinine   Date Value Ref Range Status   11/14/2022 2.15 (H) 0.57 - 1.00 mg/dL Final     Sodium   Date Value Ref Range Status   11/14/2022 139 136 - 145 mmol/L Final     Potassium   Date Value Ref Range Status   11/14/2022 4.6 3.5 - 5.2 mmol/L Final     Chloride   Date Value Ref Range Status   11/14/2022 104 98 - 107 mmol/L Final     CO2   Date Value Ref Range Status   11/14/2022 20.5 (L) 22.0 - 29.0 mmol/L Final     Calcium   Date Value Ref Range Status   11/14/2022 9.7 8.6 - 10.5 mg/dL Final     Total Protein   Date Value Ref Range Status   11/14/2022 7.9 6.0 - 8.5 g/dL Final     Albumin   Date Value Ref Range Status   11/14/2022 3.20 (L) 3.50 - 5.20 g/dL Final     ALT (SGPT)   Date Value Ref Range Status   11/14/2022 5 1 - 33 U/L Final     AST (SGOT)   Date Value Ref Range Status   11/14/2022 36 (H) 1 - 32 U/L Final     Alkaline Phosphatase   Date Value Ref Range Status   11/14/2022 117 39 - 117 U/L Final     Total Bilirubin   Date Value Ref Range Status   11/14/2022 0.9 0.0 - 1.2 mg/dL Final     BUN/Creatinine Ratio   Date Value Ref Range Status   11/14/2022 23.7 7.0 - 25.0 Final     Anion Gap   Date Value Ref Range Status   11/14/2022 14.5 5.0 - 15.0 mmol/L Final      WBC   Date Value Ref Range Status   11/14/2022 8.26 3.40 - 10.80 10*3/mm3 Final     RBC   Date Value Ref Range Status   11/14/2022 4.57 3.77 - 5.28 10*6/mm3 Final     Hemoglobin   Date Value  Ref Range Status   11/14/2022 12.3 12.0 - 15.9 g/dL Final     Hematocrit   Date Value Ref Range Status   11/14/2022 39.4 34.0 - 46.6 % Final     MCV   Date Value Ref Range Status   11/14/2022 86.2 79.0 - 97.0 fL Final     MCH   Date Value Ref Range Status   11/14/2022 26.9 26.6 - 33.0 pg Final     MCHC   Date Value Ref Range Status   11/14/2022 31.2 (L) 31.5 - 35.7 g/dL Final     RDW   Date Value Ref Range Status   11/14/2022 16.5 (H) 12.3 - 15.4 % Final     RDW-SD   Date Value Ref Range Status   11/14/2022 51.4 37.0 - 54.0 fl Final     MPV   Date Value Ref Range Status   11/14/2022   Final     Comment:     Unable to result due to specimen interference      Platelets   Date Value Ref Range Status   11/14/2022 112 (L) 140 - 450 10*3/mm3 Final     Neutrophil %   Date Value Ref Range Status   11/14/2022 91.1 (H) 42.7 - 76.0 % Final     Lymphocyte %   Date Value Ref Range Status   11/14/2022 4.0 (L) 19.6 - 45.3 % Final     Monocyte %   Date Value Ref Range Status   11/14/2022 3.9 (L) 5.0 - 12.0 % Final     Eosinophil %   Date Value Ref Range Status   11/14/2022 0.1 (L) 0.3 - 6.2 % Final     Basophil %   Date Value Ref Range Status   11/14/2022 0.4 0.0 - 1.5 % Final     Immature Grans %   Date Value Ref Range Status   11/14/2022 0.5 0.0 - 0.5 % Final     Neutrophils, Absolute   Date Value Ref Range Status   11/14/2022 7.53 (H) 1.70 - 7.00 10*3/mm3 Final     Lymphocytes, Absolute   Date Value Ref Range Status   11/14/2022 0.33 (L) 0.70 - 3.10 10*3/mm3 Final     Monocytes, Absolute   Date Value Ref Range Status   11/14/2022 0.32 0.10 - 0.90 10*3/mm3 Final     Eosinophils, Absolute   Date Value Ref Range Status   11/14/2022 0.01 0.00 - 0.40 10*3/mm3 Final     Basophils, Absolute   Date Value Ref Range Status   11/14/2022 0.03 0.00 - 0.20 10*3/mm3 Final     Immature Grans, Absolute   Date Value Ref Range Status   11/14/2022 0.04 0.00 - 0.05 10*3/mm3 Final     nRBC   Date Value Ref Range Status   11/14/2022 0.0 0.0 - 0.2 /100  WBC Final      UA    Urinalysis 6/4/22 6/4/22 9/20/22 9/20/22 11/14/22 11/14/22 2049 2049 2001 2001 0348 0348   Squamous Epithelial Cells, UA  0-2  0-2  3-6 (A)   Specific [place], UA 1.016  1.015  1.022    Ketones, UA Negative  Negative  Trace (A)    Blood, UA Negative  Trace (A)  Small (1+) (A)    Leukocytes, UA Small (1+) (A)  Large (3+) (A)  Large (3+) (A)    Nitrite, UA Negative  Negative  Negative    RBC, UA  0-2 (A)  3-5 (A)  13-20 (A)   WBC, UA  6-12 (A)  Too Numerous to Count (A)  Too Numerous to Count (A)   Bacteria, UA  None Seen  None Seen  4+ (A)   (A) Abnormal value               Imaging:  XR Scapula Right    Result Date: 11/4/2022  • View:AP/Lateral view(s) • Site: Right shoulder • Indication: Right shoulder pain • Study: X-rays ordered, taken in the office, and reviewed today • X-ray: No fracture, no dislocation, no acute osseous abnormality, mild degenerative changes of glenohumeral joint and AC joint. • Comparative data: No comparative data found    XR Chest 1 View    Result Date: 11/14/2022  PROCEDURE: XR CHEST 1 VW  COMPARISON: 9/20/2022.  INDICATIONS: 67-YEAR-OLD FEMALE W/ UNSPECIFIED WEAKNESS & DIZZINESS.  FINDINGS: A single AP semi-upright portable chest radiograph was performed.  There is pulmonary hypoinflation.  New or increased bilateral infiltrates are suspected.  The findings may represent mild pulmonary edema with vascular congestion.  Pneumonia cannot be excluded.  Surgical clips are projected over the left chest, including the left axilla and the left breast.  The findings suggest prior left mastectomy with left axillary lymph node dissection.  Please correlate clinically.  The thoracic aorta is atherosclerotic.  External artifacts obscure detail.  Degenerative changes involve the bilateral shoulders.  There may be minimal blunting of the left lateral costophrenic sulcus, which is probably related to low lung volumes.  Pleural-parenchymal scarring is possible.  A tiny left pleural  effusion cannot be excluded but is thought to be less likely.  There may be borderline cardiac enlargement.  No pneumothorax.       There may be new mild pulmonary edema with vascular congestion.  Pneumonia cannot be excluded but is thought to be less likely.     Please note that portions of this note were completed with a voice recognition program.  VIRI KIM JR, MD       Electronically Signed and Approved By: VIRI KIM JR, MD on 11/14/2022 at 3:25              DEXA Bone Density Axial    Result Date: 10/20/2022  PROCEDURE: DEXA BONE DENSITY AXIAL  COMPARISON: [facility], CT, CT ABDOMEN PELVIS WO CONTRAST, 9/24/2022, 10:23.  TECHNIQUE: Lumbar vertebral and proximal femoral dual-energy X-ray absorptiometry (DXA) was performed on a Wireless Environment device.  INDICATIONS: POST MENOPAUSE, BREAST CANCER, LOW CALCIUM INTAKE, SPINE FRACTURE, HX OF POLIO  FINDINGS: In the lumbar spine, measured from L1-L4, the young adult T score is 0.4 with bone mineral density of 1.2 gm/cm2.  According to the World Health Organization criteria, this is classified as normal.   In the right femoral neck T-score measures -1.9 and right trochanter T-score measures -1.6.  According to the World Health Organization criteria, this is classified as osteopenia.   Using the FRAX scores, which utilize risk factors and femoral neck bone density, the ten year probability of a major osteoporotic fracture is 15.8 %  The ten year probability of a hip fracture is 2.5 %       Normal bone density lumbar spine.  Osteopenia in the right hip.  Left hip not evaluated due to previous severe deformity.     NOLA ELY MD       Electronically Signed and Approved By: NOLA ELY MD on 10/20/2022 at 12:32             Mammo Screening Modified With Tomosynthesis Right With CAD    Result Date: 10/31/2022  PROCEDURE: MAMMO SCREENING MODIFIED WITH TOMOSYNTHESIS RIGHT W CAD  COMPARISON: Other, MG, MAMMO SCREENING BILATERAL W CAD, 4/13/2018,  10:50.  Other, MG, MAMMO DIAGNOSTIC LEFT W CAD, 3/29/2017, 11:42.  Other, MG, MAMMO SCREENING BILATERAL W CAD, 3/14/2017, 11:47.  Other, MG, MAMMO SCREENING MODIFIED RIGHT W CAD, 5/11/2020, 16:43.  VIEWS:  RIGHT CC AND MLO VIEWS WERE OBTAINED UTILIZING 3D TOMOSYNTHESIS AND R2 CAD SOFTWARE  INDICATIONS: MAMMO SCREENING  FINDINGS: No suspicious mass, area of architectural distortion or suspicious microcalcification is identified.      Benign right mammogram. Suggest routine mammographic screening.  Post left mastectomy.  RECOMMENDATION(S):  ROUTINE MAMMOGRAM AND CLINICAL EVALUATION IN 12 MONTHS.   BIRADS:  DIAGNOSTIC CATEGORY 1--NEGATIVE.   BREAST COMPOSITION: Scattered areas fibroglandular density.  PLEASE NOTE:  A NORMAL MAMMOGRAM DOES NOT EXCLUDE THE POSSIBILITY OF BREAST CANCER. ANY CLINICALLY SUSPICIOUS PALPABLE LUMP SHOULD BE BIOPSIED.      SRINIVASAN PINTO MD       Electronically Signed and Approved By: SRINIVASAN PINTO MD on 10/31/2022 at 16:32             XR Hip With or Without Pelvis 2 - 3 View Right    Result Date: 11/4/2022  • View:AP/Lateral view(s) • Site: Right hip • Indication: Right hip pain • Study: X-rays ordered, taken in the office, and reviewed today • X-ray: No fracture, no dislocation, no acute osseous abnormality, mild to moderate degenerative changes. • Comparative data: No comparative data found          Assessment & Plan   Assessment / Plan     Active Hospital Problems    Diagnosis  POA   • **Acute cystitis [N30.00]  Yes     Priority: High   • Acute kidney injury (HCC) [N17.9]  Yes     Priority: High   • Type 2 diabetes mellitus without complication, without long-term current use of insulin (HCC) [E11.9]  Yes     Priority: Low   • Seizure disorder (HCC) [G40.909]  Yes     Priority: Low        Assessment/Plan:   Acute cystitis-patient presents with feeling unwell for the past few days.  Patient states that she has had frequency, urgency and dysuria.  Patient has decreased appetite and decreased  level of activity.  Patient's UA suggestive of UTI.  Patient given cefepime and vancomycin in the ED secondary to sepsis.  We will follow-up urine and blood cultures.  Patient will be continued on cefepime and vancomycin for now.  Sepsis-patient meets criteria for sepsis.  Patient found to have an elevated temperature of 103.4 degrees.  Patient also had elevated lactic acid.  Patient will be treated for sepsis with IV fluid hydration along with antibiotic.  We will follow-up blood and urine culture.  Acute kidney injury-patient notes that she has had decreased urinary output over the past few days.  Patient states she has not had anything to drink over the past 2 days.  Patient will be given gentle IV fluid hydration and we will check creatinine in the morning  Type 2 diabetes-patient with history of type 2 diabetes.  Patient will be continued on insulin with meals.  Accu-Cheks as scheduled.  Seizure disorder-patient with history of seizure disorder.  Patient will be continued on her home seizure medications.      DVT prophylaxis:  Medical DVT prophylaxis orders are present.    CODE STATUS:    Level Of Support Discussed With: Patient  Code Status (Patient has no pulse and is not breathing): CPR (Attempt to Resuscitate)  Medical Interventions (Patient has pulse or is breathing): Full Support  Release to patient: Routine Release      Admission Status:  I believe this patient meets inpatient status.    Electronically signed by Yanet Bird MD, 11/14/22, 7:49 AM EST.

## 2022-11-15 LAB
ALBUMIN SERPL-MCNC: 2.6 G/DL (ref 3.5–5.2)
ALBUMIN/GLOB SERPL: 0.6 G/DL
ALP SERPL-CCNC: 130 U/L (ref 39–117)
ALT SERPL W P-5'-P-CCNC: <5 U/L (ref 1–33)
ANION GAP SERPL CALCULATED.3IONS-SCNC: 10.9 MMOL/L (ref 5–15)
ANISOCYTOSIS BLD QL: NORMAL
AST SERPL-CCNC: 27 U/L (ref 1–32)
BASOPHILS # BLD AUTO: 0.02 10*3/MM3 (ref 0–0.2)
BASOPHILS NFR BLD AUTO: 0.4 % (ref 0–1.5)
BILIRUB SERPL-MCNC: 0.7 MG/DL (ref 0–1.2)
BUN SERPL-MCNC: 44 MG/DL (ref 8–23)
BUN/CREAT SERPL: 36.7 (ref 7–25)
CALCIUM SPEC-SCNC: 8.8 MG/DL (ref 8.6–10.5)
CHLORIDE SERPL-SCNC: 108 MMOL/L (ref 98–107)
CO2 SERPL-SCNC: 19.1 MMOL/L (ref 22–29)
CREAT SERPL-MCNC: 1.2 MG/DL (ref 0.57–1)
DEPRECATED RDW RBC AUTO: 51 FL (ref 37–54)
EGFRCR SERPLBLD CKD-EPI 2021: 49.1 ML/MIN/1.73
EOSINOPHIL # BLD AUTO: 0.03 10*3/MM3 (ref 0–0.4)
EOSINOPHIL NFR BLD AUTO: 0.6 % (ref 0.3–6.2)
ERYTHROCYTE [DISTWIDTH] IN BLOOD BY AUTOMATED COUNT: 16.2 % (ref 12.3–15.4)
GIANT PLATELETS: NORMAL
GLOBULIN UR ELPH-MCNC: 4.7 GM/DL
GLUCOSE BLDC GLUCOMTR-MCNC: 122 MG/DL (ref 70–99)
GLUCOSE BLDC GLUCOMTR-MCNC: 140 MG/DL (ref 70–99)
GLUCOSE BLDC GLUCOMTR-MCNC: 175 MG/DL (ref 70–99)
GLUCOSE SERPL-MCNC: 150 MG/DL (ref 65–99)
HCT VFR BLD AUTO: 35.2 % (ref 34–46.6)
HGB BLD-MCNC: 11 G/DL (ref 12–15.9)
IMM GRANULOCYTES # BLD AUTO: 0.03 10*3/MM3 (ref 0–0.05)
IMM GRANULOCYTES NFR BLD AUTO: 0.6 % (ref 0–0.5)
LARGE PLATELETS: NORMAL
LYMPHOCYTES # BLD AUTO: 0.3 10*3/MM3 (ref 0.7–3.1)
LYMPHOCYTES NFR BLD AUTO: 5.9 % (ref 19.6–45.3)
MACROCYTES BLD QL SMEAR: NORMAL
MCH RBC QN AUTO: 26.6 PG (ref 26.6–33)
MCHC RBC AUTO-ENTMCNC: 31.3 G/DL (ref 31.5–35.7)
MCV RBC AUTO: 85.2 FL (ref 79–97)
MONOCYTES # BLD AUTO: 0.38 10*3/MM3 (ref 0.1–0.9)
MONOCYTES NFR BLD AUTO: 7.5 % (ref 5–12)
NEUTROPHILS NFR BLD AUTO: 4.29 10*3/MM3 (ref 1.7–7)
NEUTROPHILS NFR BLD AUTO: 85 % (ref 42.7–76)
NRBC BLD AUTO-RTO: 0 /100 WBC (ref 0–0.2)
OVALOCYTES BLD QL SMEAR: NORMAL
PLATELET # BLD AUTO: 93 10*3/MM3 (ref 140–450)
PMV BLD AUTO: ABNORMAL FL
POTASSIUM SERPL-SCNC: 3.9 MMOL/L (ref 3.5–5.2)
PROT SERPL-MCNC: 7.3 G/DL (ref 6–8.5)
RBC # BLD AUTO: 4.13 10*6/MM3 (ref 3.77–5.28)
SODIUM SERPL-SCNC: 138 MMOL/L (ref 136–145)
VANCOMYCIN SERPL-MCNC: 25.68 MCG/ML (ref 5–40)
WBC MORPH BLD: NORMAL
WBC NRBC COR # BLD: 5.05 10*3/MM3 (ref 3.4–10.8)

## 2022-11-15 PROCEDURE — 80053 COMPREHEN METABOLIC PANEL: CPT | Performed by: HOSPITALIST

## 2022-11-15 PROCEDURE — 82962 GLUCOSE BLOOD TEST: CPT

## 2022-11-15 PROCEDURE — 87102 FUNGUS ISOLATION CULTURE: CPT | Performed by: INTERNAL MEDICINE

## 2022-11-15 PROCEDURE — 80202 ASSAY OF VANCOMYCIN: CPT | Performed by: HOSPITALIST

## 2022-11-15 PROCEDURE — 0 CEFTRIAXONE PER 250 MG: Performed by: INTERNAL MEDICINE

## 2022-11-15 PROCEDURE — 97161 PT EVAL LOW COMPLEX 20 MIN: CPT

## 2022-11-15 PROCEDURE — 85025 COMPLETE CBC W/AUTO DIFF WBC: CPT | Performed by: HOSPITALIST

## 2022-11-15 PROCEDURE — 99233 SBSQ HOSP IP/OBS HIGH 50: CPT | Performed by: INTERNAL MEDICINE

## 2022-11-15 PROCEDURE — 36415 COLL VENOUS BLD VENIPUNCTURE: CPT | Performed by: HOSPITALIST

## 2022-11-15 PROCEDURE — 97165 OT EVAL LOW COMPLEX 30 MIN: CPT

## 2022-11-15 PROCEDURE — 63710000001 INSULIN DETEMIR PER 5 UNITS: Performed by: HOSPITALIST

## 2022-11-15 PROCEDURE — 94799 UNLISTED PULMONARY SVC/PX: CPT

## 2022-11-15 PROCEDURE — 85007 BL SMEAR W/DIFF WBC COUNT: CPT | Performed by: HOSPITALIST

## 2022-11-15 PROCEDURE — 87040 BLOOD CULTURE FOR BACTERIA: CPT | Performed by: FAMILY MEDICINE

## 2022-11-15 PROCEDURE — 25010000002 HEPARIN (PORCINE) PER 1000 UNITS: Performed by: HOSPITALIST

## 2022-11-15 RX ORDER — CEFTRIAXONE SODIUM 2 G/50ML
2 INJECTION, SOLUTION INTRAVENOUS EVERY 24 HOURS
Status: DISCONTINUED | OUTPATIENT
Start: 2022-11-15 | End: 2022-11-18 | Stop reason: HOSPADM

## 2022-11-15 RX ADMIN — HEPARIN SODIUM 5000 UNITS: 5000 INJECTION INTRAVENOUS; SUBCUTANEOUS at 06:43

## 2022-11-15 RX ADMIN — RUGBY ZINC OXIDE 20%: 20 OINTMENT TOPICAL at 21:30

## 2022-11-15 RX ADMIN — BUSPIRONE HYDROCHLORIDE 10 MG: 10 TABLET ORAL at 09:26

## 2022-11-15 RX ADMIN — INSULIN DETEMIR 28 UNITS: 100 INJECTION, SOLUTION SUBCUTANEOUS at 09:27

## 2022-11-15 RX ADMIN — BUSPIRONE HYDROCHLORIDE 10 MG: 10 TABLET ORAL at 17:10

## 2022-11-15 RX ADMIN — ANASTROZOLE 1 MG: 1 TABLET ORAL at 09:28

## 2022-11-15 RX ADMIN — IPRATROPIUM BROMIDE 0.5 MG: 0.5 SOLUTION RESPIRATORY (INHALATION) at 18:28

## 2022-11-15 RX ADMIN — HEPARIN SODIUM 5000 UNITS: 5000 INJECTION INTRAVENOUS; SUBCUTANEOUS at 14:10

## 2022-11-15 RX ADMIN — CEFTRIAXONE SODIUM 2 G: 2 INJECTION, SOLUTION INTRAVENOUS at 09:29

## 2022-11-15 RX ADMIN — ROSUVASTATIN CALCIUM 20 MG: 20 TABLET, FILM COATED ORAL at 20:25

## 2022-11-15 RX ADMIN — Medication 10 ML: at 09:29

## 2022-11-15 RX ADMIN — NYSTATIN 1 APPLICATION: 100000 POWDER TOPICAL at 09:34

## 2022-11-15 RX ADMIN — PHENYTOIN 100 MG: 50 TABLET, CHEWABLE ORAL at 21:29

## 2022-11-15 RX ADMIN — FLUTICASONE PROPIONATE 2 SPRAY: 50 SPRAY, METERED NASAL at 09:34

## 2022-11-15 RX ADMIN — SENNOSIDES AND DOCUSATE SODIUM 2 TABLET: 8.6; 5 TABLET ORAL at 09:27

## 2022-11-15 RX ADMIN — IPRATROPIUM BROMIDE 0.5 MG: 0.5 SOLUTION RESPIRATORY (INHALATION) at 00:59

## 2022-11-15 RX ADMIN — HYDROXYZINE HYDROCHLORIDE 25 MG: 25 TABLET, FILM COATED ORAL at 20:28

## 2022-11-15 RX ADMIN — TERAZOSIN HYDROCHLORIDE 1 MG: 1 CAPSULE ORAL at 20:25

## 2022-11-15 RX ADMIN — BUSPIRONE HYDROCHLORIDE 10 MG: 10 TABLET ORAL at 20:25

## 2022-11-15 RX ADMIN — VENLAFAXINE HYDROCHLORIDE 37.5 MG: 37.5 TABLET ORAL at 12:50

## 2022-11-15 RX ADMIN — HYDROXYZINE HYDROCHLORIDE 25 MG: 25 TABLET, FILM COATED ORAL at 09:27

## 2022-11-15 RX ADMIN — IPRATROPIUM BROMIDE 0.5 MG: 0.5 SOLUTION RESPIRATORY (INHALATION) at 11:36

## 2022-11-15 RX ADMIN — Medication 10 ML: at 20:27

## 2022-11-15 RX ADMIN — CETIRIZINE HYDROCHLORIDE 10 MG: 10 TABLET, FILM COATED ORAL at 09:27

## 2022-11-15 RX ADMIN — TRAZODONE HYDROCHLORIDE 50 MG: 50 TABLET ORAL at 21:29

## 2022-11-15 RX ADMIN — RUGBY ZINC OXIDE 20%: 20 OINTMENT TOPICAL at 12:53

## 2022-11-15 RX ADMIN — HYDROXYZINE HYDROCHLORIDE 25 MG: 25 TABLET, FILM COATED ORAL at 17:10

## 2022-11-15 RX ADMIN — TOPIRAMATE 50 MG: 25 TABLET, FILM COATED ORAL at 20:27

## 2022-11-15 RX ADMIN — SENNOSIDES AND DOCUSATE SODIUM 2 TABLET: 8.6; 5 TABLET ORAL at 20:25

## 2022-11-15 RX ADMIN — NYSTATIN 1 APPLICATION: 100000 POWDER TOPICAL at 20:28

## 2022-11-15 RX ADMIN — TOPIRAMATE 25 MG: 25 TABLET, FILM COATED ORAL at 06:44

## 2022-11-15 RX ADMIN — HEPARIN SODIUM 5000 UNITS: 5000 INJECTION INTRAVENOUS; SUBCUTANEOUS at 21:29

## 2022-11-15 RX ADMIN — IPRATROPIUM BROMIDE 0.5 MG: 0.5 SOLUTION RESPIRATORY (INHALATION) at 07:16

## 2022-11-15 NOTE — PLAN OF CARE
Goal Outcome Evaluation:  Plan of Care Reviewed With: patient        Progress: no change  Vss, 02 @ 2L nc, bed fast, confused, swabbed for candida aureus

## 2022-11-15 NOTE — PLAN OF CARE
Goal Outcome Evaluation:  Plan of Care Reviewed With: patient        Progress: no change  Outcome Evaluation: Pt. rested this shift with no c/o pain or soa. Pt. confused, and is only alert and oriented to self and birthday. Crushed meds in applesauce and took without any difficulty. VSS

## 2022-11-15 NOTE — PLAN OF CARE
Goal Outcome Evaluation:  Plan of Care Reviewed With: patient        Progress: no change  Outcome Evaluation: Patient dependent with all ADLs and reportedly bedbound.  No additional occupational therapy is recommended at this time

## 2022-11-15 NOTE — PLAN OF CARE
Goal Outcome Evaluation:  Plan of Care Reviewed With: patient           Outcome Evaluation: Pt is max/dependent with all functional mobility and has been for several years per pt report, therefore pt is not appropriate for skilled PT services. Pt to be discharged for inpatient PT services. Recommend pt return to extended care facility.

## 2022-11-15 NOTE — CONSULTS
"Nutrition Services    Patient Name: Josseline Vargas  YOB: 1955  MRN: [id number]  Admission date: 11/14/2022      CLINICAL NUTRITION ASSESSMENT      Reason for Assessment  MST score 2+     H&P:    Past Medical History:   Diagnosis Date   • Acute kidney injury (HCC) 11/14/2022   • Cancer (HCC)     Left breast   • Depressed    • Diabetes (HCC)     TYPE 2   • Flea bite of multiple sites of lower extremity     PT INSTRUCTED TO INFORM DR TRINH OF BITES PRIOR TO SURGERY   • H/O meningitis 1987   • History of Bell's palsy    • Migraine headache    • Polio    • Seizures (HCC)    • Stroke (HCC) 1987        Current Problems:   Active Hospital Problems    Diagnosis    • **Acute cystitis    • Type 2 diabetes mellitus without complication, without long-term current use of insulin (HCC)    • Acute kidney injury (HCC)    • Seizure disorder (HCC)         Nutrition/Diet History         Narrative     Nutrition consult for MST score 2+.  Nursing reports patient is confused and disoriented.  Not appropriate for diet education at this time.      Patient is on a regular, diabetic, cardiac diet.  Consuming 25-50% of meal since admission.  Will add oral nutrition supplement to provide additional kcal/pro and monitor for acceptance and meal intake.      RD will follow for improvement in mental status to complete nutrition interview as able.       Anthropometrics        Current Height, Weight Height: 152.4 cm (60\")  Weight: 103 kg (226 lb 3.1 oz)   Current BMI Body mass index is 44.18 kg/m².       Weight Hx  Wt Readings from Last 30 Encounters:   11/14/22 0929 103 kg (226 lb 3.1 oz)   11/14/22 0233 106 kg (232 lb 12.9 oz)   11/03/22 1342 102 kg (225 lb)   09/21/22 0232 102 kg (225 lb 15.5 oz)   09/20/22 1833 110 kg (242 lb 8.1 oz)   07/28/22 1257 114 kg (251 lb)   06/04/22 2026 115 kg (254 lb 6.6 oz)   04/28/22 1550 109 kg (240 lb)   01/18/22 1412 109 kg (240 lb)   11/19/21 1047 107 kg (236 lb)   10/25/21 0535 113 kg (250 " lb)   10/20/21 0131 110 kg (241 lb 13.5 oz)   10/19/21 1932 113 kg (249 lb 12.5 oz)   08/08/19 0739 114 kg (251 lb 15.8 oz)   06/10/19 1248 115 kg (253 lb 8 oz)   04/25/19 1441 113 kg (250 lb 1 oz)   04/18/18 1453 105 kg (231 lb)   01/23/18 1148 97.9 kg (215 lb 12.8 oz)   01/02/18 1251 102 kg (224 lb)   12/18/17 1454 101 kg (222 lb)   11/27/17 1115 99.1 kg (218 lb 6.4 oz)   06/06/17 2051 106 kg (234 lb)   06/06/17 2004 107 kg (235 lb)   06/02/17 1110 107 kg (234 lb 12.8 oz)   05/31/17 1347 106 kg (234 lb)   05/23/17 1423 106 kg (234 lb)   05/22/17 0943 104 kg (230 lb)   05/15/17 1220 104 kg (229 lb)   05/10/17 0748 105 kg (232 lb)   04/27/17 1100 106 kg (234 lb 3.2 oz)   04/21/17 1102 104 kg (230 lb)   04/14/17 0935 105 kg (230 lb 12.8 oz)   09/15/16 1440 108 kg (239 lb)            Wt Change Observation -10.4% in 5 months     Estimated/Assessed Needs       Energy Requirements 25-30 kcal/kg adj BW   EST Needs (kcal/day) 7739-7374 kcal        Protein Requirements 1.0-1.2 g/kg adj BW   EST Daily Needs (g/day) 60-72 g        Fluid Requirements 30 ml/kg adj BW    Estimated Needs (mL/day) 1800 ml      Labs/Medications         Pertinent Labs Reviewed.   Results from last 7 days   Lab Units 11/15/22  0535 11/14/22  0403 11/13/22  1350   SODIUM mmol/L 138 139 140   POTASSIUM mmol/L 3.9 4.6 4.0   CHLORIDE mmol/L 108* 104 104   CO2 mmol/L 19.1* 20.5* 17.4*   BUN mg/dL 44* 51* 39*   CREATININE mg/dL 1.20* 2.15* 2.10*   CALCIUM mg/dL 8.8 9.7 9.1   BILIRUBIN mg/dL 0.7 0.9  --    ALK PHOS U/L 130* 117  --    ALT (SGPT) U/L <5 5  --    AST (SGOT) U/L 27 36*  --    GLUCOSE mg/dL 150* 156* 160*     Results from last 7 days   Lab Units 11/15/22  0535 11/14/22  0403   MAGNESIUM mg/dL  --  1.4*   HEMOGLOBIN g/dL 11.0*  --    HEMATOCRIT % 35.2  --      COVID19   Date Value Ref Range Status   11/14/2022 Not Detected Not Detected - Ref. Range Final     Lab Results   Component Value Date    HGBA1C 6.9 (H) 08/22/2020         Pertinent  Medications Reviewed.     Current Nutrition Orders & Evaluation of Intake       Oral Nutrition     Current PO Diet Diet Regular Texture (IDDSI 7); Regular Consistency; Cardiac Diets, Diabetic Diets; Low Sodium (2g); Consistent Carbohydrate   Supplement No active supplement orders       Malnutrition Severity Assessment                Nutrition Diagnosis         Nutrition Dx Problem 1 Inadequate energy Intake related to decreased ability to consume sufficient energy as evidenced by unintended wt change. and report of minimal PO intake.       Nutrition Intervention         Boost Glucose Control BID  +380 kcal, 32 g pro per day      Medical Nutrition Therapy/Nutrition Education          Learner     Readiness Patient  Education not appropriate at this time     Method     Response N/A  N/A     Monitor/Evaluation        Monitor Per protocol, PO intake, Supplement intake, Pertinent labs, Weight, Symptoms       Nutrition Discharge Plan         To be determined       Electronically signed by:  Carolynn Goldsmith, ANANT  11/15/22 14:53 EST

## 2022-11-15 NOTE — THERAPY EVALUATION
Patient Name: Josseline Vargas  : 1955    MRN: [id number]                              Today's Date: 11/15/2022       Admit Date: 2022    Visit Dx:     ICD-10-CM ICD-9-CM   1. Cystitis  N30.90 595.9   2. Sepsis, due to unspecified organism, unspecified whether acute organ dysfunction present (HCC)  A41.9 038.9     995.91   3. Lactic acidosis  E87.20 276.2   4. 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)   • Abnormal radionuclide bone scan   • Abnormal CT scan, chest   • Drug therapy   • Fitting and adjustment of vascular catheter   • Other cirrhosis of liver (HCC)   • Breast cancer of upper-outer quadrant of left female breast (HCC)   • Sepsis (HCC)   • Left hip pain   • Primary osteoarthritis of right shoulder   • Right shoulder pain   • Sepsis without acute organ dysfunction, due to unspecified organism (HCC)   • Change in bowel habit   • Contusion of right hip   • Contusion of right shoulder   • Right hip pain   • Acute cystitis   • Type 2 diabetes mellitus without complication, without long-term current use of insulin (HCC)   • Acute kidney injury (HCC)     Past Medical History:   Diagnosis Date   • Acute kidney injury (HCC) 2022   • Cancer (HCC)     Left breast   • Depressed    • Diabetes (HCC)     TYPE 2   • Flea bite of multiple sites of lower extremity     PT INSTRUCTED TO INFORM DR TRINH OF BITES PRIOR TO SURGERY   • H/O meningitis    • History of Bell's palsy    • Migraine headache    • Polio    • Seizures (HCC)    • Stroke (HCC)      Past  Surgical History:   Procedure Laterality Date   • APPENDECTOMY  1987   • BREAST SURGERY Left 10/2015    Biopsy, benign   • CATARACT EXTRACTION     • COLONOSCOPY N/A 9/28/2022    Procedure: COLONOSCOPY WITH POLYPECTOMY;  Surgeon: Alfonso Fitzgerald MD;  Location: [facility] ENDOSCOPY;  Service: Gastroenterology;  Laterality: N/A;  COLON POLYP   • EAR TUBES     • ENDOSCOPY N/A 9/28/2022    Procedure: ESOPHAGOGASTRODUODENOSCOPY WITH BIOPSIES;  Surgeon: Alfonso Fitzgerald MD;  Location: [facility] ENDOSCOPY;  Service: Gastroenterology;  Laterality: N/A;  GASTRITIS, VARIX   • KNEE MENISCECTOMY     • MASTECTOMY     • MASTECTOMY WITH SENTINEL NODE BIOPSY AND AXILLARY NODE DISSECTION Left 1/11/2018    Procedure: Left total mastectomy, left sentinel lymph node biopsy to include retrieval of prior clip, axillary lymph node dissection, no reconstruction. ;  Surgeon: Yuli Garcias MD;  [facility];  Service:    • OVARIAN CYST REMOVAL     • KS INSJ TUNNELED CVC W/O SUBQ PORT/ AGE 3 YR/> Right 5/18/2017    Procedure: INSERTION RIGHT VENOUS ACCESS DEVICE;  Surgeon: Yuli Garcias MD;  [facility];  Service: General   • TONSILLECTOMY     • TUBAL ABDOMINAL LIGATION     • VENOUS ACCESS DEVICE (PORT) REMOVAL Right 8/8/2019    Procedure: RIGHT port removal,;  Surgeon: Yuli Garcias MD;  [facility] OR [facility];  Service: General      General Information     Row Name 11/15/22 0923          OT Time and Intention    Document Type evaluation  -PG     Mode of Treatment individual therapy;occupational therapy  -PG     Row Name 11/15/22 0923          General Information    Patient Profile Reviewed yes  -PG     Prior Level of Function ADL's;dependent:;transfer  -PG     Existing Precautions/Restrictions fall  -PG     Barriers to Rehab none identified  -PG     Row Name 11/15/22 0923          Occupational Profile    Reason for Services/Referral (Occupational Profile) Patient is a 67-year-old nursing home  resident admitted for acute cystitis and confusion.  Previous OT services unknown.  Patient is being evaluated by Occupational Therapy due to recent decline in ADL function  -     Row Name 11/15/22 0923          Living Environment    People in Home facility resident  -     Row Name 11/15/22 0923          Cognition    Orientation Status (Cognition) oriented to;person;verbal cues/prompts needed for orientation  -PG           User Key  (r) = Recorded By, (t) = Taken By, (c) = Cosigned By    Initials Name Provider Type    PG Tello Melgoza, OT Occupational Therapist                 Mobility/ADL's     Row Name 11/15/22 0924          Bed Mobility    Bed Mobility bed mobility (all) activities  -     All Activities, [place] (Bed Mobility) dependent (less than 25% patient effort)  -     Row Name 11/15/22 0924          Activities of Daily Living    BADL Assessment/Intervention bathing;upper body dressing;lower body dressing;grooming;toileting  -     Row Name 11/15/22 0924          Bathing Assessment/Intervention    [place] Level (Bathing) bathing skills;dependent (less than 25% patient effort)  -     Row Name 11/15/22 0924          Upper Body Dressing Assessment/Training    [place] Level (Upper Body Dressing) upper body dressing skills;dependent (less than 25% patient effort)  -     Row Name 11/15/22 0924          Lower Body Dressing Assessment/Training    [place] Level (Lower Body Dressing) lower body dressing skills;dependent (less than 25% patient effort)  -     Row Name 11/15/22 0924          Grooming Assessment/Training    [place] Level (Grooming) grooming skills;dependent (less than 25% patient effort)  -     Row Name 11/15/22 0924          Toileting Assessment/Training    [place] Level (Toileting) toileting skills;dependent (less than 25% patient effort)  -PG           User Key  (r) = Recorded By, (t) = Taken By, (c) = Cosigned By    Initials Name Provider Type    PG  Tello Melgoza OT Occupational Therapist               Obj/Interventions     Row Name 11/15/22 0925          Sensory Assessment (Somatosensory)    Sensory Assessment (Somatosensory) unable/difficult to assess  -PG     Row Name 11/15/22 0925          Vision Assessment/Intervention    Visual Impairment/Limitations unable/difficult to assess  -PG     Row Name 11/15/22 0925          Range of Motion Comprehensive    General Range of Motion upper extremity range of motion deficits identified  -PG     Row Name 11/15/22 0925          Strength Comprehensive (MMT)    General Manual Muscle Testing (MMT) Assessment upper extremity strength deficits identified  -PG     Row Name 11/15/22 0925          Motor Skills    Motor Skills coordination;functional endurance  -PG     Coordination fine motor deficit  -PG     Functional Endurance poor  -PG           User Key  (r) = Recorded By, (t) = Taken By, (c) = Cosigned By    Initials Name Provider Type    PG Tello Melgoza OT Occupational Therapist               Goals/Plan    No documentation.                Clinical Impression     Row Name 11/15/22 0925          Plan of Care Review    Plan of Care Reviewed With patient  -PG     Progress no change  -PG     Outcome Evaluation Patient dependent with all ADLs and reportedly bedbound.  No additional occupational therapy is recommended at this time  -PG     Row Name 11/15/22 0925          Therapy Assessment/Plan (OT)    Criteria for Skilled Therapeutic Interventions Met (OT) no;does not meet criteria for skilled intervention  -PG     Therapy Frequency (OT) evaluation only  -PG     Row Name 11/15/22 0925          Therapy Plan Review/Discharge Plan (OT)    Anticipated Discharge Disposition (OT) extended care facility  -PG           User Key  (r) = Recorded By, (t) = Taken By, (c) = Cosigned By    Initials Name Provider Type    PG Tello Melgoza OT Occupational Therapist               Outcome Measures     Row Name 11/15/22 0926          How much  help from another is currently needed...    Putting on and taking off regular lower body clothing? 1  -PG     Bathing (including washing, rinsing, and drying) 1  -PG     Toileting (which includes using toilet bed pan or urinal) 1  -PG     Putting on and taking off regular upper body clothing 1  -PG     Taking care of personal grooming (such as brushing teeth) 1  -PG     Eating meals 1  -PG     AM-PAC 6 Clicks Score (OT) 6  -PG     Row Name 11/15/22 0926          Functional Assessment    Outcome Measure Options AM-PAC 6 Clicks Daily Activity (OT);Optimal Instrument  -PG     Row Name 11/15/22 0926          Optimal Instrument    Optimal Instrument Optimal - 3  -PG     Bending/Stooping 5  -PG     Standing 5  -PG     Reaching 3  -PG     From the list, choose the 3 activities you would most like to be able to do without any difficulty Bending/stooping;Standing;Reaching  -PG     Total Score Optimal - 3 13  -PG           User Key  (r) = Recorded By, (t) = Taken By, (c) = Cosigned By    Initials Name Provider Type    PG Tello Melgoza OT Occupational Therapist                  OT Recommendation and Plan  Therapy Frequency (OT): evaluation only  Plan of Care Review  Plan of Care Reviewed With: patient  Progress: no change  Outcome Evaluation: Patient dependent with all ADLs and reportedly bedbound.  No additional occupational therapy is recommended at this time     Time Calculation:    Time Calculation- OT     Row Name 11/15/22 0927             Time Calculation- OT    OT Received On 11/15/22  -PG         Untimed Charges    OT Eval/Re-eval Minutes 30  -PG         Total Minutes    Untimed Charges Total Minutes 30  -PG       Total Minutes 30  -PG            User Key  (r) = Recorded By, (t) = Taken By, (c) = Cosigned By    Initials Name Provider Type    Tello Claudio OT Occupational Therapist              Therapy Charges for Today     Code Description Service Date Service Provider Modifiers Qty    [id number]  OT EVAL LOW  COMPLEXITY 2 11/15/2022 Tello Melgoza, OT GO 1               Tello Melgoza, MARLEN  11/15/2022

## 2022-11-15 NOTE — THERAPY EVALUATION
Acute Care - Physical Therapy Initial Evaluation   Mock     Patient Name: Josseline Vargas  : 1955  MRN: [id number]  Today's Date: 11/15/2022      Visit Dx:     ICD-10-CM ICD-9-CM   1. Cystitis  N30.90 595.9   2. Sepsis, due to unspecified organism, unspecified whether acute organ dysfunction present (HCC)  A41.9 038.9     995.91   3. Lactic acidosis  E87.20 276.2   4. Decreased activities of daily living (ADL)  Z78.9 V49.89   5. 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)   • Abnormal radionuclide bone scan   • Abnormal CT scan, chest   • Drug therapy   • Fitting and adjustment of vascular catheter   • Other cirrhosis of liver (HCC)   • Breast cancer of upper-outer quadrant of left female breast (HCC)   • Sepsis (HCC)   • Left hip pain   • Primary osteoarthritis of right shoulder   • Right shoulder pain   • Sepsis without acute organ dysfunction, due to unspecified organism (HCC)   • Change in bowel habit   • Contusion of right hip   • Contusion of right shoulder   • Right hip pain   • Acute cystitis   • Type 2 diabetes mellitus without complication, without long-term current use of insulin (HCC)   • Acute kidney injury (HCC)     Past Medical History:   Diagnosis Date   • Acute kidney injury (HCC) 2022   • Cancer (HCC)     Left breast   • Depressed    • Diabetes (HCC)     TYPE 2   • Flea bite of multiple sites of lower extremity     PT INSTRUCTED TO INFORM DR TRINH OF BITES PRIOR TO SURGERY   • H/O meningitis    • History of Bell's palsy    • Migraine headache    • Polio    •  Seizures (HCC)    • Stroke (HCC) 1987     Past Surgical History:   Procedure Laterality Date   • APPENDECTOMY  1987   • BREAST SURGERY Left 10/2015    Biopsy, benign   • CATARACT EXTRACTION     • COLONOSCOPY N/A 9/28/2022    Procedure: COLONOSCOPY WITH POLYPECTOMY;  Surgeon: Alfonso Fitzgerald MD;  Location: [facility] ENDOSCOPY;  Service: Gastroenterology;  Laterality: N/A;  COLON POLYP   • EAR TUBES     • ENDOSCOPY N/A 9/28/2022    Procedure: ESOPHAGOGASTRODUODENOSCOPY WITH BIOPSIES;  Surgeon: Alfonso Fitzgerald MD;  Location: [facility] ENDOSCOPY;  Service: Gastroenterology;  Laterality: N/A;  GASTRITIS, VARIX   • KNEE MENISCECTOMY     • MASTECTOMY     • MASTECTOMY WITH SENTINEL NODE BIOPSY AND AXILLARY NODE DISSECTION Left 1/11/2018    Procedure: Left total mastectomy, left sentinel lymph node biopsy to include retrieval of prior clip, axillary lymph node dissection, no reconstruction. ;  Surgeon: Yuli Garcias MD;  [place] [facility] [place] [facility] [place]  Service:    • OVARIAN CYST REMOVAL     • OH INSJ TUNNELED CVC W/O SUBQ PORT/ AGE 3 YR/> Right 5/18/2017    Procedure: INSERTION RIGHT VENOUS ACCESS DEVICE;  Surgeon: Yuli Garcias MD;  [place] [facility] [place] [facility] [place]  Service: General   • TONSILLECTOMY     • TUBAL ABDOMINAL LIGATION     • VENOUS ACCESS DEVICE (PORT) REMOVAL Right 8/8/2019    Procedure: RIGHT port removal,;  Surgeon: Yuli Garcias MD;  [place] [facility] [place] [facility] [place]  Service: General     PT Assessment (last 12 hours)     PT Evaluation and Treatment     Row Name 11/15/22 1416          Physical Therapy Time and Intention    Subjective Information no complaints  -CW     Document Type evaluation  -CW     Mode of Treatment individual therapy;physical therapy  -CW     Patient Effort good  -CW     Symptoms Noted During/After Treatment none  -CW     Row Name 11/15/22 7349          General Information    Patient Profile Reviewed yes  -CW     Patient Observations alert;cooperative;agree to therapy  -CW     Prior  Level of Function dependent:;gait;transfer;ADL's  -CW     Row Name 11/15/22 1415          Living Environment    Current Living Arrangements extended care facility  [facility]  -CW     Home Accessibility wheelchair accessible  -CW     People in Home facility resident  -CW     Row Name 11/15/22 1415          Home Use of Assistive/Adaptive Equipment    Equipment Currently Used at Home lift device;wheelchair  -     Row Name 11/15/22 1415          Cognition    Affect/Mental Status (Cognition) confused;low arousal/lethargic  -CW     Orientation Status (Cognition) oriented to;person  -CW     Row Name 11/15/22 1415          Range of Motion Comprehensive    General Range of Motion lower extremity range of motion deficits identified  -     Row Name 11/15/22 1415          Strength Comprehensive (MMT)    General Manual Muscle Testing (MMT) Assessment lower extremity strength deficits identified  -CW     Comment, General Manual Muscle Testing (MMT) Assessment Pt's BLE strength was assessed to be 2+/5 in all muscle groups other than hip FLEX which was 1/5.  -     Row Name 11/15/22 1415          Bed Mobility    Bed Mobility supine-sit;sit-supine;rolling left;rolling right  -CW     Rolling Left [place] (Bed Mobility) maximum assist (25% patient effort);dependent (less than 25% patient effort)  -CW     Rolling Right [place] (Bed Mobility) dependent (less than 25% patient effort)  -CW     Supine-Sit [place] (Bed Mobility) maximum assist (25% patient effort)  supine > long sit  -CW     Sit-Supine [place] (Bed Mobility) maximum assist (25% patient effort)  long-sit > supine  -CW     Assistive Device (Bed Mobility) bed rails;head of bed elevated  -     Row Name 11/15/22 1415          Transfers    Transfers --  deferred for pt safety  -     Row Name 11/15/22 1415          Plan of Care Review    Plan of Care Reviewed With patient  -CW     Outcome Evaluation Pt is max/dependent with all functional  mobility and has been for several years per pt report, therefore pt is not appropriate for skilled PT services. Pt to be discharged for inpatient PT services. Recommend pt return to extended care facility.  -CW     Row Name 11/15/22 1415          Positioning and Restraints    Pre-Treatment Position in bed  -CW     Post Treatment Position bed  -CW     In Bed fowlers;with nsg;call light within reach  -CW     Row Name 11/15/22 1415          Therapy Assessment/Plan (PT)    Criteria for Skilled Interventions Met (PT) does not meet criteria for skilled intervention  -CW     Therapy Frequency (PT) evaluation only  -CW     Row Name 11/15/22 1415          PT Evaluation Complexity    History, PT Evaluation Complexity 1-2 personal factors and/or comorbidities  -CW     Examination of Body Systems (PT Eval Complexity) total of 4 or more elements  -CW     Clinical Presentation (PT Evaluation Complexity) stable  -CW     Clinical Decision Making (PT Evaluation Complexity) low complexity  -CW     Overall Complexity (PT Evaluation Complexity) low complexity  -CW     Row Name 11/15/22 1415          Therapy Plan Review/Discharge Plan (PT)    Therapy Plan Review (PT) evaluation/treatment results reviewed;patient  -CW           User Key  (r) = Recorded By, (t) = Taken By, (c) = Cosigned By    Initials Name Provider Type    CW Melly Moses, PT Physical Therapist                Physical Therapy Education     Title: PT OT SLP Therapies (Done)     Topic: Physical Therapy (Done)     Point: Mobility training (Done)     Learning Progress Summary           Patient Acceptance, E,TB, VU by CW at 11/15/2022 1506                   Point: Precautions (Done)     Learning Progress Summary           Patient Acceptance, E,TB, VU by CW at 11/15/2022 1506                               User Key     Initials Effective Dates Name Provider Type Discipline    CW 09/23/22 -  Melly Moses, PT Physical Therapist PT              PT Recommendation and  Plan  Anticipated Discharge Disposition (PT): extended care facility  Therapy Frequency (PT): evaluation only  Plan of Care Reviewed With: patient  Outcome Evaluation: Pt is max/dependent with all functional mobility and has been for several years per pt report, therefore pt is not appropriate for skilled PT services. Pt to be discharged for inpatient PT services. Recommend pt return to extended care facility.   Outcome Measures     Row Name 11/15/22 1505             How much help from another person do you currently need...    Turning from your back to your side while in flat bed without using bedrails? 2  -CW      Moving from lying on back to sitting on the side of a flat bed without bedrails? 1  -CW      Moving to and from a bed to a chair (including a wheelchair)? 1  -CW      Standing up from a chair using your arms (e.g., wheelchair, bedside chair)? 1  -CW      Climbing 3-5 steps with a railing? 1  -CW      To walk in hospital room? 1  -CW      AM-PAC 6 Clicks Score (PT) 7  -CW         Functional Assessment    Outcome Measure Options AM-PAC 6 Clicks Basic Mobility (PT)  -CW            User Key  (r) = Recorded By, (t) = Taken By, (c) = Cosigned By    Initials Name Provider Type    Melly Douglas PT Physical Therapist                 Time Calculation:    PT Charges     Row Name 11/15/22 1505             Time Calculation    PT Received On 11/15/22  -CW         Untimed Charges    PT Eval/Re-eval Minutes 25  -CW         Total Minutes    Untimed Charges Total Minutes 25  -CW       Total Minutes 25  -CW            User Key  (r) = Recorded By, (t) = Taken By, (c) = Cosigned By    Initials Name Provider Type    Melly Douglas PT Physical Therapist              Therapy Charges for Today     Code Description Service Date Service Provider Modifiers Qty    13009585999 HC PT EVAL LOW COMPLEXITY 2 11/15/2022 Melly Moses, MARCELO GP 1          PT G-Codes  Outcome Measure Options: AM-PAC 6 Clicks Basic Mobility  (PT)  AM-PAC 6 Clicks Score (PT): 7  AM-PAC 6 Clicks Score (OT): 6    Melly Moses, PT  11/15/2022

## 2022-11-15 NOTE — PROGRESS NOTES
[facility]   Hospitalist Progress Note  Date: 11/15/2022  Patient Name: Josseline Vargas  : 1955  MRN: [id number]  Date of admission: 2022      Subjective   Subjective     Chief Complaint: confusion     Summary:  67 y.o. female with medical history significant for type 2 diabetes, seizure disorder, history of CVA; presents with feeling unwell times several days.  Patient states that she currently resides at a nursing home and she has not been feeling herself for the past few days.  Patient notes a decrease in her appetite along with a decrease in her level of activity.  Patient notes having a cough.  Patient also reports having UTI type symptoms, with difficulty urinating, seizure, flank pain and frequency.  Patient reports suprapubic pain along with bilateral flank pain.  Patient reports that she was trying to tell the people at her facility that she was not feeling well, but they did not take her seriously.  Patient has also noted having headache.      Interval Followup:   Blood and urine culture showing e. Coli remain on  Antibiotics    Review of Systems  Unable to obtain secondary to confusion    Objective   Objective     Vitals:   Temp:  [97.9 °F (36.6 °C)-102 °F (38.9 °C)] 99.7 °F (37.6 °C)  Heart Rate:  [] 118  Resp:  [20-32] 20  BP: (105-158)/(51-76) 105/51  Flow (L/min):  [2] 2  Physical Exam    Constitutional: Awake, alert, no acute distress resting in bed   Eyes: Pupils equal, sclerae anicteric, no conjunctival injection   HENT: NCAT, mucous membranes moist   Neck: Supple, no thyromegaly, no lymphadenopathy, trachea midline   Respiratory: Clear to auscultation bilaterally, nonlabored respirations no wheezing rales or rhonchi   Cardiovascular: RRR, no murmurs, Gastrointestinal: Positive bowel sounds, soft, nontender, nondistended   Musculoskeletal: No bilateral ankle edema, no clubbing or cyanosis to extremities   Psychiatric: Appropriate affect, cooperative   Neurologic: Oriented x  2, strength symmetric in all extremities, Cranial Nerves grossly intact to confrontation, speech clear   Skin: No rashes     Result Review    Result Review:  I have personally reviewed the results from the time of this admission to 11/15/2022 06:49 EST and agree with these findings:  [x]  Laboratory   CBC    CBC 11/13/22 11/14/22 11/15/22   WBC 15.07 (A) 8.26 5.05   RBC 4.49 4.57 4.13   Hemoglobin 12.0 12.3 11.0 (A)   Hematocrit 38.2 39.4 35.2   MCV 85.1 86.2 85.2   MCH 26.7 26.9 26.6   MCHC 31.4 (A) 31.2 (A) 31.3 (A)   RDW 15.1 16.5 (A) 16.2 (A)   Platelets 153 112 (A) 93 (A)   (A) Abnormal value            BMP    BMP 11/13/22 11/14/22 11/15/22   BUN 39 (A) 51 (A) 44 (A)   Creatinine 2.10 (A) 2.15 (A) 1.20 (A)   Sodium 140 139 138   Potassium 4.0 4.6 3.9   Chloride 104 104 108 (A)   CO2 17.4 (A) 20.5 (A) 19.1 (A)   Calcium 9.1 9.7 8.8   (A) Abnormal value              [x]  Microbiology   Blood Culture   Date Value Ref Range Status   11/14/2022 Escherichia coli (C)  Preliminary   11/14/2022 Escherichia coli (C)  Preliminary     BCID, PCR   Date Value Ref Range Status   11/14/2022 Escherichia coli. Identification by BCID2 PCR. (A) Negative by BCID PCR. Culture to Follow. Final     No results found for: CULTURES, HSVCX, URCX  No results found for: EYECULTURE, GCCX, HSVCULTURE, LABHSV  No results found for: LEGIONELLA, MRSACX, MUMPSCX, MYCOPLASCX  No results found for: NOCARDIACX, STOOLCX  Urine Culture   Date Value Ref Range Status   11/14/2022 >100,000 CFU/mL Escherichia coli (A)  Preliminary     No results found for: VIRALCULTU, WOUNDCX    [x]  Radiology  XR Chest 1 View    Result Date: 11/14/2022  PROCEDURE: XR CHEST 1 VW  COMPARISON: 9/20/2022.  INDICATIONS: 67-YEAR-OLD FEMALE W/ UNSPECIFIED WEAKNESS & DIZZINESS.  FINDINGS: A single AP semi-upright portable chest radiograph was performed.  There is pulmonary hypoinflation.  New or increased bilateral infiltrates are suspected.  The findings may represent mild  pulmonary edema with vascular congestion.  Pneumonia cannot be excluded.  Surgical clips are projected over the left chest, including the left axilla and the left breast.  The findings suggest prior left mastectomy with left axillary lymph node dissection.  Please correlate clinically.  The thoracic aorta is atherosclerotic.  External artifacts obscure detail.  Degenerative changes involve the bilateral shoulders.  There may be minimal blunting of the left lateral costophrenic sulcus, which is probably related to low lung volumes.  Pleural-parenchymal scarring is possible.  A tiny left pleural effusion cannot be excluded but is thought to be less likely.  There may be borderline cardiac enlargement.  No pneumothorax.      Impression:  There may be new mild pulmonary edema with vascular congestion.  Pneumonia cannot be excluded but is thought to be less likely.     Please note that portions of this note were completed with a voice recognition program.  VIRI KIM JR, MD       Electronically Signed and Approved By: VIRI KIM JR, MD on 11/14/2022 at 3:25                []  EKG/Telemetry   []  Cardiology/Vascular   []  Pathology  []  Old records  []  Other:    Assessment & Plan   Assessment / Plan     Assessment/Plan:  Assessment/Plan:   Sepsis due to E. Coli -  antibiotics de-escalated to ceftriaxone   Acute kidney injury-resolved   Type 2 diabetes-patient with history of type 2 diabetes.  Patient will be continued on insulin with meals.  Accu-Cheks as scheduled.  Seizure disorder-patient with history of seizure disorder.  Patient will be continued on her home seizure medications.      Discussed plan with RN.    DVT prophylaxis:  Medical DVT prophylaxis orders are present.    CODE STATUS:   Level Of Support Discussed With: Patient  Code Status (Patient has no pulse and is not breathing): CPR (Attempt to Resuscitate)  Medical Interventions (Patient has pulse or is breathing): Full Support  Release to patient:  Routine Release        Electronically signed by Tu Stahl DO, 11/15/22, 6:49 AM EST.

## 2022-11-16 PROBLEM — N39.0 SEPSIS DUE TO GRAM-NEGATIVE UTI (HCC): Status: ACTIVE | Noted: 2022-11-16

## 2022-11-16 PROBLEM — A41.50 SEPSIS DUE TO GRAM-NEGATIVE UTI (HCC): Status: ACTIVE | Noted: 2022-11-16

## 2022-11-16 PROBLEM — B96.20 E COLI BACTEREMIA: Status: ACTIVE | Noted: 2022-11-16

## 2022-11-16 PROBLEM — R78.81 E COLI BACTEREMIA: Status: ACTIVE | Noted: 2022-11-16

## 2022-11-16 LAB
BACTERIA SPEC AEROBE CULT: ABNORMAL
GLUCOSE BLDC GLUCOMTR-MCNC: 114 MG/DL (ref 70–99)
GLUCOSE BLDC GLUCOMTR-MCNC: 123 MG/DL (ref 70–99)
GLUCOSE BLDC GLUCOMTR-MCNC: 174 MG/DL (ref 70–99)
GRAM STN SPEC: ABNORMAL
ISOLATED FROM: ABNORMAL
ISOLATED FROM: ABNORMAL

## 2022-11-16 PROCEDURE — 25010000002 HEPARIN (PORCINE) PER 1000 UNITS: Performed by: HOSPITALIST

## 2022-11-16 PROCEDURE — 94799 UNLISTED PULMONARY SVC/PX: CPT

## 2022-11-16 PROCEDURE — 63710000001 INSULIN DETEMIR PER 5 UNITS: Performed by: HOSPITALIST

## 2022-11-16 PROCEDURE — 82962 GLUCOSE BLOOD TEST: CPT

## 2022-11-16 PROCEDURE — 0 CEFTRIAXONE PER 250 MG: Performed by: INTERNAL MEDICINE

## 2022-11-16 PROCEDURE — 99233 SBSQ HOSP IP/OBS HIGH 50: CPT | Performed by: INTERNAL MEDICINE

## 2022-11-16 RX ADMIN — ROSUVASTATIN CALCIUM 20 MG: 20 TABLET, FILM COATED ORAL at 21:39

## 2022-11-16 RX ADMIN — CETIRIZINE HYDROCHLORIDE 10 MG: 10 TABLET, FILM COATED ORAL at 08:58

## 2022-11-16 RX ADMIN — Medication 10 ML: at 21:40

## 2022-11-16 RX ADMIN — FLUTICASONE PROPIONATE 2 SPRAY: 50 SPRAY, METERED NASAL at 09:00

## 2022-11-16 RX ADMIN — HYDROCODONE BITARTRATE AND ACETAMINOPHEN 1 TABLET: 5; 325 TABLET ORAL at 15:40

## 2022-11-16 RX ADMIN — TOPIRAMATE 25 MG: 25 TABLET, FILM COATED ORAL at 08:59

## 2022-11-16 RX ADMIN — HEPARIN SODIUM 5000 UNITS: 5000 INJECTION INTRAVENOUS; SUBCUTANEOUS at 15:37

## 2022-11-16 RX ADMIN — IPRATROPIUM BROMIDE 0.5 MG: 0.5 SOLUTION RESPIRATORY (INHALATION) at 18:32

## 2022-11-16 RX ADMIN — NYSTATIN 1 APPLICATION: 100000 POWDER TOPICAL at 09:00

## 2022-11-16 RX ADMIN — TOPIRAMATE 50 MG: 25 TABLET, FILM COATED ORAL at 21:39

## 2022-11-16 RX ADMIN — IPRATROPIUM BROMIDE 0.5 MG: 0.5 SOLUTION RESPIRATORY (INHALATION) at 11:53

## 2022-11-16 RX ADMIN — CEFTRIAXONE SODIUM 2 G: 2 INJECTION, SOLUTION INTRAVENOUS at 08:58

## 2022-11-16 RX ADMIN — RUGBY ZINC OXIDE 20%: 20 OINTMENT TOPICAL at 21:40

## 2022-11-16 RX ADMIN — HYDROXYZINE HYDROCHLORIDE 25 MG: 25 TABLET, FILM COATED ORAL at 15:40

## 2022-11-16 RX ADMIN — BUSPIRONE HYDROCHLORIDE 10 MG: 10 TABLET ORAL at 15:40

## 2022-11-16 RX ADMIN — NYSTATIN 1 APPLICATION: 100000 POWDER TOPICAL at 21:39

## 2022-11-16 RX ADMIN — PHENYTOIN 100 MG: 50 TABLET, CHEWABLE ORAL at 21:39

## 2022-11-16 RX ADMIN — HYDROXYZINE HYDROCHLORIDE 25 MG: 25 TABLET, FILM COATED ORAL at 08:58

## 2022-11-16 RX ADMIN — HEPARIN SODIUM 5000 UNITS: 5000 INJECTION INTRAVENOUS; SUBCUTANEOUS at 21:38

## 2022-11-16 RX ADMIN — Medication 10 ML: at 09:01

## 2022-11-16 RX ADMIN — INSULIN DETEMIR 28 UNITS: 100 INJECTION, SOLUTION SUBCUTANEOUS at 09:05

## 2022-11-16 RX ADMIN — HEPARIN SODIUM 5000 UNITS: 5000 INJECTION INTRAVENOUS; SUBCUTANEOUS at 06:45

## 2022-11-16 RX ADMIN — ANASTROZOLE 1 MG: 1 TABLET ORAL at 08:57

## 2022-11-16 RX ADMIN — RUGBY ZINC OXIDE 20%: 20 OINTMENT TOPICAL at 09:02

## 2022-11-16 RX ADMIN — HYDROXYZINE HYDROCHLORIDE 25 MG: 25 TABLET, FILM COATED ORAL at 21:39

## 2022-11-16 RX ADMIN — VENLAFAXINE HYDROCHLORIDE 37.5 MG: 37.5 TABLET ORAL at 11:23

## 2022-11-16 RX ADMIN — IPRATROPIUM BROMIDE 0.5 MG: 0.5 SOLUTION RESPIRATORY (INHALATION) at 00:20

## 2022-11-16 RX ADMIN — IPRATROPIUM BROMIDE 0.5 MG: 0.5 SOLUTION RESPIRATORY (INHALATION) at 06:31

## 2022-11-16 RX ADMIN — TERAZOSIN HYDROCHLORIDE 1 MG: 1 CAPSULE ORAL at 21:39

## 2022-11-16 RX ADMIN — BUSPIRONE HYDROCHLORIDE 10 MG: 10 TABLET ORAL at 21:39

## 2022-11-16 RX ADMIN — TRAZODONE HYDROCHLORIDE 50 MG: 50 TABLET ORAL at 21:39

## 2022-11-16 RX ADMIN — BUSPIRONE HYDROCHLORIDE 10 MG: 10 TABLET ORAL at 08:58

## 2022-11-16 NOTE — PROGRESS NOTES
Enter Query Response Below      Query Response:   Unable to determine but probably due to sepsis     Electronically signed by Tu Stahl DO, 22, 3:53 PM EST.             If applicable, please update the problem list.      Patient: Josseline Vargas        : 1955  Account: [id number]           Admit Date:         How to Respond to this query:       a. Click New Note     b. Answer query within the yellow box.                c. Update the Problem List, if applicable.      If you have any questions about this query contact me at: [phone]    ,     Patient admitted with sepsis and DYLON, treated with IV antibiotics and IV fluids.  Please clarify the relationship between the sepsis and the DYLON.    DYLON due to sepsis  DYLON is not due to sepsis  DYLON due to other  Other  Unable to determine    By submitting this query, we are merely seeking further clarification of documentation to accurately reflect all conditions that you are monitoring, evaluating, treating or that extend the hospitalization or utilize additional resources of care. Please utilize your independent clinical judgment when addressing the question(s) above.     This query and your response, once completed, will be entered into the legal medical record.    Sincerely,  Rosa CASTILLO Rn   Clinical Documentation Integrity Program

## 2022-11-16 NOTE — PROGRESS NOTES
[facility]   Hospitalist Progress Note  Date: 2022  Patient Name: Josseline Vargas  : 1955  MRN: [id number]  Date of admission: 2022      Subjective   Subjective     Chief Complaint: confusion     Summary:  67 y.o. female with medical history significant for type 2 diabetes, seizure disorder, history of CVA; presents with feeling unwell times several days.  Patient states that she currently resides at a nursing home and she has not been feeling herself for the past few days.  Patient notes a decrease in her appetite along with a decrease in her level of activity.  Patient notes having a cough.  Patient also reports having UTI type symptoms, with difficulty urinating, seizure, flank pain and frequency.  Patient reports suprapubic pain along with bilateral flank pain.  Patient reports that she was trying to tell the people at her facility that she was not feeling well, but they did not take her seriously.  Patient has also noted having headache.      Interval Followup:   Blood and urine culture showing e. Coli remain on Antibiotics  Mental status appears better today     Review of Systems  Unable to obtain due to confusion     Objective   Objective     Vitals:   Temp:  [97.3 °F (36.3 °C)-98.6 °F (37 °C)] 98.6 °F (37 °C)  Heart Rate:  [103-117] 103  Resp:  [20-22] 20  BP: (114-122)/(52-68) 120/66  Flow (L/min):  [2] 2  Physical Exam    Constitutional: Awake, alert, no acute distress resting in bed watching tv. No family at the bedside    Eyes: Pupils equal, sclerae anicteric, no conjunctival injection   HENT: NCAT, mucous membranes moist   Neck: Supple,   Respiratory: Clear to auscultation bilaterally, nonlabored respirations no wheezing rales or rhonchi   Cardiovascular: RRR, no murmurs, Gastrointestinal: Positive bowel sounds, soft, nontender, nondistended   Musculoskeletal: No bilateral ankle edema, no clubbing or cyanosis to extremities   Psychiatric: Appropriate affect,  cooperative   Neurologic: Oriented x 2, strength symmetric in all extremities, Cranial Nerves grossly intact to confrontation, speech clear   Skin: No rashes     Result Review    Result Review:  I have personally reviewed the results from the time of this admission to 11/16/2022 08:37 EST and agree with these findings:  [x]  Laboratory   CBC    CBC 11/13/22 11/14/22 11/15/22   WBC 15.07 (A) 8.26 5.05   RBC 4.49 4.57 4.13   Hemoglobin 12.0 12.3 11.0 (A)   Hematocrit 38.2 39.4 35.2   MCV 85.1 86.2 85.2   MCH 26.7 26.9 26.6   MCHC 31.4 (A) 31.2 (A) 31.3 (A)   RDW 15.1 16.5 (A) 16.2 (A)   Platelets 153 112 (A) 93 (A)   (A) Abnormal value            BMP    BMP 11/13/22 11/14/22 11/15/22   BUN 39 (A) 51 (A) 44 (A)   Creatinine 2.10 (A) 2.15 (A) 1.20 (A)   Sodium 140 139 138   Potassium 4.0 4.6 3.9   Chloride 104 104 108 (A)   CO2 17.4 (A) 20.5 (A) 19.1 (A)   Calcium 9.1 9.7 8.8   (A) Abnormal value              [x]  Microbiology   Blood Culture   Date Value Ref Range Status   11/14/2022 Escherichia coli (C)  Preliminary   11/14/2022 Escherichia coli (C)  Preliminary     BCID, PCR   Date Value Ref Range Status   11/14/2022 Escherichia coli. Identification by BCID2 PCR. (A) Negative by BCID PCR. Culture to Follow. Final     No results found for: CULTURES, HSVCX, URCX  No results found for: EYECULTURE, GCCX, HSVCULTURE, LABHSV  No results found for: LEGIONELLA, MRSACX, MUMPSCX, MYCOPLASCX  No results found for: NOCARDIACX, STOOLCX  Urine Culture   Date Value Ref Range Status   11/14/2022 >100,000 CFU/mL Escherichia coli (A)  Preliminary     No results found for: VIRALCULTU, WOUNDCX    [x]  Radiology  XR Chest 1 View    Result Date: 11/14/2022  PROCEDURE: XR CHEST 1 VW  COMPARISON: 9/20/2022.  INDICATIONS: 67-YEAR-OLD FEMALE W/ UNSPECIFIED WEAKNESS & DIZZINESS.  FINDINGS: A single AP semi-upright portable chest radiograph was performed.  There is pulmonary hypoinflation.  New or increased bilateral infiltrates are suspected.   The findings may represent mild pulmonary edema with vascular congestion.  Pneumonia cannot be excluded.  Surgical clips are projected over the left chest, including the left axilla and the left breast.  The findings suggest prior left mastectomy with left axillary lymph node dissection.  Please correlate clinically.  The thoracic aorta is atherosclerotic.  External artifacts obscure detail.  Degenerative changes involve the bilateral shoulders.  There may be minimal blunting of the left lateral costophrenic sulcus, which is probably related to low lung volumes.  Pleural-parenchymal scarring is possible.  A tiny left pleural effusion cannot be excluded but is thought to be less likely.  There may be borderline cardiac enlargement.  No pneumothorax.      Impression:  There may be new mild pulmonary edema with vascular congestion.  Pneumonia cannot be excluded but is thought to be less likely.     Please note that portions of this note were completed with a voice recognition program.  VIRI KIM JR, MD       Electronically Signed and Approved By: VIRI KIM JR, MD on 11/14/2022 at 3:25                []  EKG/Telemetry   []  Cardiology/Vascular   []  Pathology  []  Old records  []  Other:    Assessment & Plan   Assessment / Plan     Assessment/Plan:  Assessment/Plan:   Sepsis due to E. Coli -  continue ceftriaxone   Acute kidney injury-resolved   Type 2 diabetes-patient with history of type 2 diabetes.  Patient will be continued on insulin with meals.  Accu-Cheks as scheduled.  Seizure disorder-patient with history of seizure disorder.  Patient will be continued on her home seizure medications.      Discussed plan with RN.    DVT prophylaxis:  Heparin   Medical DVT prophylaxis orders are present.    CODE STATUS:   Level Of Support Discussed With: Patient  Code Status (Patient has no pulse and is not breathing): CPR (Attempt to Resuscitate)  Medical Interventions (Patient has pulse or is breathing): Full  Support  Release to patient: Routine Release        Electronically signed by Tu Stahl DO, 11/15/22, 6:49 AM EST.

## 2022-11-16 NOTE — PLAN OF CARE
Goal Outcome Evaluation:  Plan of Care Reviewed With: patient        Progress: no change  Outcome Evaluation: vss. pt on room air. remains confused

## 2022-11-17 LAB
ANION GAP SERPL CALCULATED.3IONS-SCNC: 9.5 MMOL/L (ref 5–15)
BUN SERPL-MCNC: 25 MG/DL (ref 8–23)
BUN/CREAT SERPL: 33.8 (ref 7–25)
CALCIUM SPEC-SCNC: 9.4 MG/DL (ref 8.6–10.5)
CHLORIDE SERPL-SCNC: 109 MMOL/L (ref 98–107)
CO2 SERPL-SCNC: 19.5 MMOL/L (ref 22–29)
CREAT SERPL-MCNC: 0.74 MG/DL (ref 0.57–1)
EGFRCR SERPLBLD CKD-EPI 2021: 87.7 ML/MIN/1.73
GLUCOSE BLDC GLUCOMTR-MCNC: 135 MG/DL (ref 70–99)
GLUCOSE SERPL-MCNC: 140 MG/DL (ref 65–99)
MAGNESIUM SERPL-MCNC: 1.5 MG/DL (ref 1.6–2.4)
POTASSIUM SERPL-SCNC: 3.6 MMOL/L (ref 3.5–5.2)
SODIUM SERPL-SCNC: 138 MMOL/L (ref 136–145)

## 2022-11-17 PROCEDURE — 97162 PT EVAL MOD COMPLEX 30 MIN: CPT

## 2022-11-17 PROCEDURE — 25010000002 HEPARIN (PORCINE) PER 1000 UNITS: Performed by: HOSPITALIST

## 2022-11-17 PROCEDURE — 82962 GLUCOSE BLOOD TEST: CPT

## 2022-11-17 PROCEDURE — 94760 N-INVAS EAR/PLS OXIMETRY 1: CPT

## 2022-11-17 PROCEDURE — 99233 SBSQ HOSP IP/OBS HIGH 50: CPT | Performed by: INTERNAL MEDICINE

## 2022-11-17 PROCEDURE — 94799 UNLISTED PULMONARY SVC/PX: CPT

## 2022-11-17 PROCEDURE — 83735 ASSAY OF MAGNESIUM: CPT | Performed by: INTERNAL MEDICINE

## 2022-11-17 PROCEDURE — 63710000001 INSULIN DETEMIR PER 5 UNITS: Performed by: HOSPITALIST

## 2022-11-17 PROCEDURE — 80048 BASIC METABOLIC PNL TOTAL CA: CPT | Performed by: INTERNAL MEDICINE

## 2022-11-17 PROCEDURE — 0 CEFTRIAXONE PER 250 MG: Performed by: INTERNAL MEDICINE

## 2022-11-17 PROCEDURE — 94664 DEMO&/EVAL PT USE INHALER: CPT

## 2022-11-17 RX ADMIN — CETIRIZINE HYDROCHLORIDE 10 MG: 10 TABLET, FILM COATED ORAL at 09:01

## 2022-11-17 RX ADMIN — HEPARIN SODIUM 5000 UNITS: 5000 INJECTION INTRAVENOUS; SUBCUTANEOUS at 14:57

## 2022-11-17 RX ADMIN — PHENYTOIN 100 MG: 50 TABLET, CHEWABLE ORAL at 20:06

## 2022-11-17 RX ADMIN — BUSPIRONE HYDROCHLORIDE 10 MG: 10 TABLET ORAL at 20:07

## 2022-11-17 RX ADMIN — CEFTRIAXONE SODIUM 2 G: 2 INJECTION, SOLUTION INTRAVENOUS at 08:27

## 2022-11-17 RX ADMIN — HYDROCODONE BITARTRATE AND ACETAMINOPHEN 1 TABLET: 5; 325 TABLET ORAL at 08:59

## 2022-11-17 RX ADMIN — Medication 10 ML: at 08:27

## 2022-11-17 RX ADMIN — HYDROXYZINE HYDROCHLORIDE 25 MG: 25 TABLET, FILM COATED ORAL at 20:07

## 2022-11-17 RX ADMIN — ROSUVASTATIN CALCIUM 20 MG: 20 TABLET, FILM COATED ORAL at 20:06

## 2022-11-17 RX ADMIN — HYDROXYZINE HYDROCHLORIDE 25 MG: 25 TABLET, FILM COATED ORAL at 08:59

## 2022-11-17 RX ADMIN — Medication 10 ML: at 20:06

## 2022-11-17 RX ADMIN — TRAZODONE HYDROCHLORIDE 50 MG: 50 TABLET ORAL at 20:07

## 2022-11-17 RX ADMIN — HEPARIN SODIUM 5000 UNITS: 5000 INJECTION INTRAVENOUS; SUBCUTANEOUS at 21:20

## 2022-11-17 RX ADMIN — INSULIN DETEMIR 28 UNITS: 100 INJECTION, SOLUTION SUBCUTANEOUS at 08:25

## 2022-11-17 RX ADMIN — IPRATROPIUM BROMIDE 0.5 MG: 0.5 SOLUTION RESPIRATORY (INHALATION) at 18:36

## 2022-11-17 RX ADMIN — TERAZOSIN HYDROCHLORIDE 1 MG: 1 CAPSULE ORAL at 20:06

## 2022-11-17 RX ADMIN — BUSPIRONE HYDROCHLORIDE 10 MG: 10 TABLET ORAL at 09:01

## 2022-11-17 RX ADMIN — HEPARIN SODIUM 5000 UNITS: 5000 INJECTION INTRAVENOUS; SUBCUTANEOUS at 06:08

## 2022-11-17 RX ADMIN — IPRATROPIUM BROMIDE 0.5 MG: 0.5 SOLUTION RESPIRATORY (INHALATION) at 23:55

## 2022-11-17 RX ADMIN — IPRATROPIUM BROMIDE 0.5 MG: 0.5 SOLUTION RESPIRATORY (INHALATION) at 00:50

## 2022-11-17 RX ADMIN — IPRATROPIUM BROMIDE 0.5 MG: 0.5 SOLUTION RESPIRATORY (INHALATION) at 07:08

## 2022-11-17 RX ADMIN — NYSTATIN 1 APPLICATION: 100000 POWDER TOPICAL at 09:01

## 2022-11-17 RX ADMIN — ANASTROZOLE 1 MG: 1 TABLET ORAL at 08:59

## 2022-11-17 RX ADMIN — IPRATROPIUM BROMIDE 0.5 MG: 0.5 SOLUTION RESPIRATORY (INHALATION) at 12:56

## 2022-11-17 RX ADMIN — TOPIRAMATE 50 MG: 25 TABLET, FILM COATED ORAL at 20:06

## 2022-11-17 RX ADMIN — NYSTATIN 1 APPLICATION: 100000 POWDER TOPICAL at 21:20

## 2022-11-17 RX ADMIN — TOPIRAMATE 25 MG: 25 TABLET, FILM COATED ORAL at 06:08

## 2022-11-17 RX ADMIN — BUSPIRONE HYDROCHLORIDE 10 MG: 10 TABLET ORAL at 16:45

## 2022-11-17 RX ADMIN — VENLAFAXINE HYDROCHLORIDE 37.5 MG: 37.5 TABLET ORAL at 09:00

## 2022-11-17 RX ADMIN — HYDROXYZINE HYDROCHLORIDE 25 MG: 25 TABLET, FILM COATED ORAL at 16:45

## 2022-11-17 RX ADMIN — RUGBY ZINC OXIDE 20%: 20 OINTMENT TOPICAL at 21:20

## 2022-11-17 RX ADMIN — RUGBY ZINC OXIDE 20%: 20 OINTMENT TOPICAL at 09:02

## 2022-11-17 RX ADMIN — FLUTICASONE PROPIONATE 2 SPRAY: 50 SPRAY, METERED NASAL at 09:01

## 2022-11-17 NOTE — PROGRESS NOTES
[facility]   Hospitalist Progress Note  Date: 2022  Patient Name: Josseline Vargas  : 1955  MRN: [id number]  Date of admission: 2022      Subjective   Subjective     Chief Complaint: confusion     Summary:  67 y.o. female with medical history significant for type 2 diabetes, seizure disorder, history of CVA; presents with feeling unwell times several days.  Patient states that she currently resides at a nursing home and she has not been feeling herself for the past few days.  Patient notes a decrease in her appetite along with a decrease in her level of activity.  Patient notes having a cough.  Patient also reports having UTI type symptoms, with difficulty urinating, seizure, flank pain and frequency.  Patient reports suprapubic pain along with bilateral flank pain.  Patient reports that she was trying to tell the people at her facility that she was not feeling well, but they did not take her seriously.  Patient has also noted having headache.      Interval Followup:   Blood and urine culture showing e. Coli remain on Antibiotics  Mental status much improved today she is alert and oriented and back to her baseline     Review of Systems  All systems reviewed and negative at this time  Objective   Objective     Vitals:   Temp:  [97.6 °F (36.4 °C)-98.4 °F (36.9 °C)] 97.9 °F (36.6 °C)  Heart Rate:  [] 96  Resp:  [20-22] 21  BP: (119-129)/(62-72) 127/62  Flow (L/min):  [2] 2  Physical Exam    Constitutional: Awake, alert, no acute distress resting in bed watching tv.   Eyes: Pupils equal, sclerae anicteric, no conjunctival injection   HENT: NCAT, mucous membranes moist   Neck: Supple,   Respiratory: Clear, no w/r/r    Cardiovascular: RRR, no murmurs,    Gastrointestinal: Positive bowel sounds, soft, nontender, nondistended   Musculoskeletal: No bilateral ankle edema, no clubbing or cyanosis to extremities   Psychiatric: Appropriate affect, cooperative   Neurologic: Oriented x 3, strength  symmetric in all extremities, Cranial Nerves grossly intact to confrontation, speech clear   Skin: No rashes     Result Review    Result Review:  I have personally reviewed the results from the time of this admission to 11/17/2022 11:08 EST and agree with these findings:  [x]  Laboratory   CBC    CBC 11/13/22 11/14/22 11/15/22   WBC 15.07 (A) 8.26 5.05   RBC 4.49 4.57 4.13   Hemoglobin 12.0 12.3 11.0 (A)   Hematocrit 38.2 39.4 35.2   MCV 85.1 86.2 85.2   MCH 26.7 26.9 26.6   MCHC 31.4 (A) 31.2 (A) 31.3 (A)   RDW 15.1 16.5 (A) 16.2 (A)   Platelets 153 112 (A) 93 (A)   (A) Abnormal value            BMP    BMP 11/14/22 11/15/22 11/17/22   BUN 51 (A) 44 (A) 25 (A)   Creatinine 2.15 (A) 1.20 (A) 0.74   Sodium 139 138 138   Potassium 4.6 3.9 3.6   Chloride 104 108 (A) 109 (A)   CO2 20.5 (A) 19.1 (A) 19.5 (A)   Calcium 9.7 8.8 9.4   (A) Abnormal value              [x]  Microbiology   Blood Culture   Date Value Ref Range Status   11/14/2022 Escherichia coli (C)  Preliminary   11/14/2022 Escherichia coli (C)  Preliminary     BCID, PCR   Date Value Ref Range Status   11/14/2022 Escherichia coli. Identification by BCID2 PCR. (A) Negative by BCID PCR. Culture to Follow. Final     No results found for: CULTURES, HSVCX, URCX  No results found for: EYECULTURE, GCCX, HSVCULTURE, LABHSV  No results found for: LEGIONELLA, MRSACX, MUMPSCX, MYCOPLASCX  No results found for: NOCARDIACX, STOOLCX  Urine Culture   Date Value Ref Range Status   11/14/2022 >100,000 CFU/mL Escherichia coli (A)  Preliminary     No results found for: VIRALCULTU, WOUNDCX    [x]  Radiology       []  EKG/Telemetry   []  Cardiology/Vascular   []  Pathology  []  Old records  []  Other:    Assessment & Plan   Assessment / Plan     Assessment/Plan:  Assessment/Plan:   Sepsis due to E. Coli -  continue ceftriaxone and change to oral antibiotic on discharge   Acute kidney injury-resolved   Type 2 diabetes-patient with history of type 2 diabetes.  Patient will be  continued on insulin with meals.  Accu-Cheks as scheduled.  Seizure disorder-patient with history of seizure disorder.  Patient will be continued on her home seizure medications.      Discussed plan with RN.    DVT prophylaxis:  Heparin   Medical DVT prophylaxis orders are present.    CODE STATUS:   Level Of Support Discussed With: Patient  Code Status (Patient has no pulse and is not breathing): CPR (Attempt to Resuscitate)  Medical Interventions (Patient has pulse or is breathing): Full Support  Release to patient: Routine Release

## 2022-11-17 NOTE — PLAN OF CARE
Goal Outcome Evaluation:  Plan of Care Reviewed With: (P) patient           Outcome Evaluation: (P) Pt presents with deficits in strength, mobility, ROM, endurance, and motor control. Skilled therapy services are warranted to address limitations and maximize function. Recommend return to assisted living upon discharge.

## 2022-11-17 NOTE — PLAN OF CARE
Goal Outcome Evaluation:           Progress: no change    Patient stable this shift. Incontinent of stool x2 this shift. Complained of pain generalized, medicated per emar. Will continue to monitor.

## 2022-11-17 NOTE — PLAN OF CARE
Goal Outcome Evaluation:  Plan of Care Reviewed With: patient        Progress: no change    Patient stable this shift. Worked with therapy. Spoke to pt's family on the phone and let them know that pt is likely to dc back to facility this week  within the next 2-3 days per . Precert sent today. Will continue to monitor.

## 2022-11-17 NOTE — THERAPY EVALUATION
Acute Care - Physical Therapy Initial Evaluation  [facility]     Patient Name: Josseline Vargas  : 1955  MRN: [id number]  Today's Date: 2022         Admit date: 2022     Referring Physician: Tu Stahl DO     Surgery Date:* No surgery found *        Visit Dx:     ICD-10-CM ICD-9-CM   1. Cystitis  N30.90 595.9   2. Sepsis, due to unspecified organism, unspecified whether acute organ dysfunction present (HCC)  A41.9 038.9     995.91   3. Lactic acidosis  E87.20 276.2   4. Decreased activities of daily living (ADL)  Z78.9 V49.89   5. 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)   • Abnormal radionuclide bone scan   • Abnormal CT scan, chest   • Drug therapy   • Fitting and adjustment of vascular catheter   • Other cirrhosis of liver (HCC)   • Breast cancer of upper-outer quadrant of left female breast (HCC)   • Sepsis (HCC)   • Left hip pain   • Primary osteoarthritis of right shoulder   • Right shoulder pain   • Sepsis without acute organ dysfunction, due to unspecified organism (HCC)   • Change in bowel habit   • Contusion of right hip   • Contusion of right shoulder   • Right hip pain   • Acute cystitis   • Type 2 diabetes mellitus without complication, without long-term current use of insulin (HCC)   • Acute kidney injury (HCC)   • E coli bacteremia   • Sepsis due to gram-negative UTI (HCC)     Past Medical History:   Diagnosis Date   • Acute kidney injury (HCC) 2022   • Cancer (HCC)     Left breast   • Depressed    • Diabetes (HCC)     TYPE 2   • Flea bite of multiple sites of  lower extremity     PT INSTRUCTED TO INFORM DR GARCIAS OF BITES PRIOR TO SURGERY   • H/O meningitis 1987   • History of Bell's palsy    • Migraine headache    • Polio    • Seizures (HCC)    • Stroke (HCC) 1987     Past Surgical History:   Procedure Laterality Date   • APPENDECTOMY  1987   • BREAST SURGERY Left 10/2015    Biopsy, benign   • CATARACT EXTRACTION     • COLONOSCOPY N/A 9/28/2022    Procedure: COLONOSCOPY WITH POLYPECTOMY;  Surgeon: Alfonso Fitzgerald MD;  Location: [facility] ENDOSCOPY;  Service: Gastroenterology;  Laterality: N/A;  COLON POLYP   • EAR TUBES     • ENDOSCOPY N/A 9/28/2022    Procedure: ESOPHAGOGASTRODUODENOSCOPY WITH BIOPSIES;  Surgeon: Alfonso Fitzgerald MD;  Location: [facility] ENDOSCOPY;  Service: Gastroenterology;  Laterality: N/A;  GASTRITIS, VARIX   • KNEE MENISCECTOMY     • MASTECTOMY     • MASTECTOMY WITH SENTINEL NODE BIOPSY AND AXILLARY NODE DISSECTION Left 1/11/2018    Procedure: Left total mastectomy, left sentinel lymph node biopsy to include retrieval of prior clip, axillary lymph node dissection, no reconstruction. ;  Surgeon: Yuli Garcias MD;  Location: [facility];  Service:    • OVARIAN CYST REMOVAL     • MA INSJ TUNNELED CVC W/O SUBQ PORT/ AGE 3 YR/> Right 5/18/2017    Procedure: INSERTION RIGHT VENOUS ACCESS DEVICE;  Surgeon: Yuli Garcias MD;  Location: [facility];  Service: General   • TONSILLECTOMY     • TUBAL ABDOMINAL LIGATION     • VENOUS ACCESS DEVICE (PORT) REMOVAL Right 8/8/2019    Procedure: RIGHT port removal,;  Surgeon: Yuli Garcias MD;  Location: [facility];  Service: General     PT Assessment (last 12 hours)     PT Evaluation and Treatment     Row Name 11/17/22 1513          Physical Therapy Time and Intention    Subjective Information complains of;pain (P)   -JG     Document Type evaluation (P)   -JG     Mode of Treatment individual therapy;physical therapy (P)   -JG     Patient Effort poor (P)   -JG     Symptoms Noted  During/After Treatment increased pain;fatigue (P)   -JG     Row Name 11/17/22 1513          General Information    Patient Profile Reviewed yes (P)   -JG     Patient Observations alert;cooperative;agree to therapy (P)   -JG     Prior Level of Function mod assist:;max assist: (P)   -JG     Equipment Currently Used at Home wheelchair (P)   -JG     Existing Precautions/Restrictions fall (P)   -JG     Barriers to Rehab none identified (P)   -JG     Row Name 11/17/22 1513          Living Environment    Current Living Arrangements assisted living facility (P)   -JG     People in Home facility resident (P)   -JG     Primary Care Provided by other (see comments) (P)   -JG     Row Name 11/17/22 1513          Home Use of Assistive/Adaptive Equipment    Equipment Currently Used at Home lift device;wheelchair (P)   -JG     Row Name 11/17/22 1513          Range of Motion Comprehensive    General Range of Motion lower extremity range of motion deficits identified (P)   -JG     Row Name 11/17/22 1513          Strength Comprehensive (MMT)    General Manual Muscle Testing (MMT) Assessment lower extremity strength deficits identified (P)   3/5 on hip flex, knee ext, knee flex, ankle df BL  -G     Row Name 11/17/22 1513          Safety Issues, Functional Mobility    Impairments Affecting Function (Mobility) balance;cognition;coordination;endurance/activity tolerance;motor control;pain;strength;range of motion (ROM) (P)   -JG     Row Name             Wound Right anterior abdomen MASD (Moisture associated skin damage)    Wound - Properties Group Side: Right  -BOGDAN Orientation: anterior  -BOGDAN Location: abdomen  -BOGDAN Primary Wound Type: MASD  -BOGDAN    Retired Wound - Properties Group Side: Right  -BOGDAN Orientation: anterior  -BOGDAN Location: abdomen  -BOGDAN Primary Wound Type: MASD  -BOGDAN    Retired Wound - Properties Group Side: Right  -BOGDAN Location: abdomen  -BOGDAN Primary Wound Type: MASD  -BOGDAN    Row Name 11/17/22 1513          Plan of Care Review     Plan of Care Reviewed With patient (P)   -JG     Outcome Evaluation Pt presents with deficits in strength, mobility, ROM, endurance, and motor control. Skilled therapy services are warranted to address limitations and maximize function. Recommend return to assisted living upon discharge. (P)   -JG     Row Name 11/17/22 1513          Positioning and Restraints    Pre-Treatment Position in bed (P)   -JG     Post Treatment Position bed (P)   -JG     In Bed supine;call light within reach (P)   -JG     Row Name 11/17/22 1513          Therapy Assessment/Plan (PT)    Rehab Potential (PT) fair, will monitor progress closely (P)   -JG     Criteria for Skilled Interventions Met (PT) yes;skilled treatment is necessary (P)   -JG     Therapy Frequency (PT) daily (P)   -JG     Predicted Duration of Therapy Intervention (PT) 10 days (P)   -JG     Problem List (PT) problems related to;balance;cognition;coordination;mobility;motor control;range of motion (ROM);strength;postural control (P)   -JG     Activity Limitations Related to Problem List (PT) unable to ambulate safely;unable to transfer safely;IADLs not performed adequately or safely (P)   -JG     Row Name 11/17/22 1513          Physical Therapy Goals    Bed Mobility Goal Selection (PT) bed mobility, PT goal 1 (P)   -JG     Transfer Goal Selection (PT) transfer, PT goal 1 (P)   -JG     Row Name 11/17/22 1513          Bed Mobility Goal 1 (PT)    Activity/Assistive Device (Bed Mobility Goal 1, PT) bed mobility activities, all (P)   -JG     [place] Level/Cues Needed (Bed Mobility Goal 1, PT) standby assist (P)   -JG     Time Frame (Bed Mobility Goal 1, PT) long term goal (LTG);10 days (P)   -JG     Row Name 11/17/22 1513          Transfer Goal 1 (PT)    Activity/Assistive Device (Transfer Goal 1, PT) sit-to-stand/stand-to-sit (P)   -JG     [place] Level/Cues Needed (Transfer Goal 1, PT) minimum assist (75% or more patient effort) (P)   -JG     Time Frame (Transfer Goal  1, PT) long term goal (LTG);10 days (P)   -JG           User Key  (r) = Recorded By, (t) = Taken By, (c) = Cosigned By    Initials Name Provider Type    Charisma Borrero, RN Registered Nurse    Blair Mcfarland, PT Student PT Student                Physical Therapy Education     Title: PT OT SLP Therapies (Done)     Topic: Physical Therapy (Done)     Point: Mobility training (Done)     Learning Progress Summary           Patient Acceptance, E, VU by J at 11/17/2022 1521    Acceptance, E,TB, VU by  at 11/15/2022 1506                   Point: Precautions (Done)     Learning Progress Summary           Patient Acceptance, E,TB, VU by  at 11/15/2022 1506                               User Key     Initials Effective Dates Name Provider Type Discipline    CW 09/23/22 -  Melly Moses, PT Physical Therapist PT    LESA 10/18/22 -  Blair Oropeza, PT Student PT Student PT              PT Recommendation and Plan  Anticipated Discharge Disposition (PT): (P) assisted living, extended care facility  Planned Therapy Interventions (PT): (P) bed mobility training, balance training, motor coordination training, postural re-education, ROM (range of motion), strengthening, transfer training  Therapy Frequency (PT): (P) daily  Plan of Care Reviewed With: (P) patient  Outcome Evaluation: (P) Pt presents with deficits in strength, mobility, ROM, endurance, and motor control. Skilled therapy services are warranted to address limitations and maximize function. Recommend return to assisted living upon discharge.   Outcome Measures     Row Name 11/17/22 1500 11/15/22 1505          How much help from another person do you currently need...    Turning from your back to your side while in flat bed without using bedrails? 2 (P)   -JG 2  -CW     Moving from lying on back to sitting on the side of a flat bed without bedrails? 1 (P)   -JG 1  -CW     Moving to and from a bed to a chair (including a wheelchair)? 1 (P)   -JG 1  -CW     Standing up  from a chair using your arms (e.g., wheelchair, bedside chair)? 1 (P)   -JG 1  -CW     Climbing 3-5 steps with a railing? 1 (P)   -JG 1  -CW     To walk in hospital room? 1 (P)   -JG 1  -CW     AM-PAC 6 Clicks Score (PT) 7 (P)   -JG 7  -CW        Functional Assessment    Outcome Measure Options AM-PAC 6 Clicks Basic Mobility (PT) (P)   -JG AM-PAC 6 Clicks Basic Mobility (PT)  -CW           User Key  (r) = Recorded By, (t) = Taken By, (c) = Cosigned By    Initials Name Provider Type    CW Melly Moses, PT Physical Therapist    Blair Mcfarland, PT Student PT Student                 Time Calculation:     Therapy Charges for Today     Code Description Service Date Service Provider Modifiers Qty    [id number] HC PT EVAL MOD COMPLEXITY 3 11/17/2022 Blair Oropeza, PT Student GP 1          PT G-Codes  Outcome Measure Options: (P) AM-PAC 6 Clicks Basic Mobility (PT)  AM-PAC 6 Clicks Score (PT): (P) 7  AM-PAC 6 Clicks Score (OT): 6    Blair Oropeza, PT Student  11/17/2022

## 2022-11-17 NOTE — PLAN OF CARE
Goal Outcome Evaluation:  Plan of Care Reviewed With: patient           Outcome Evaluation: VSS. Oxygen saturation well on room air. Incontient. Confused. Aspiration precautions. Agnieszka Mistry RN

## 2022-11-17 NOTE — SIGNIFICANT NOTE
11/17/22 1429   Plan   Plan MD requested for precert to be started today. SW notified facility and precert will be started once updated PT note is available.

## 2022-11-18 VITALS
HEIGHT: 60 IN | HEART RATE: 86 BPM | TEMPERATURE: 98.1 F | RESPIRATION RATE: 20 BRPM | WEIGHT: 226.19 LBS | BODY MASS INDEX: 44.41 KG/M2 | OXYGEN SATURATION: 94 % | DIASTOLIC BLOOD PRESSURE: 74 MMHG | SYSTOLIC BLOOD PRESSURE: 129 MMHG

## 2022-11-18 LAB — GLUCOSE BLDC GLUCOMTR-MCNC: 133 MG/DL (ref 70–99)

## 2022-11-18 PROCEDURE — 25010000002 HEPARIN (PORCINE) PER 1000 UNITS: Performed by: HOSPITALIST

## 2022-11-18 PROCEDURE — 94799 UNLISTED PULMONARY SVC/PX: CPT

## 2022-11-18 PROCEDURE — 63710000001 INSULIN DETEMIR PER 5 UNITS: Performed by: HOSPITALIST

## 2022-11-18 PROCEDURE — 82962 GLUCOSE BLOOD TEST: CPT

## 2022-11-18 PROCEDURE — 99239 HOSP IP/OBS DSCHRG MGMT >30: CPT | Performed by: INTERNAL MEDICINE

## 2022-11-18 PROCEDURE — 0 CEFTRIAXONE PER 250 MG: Performed by: INTERNAL MEDICINE

## 2022-11-18 RX ORDER — CEFDINIR 300 MG/1
300 CAPSULE ORAL 2 TIMES DAILY
Qty: 20 CAPSULE | Refills: 0 | Status: SHIPPED | OUTPATIENT
Start: 2022-11-18 | End: 2022-11-28

## 2022-11-18 RX ADMIN — VENLAFAXINE HYDROCHLORIDE 37.5 MG: 37.5 TABLET ORAL at 09:56

## 2022-11-18 RX ADMIN — INSULIN DETEMIR 28 UNITS: 100 INJECTION, SOLUTION SUBCUTANEOUS at 09:56

## 2022-11-18 RX ADMIN — ANASTROZOLE 1 MG: 1 TABLET ORAL at 09:56

## 2022-11-18 RX ADMIN — BUSPIRONE HYDROCHLORIDE 10 MG: 10 TABLET ORAL at 09:56

## 2022-11-18 RX ADMIN — HYDROXYZINE HYDROCHLORIDE 25 MG: 25 TABLET, FILM COATED ORAL at 09:56

## 2022-11-18 RX ADMIN — CEFTRIAXONE SODIUM 2 G: 2 INJECTION, SOLUTION INTRAVENOUS at 09:56

## 2022-11-18 RX ADMIN — RUGBY ZINC OXIDE 20%: 20 OINTMENT TOPICAL at 10:05

## 2022-11-18 RX ADMIN — NYSTATIN 1 APPLICATION: 100000 POWDER TOPICAL at 10:01

## 2022-11-18 RX ADMIN — HEPARIN SODIUM 5000 UNITS: 5000 INJECTION INTRAVENOUS; SUBCUTANEOUS at 06:02

## 2022-11-18 RX ADMIN — FLUTICASONE PROPIONATE 2 SPRAY: 50 SPRAY, METERED NASAL at 10:44

## 2022-11-18 RX ADMIN — CETIRIZINE HYDROCHLORIDE 10 MG: 10 TABLET, FILM COATED ORAL at 09:56

## 2022-11-18 RX ADMIN — IPRATROPIUM BROMIDE 0.5 MG: 0.5 SOLUTION RESPIRATORY (INHALATION) at 11:22

## 2022-11-18 RX ADMIN — HYDROCODONE BITARTRATE AND ACETAMINOPHEN 1 TABLET: 5; 325 TABLET ORAL at 06:01

## 2022-11-18 RX ADMIN — ACETAMINOPHEN 650 MG: 325 TABLET ORAL at 10:44

## 2022-11-18 RX ADMIN — TOPIRAMATE 25 MG: 25 TABLET, FILM COATED ORAL at 06:01

## 2022-11-18 RX ADMIN — IPRATROPIUM BROMIDE 0.5 MG: 0.5 SOLUTION RESPIRATORY (INHALATION) at 06:27

## 2022-11-18 RX ADMIN — HEPARIN SODIUM 5000 UNITS: 5000 INJECTION INTRAVENOUS; SUBCUTANEOUS at 14:15

## 2022-11-18 RX ADMIN — Medication 10 ML: at 09:56

## 2022-11-18 RX ADMIN — SENNOSIDES AND DOCUSATE SODIUM 2 TABLET: 8.6; 5 TABLET ORAL at 09:56

## 2022-11-18 NOTE — PLAN OF CARE
Goal Outcome Evaluation:  Plan of Care Reviewed With: patient, family        Progress: no change

## 2022-11-18 NOTE — CONSULTS
Discharge Planning Assessment   [facility]     Patient Name: Josseline Vargas  MRN: [id number]  Today's Date: 11/18/2022    Admit Date: 11/14/2022     Discharge Plan     Row Name 11/18/22 1208       Plan    Final Discharge Disposition Code 03 - skilled nursing facility (SNF)    Final Note Pt's pre-cert has been approved for [facility]. MD notified.                SARAH Goodwin

## 2022-11-18 NOTE — PLAN OF CARE
Problem: Adult Inpatient Plan of Care  Goal: Plan of Care Review  Outcome: Met  Goal: Patient-Specific Goal (Individualized)  Outcome: Met  Goal: Absence of Hospital-Acquired Illness or Injury  Outcome: Met  Intervention: Identify and Manage Fall Risk  Recent Flowsheet Documentation  Taken 11/18/2022 0950 by Izabel Zaman RNA  Safety Promotion/Fall Prevention: safety round/check completed  Intervention: Prevent and Manage VTE (Venous Thromboembolism) Risk  Recent Flowsheet Documentation  Taken 11/18/2022 0950 by Izabel Zaman RNA  Activity Management: bedrest  Range of Motion: active ROM (range of motion) encouraged  Intervention: Prevent Infection  Recent Flowsheet Documentation  Taken 11/18/2022 0950 by Izabel Zaman RNA  Infection Prevention: single patient room provided  Goal: Optimal Comfort and Wellbeing  Outcome: Met  Intervention: Provide Person-Centered Care  Recent Flowsheet Documentation  Taken 11/18/2022 0950 by Izabel Zaman RNA  Trust Relationship/Rapport: care explained  Goal: Readiness for Transition of Care  Outcome: Met     Problem: Skin Injury Risk Increased  Goal: Skin Health and Integrity  Outcome: Met     Problem: Fall Injury Risk  Goal: Absence of Fall and Fall-Related Injury  Outcome: Met  Intervention: Identify and Manage Contributors  Recent Flowsheet Documentation  Taken 11/18/2022 0950 by Izabel Zaman RNA  Medication Review/Management: medications reviewed  Intervention: Promote Injury-Free Environment  Recent Flowsheet Documentation  Taken 11/18/2022 0950 by Izabel Zaman RNA  Safety Promotion/Fall Prevention: safety round/check completed   Goal Outcome Evaluation:

## 2022-11-18 NOTE — DISCHARGE SUMMARY
[facility]         HOSPITALIST  DISCHARGE SUMMARY    Patient Name: Josseline Vargas  : 1955  MRN: [id number]    Date of Admission: 2022  Date of Discharge:  2022    Primary Care Physician: Jennifer Newman MD    Consults     Date and Time Order Name Status Description    2022  4:43 AM Inpatient Hospitalist Consult            Active and Resolved Hospital Problems:  Active Hospital Problems    Diagnosis POA   • **Acute cystitis [N30.00] Yes   • E coli bacteremia [R78.81, B96.20] Yes   • Sepsis due to gram-negative UTI (HCC) [A41.50, N39.0] Yes   • Type 2 diabetes mellitus without complication, without long-term current use of insulin (HCC) [E11.9] Yes   • Acute kidney injury (HCC) [N17.9] Yes   • Seizure disorder (HCC) [G40.909] Yes      Resolved Hospital Problems   No resolved problems to display.       Hospital Course     Hospital Course:  Josseline Vargas is a 67 y.o. female with medical history significant for type 2 diabetes, seizure disorder, history of CVA; presents with feeling unwell times several days.  Patient  resides at a nursing home and she has not been feeling herself for the past few days.  Patient notes a decrease in her appetite along with a decrease in her level of activity.  Patient notes having a cough.  Patient also reports having UTI type symptoms, with difficulty urinating, seizure, flank pain and frequency.  Patient reports suprapubic pain along with bilateral flank pain.  Patient reports that she was trying to tell the people at her facility that she was not feeling well, but they did not take her seriously.  Patient has also noted having headache.  Patient was admitted to the hospital and started on IV antibiotics.  Patient's blood and urine cultures were growing E. coli.  Repeat cultures have cleared.  Patient will be transitioned over to oral antibiotics to complete a course.  Patient will be discharged back to her nursing home today in  stable condition.       Day of Discharge     Vital Signs:  Temp:  [98.1 °F (36.7 °C)-98.4 °F (36.9 °C)] 98.1 °F (36.7 °C)  Heart Rate:  [] 86  Resp:  [16-20] 20  BP: (128-145)/(57-76) 129/74  Flow (L/min):  [0] 0  Physical Exam:     Constitutional: Awake, alert, no acute distress resting in bed watching tv.              Eyes: Pupils equal, sclerae anicteric, no conjunctival injection              HENT: NCAT, mucous membranes moist              Neck: Supple,              Respiratory: Clear, no w/r/r               Cardiovascular: RRR, no murmurs,                 Gastrointestinal: Positive bowel sounds, soft, nontender, nondistended              Musculoskeletal: No bilateral ankle edema, no clubbing or cyanosis to extremities              Psychiatric: Appropriate affect, cooperative              Neurologic: Oriented x 3, strength symmetric in all extremities, Cranial Nerves grossly intact to confrontation, speech clear              Skin: No rashes       Discharge Details        Discharge Medications      New Medications      Instructions Start Date   cefdinir 300 MG capsule  Commonly known as: OMNICEF   300 mg, Oral, 2 Times Daily         Continue These Medications      Instructions Start Date   acetaminophen 500 MG tablet  Commonly known as: TYLENOL   500 mg, Oral, Every 8 Hours PRN      albuterol sulfate  (90 Base) MCG/ACT inhaler  Commonly known as: PROVENTIL HFA;VENTOLIN HFA;PROAIR HFA   2 puffs, Inhalation, Every 6 Hours PRN      anastrozole 1 MG tablet  Commonly known as: ARIMIDEX   1 mg, Oral, Daily      azithromycin 250 MG tablet  Commonly known as: ZITHROMAX   No dose, route, or frequency recorded.      bisacodyl 10 MG suppository  Commonly known as: DULCOLAX   10 mg, Rectal, Daily PRN      busPIRone 10 MG tablet  Commonly known as: BUSPAR   10 mg, Oral, 3 Times Daily      cetirizine 10 MG tablet  Commonly known as: zyrTEC   10 mg, Oral, Daily      docusate sodium 100 MG capsule  Commonly known  as: COLACE   100 mg, Oral, 2 Times Daily      fluticasone 50 MCG/ACT nasal spray  Commonly known as: FLONASE   2 sprays, Nasal, Daily      furosemide 40 MG tablet  Commonly known as: LASIX   40 mg, Oral, Daily      gabapentin 300 MG capsule  Commonly known as: NEURONTIN   300 mg, Oral, Every 12 Hours      HYDROcodone-acetaminophen 5-325 MG per tablet  Commonly known as: NORCO   1 tablet, Oral, Every 6 Hours PRN      hydrOXYzine 25 MG tablet  Commonly known as: ATARAX   25 mg, Oral, 3 Times Daily      insulin glargine 100 UNIT/ML injection  Commonly known as: LANTUS, SEMGLEE   28 Units, Subcutaneous, Daily      ipratropium-albuterol 0.5-2.5 mg/3 ml nebulizer  Commonly known as: DUO-NEB   3 mL, Nebulization, Every 8 Hours PRN      magnesium hydroxide 400 MG/5ML suspension  Commonly known as: MILK OF MAGNESIA   30 mL, Oral, Nightly PRN      metFORMIN 1000 MG tablet  Commonly known as: GLUCOPHAGE   1,000 mg, Oral, 2 Times Daily With Meals      nystatin 347273 UNIT/GM powder  Commonly known as: MYCOSTATIN   1 application, Topical, 2 Times Daily      ondansetron 4 MG tablet  Commonly known as: ZOFRAN   4 mg, Oral, Every 8 Hours PRN      phenytoin 50 MG chewable tablet  Commonly known as: DILANTIN   100 mg, Oral, Nightly      potassium chloride 10 MEQ CR tablet   10 mEq, Oral, Daily      prazosin 1 MG capsule  Commonly known as: MINIPRESS   1 mg, Oral, Nightly      rosuvastatin 20 MG tablet  Commonly known as: CRESTOR   20 mg, Oral, Nightly      simethicone 125 MG chewable tablet  Commonly known as: MYLICON   125 mg, Oral, Every 6 Hours PRN      Spiriva HandiHaler 18 MCG per inhalation capsule  Generic drug: tiotropium   1 capsule, Inhalation, Daily - RT      topiramate 50 MG tablet  Commonly known as: TOPAMAX   50 mg, Oral, Nightly      topiramate 25 MG tablet  Commonly known as: TOPAMAX   25 mg, Oral, Every Morning      traZODone 50 MG tablet  Commonly known as: DESYREL   50 mg, Oral, Nightly      venlafaxine 37.5 MG  tablet  Commonly known as: EFFEXOR   37.5 mg, Oral, Daily      Vitamin D (Cholecalciferol) 50 MCG (2000 UT) capsule   2,000 Units, Oral, Daily      zinc oxide 13 % cream cream   1 application, Topical, As Needed             Allergies   Allergen Reactions   • Latex Hives   • Penicillins Rash     Pt tolerated graded dose challenge to amp-sulbactam on 9/24/2022       Discharge Disposition:  Long Term Care (DC - External)    Diet:  Hospital:  Diet Order   Procedures   • Diet: Texture: Mechanical Ground (NDD 2); Fluid Consistency: Nectar Thick; Diabetic Diets; Consistent Carbohydrate       Discharge Activity: as tolerated       CODE STATUS:  Code Status and Medical Interventions:   Ordered at: 11/14/22 0848     Level Of Support Discussed With:    Patient     Code Status (Patient has no pulse and is not breathing):    CPR (Attempt to Resuscitate)     Medical Interventions (Patient has pulse or is breathing):    Full Support     Release to patient:    Routine Release         Future Appointments   Date Time Provider Department Center   2/3/2023  2:15 PM [place]il[place]el [place] [facility] [place] [facility]           Pertinent  and/or Most Recent Results     PROCEDURES:   None     LAB RESULTS:      Lab 11/15/22  0535 11/14/22  0601 11/14/22  0247 11/13/22  1350   WBC 5.05  --  8.26 15.07*   HEMOGLOBIN 11.0*  --  12.3 12.0   HEMATOCRIT 35.2  --  39.4 38.2   PLATELETS 93*  --  112* 153   NEUTROS ABS 4.29  --  7.53* 14.11*   IMMATURE GRANS (ABS) 0.03  --  0.04  --    LYMPHS ABS 0.30*  --  0.33*  --    MONOS ABS 0.38  --  0.32  --    EOS ABS 0.03  --  0.01  --    MCV 85.2  --  86.2 85.1   LACTATE  --  4.3* 4.3*  --          Lab 11/17/22  0600 11/15/22  0535 11/14/22  0403 11/13/22  1350   SODIUM 138 138 139 140   POTASSIUM 3.6 3.9 4.6 4.0   CHLORIDE 109* 108* 104 104   CO2 19.5* 19.1* 20.5* 17.4*   ANION GAP 9.5 10.9 14.5 18.6*   BUN 25* 44* 51* 39*   CREATININE 0.74 1.20* 2.15* 2.10*   EGFR 87.7 49.1* 24.4* 25.1*   GLUCOSE 140* 150*  156* 160*   CALCIUM 9.4 8.8 9.7 9.1   MAGNESIUM 1.5*  --  1.4*  --          Lab 11/15/22  0535 11/14/22  0403   TOTAL PROTEIN 7.3 7.9   ALBUMIN 2.60* 3.20*   GLOBULIN 4.7 4.7   ALT (SGPT) <5 5   AST (SGOT) 27 36*   BILIRUBIN 0.7 0.9   ALK PHOS 130* 117         Lab 11/14/22  0403   PROBNP 884.8   TROPONIN T 0.023                 Brief Urine Lab Results  (Last result in the past 365 days)      Color   Clarity   Blood   Leuk Est   Nitrite   Protein   CREAT   Urine HCG        11/14/22 0348 Dark Yellow   Turbid   Small (1+)   Large (3+)   Negative   100 mg/dL (2+)               Microbiology Results (last 10 days)     Procedure Component Value - Date/Time    CANDIDA AURIS SCREEN - Swab, Axilla Right, Axilla Left and Groin [[id number]]  (Normal) Collected: 11/15/22 1600    Lab Status: Preliminary result Specimen: Swab from Axilla Right, Axilla Left and Groin Updated: 11/17/22 1617     Josee Auris Screen Culture No Candida auris isolated at 2 days    Blood Culture - Blood, Arm, Left [[id number]]  (Normal) Collected: 11/15/22 0901    Lab Status: Preliminary result Specimen: Blood from Arm, Left Updated: 11/18/22 0915     Blood Culture No growth at 3 days    Blood Culture - Blood, Arm, Left [[id number]]  (Normal) Collected: 11/15/22 0901    Lab Status: Preliminary result Specimen: Blood from Arm, Left Updated: 11/18/22 0915     Blood Culture No growth at 3 days    Urine Culture - Urine, Urine, Clean Catch [[id number]]  (Abnormal)  (Susceptibility) Collected: 11/14/22 0348    Lab Status: Final result Specimen: Urine, Clean Catch Updated: 11/16/22 0110     Urine Culture >100,000 CFU/mL Escherichia coli    Narrative:      Colonization of the urinary tract without infection is common. Treatment is discouraged unless the patient is symptomatic, pregnant, or undergoing an invasive urologic procedure.    Susceptibility      Escherichia coli      SHERITA      Ampicillin Resistant     Ampicillin + Sulbactam Susceptible      Cefazolin  Susceptible      Cefepime Susceptible      Ceftazidime Susceptible      Ceftriaxone Susceptible      Gentamicin Susceptible      Levofloxacin Susceptible      Nitrofurantoin Susceptible      Piperacillin + Tazobactam Susceptible      Trimethoprim + Sulfamethoxazole Resistant                          Influenza Antigen, Rapid - Swab, Nasopharynx [[id number]]  (Normal) Collected: 11/14/22 0251    Lab Status: Final result Specimen: Swab from Nasopharynx Updated: 11/14/22 0320     Influenza A Ag, EIA Negative     Influenza B Ag, EIA Negative    COVID-19,APTIMA PANTHER([facility]),[facility], NP/OP SWAB IN UTM/VTM/[facility] [employer] MEDIA,24 HR TAT - Swab, Nasal Cavity [[id number]]  (Normal) Collected: 11/14/22 0251    Lab Status: Final result Specimen: Swab from Nasal Cavity Updated: 11/14/22 1401     COVID19 Not Detected    Narrative:      Fact sheet for providers: https://www.fda.gov/media/060722/download     Fact sheet for patients: https://www.fda.gov/media/578708/download    Test performed by RT PCR.    Blood Culture - Blood, Arm, Right [[id number]]  (Abnormal)  (Susceptibility) Collected: 11/14/22 0247    Lab Status: Final result Specimen: Blood from Arm, Right Updated: 11/16/22 0605     Blood Culture Escherichia coli     Isolated from Aerobic and Anaerobic Bottles     Gram Stain Aerobic Bottle Gram negative bacilli      Anaerobic Bottle Gram negative bacilli    Susceptibility      Escherichia coli      SHERITA      Ampicillin Resistant     Ampicillin + Sulbactam Susceptible      Cefepime Susceptible      Ceftazidime Susceptible      Ceftriaxone Susceptible      Gentamicin Susceptible      Levofloxacin Susceptible      Piperacillin + Tazobactam Susceptible      Trimethoprim + Sulfamethoxazole Resistant                      Susceptibility Comments     Escherichia coli    Cefazolin sensitivity will not be reported for Enterobacteriaceae in non-urine isolates. If cefazolin is preferred, please call the microbiology lab to request  an E-test.  With the exception of urinary-sourced infections, aminoglycosides should not be used as monotherapy.             Blood Culture - Blood, Arm, Right [[id number]]  (Abnormal) Collected: 11/14/22 0247    Lab Status: Final result Specimen: Blood from Arm, Right Updated: 11/16/22 0605     Blood Culture Escherichia coli     Isolated from Aerobic and Anaerobic Bottles     Gram Stain Aerobic Bottle Gram negative bacilli      Anaerobic Bottle Gram negative bacilli    Narrative:      Refer to previous blood culture collected on   11/14/2022 0247 for MICs.    Blood Culture ID, PCR - Blood, Arm, Right [[id number]]  (Abnormal) Collected: 11/14/22 0247    Lab Status: Final result Specimen: Blood from Arm, Right Updated: 11/14/22 1717     BCID, PCR Escherichia coli. Identification by BCID2 PCR.    Narrative:      No resistance genes detected.          XR Chest 1 View    Result Date: 11/14/2022  Impression:  There may be new mild pulmonary edema with vascular congestion.  Pneumonia cannot be excluded but is thought to be less likely.     Please note that portions of this note were completed with a voice recognition program.  VIRI KIM JR, MD       Electronically Signed and Approved By: VIRI KIM JR, MD on 11/14/2022 at 3:25              DEXA Bone Density Axial    Result Date: 10/20/2022  Impression: Normal bone density lumbar spine.  Osteopenia in the right hip.  Left hip not evaluated due to previous severe deformity.     NOLA ELY MD       Electronically Signed and Approved By: NOLA ELY MD on 10/20/2022 at 12:32             Mammo Screening Modified With Tomosynthesis Right With CAD    Result Date: 10/31/2022  Impression: Benign right mammogram. Suggest routine mammographic screening.  Post left mastectomy.  RECOMMENDATION(S):  ROUTINE MAMMOGRAM AND CLINICAL EVALUATION IN 12 MONTHS.   BIRADS:  DIAGNOSTIC CATEGORY 1--NEGATIVE.   BREAST COMPOSITION: Scattered areas fibroglandular density.  PLEASE  NOTE:  A NORMAL MAMMOGRAM DOES NOT EXCLUDE THE POSSIBILITY OF BREAST CANCER. ANY CLINICALLY SUSPICIOUS PALPABLE LUMP SHOULD BE BIOPSIED.      SRINIVASAN PINTO MD       Electronically Signed and Approved By: SRINIVASAN PINTO MD on 10/31/2022 at 16:32               Results for orders placed during the hospital encounter of 10/19/21    Duplex Venous Lower Extremity - Right CV-READ    Interpretation Summary  · Normal right lower extremity venous duplex scan.  · Technically limited study. Imaging quality is suboptimal.      Results for orders placed during the hospital encounter of 10/19/21    Duplex Venous Lower Extremity - Right CV-READ    Interpretation Summary  · Normal right lower extremity venous duplex scan.  · Technically limited study. Imaging quality is suboptimal.      Results for orders placed during the hospital encounter of 05/31/17    Adult Transthoracic Echo Complete With Contrast    Interpretation Summary  · Left ventricular systolic function is normal. Estimated EF = 60%.  · Right ventricular cavity is mildly dilated.  · Left ventricular diastolic dysfunction (grade I) consistent with impaired relaxation.      Labs Pending at Discharge:  Pending Labs     Order Current Status    Blood Culture - Blood, Arm, Left Preliminary result    Blood Culture - Blood, Arm, Left Preliminary result    CANDIDA AURIS SCREEN - Swab, Axilla Right, Axilla Left and Groin Preliminary result            Time spent on Discharge including face to face service:  More than 35 minutes    Electronically signed by Tu Stahl DO, 11/18/22, 12:59 PM EST.

## 2022-11-20 LAB
BACTERIA ISLT: NORMAL
BACTERIA SPEC AEROBE CULT: NORMAL
BACTERIA SPEC AEROBE CULT: NORMAL

## 2023-01-06 ENCOUNTER — HOSPITAL ENCOUNTER (EMERGENCY)
Facility: HOSPITAL | Age: 68
Discharge: HOME | End: 2023-01-06
Attending: EMERGENCY MEDICINE | Admitting: EMERGENCY MEDICINE
Payer: MEDICARE

## 2023-01-06 ENCOUNTER — APPOINTMENT (OUTPATIENT)
Dept: GENERAL RADIOLOGY | Facility: HOSPITAL | Age: 68
End: 2023-01-06
Payer: MEDICARE

## 2023-01-06 ENCOUNTER — APPOINTMENT (OUTPATIENT)
Dept: CARDIOLOGY | Facility: HOSPITAL | Age: 68
End: 2023-01-06
Payer: MEDICARE

## 2023-01-06 VITALS
WEIGHT: 232.81 LBS | SYSTOLIC BLOOD PRESSURE: 122 MMHG | HEIGHT: 60 IN | RESPIRATION RATE: 16 BRPM | HEART RATE: 110 BPM | OXYGEN SATURATION: 95 % | DIASTOLIC BLOOD PRESSURE: 59 MMHG | BODY MASS INDEX: 45.71 KG/M2 | TEMPERATURE: 99.2 F

## 2023-01-06 DIAGNOSIS — N39.0 URINARY TRACT INFECTION WITHOUT HEMATURIA, SITE UNSPECIFIED: Primary | ICD-10-CM

## 2023-01-06 LAB
ALBUMIN SERPL-MCNC: 3.4 G/DL (ref 3.5–5.2)
ALBUMIN/GLOB SERPL: 0.7 G/DL
ALP SERPL-CCNC: 132 U/L (ref 39–117)
ALT SERPL W P-5'-P-CCNC: 10 U/L (ref 1–33)
AMORPH URATE CRY URNS QL MICRO: ABNORMAL /HPF
ANION GAP SERPL CALCULATED.3IONS-SCNC: 10.3 MMOL/L (ref 5–15)
ANISOCYTOSIS BLD QL: NORMAL
AST SERPL-CCNC: 51 U/L (ref 1–32)
BACTERIA UR QL AUTO: ABNORMAL /HPF
BASOPHILS # BLD AUTO: 0.03 10*3/MM3 (ref 0–0.2)
BASOPHILS NFR BLD AUTO: 0.6 % (ref 0–1.5)
BH CV LOWER VASCULAR LEFT COMMON FEMORAL AUGMENT: NORMAL
BH CV LOWER VASCULAR LEFT COMMON FEMORAL COMPETENT: NORMAL
BH CV LOWER VASCULAR LEFT COMMON FEMORAL COMPRESS: NORMAL
BH CV LOWER VASCULAR LEFT COMMON FEMORAL PHASIC: NORMAL
BH CV LOWER VASCULAR LEFT COMMON FEMORAL SPONT: NORMAL
BH CV LOWER VASCULAR LEFT DISTAL FEMORAL COMPRESS: NORMAL
BH CV LOWER VASCULAR LEFT GREATER SAPH AK COMPRESS: NORMAL
BH CV LOWER VASCULAR LEFT GREATER SAPH BK COMPRESS: NORMAL
BH CV LOWER VASCULAR LEFT MID FEMORAL AUGMENT: NORMAL
BH CV LOWER VASCULAR LEFT MID FEMORAL COMPETENT: NORMAL
BH CV LOWER VASCULAR LEFT MID FEMORAL COMPRESS: NORMAL
BH CV LOWER VASCULAR LEFT MID FEMORAL PHASIC: NORMAL
BH CV LOWER VASCULAR LEFT MID FEMORAL SPONT: NORMAL
BH CV LOWER VASCULAR LEFT POPLITEAL AUGMENT: NORMAL
BH CV LOWER VASCULAR LEFT POPLITEAL COMPETENT: NORMAL
BH CV LOWER VASCULAR LEFT POPLITEAL COMPRESS: NORMAL
BH CV LOWER VASCULAR LEFT POPLITEAL PHASIC: NORMAL
BH CV LOWER VASCULAR LEFT POPLITEAL SPONT: NORMAL
BH CV LOWER VASCULAR LEFT PROXIMAL FEMORAL COMPRESS: NORMAL
BH CV LOWER VASCULAR LEFT SAPHENOFEMORAL JUNCTION COMPRESS: NORMAL
BH CV LOWER VASCULAR RIGHT COMMON FEMORAL AUGMENT: NORMAL
BH CV LOWER VASCULAR RIGHT COMMON FEMORAL COMPETENT: NORMAL
BH CV LOWER VASCULAR RIGHT COMMON FEMORAL COMPRESS: NORMAL
BH CV LOWER VASCULAR RIGHT COMMON FEMORAL PHASIC: NORMAL
BH CV LOWER VASCULAR RIGHT COMMON FEMORAL SPONT: NORMAL
BH CV LOWER VASCULAR RIGHT DISTAL FEMORAL COMPRESS: NORMAL
BH CV LOWER VASCULAR RIGHT GREATER SAPH AK COMPRESS: NORMAL
BH CV LOWER VASCULAR RIGHT GREATER SAPH BK COMPRESS: NORMAL
BH CV LOWER VASCULAR RIGHT MID FEMORAL AUGMENT: NORMAL
BH CV LOWER VASCULAR RIGHT MID FEMORAL COMPETENT: NORMAL
BH CV LOWER VASCULAR RIGHT MID FEMORAL COMPRESS: NORMAL
BH CV LOWER VASCULAR RIGHT MID FEMORAL PHASIC: NORMAL
BH CV LOWER VASCULAR RIGHT MID FEMORAL SPONT: NORMAL
BH CV LOWER VASCULAR RIGHT POPLITEAL AUGMENT: NORMAL
BH CV LOWER VASCULAR RIGHT POPLITEAL COMPETENT: NORMAL
BH CV LOWER VASCULAR RIGHT POPLITEAL COMPRESS: NORMAL
BH CV LOWER VASCULAR RIGHT POPLITEAL PHASIC: NORMAL
BH CV LOWER VASCULAR RIGHT POPLITEAL SPONT: NORMAL
BH CV LOWER VASCULAR RIGHT POSTERIOR TIBIAL COMPRESS: NORMAL
BH CV LOWER VASCULAR RIGHT PROXIMAL FEMORAL COMPRESS: NORMAL
BH CV LOWER VASCULAR RIGHT SAPHENOFEMORAL JUNCTION COMPRESS: NORMAL
BH CV VAS PRELIMINARY FINDINGS SCRIPTING: 1
BILIRUB SERPL-MCNC: 0.7 MG/DL (ref 0–1.2)
BILIRUB UR QL STRIP: NEGATIVE
BUN SERPL-MCNC: 19 MG/DL (ref 8–23)
BUN/CREAT SERPL: 21.3 (ref 7–25)
CALCIUM SPEC-SCNC: 9.3 MG/DL (ref 8.6–10.5)
CHLORIDE SERPL-SCNC: 101 MMOL/L (ref 98–107)
CLARITY UR: ABNORMAL
CO2 SERPL-SCNC: 25.7 MMOL/L (ref 22–29)
COLOR UR: YELLOW
CREAT SERPL-MCNC: 0.89 MG/DL (ref 0.57–1)
D-LACTATE SERPL-SCNC: 1.5 MMOL/L (ref 0.5–2)
DEPRECATED RDW RBC AUTO: 49.6 FL (ref 37–54)
EGFRCR SERPLBLD CKD-EPI 2021: 70.3 ML/MIN/1.73
EOSINOPHIL # BLD AUTO: 0.13 10*3/MM3 (ref 0–0.4)
EOSINOPHIL NFR BLD AUTO: 2.5 % (ref 0.3–6.2)
ERYTHROCYTE [DISTWIDTH] IN BLOOD BY AUTOMATED COUNT: 15.9 % (ref 12.3–15.4)
FLUAV AG NPH QL: NEGATIVE
FLUBV AG NPH QL IA: NEGATIVE
GLOBULIN UR ELPH-MCNC: 5 GM/DL
GLUCOSE SERPL-MCNC: 145 MG/DL (ref 65–99)
GLUCOSE UR STRIP-MCNC: NEGATIVE MG/DL
HCT VFR BLD AUTO: 37.1 % (ref 34–46.6)
HGB BLD-MCNC: 11.8 G/DL (ref 12–15.9)
HGB UR QL STRIP.AUTO: NEGATIVE
HYALINE CASTS UR QL AUTO: ABNORMAL /LPF
IMM GRANULOCYTES # BLD AUTO: 0.02 10*3/MM3 (ref 0–0.05)
IMM GRANULOCYTES NFR BLD AUTO: 0.4 % (ref 0–0.5)
KETONES UR QL STRIP: NEGATIVE
LARGE PLATELETS: NORMAL
LEUKOCYTE ESTERASE UR QL STRIP.AUTO: ABNORMAL
LYMPHOCYTES # BLD AUTO: 0.7 10*3/MM3 (ref 0.7–3.1)
LYMPHOCYTES NFR BLD AUTO: 13.6 % (ref 19.6–45.3)
MAXIMAL PREDICTED HEART RATE: 151 BPM
MCH RBC QN AUTO: 27.1 PG (ref 26.6–33)
MCHC RBC AUTO-ENTMCNC: 31.8 G/DL (ref 31.5–35.7)
MCV RBC AUTO: 85.3 FL (ref 79–97)
MONOCYTES # BLD AUTO: 0.5 10*3/MM3 (ref 0.1–0.9)
MONOCYTES NFR BLD AUTO: 9.7 % (ref 5–12)
NEUTROPHILS NFR BLD AUTO: 3.76 10*3/MM3 (ref 1.7–7)
NEUTROPHILS NFR BLD AUTO: 73.2 % (ref 42.7–76)
NITRITE UR QL STRIP: NEGATIVE
NRBC BLD AUTO-RTO: 0 /100 WBC (ref 0–0.2)
PH UR STRIP.AUTO: 8.5 [PH] (ref 5–8)
PLATELET # BLD AUTO: 125 10*3/MM3 (ref 140–450)
PMV BLD AUTO: 12.4 FL (ref 6–12)
POTASSIUM SERPL-SCNC: 3.7 MMOL/L (ref 3.5–5.2)
PROT SERPL-MCNC: 8.4 G/DL (ref 6–8.5)
PROT UR QL STRIP: ABNORMAL
RBC # BLD AUTO: 4.35 10*6/MM3 (ref 3.77–5.28)
RBC # UR STRIP: ABNORMAL /HPF
REF LAB TEST METHOD: ABNORMAL
SARS-COV-2 RNA PNL SPEC NAA+PROBE: NOT DETECTED
SMALL PLATELETS BLD QL SMEAR: ADEQUATE
SODIUM SERPL-SCNC: 137 MMOL/L (ref 136–145)
SP GR UR STRIP: 1.01 (ref 1–1.03)
SQUAMOUS #/AREA URNS HPF: ABNORMAL /HPF
STRESS TARGET HR: 128 BPM
UROBILINOGEN UR QL STRIP: ABNORMAL
WBC # UR STRIP: ABNORMAL /HPF
WBC MORPH BLD: NORMAL
WBC NRBC COR # BLD: 5.14 10*3/MM3 (ref 3.4–10.8)

## 2023-01-06 PROCEDURE — 93970 EXTREMITY STUDY: CPT

## 2023-01-06 PROCEDURE — 85025 COMPLETE CBC W/AUTO DIFF WBC: CPT | Performed by: EMERGENCY MEDICINE

## 2023-01-06 PROCEDURE — 87186 SC STD MICRODIL/AGAR DIL: CPT | Performed by: EMERGENCY MEDICINE

## 2023-01-06 PROCEDURE — 87040 BLOOD CULTURE FOR BACTERIA: CPT | Performed by: EMERGENCY MEDICINE

## 2023-01-06 PROCEDURE — 81001 URINALYSIS AUTO W/SCOPE: CPT | Performed by: EMERGENCY MEDICINE

## 2023-01-06 PROCEDURE — 85007 BL SMEAR W/DIFF WBC COUNT: CPT | Performed by: EMERGENCY MEDICINE

## 2023-01-06 PROCEDURE — C9803 HOPD COVID-19 SPEC COLLECT: HCPCS | Performed by: EMERGENCY MEDICINE

## 2023-01-06 PROCEDURE — 93970 EXTREMITY STUDY: CPT | Performed by: SURGERY

## 2023-01-06 PROCEDURE — U0004 COV-19 TEST NON-CDC HGH THRU: HCPCS | Performed by: EMERGENCY MEDICINE

## 2023-01-06 PROCEDURE — 71045 X-RAY EXAM CHEST 1 VIEW: CPT

## 2023-01-06 PROCEDURE — 87088 URINE BACTERIA CULTURE: CPT | Performed by: EMERGENCY MEDICINE

## 2023-01-06 PROCEDURE — 87086 URINE CULTURE/COLONY COUNT: CPT | Performed by: EMERGENCY MEDICINE

## 2023-01-06 PROCEDURE — 99284 EMERGENCY DEPT VISIT MOD MDM: CPT

## 2023-01-06 PROCEDURE — 83605 ASSAY OF LACTIC ACID: CPT | Performed by: EMERGENCY MEDICINE

## 2023-01-06 PROCEDURE — 80053 COMPREHEN METABOLIC PANEL: CPT | Performed by: EMERGENCY MEDICINE

## 2023-01-06 PROCEDURE — 87804 INFLUENZA ASSAY W/OPTIC: CPT | Performed by: EMERGENCY MEDICINE

## 2023-01-06 RX ORDER — LEVOFLOXACIN 500 MG/1
500 TABLET, FILM COATED ORAL DAILY
Qty: 5 TABLET | Refills: 0 | Status: SHIPPED | OUTPATIENT
Start: 2023-01-06

## 2023-01-06 NOTE — ED PROVIDER NOTES
Time: 8:06 AM EST  Date of encounter:  1/6/2023  Independent Historian/Clinical History and Information was obtained by:   Patient  Chief Complaint: dyspnea    History is limited by: N/A    History of Present Illness:  Patient is a 67 y.o. year old female who presents to the emergency department via EMS for evaluation of dyspnea for 2 days. Pt also c/o cough and myalgias.      History provided by:  Patient   used: No        Patient Care Team  Primary Care Provider: Jennifer Newman MD    Past Medical History:     Allergies   Allergen Reactions   • Latex Hives   • Penicillins Rash     Pt tolerated graded dose challenge to amp-sulbactam on 9/24/2022     Past Medical History:   Diagnosis Date   • Acute kidney injury (HCC) 11/14/2022   • Cancer (HCC)     Left breast   • Depressed    • Diabetes (HCC)     TYPE 2   • Flea bite of multiple sites of lower extremity     PT INSTRUCTED TO INFORM DR TRINH OF BITES PRIOR TO SURGERY   • H/O meningitis 1987   • History of Bell's palsy    • Migraine headache    • Polio    • Seizures (HCC)    • Stroke (HCC) 1987     Past Surgical History:   Procedure Laterality Date   • APPENDECTOMY  1987   • BREAST SURGERY Left 10/2015    Biopsy, benign   • CATARACT EXTRACTION     • COLONOSCOPY N/A 9/28/2022    Procedure: COLONOSCOPY WITH POLYPECTOMY;  Surgeon: Alfonso Fitzgerald MD;  Location: [facility] ENDOSCOPY;  Service: Gastroenterology;  Laterality: N/A;  COLON POLYP   • EAR TUBES     • ENDOSCOPY N/A 9/28/2022    Procedure: ESOPHAGOGASTRODUODENOSCOPY WITH BIOPSIES;  Surgeon: Alfonso Fitzgerald MD;  Location: [facility] ENDOSCOPY;  Service: Gastroenterology;  Laterality: N/A;  GASTRITIS, VARIX   • KNEE MENISCECTOMY     • MASTECTOMY     • MASTECTOMY WITH SENTINEL NODE BIOPSY AND AXILLARY NODE DISSECTION Left 1/11/2018    Procedure: Left total mastectomy, left sentinel lymph node biopsy to include retrieval of prior clip, axillary lymph node dissection, no  reconstruction. ;  Surgeon: Yuli Garcias MD;  Location: [facility] OR [facility];  Service:    • OVARIAN CYST REMOVAL     • DC INSJ TUNNELED CVC W/O SUBQ PORT/ AGE 3 YR/> Right 5/18/2017    Procedure: INSERTION RIGHT VENOUS ACCESS DEVICE;  Surgeon: Yuli Garcias MD;  Location: [facility] OR [facility];  Service: General   • TONSILLECTOMY     • TUBAL ABDOMINAL LIGATION     • VENOUS ACCESS DEVICE (PORT) REMOVAL Right 8/8/2019    Procedure: RIGHT port removal,;  Surgeon: Yuli Garcias MD;  Location: [facility] OR [facility];  Service: General     Family History   Problem Relation Age of Onset   • Asthma Mother    • Diabetes Mother    • Hearing loss Mother    • Heart attack Mother    • Heart failure Mother    • Breast cancer Mother 55   • Hypertension Mother    • Alcohol abuse Father    • Diabetes Sister    • Diabetes Brother    • Brain cancer Brother 56   • Cancer Brother 56        bladder   • Malig Hyperthermia Neg Hx        Home Medications:  Prior to Admission medications    Medication Sig Start Date End Date Taking? Authorizing Provider   acetaminophen (TYLENOL) 500 MG tablet Take 500 mg by mouth Every 8 (Eight) Hours As Needed for Mild Pain.    Roland Rankin MD   albuterol sulfate  (90 Base) MCG/ACT inhaler Inhale 2 puffs Every 6 (Six) Hours As Needed for Shortness of Air. 10/26/21   Roland Rankin MD   anastrozole (ARIMIDEX) 1 MG tablet Take 1 mg by mouth Daily.    Roland Rankin MD   azithromycin (ZITHROMAX) 250 MG tablet  10/21/22   Roland Rankin MD   bisacodyl (DULCOLAX) 10 MG suppository Insert 10 mg into the rectum Daily As Needed for Constipation.    Roland Rankin MD   busPIRone (BUSPAR) 10 MG tablet Take 10 mg by mouth 3 (Three) Times a Day. 4/16/22   Roland Rankin MD   cetirizine (zyrTEC) 10 MG tablet Take 1 tablet by mouth Daily.    Roland Rankin MD   docusate sodium (COLACE) 100 MG capsule Take 100 mg by mouth 2 (Two) Times a Day.    Roland Rankin  MD   fluticasone (FLONASE) 50 MCG/ACT nasal spray 2 sprays into the nostril(s) as directed by provider Daily. 1/14/22   Roland Rankin MD   furosemide (LASIX) 40 MG tablet Take 40 mg by mouth Daily.    Roland Rankin MD   gabapentin (NEURONTIN) 300 MG capsule Take 1 capsule by mouth Every 12 (Twelve) Hours.    Roland Rankin MD   HYDROcodone-acetaminophen (NORCO) 5-325 MG per tablet Take 1 tablet by mouth Every 6 (Six) Hours As Needed for Moderate Pain. 9/29/22   Tim Patel MD   hydrOXYzine (ATARAX) 25 MG tablet Take 25 mg by mouth 3 (Three) Times a Day. 4/20/22   Roland Rankin MD   insulin glargine (LANTUS, SEMGLEE) 100 UNIT/ML injection Inject 28 Units under the skin into the appropriate area as directed Daily.    Roland Rankin MD   ipratropium-albuterol (DUO-NEB) 0.5-2.5 mg/3 ml nebulizer Take 3 mL by nebulization Every 8 (Eight) Hours As Needed for Wheezing.    Roland Rankin MD   magnesium hydroxide (MILK OF MAGNESIA) 400 MG/5ML suspension Take 30 mL by mouth At Night As Needed for Constipation.    Roland Rankin MD   metFORMIN (GLUCOPHAGE) 1000 MG tablet Take 1,000 mg by mouth 2 (Two) Times a Day With Meals.    Roland Rankin MD   nystatin (MYCOSTATIN) 874674 UNIT/GM powder Apply 1 application topically to the appropriate area as directed 2 (Two) Times a Day.    Roland Rankin MD   ondansetron (ZOFRAN) 4 MG tablet Take 4 mg by mouth Every 8 (Eight) Hours As Needed for Nausea. 6/19/22   Roland Rankin MD   phenytoin (DILANTIN) 50 MG chewable tablet Chew 100 mg Every Night.    Roland Rankin MD   potassium chloride 10 MEQ CR tablet Take 10 mEq by mouth Daily.    Roland Rankin MD   prazosin (MINIPRESS) 1 MG capsule Take 1 mg by mouth Every Night.    Roland Rankin MD   rosuvastatin (CRESTOR) 20 MG tablet Take 1 tablet by mouth Every Night. 11/8/21   Roland Rankin MD   simethicone (MYLICON) 125 MG chewable  tablet Chew 125 mg Every 6 (Six) Hours As Needed for Flatulence.    Roland Rankin MD   Spiriva HandiHaler 18 MCG per inhalation capsule Place 1 capsule into inhaler and inhale Daily. 22   Roland Rankin MD   topiramate (TOPAMAX) 25 MG tablet Take 25 mg by mouth Every Morning.    Roland Rankin MD   topiramate (TOPAMAX) 50 MG tablet Take 50 mg by mouth Every Night.    Roland Rankin MD   traZODone (DESYREL) 50 MG tablet Take 50 mg by mouth Every Night. 22   Roland Rankin MD   venlafaxine (EFFEXOR) 37.5 MG tablet Take 37.5 mg by mouth Daily.    Roland Rankin MD   Vitamin D, Cholecalciferol, 50 MCG (2000 UT) capsule Take 2,000 Units by mouth Daily.    Roland Rankin MD   zinc oxide 13 % cream cream Apply 1 application topically to the appropriate area as directed As Needed (redness).    Roland Rankin MD        Social History:   Social History     Tobacco Use   • Smoking status: Former     Packs/day: 2.50     Types: Cigarettes     Start date:      Quit date:      Years since quittin.0   • Smokeless tobacco: Never   Vaping Use   • Vaping Use: Never used   Substance Use Topics   • Alcohol use: Yes     Comment: rarely   • Drug use: No         Review of Systems:  Review of Systems   Constitutional: Negative for chills and fever.   HENT: Negative for congestion, rhinorrhea and sore throat.    Eyes: Negative for pain and visual disturbance.   Respiratory: Positive for cough. Negative for apnea and chest tightness.         Dyspnea   Cardiovascular: Negative for chest pain and palpitations.   Gastrointestinal: Negative for abdominal pain, diarrhea, nausea and vomiting.   Genitourinary: Negative for difficulty urinating and dysuria.   Musculoskeletal: Positive for myalgias. Negative for joint swelling.   Skin: Negative for color change.   Neurological: Negative for seizures and headaches.   Psychiatric/Behavioral: Negative.    All other systems  "reviewed and are negative.       Physical Exam:  /59   Pulse 110   Temp 99.2 °F (37.3 °C) (Oral)   Resp 16   Ht 152.4 cm (60\")   Wt 106 kg (232 lb 12.9 oz)   SpO2 95%   BMI 45.47 kg/m²     Physical Exam  Vitals and nursing note reviewed.   Constitutional:       General: She is not in acute distress.     Appearance: Normal appearance. She is not toxic-appearing.   HENT:      Head: Normocephalic and atraumatic.      Jaw: There is normal jaw occlusion.   Eyes:      General: Lids are normal.      Extraocular Movements: Extraocular movements intact.      Conjunctiva/sclera: Conjunctivae normal.      Pupils: Pupils are equal, round, and reactive to light.   Cardiovascular:      Rate and Rhythm: Regular rhythm. Tachycardia present.      Pulses: Normal pulses.      Heart sounds: Normal heart sounds.   Pulmonary:      Effort: Pulmonary effort is normal. No respiratory distress.      Breath sounds: Normal breath sounds. No wheezing or rhonchi.   Abdominal:      General: Abdomen is flat.      Palpations: Abdomen is soft.      Tenderness: There is abdominal tenderness in the suprapubic area. There is no guarding or rebound.   Musculoskeletal:         General: Normal range of motion.      Cervical back: Normal range of motion and neck supple.      Right lower leg: 3+ Edema present.      Left lower leg: 3+ Edema present.      Comments: BLE: increased erythema and warmth   Skin:     General: Skin is warm and dry.   Neurological:      Mental Status: She is alert and oriented to person, place, and time. Mental status is at baseline.   Psychiatric:         Mood and Affect: Mood normal.                  Procedures:  Procedures      Medical Decision Making:      Comorbidities that affect care:    Diabetes    External Notes reviewed:    EMS Run sheet      The following orders were placed and all results were independently analyzed by me:  Orders Placed This Encounter   Procedures   • Influenza Antigen, Rapid - Swab, " Nasopharynx   • COVID-19,APTIMA PANTHER([place]),[facility], NP/OP SWAB IN UTM/VTM/SALINE TRANSPORT MEDIA,24 HR TAT - Swab, Nasal Cavity   • Blood Culture - Blood,   • Urine Culture - Urine,   • XR Chest 1 View   • Comprehensive Metabolic Panel   • Urinalysis With Culture If Indicated -   • Lactic Acid, Plasma   • CBC Auto Differential   • Scan Slide   • Urinalysis, Microscopic Only - Urine, Clean Catch   • Straight cath   • CBC & Differential       Medications Given in the Emergency Department:  Medications - No data to display     ED Course:         Labs:    Lab Results (last 24 hours)     Procedure Component Value Units Date/Time    Influenza Antigen, Rapid - Swab, Nasopharynx [[id number]]  (Normal) Collected: 01/06/23 0958    Specimen: Swab from Nasopharynx Updated: 01/06/23 1055     Influenza A Ag, EIA Negative     Influenza B Ag, EIA Negative    COVID-19,APTIMA PANTHER([place]),[facility], NP/OP SWAB IN UTM/VTM/SALINE TRANSPORT MEDIA,24 HR TAT - Swab, Nasal Cavity [[id number]] Collected: 01/06/23 0958    Specimen: Swab from Nasal Cavity Updated: 01/06/23 1000    CBC & Differential [[id number]]  (Abnormal) Collected: 01/06/23 1005    Specimen: Blood Updated: 01/06/23 1050    Narrative:      The following orders were created for panel order CBC & Differential.  Procedure                               Abnormality         Status                     ---------                               -----------         ------                     CBC Auto Differential[[id number]]        Abnormal            Final result               Scan Slide[[id number]]                                       Final result                 Please view results for these tests on the individual orders.    Comprehensive Metabolic Panel [[id number]]  (Abnormal) Collected: 01/06/23 1005    Specimen: Blood Updated: 01/06/23 1034     Glucose 145 mg/dL      BUN 19 mg/dL      Creatinine 0.89 mg/dL      Sodium 137 mmol/L      Potassium 3.7 mmol/L      Comment:  Slight hemolysis detected by analyzer. Results may be affected.        Chloride 101 mmol/L      CO2 25.7 mmol/L      Calcium 9.3 mg/dL      Total Protein 8.4 g/dL      Albumin 3.4 g/dL      ALT (SGPT) 10 U/L      AST (SGOT) 51 U/L      Alkaline Phosphatase 132 U/L      Total Bilirubin 0.7 mg/dL      Globulin 5.0 gm/dL      A/G Ratio 0.7 g/dL      BUN/Creatinine Ratio 21.3     Anion Gap 10.3 mmol/L      eGFR 70.3 mL/min/1.73      Comment: National Kidney Foundation and American Society of Nephrology (ASN) Task Force recommended calculation based on the Chronic Kidney Disease Epidemiology Collaboration (CKD-EPI) equation refit without adjustment for race.       Narrative:      GFR Normal >60  Chronic Kidney Disease <60  Kidney Failure <15      Lactic Acid, Plasma [[id number]]  (Normal) Collected: 01/06/23 1005    Specimen: Blood Updated: 01/06/23 1032     Lactate 1.5 mmol/L     Blood Culture - Blood, Arm, Left [[id number]] Collected: 01/06/23 1005    Specimen: Blood from Arm, Left Updated: 01/06/23 1011    CBC Auto Differential [[id number]]  (Abnormal) Collected: 01/06/23 1005    Specimen: Blood Updated: 01/06/23 1050     WBC 5.14 10*3/mm3      RBC 4.35 10*6/mm3      Hemoglobin 11.8 g/dL      Hematocrit 37.1 %      MCV 85.3 fL      MCH 27.1 pg      MCHC 31.8 g/dL      RDW 15.9 %      RDW-SD 49.6 fl      MPV 12.4 fL      Platelets 125 10*3/mm3      Neutrophil % 73.2 %      Lymphocyte % 13.6 %      Monocyte % 9.7 %      Eosinophil % 2.5 %      Basophil % 0.6 %      Immature Grans % 0.4 %      Neutrophils, Absolute 3.76 10*3/mm3      Lymphocytes, Absolute 0.70 10*3/mm3      Monocytes, Absolute 0.50 10*3/mm3      Eosinophils, Absolute 0.13 10*3/mm3      Basophils, Absolute 0.03 10*3/mm3      Immature Grans, Absolute 0.02 10*3/mm3      nRBC 0.0 /100 WBC     Scan Slide [[id number]] Collected: 01/06/23 1005    Specimen: Blood Updated: 01/06/23 1050     Anisocytosis Slight/1+     WBC Morphology Normal     Platelet Estimate  Adequate     Large Platelets Slight/1+    Urinalysis With Culture If Indicated - Straight Cath [[id number]]  (Abnormal) Collected: 01/06/23 1142    Specimen: Urine from Straight Cath Updated: 01/06/23 1205     Color, UA Yellow     Appearance, UA Turbid     pH, UA 8.5     Specific Gravity, UA 1.012     Glucose, UA Negative     Ketones, UA Negative     Bilirubin, UA Negative     Blood, UA Negative     Protein, UA 30 mg/dL (1+)     Leuk Esterase, UA Moderate (2+)     Nitrite, UA Negative     Urobilinogen, UA 1.0 E.U./dL    Narrative:      In absence of clinical symptoms, the presence of pyuria, bacteria, and/or nitrites on the urinalysis result does not correlate with infection.    Urinalysis, Microscopic Only - Straight Cath [[id number]]  (Abnormal) Collected: 01/06/23 1142    Specimen: Urine from Straight Cath Updated: 01/06/23 1208     RBC, UA None Seen /HPF      WBC, UA 31-50 /HPF      Bacteria, UA 3+ /HPF      Squamous Epithelial Cells, UA 0-2 /HPF      Hyaline Casts, UA None Seen /LPF      Amorphous Crystals, UA Small/1+ /HPF      Methodology Manual Light Microscopy    Urine Culture - Urine, Straight Cath [[id number]] Collected: 01/06/23 1142    Specimen: Urine from Straight Cath Updated: 01/06/23 1208           Imaging:    XR Chest 1 View    Result Date: 1/6/2023  PROCEDURE: XR CHEST 1 VW  COMPARISON: [facility], CR, XR CHEST 1 VW, 9/20/2022, 19:34.  [facility], CR, XR CHEST 1 VW, 11/14/2022, 2:58.  INDICATIONS: cough, fever, soa  FINDINGS:  The heart appears mildly enlarged.  There appear to be mild increased bibasilar opacities.  No significant effusion or pneumothorax is seen.  There is atherosclerosis of the aortic arch.  Multiple surgical clips project over the left chest.        1. Mildly increased bibasilar opacities which could be related to edema, pneumonia, or atelectasis. 2. Mild cardiomegaly.       DK SALDANA MD       Electronically Signed and Approved By: DK SALDANA MD  on 1/06/2023 at 8:45             Duplex Venous Lower Extremity - Bilateral CV-READ    Result Date: 1/6/2023  •  Normal bilateral lower extremity venous duplex scan.         Differential Diagnosis and Discussion:    Fever: Based on the complaint of fever, differential diagnosis includes but is not limited to meningitis, pneumonia, pyelonephritis, acute uti,  systemic immune response syndrome, sepsis, viral syndrome, fungal infection, tick born illness and other bacterial infections.    All labs were reviewed and analyzed by me.  All X-rays were independently reviewed by me.    MDM  Number of Diagnoses or Management Options  Urinary tract infection without hematuria, site unspecified  Diagnosis management comments: In summary this is a 67-year-old female who presents emerged department from a local nursing home for evaluation of possible fever and sepsis.  Patient is tachycardic but is not febrile.  Chest x-ray unremarkable CBC independently reviewed by me and shows no critical abnormalities.  CMP independently reviewed by me and shows no critical abnormalities.  Urinalysis positive for UTI.  Influenza test negative.  Patient be treated with antibiotics, discharged home.  Very strict return to ER and follow-up instructions have been provided to the patient.  No signs of sepsis at this time.           Patient Care Considerations:    I considered ordering a CT scan of the abdomen and pelvis however Patient had a benign abdominal exam and is positive for UTI.      Consultants/Shared Management Plan:    None    Social Determinants of Health:    Patient is a nursing home/assisted living resident and has reliable access to care.      Disposition and Care Coordination:    Discharged: The patient is suitable and stable for discharge with no need for consideration of observation or admission.    I have explained discharge medications and the need for follow up with the patient/caretakers. This was also printed in the discharge  instructions. Patient was discharged with the following medications and follow up:      Medication List      New Prescriptions    levoFLOXacin 500 MG tablet  Commonly known as: LEVAQUIN  Take 1 tablet by mouth Daily.           Where to Get Your Medications      These medications were sent to [facility]  [address] [place] [address]    Hours: Mon-Fri 9:00AM-7:30PM Saturday 9:00AM-2:00PM Phone: [phone]   · levoFLOXacin 500 MG tablet      Jennifer Newman MD  [address] [id number]  [phone]    In 1 week         Final diagnoses:   Urinary tract infection without hematuria, site unspecified        ED Disposition     ED Disposition   Discharge    Condition   Stable    Comment   --             This medical record created using voice recognition software.    Documentation assistance provided by Estephanie Chadwick acting as scribe for Surya Guevara MD. Information recorded by the scribe was done at my direction and has been verified and validated by me.          Estephanie Chadwick  01/06/23 5969       Surya Guevara MD  01/06/23 0833

## 2023-01-08 LAB — BACTERIA SPEC AEROBE CULT: ABNORMAL

## 2023-01-11 LAB — BACTERIA SPEC AEROBE CULT: NORMAL

## 2023-02-13 ENCOUNTER — APPOINTMENT (OUTPATIENT)
Dept: GENERAL RADIOLOGY | Facility: HOSPITAL | Age: 68
End: 2023-02-13
Payer: MEDICARE

## 2023-02-13 ENCOUNTER — HOSPITAL ENCOUNTER (EMERGENCY)
Facility: HOSPITAL | Age: 68
Discharge: HOME | End: 2023-02-14
Attending: EMERGENCY MEDICINE | Admitting: EMERGENCY MEDICINE
Payer: MEDICARE

## 2023-02-13 DIAGNOSIS — R60.9 PERIPHERAL EDEMA: Primary | ICD-10-CM

## 2023-02-13 DIAGNOSIS — I89.0 LYMPHEDEMA: ICD-10-CM

## 2023-02-13 LAB
ALBUMIN SERPL-MCNC: 3.6 G/DL (ref 3.5–5.2)
ALBUMIN/GLOB SERPL: 0.7 G/DL
ALP SERPL-CCNC: 125 U/L (ref 39–117)
ALT SERPL W P-5'-P-CCNC: 7 U/L (ref 1–33)
ANION GAP SERPL CALCULATED.3IONS-SCNC: 12.8 MMOL/L (ref 5–15)
AST SERPL-CCNC: 47 U/L (ref 1–32)
BASOPHILS # BLD AUTO: 0.02 10*3/MM3 (ref 0–0.2)
BASOPHILS NFR BLD AUTO: 0.4 % (ref 0–1.5)
BILIRUB SERPL-MCNC: 0.5 MG/DL (ref 0–1.2)
BUN SERPL-MCNC: 18 MG/DL (ref 8–23)
BUN/CREAT SERPL: 17.5 (ref 7–25)
CALCIUM SPEC-SCNC: 9.3 MG/DL (ref 8.6–10.5)
CHLORIDE SERPL-SCNC: 99 MMOL/L (ref 98–107)
CO2 SERPL-SCNC: 25.2 MMOL/L (ref 22–29)
CREAT SERPL-MCNC: 1.03 MG/DL (ref 0.57–1)
DEPRECATED RDW RBC AUTO: 47.8 FL (ref 37–54)
EGFRCR SERPLBLD CKD-EPI 2021: 59 ML/MIN/1.73
EOSINOPHIL # BLD AUTO: 0.14 10*3/MM3 (ref 0–0.4)
EOSINOPHIL NFR BLD AUTO: 2.6 % (ref 0.3–6.2)
ERYTHROCYTE [DISTWIDTH] IN BLOOD BY AUTOMATED COUNT: 15.9 % (ref 12.3–15.4)
FLUAV AG NPH QL: NEGATIVE
FLUBV AG NPH QL IA: NEGATIVE
GLOBULIN UR ELPH-MCNC: 5.1 GM/DL
GLUCOSE SERPL-MCNC: 138 MG/DL (ref 65–99)
HCT VFR BLD AUTO: 38.7 % (ref 34–46.6)
HGB BLD-MCNC: 12.2 G/DL (ref 12–15.9)
HOLD SPECIMEN: NORMAL
HOLD SPECIMEN: NORMAL
IMM GRANULOCYTES # BLD AUTO: 0.02 10*3/MM3 (ref 0–0.05)
IMM GRANULOCYTES NFR BLD AUTO: 0.4 % (ref 0–0.5)
LYMPHOCYTES # BLD AUTO: 0.68 10*3/MM3 (ref 0.7–3.1)
LYMPHOCYTES NFR BLD AUTO: 12.8 % (ref 19.6–45.3)
MCH RBC QN AUTO: 26 PG (ref 26.6–33)
MCHC RBC AUTO-ENTMCNC: 31.5 G/DL (ref 31.5–35.7)
MCV RBC AUTO: 82.5 FL (ref 79–97)
MONOCYTES # BLD AUTO: 0.46 10*3/MM3 (ref 0.1–0.9)
MONOCYTES NFR BLD AUTO: 8.7 % (ref 5–12)
NEUTROPHILS NFR BLD AUTO: 3.99 10*3/MM3 (ref 1.7–7)
NEUTROPHILS NFR BLD AUTO: 75.1 % (ref 42.7–76)
NRBC BLD AUTO-RTO: 0 /100 WBC (ref 0–0.2)
NT-PROBNP SERPL-MCNC: 204.9 PG/ML (ref 0–900)
PLATELET # BLD AUTO: 180 10*3/MM3 (ref 140–450)
PMV BLD AUTO: 11.4 FL (ref 6–12)
POTASSIUM SERPL-SCNC: 3.7 MMOL/L (ref 3.5–5.2)
PROT SERPL-MCNC: 8.7 G/DL (ref 6–8.5)
RBC # BLD AUTO: 4.69 10*6/MM3 (ref 3.77–5.28)
SODIUM SERPL-SCNC: 137 MMOL/L (ref 136–145)
WBC NRBC COR # BLD: 5.31 10*3/MM3 (ref 3.4–10.8)
WHOLE BLOOD HOLD COAG: NORMAL
WHOLE BLOOD HOLD SPECIMEN: NORMAL

## 2023-02-13 PROCEDURE — 87804 INFLUENZA ASSAY W/OPTIC: CPT | Performed by: NURSE PRACTITIONER

## 2023-02-13 PROCEDURE — 96374 THER/PROPH/DIAG INJ IV PUSH: CPT

## 2023-02-13 PROCEDURE — 85025 COMPLETE CBC W/AUTO DIFF WBC: CPT | Performed by: NURSE PRACTITIONER

## 2023-02-13 PROCEDURE — 80053 COMPREHEN METABOLIC PANEL: CPT | Performed by: NURSE PRACTITIONER

## 2023-02-13 PROCEDURE — 99284 EMERGENCY DEPT VISIT MOD MDM: CPT

## 2023-02-13 PROCEDURE — U0004 COV-19 TEST NON-CDC HGH THRU: HCPCS

## 2023-02-13 PROCEDURE — 71045 X-RAY EXAM CHEST 1 VIEW: CPT

## 2023-02-13 PROCEDURE — 83880 ASSAY OF NATRIURETIC PEPTIDE: CPT | Performed by: NURSE PRACTITIONER

## 2023-02-13 RX ORDER — SODIUM CHLORIDE 0.9 % (FLUSH) 0.9 %
10 SYRINGE (ML) INJECTION AS NEEDED
Status: DISCONTINUED | OUTPATIENT
Start: 2023-02-13 | End: 2023-02-14 | Stop reason: HOSPADM

## 2023-02-14 VITALS
OXYGEN SATURATION: 94 % | TEMPERATURE: 99.8 F | HEIGHT: 60 IN | SYSTOLIC BLOOD PRESSURE: 122 MMHG | WEIGHT: 219.14 LBS | DIASTOLIC BLOOD PRESSURE: 53 MMHG | HEART RATE: 80 BPM | RESPIRATION RATE: 14 BRPM | BODY MASS INDEX: 43.02 KG/M2

## 2023-02-14 LAB — SARS-COV-2 RNA RESP QL NAA+PROBE: NOT DETECTED

## 2023-02-14 PROCEDURE — 96374 THER/PROPH/DIAG INJ IV PUSH: CPT

## 2023-02-14 PROCEDURE — 25010000002 FUROSEMIDE PER 20 MG: Performed by: EMERGENCY MEDICINE

## 2023-02-14 RX ORDER — FUROSEMIDE 10 MG/ML
80 INJECTION INTRAMUSCULAR; INTRAVENOUS ONCE
Status: COMPLETED | OUTPATIENT
Start: 2023-02-14 | End: 2023-02-14

## 2023-02-14 RX ORDER — POTASSIUM CHLORIDE 750 MG/1
40 CAPSULE, EXTENDED RELEASE ORAL ONCE
Status: COMPLETED | OUTPATIENT
Start: 2023-02-14 | End: 2023-02-14

## 2023-02-14 RX ADMIN — POTASSIUM CHLORIDE 40 MEQ: 10 CAPSULE, COATED, EXTENDED RELEASE ORAL at 02:09

## 2023-02-14 RX ADMIN — FUROSEMIDE 80 MG: 10 INJECTION, SOLUTION INTRAMUSCULAR; INTRAVENOUS at 00:58

## 2023-02-14 NOTE — ED PROVIDER NOTES
Time: 11:48 PM EST  Date of encounter:  2/13/2023  Independent Historian/Clinical History and Information was obtained by:   Patient  Chief Complaint: leg pain    History is limited by: N/A    History of Present Illness:  Patient is a 67 y.o. year old female who presents to the emergency department for evaluation of leg pain.     Pt reports cellulitis in her legs since last night. She reports a fever of 100. She admits diarrhea. She admits 2 loose stools. Pt denies dysuria, hematuria, urinary urgency, hesitancy or frequency. She denies cough and SOB.    She has been at [facility] for 2 years. She notes she usually is wheelchair bound but can stand to go to the bathroom.     She has weakness in her legs, but this is chronic and unchanged.          Patient Care Team  Primary Care Provider: Jennifer Newman MD    Past Medical History:     Allergies   Allergen Reactions   • Latex Hives   • Penicillins Rash     Pt tolerated graded dose challenge to amp-sulbactam on 9/24/2022     Past Medical History:   Diagnosis Date   • Acute kidney injury (HCC) 11/14/2022   • Cancer (HCC)     Left breast   • Depressed    • Diabetes (HCC)     TYPE 2   • Flea bite of multiple sites of lower extremity     PT INSTRUCTED TO INFORM DR TRINH OF BITES PRIOR TO SURGERY   • H/O meningitis 1987   • History of Bell's palsy    • Migraine headache    • Polio    • Seizures (HCC)    • Stroke (HCC) 1987     Past Surgical History:   Procedure Laterality Date   • APPENDECTOMY  1987   • BREAST SURGERY Left 10/2015    Biopsy, benign   • CATARACT EXTRACTION     • COLONOSCOPY N/A 9/28/2022    Procedure: COLONOSCOPY WITH POLYPECTOMY;  Surgeon: Alfonso Fitzgerald MD;  Location: [facility] ENDOSCOPY;  Service: Gastroenterology;  Laterality: N/A;  COLON POLYP   • EAR TUBES     • ENDOSCOPY N/A 9/28/2022    Procedure: ESOPHAGOGASTRODUODENOSCOPY WITH BIOPSIES;  Surgeon: Alfonso Fitzgerald MD;  Location: [facility] ENDOSCOPY;  Service:  Gastroenterology;  Laterality: N/A;  GASTRITIS, VARIX   • KNEE MENISCECTOMY     • MASTECTOMY     • MASTECTOMY WITH SENTINEL NODE BIOPSY AND AXILLARY NODE DISSECTION Left 1/11/2018    Procedure: Left total mastectomy, left sentinel lymph node biopsy to include retrieval of prior clip, axillary lymph node dissection, no reconstruction. ;  Surgeon: Yuli Garcias MD;  Location: [facility] OR [facility] [place];  Service:    • OVARIAN CYST REMOVAL     • SD INSJ TUNNELED CVC W/O SUBQ PORT/ AGE 3 YR/> Right 5/18/2017    Procedure: INSERTION RIGHT VENOUS ACCESS DEVICE;  Surgeon: Yuli Garcias MD;  Location: [facility] OR [facility] [place];  Service: General   • TONSILLECTOMY     • TUBAL ABDOMINAL LIGATION     • VENOUS ACCESS DEVICE (PORT) REMOVAL Right 8/8/2019    Procedure: RIGHT port removal,;  Surgeon: Yuli Garcias MD;  Location: [facility] OR [facility] [place];  Service: General     Family History   Problem Relation Age of Onset   • Asthma Mother    • Diabetes Mother    • Hearing loss Mother    • Heart attack Mother    • Heart failure Mother    • Breast cancer Mother 55   • Hypertension Mother    • Alcohol abuse Father    • Diabetes Sister    • Diabetes Brother    • Brain cancer Brother 56   • Cancer Brother 56        bladder   • Malig Hyperthermia Neg Hx        Home Medications:  Prior to Admission medications    Medication Sig Start Date End Date Taking? Authorizing Provider   acetaminophen (TYLENOL) 500 MG tablet Take 500 mg by mouth Every 8 (Eight) Hours As Needed for Mild Pain.    Roland Rankin MD   albuterol sulfate  (90 Base) MCG/ACT inhaler Inhale 2 puffs Every 6 (Six) Hours As Needed for Shortness of Air. 10/26/21   Roland Rankin MD   anastrozole (ARIMIDEX) 1 MG tablet Take 1 mg by mouth Daily.    Roland Rankin MD   azithromycin (ZITHROMAX) 250 MG tablet  10/21/22   Roland Rankin MD   bisacodyl (DULCOLAX) 10 MG suppository Insert 10 mg into the rectum Daily As Needed for Constipation.    Provider  MD Roland   busPIRone (BUSPAR) 10 MG tablet Take 10 mg by mouth 3 (Three) Times a Day. 4/16/22   Roland Rankin MD   cetirizine (zyrTEC) 10 MG tablet Take 1 tablet by mouth Daily.    Roland Rankin MD   docusate sodium (COLACE) 100 MG capsule Take 100 mg by mouth 2 (Two) Times a Day.    Roland Rankin MD   fluticasone (FLONASE) 50 MCG/ACT nasal spray 2 sprays into the nostril(s) as directed by provider Daily. 1/14/22   Roland Rankin MD   furosemide (LASIX) 40 MG tablet Take 40 mg by mouth Daily.    Roland Rankin MD   gabapentin (NEURONTIN) 300 MG capsule Take 1 capsule by mouth Every 12 (Twelve) Hours.    Roland Rankin MD   HYDROcodone-acetaminophen (NORCO) 5-325 MG per tablet Take 1 tablet by mouth Every 6 (Six) Hours As Needed for Moderate Pain. 9/29/22   Tim Patel MD   hydrOXYzine (ATARAX) 25 MG tablet Take 25 mg by mouth 3 (Three) Times a Day. 4/20/22   Roland Rankin MD   insulin glargine (LANTUS, SEMGLEE) 100 UNIT/ML injection Inject 28 Units under the skin into the appropriate area as directed Daily.    Roland Rankin MD   ipratropium-albuterol (DUO-NEB) 0.5-2.5 mg/3 ml nebulizer Take 3 mL by nebulization Every 8 (Eight) Hours As Needed for Wheezing.    Roland Rankin MD   levoFLOXacin (LEVAQUIN) 500 MG tablet Take 1 tablet by mouth Daily. 1/6/23   Surya Guevara MD   magnesium hydroxide (MILK OF MAGNESIA) 400 MG/5ML suspension Take 30 mL by mouth At Night As Needed for Constipation.    Roland Rankin MD   metFORMIN (GLUCOPHAGE) 1000 MG tablet Take 1,000 mg by mouth 2 (Two) Times a Day With Meals.    Roland Rankin MD   nystatin (MYCOSTATIN) 993317 UNIT/GM powder Apply 1 application topically to the appropriate area as directed 2 (Two) Times a Day.    Roland Rankin MD   ondansetron (ZOFRAN) 4 MG tablet Take 4 mg by mouth Every 8 (Eight) Hours As Needed for Nausea. 6/19/22   Roland Rankin MD   phenytoin  (DILANTIN) 50 MG chewable tablet Chew 100 mg Every Night.    Roland Rankin MD   potassium chloride 10 MEQ CR tablet Take 10 mEq by mouth Daily.    Roland Rankin MD   prazosin (MINIPRESS) 1 MG capsule Take 1 mg by mouth Every Night.    Roland Rankin MD   rosuvastatin (CRESTOR) 20 MG tablet Take 1 tablet by mouth Every Night. 21   Roland Rankin MD   simethicone (MYLICON) 125 MG chewable tablet Chew 125 mg Every 6 (Six) Hours As Needed for Flatulence.    Roland Rankin MD   Spiriva HandiHaler 18 MCG per inhalation capsule Place 1 capsule into inhaler and inhale Daily. 22   Roland Rankin MD   topiramate (TOPAMAX) 25 MG tablet Take 25 mg by mouth Every Morning.    Roland Rankin MD   topiramate (TOPAMAX) 50 MG tablet Take 50 mg by mouth Every Night.    Roland Rankin MD   traZODone (DESYREL) 50 MG tablet Take 50 mg by mouth Every Night. 22   Roland Rankin MD   venlafaxine (EFFEXOR) 37.5 MG tablet Take 37.5 mg by mouth Daily.    Roland Rankin MD   Vitamin D, Cholecalciferol, 50 MCG (2000 UT) capsule Take 2,000 Units by mouth Daily.    Roland Rankin MD   zinc oxide 13 % cream cream Apply 1 application topically to the appropriate area as directed As Needed (redness).    Roland Rankin MD        Social History:   Social History     Tobacco Use   • Smoking status: Former     Packs/day: 2.50     Types: Cigarettes     Start date:      Quit date:      Years since quittin.1   • Smokeless tobacco: Never   Vaping Use   • Vaping Use: Never used   Substance Use Topics   • Alcohol use: Yes     Comment: rarely   • Drug use: No         Review of Systems:  Review of Systems   Constitutional: Positive for fever. Negative for chills and diaphoresis.   HENT: Negative for congestion, postnasal drip, rhinorrhea and sore throat.    Eyes: Negative for photophobia.   Respiratory: Negative for cough, chest tightness and  "shortness of breath.    Cardiovascular: Positive for leg swelling. Negative for chest pain and palpitations.   Gastrointestinal: Positive for diarrhea. Negative for abdominal pain, nausea and vomiting.   Genitourinary: Negative for difficulty urinating, dysuria, flank pain, frequency, hematuria and urgency.   Musculoskeletal: Negative for neck pain and neck stiffness.   Skin: Negative for pallor and rash.   Neurological: Negative for dizziness, syncope, weakness, numbness and headaches.   Hematological: Negative for adenopathy. Does not bruise/bleed easily.   Psychiatric/Behavioral: Negative.         Physical Exam:  /53 (BP Location: Right arm, Patient Position: Sitting)   Pulse 80   Temp 99.8 °F (37.7 °C) (Oral)   Resp 14   Ht 152.4 cm (60\")   Wt 99.4 kg (219 lb 2.2 oz)   SpO2 94%   BMI 42.80 kg/m²     Physical Exam  Vitals and nursing note reviewed.   Constitutional:       General: She is not in acute distress.     Appearance: Normal appearance. She is not ill-appearing, toxic-appearing or diaphoretic.   HENT:      Head: Normocephalic and atraumatic.      Mouth/Throat:      Mouth: Mucous membranes are moist.   Eyes:      Pupils: Pupils are equal, round, and reactive to light.   Cardiovascular:      Rate and Rhythm: Normal rate. Rhythm irregular.      Pulses: Normal pulses.           Carotid pulses are 2+ on the right side and 2+ on the left side.       Radial pulses are 2+ on the right side and 2+ on the left side.        Femoral pulses are 2+ on the right side and 2+ on the left side.       Popliteal pulses are 2+ on the right side and 2+ on the left side.        Dorsalis pedis pulses are 2+ on the right side and 2+ on the left side.        Posterior tibial pulses are 2+ on the right side and 2+ on the left side.      Heart sounds: Normal heart sounds. No murmur heard.  Pulmonary:      Effort: Pulmonary effort is normal. No accessory muscle usage, respiratory distress or retractions.      Breath " sounds: Examination of the right-upper field reveals decreased breath sounds. Examination of the left-upper field reveals decreased breath sounds. Examination of the right-middle field reveals decreased breath sounds. Examination of the left-middle field reveals decreased breath sounds. Examination of the right-lower field reveals decreased breath sounds. Examination of the left-lower field reveals decreased breath sounds. Decreased breath sounds present. No wheezing, rhonchi or rales.   Abdominal:      General: Abdomen is flat. There is distension.      Palpations: Abdomen is soft. There is no mass or pulsatile mass.      Tenderness: There is no abdominal tenderness. There is no right CVA tenderness, left CVA tenderness, guarding or rebound.      Comments: No rigidity   Musculoskeletal:         General: No swelling, tenderness or deformity.      Cervical back: Neck supple. No tenderness.      Right lower leg: Edema present.      Left lower leg: Edema present.      Comments: Chronic lymphedema in legs.    Skin:     General: Skin is warm and dry.      Capillary Refill: Capillary refill takes less than 2 seconds.      Coloration: Skin is not jaundiced or pale.      Findings: No erythema.   Neurological:      General: No focal deficit present.      Mental Status: She is alert and oriented to person, place, and time. Mental status is at baseline.      Cranial Nerves: Cranial nerves 2-12 are intact. No cranial nerve deficit.      Sensory: Sensation is intact. No sensory deficit.      Motor: Motor function is intact. No weakness or pronator drift.      Coordination: Coordination is intact. Coordination normal.   Psychiatric:         Mood and Affect: Mood normal.         Behavior: Behavior normal.                  Procedures:  Procedures      Medical Decision Making:      Comorbidities that affect care:    Cancer, Diabetes    External Notes reviewed:    Previous ED Note      The following orders were placed and all results  were independently analyzed by me:  Orders Placed This Encounter   Procedures   • Influenza Antigen, Rapid - Swab, Nasopharynx   • COVID-19,APTIMA PANTHER(SANTOS), ROXANNE/ KATARINA, NP/OP SWAB IN UTM/VTM/SALINE TRANSPORT MEDIA,24 HR TAT - Swab, Nasal Cavity   • XR Chest 1 View   • Mehama Draw   • Comprehensive Metabolic Panel   • BNP   • CBC Auto Differential   • Green Top (Gel)   • Lavender Top   • Gold Top - SST   • Light Blue Top   • CBC & Differential       Medications Given in the Emergency Department:  Medications   furosemide (LASIX) injection 80 mg (80 mg Intravenous Given 2/14/23 0058)   potassium chloride (MICRO-K) CR capsule 40 mEq (40 mEq Oral Given 2/14/23 0209)        ED Course:         Labs:    Lab Results (last 24 hours)     ** No results found for the last 24 hours. **           Imaging:    No Radiology Exams Resulted Within Past 24 Hours      Differential Diagnosis and Discussion:    Extremity Pain: Differential diagnosis includes but is not limited to soft tissue sprain, tendonitis, tendon injury, dislocation, fracture, deep vein thrombosis, arterial insufficiency, osteoarthritis, bursitis, and ligamentous damage.    All labs were reviewed and interpreted by me.  All X-rays were independently reviewed by me.  EKG was interpreted by me.    MDM  Number of Diagnoses or Management Options  Lymphedema  Peripheral edema  Diagnosis management comments: The patient's vital signs were stable while in the emergency room.  Patient's flu was negative but the patient's COVID was negative.  The patient had a normal BNP marker for heart failure.  The patient's CMP was reviewed and shows no abnormalities of critical concern.  Of note, the patient's sodium and potassium are acceptable.  The patient only had very mildly elevated liver enzymes.  The patient's renal function including creatinine is preserved.  The patient has a normal anion gap.  The patient's CMP was reviewed and shows no abnormalities of critical  concern.  Of note, the patient's sodium and potassium are acceptable.  The patient's liver enzymes are unremarkable.  The patient's renal function including creatinine is preserved.  The patient has a normal anion gap.  Chest x-ray demonstrates bilateral infiltrates which could represent infectious multifocal pneumonia or pulmonary edema.  Pulmonary edema is thought to be less likely.  However, the infiltrates are decreased since the chest x-ray performed January 6, 2023.  I reviewed that record and the patient was placed on Levaquin and treated for pneumonia at that time.  Certainly the chest x-ray findings could be representative of resolving pneumonia.  Clinically the patient does not appear to have pneumonia as she does not have a fever, elevated white blood cell count.  The patient is in no respiratory distress.  The patient's primary reason for being in the emergency room again is peripheral edema.  There was no evidence of cellulitis.  The patient will be discharged back to the nursing home.  The patient will increase her Lasix to twice a day as well as her potassium to twice a day.  The patient will wear compression stockings.  The patient will elevate her legs while at rest.  The patient will follow-up with her doctor within 48 hours.  The patient was given very specific instructions on when and why to return to the emergency room.  The patient voiced understanding and felt comfortable with the discharge instructions.  They would return to the emergency room if necessary.  The patient appears appropriate for discharge and outpatient follow-up.         Amount and/or Complexity of Data Reviewed  Clinical lab tests: reviewed  Tests in the radiology section of CPT®: reviewed           Patient Care Considerations:          Social Determinants of Health:    Patient is independent, reliable, and has access to care.       Disposition and Care Coordination:    Discharged: The patient is suitable and stable for  discharge with no need for consideration of observation or admission.    I have explained discharge medications and the need for follow up with the patient/caretakers. This was also printed in the discharge instructions. Patient was discharged with the following medications and follow up:      Medication List      No changes were made to your prescriptions during this visit.      Jennifer Newman MD  [id number] [facility] [address]  [place] [id number]  [phone]    On 2/15/2023  Peripheral edema, call for appointment       Final diagnoses:   Peripheral edema   Lymphedema        ED Disposition     ED Disposition   Discharge    Condition   Stable    Comment   --             This medical record created using voice recognition software.      Documentation assistance provided by Jesús Bedolla acting as scribe for Peter Roberts DO. Information recorded by the scribe was done at my direction and has been verified and validated by me.        Jesús Bedolla  02/13/23 [id number]       Peter Roberts DO  02/15/23 [id number]

## 2023-02-14 NOTE — DISCHARGE INSTRUCTIONS
Please increase your Lasix 40 mg to twice a day for the next 3 days    Please increase your potassium chloride 10 mEq twice a day for the next 3 days    Please wear compression stockings while awake    Please elevate your legs above your heart while at rest    Return to the emergency room for worsening swelling of the legs, shortness of breath, chest pain, chest pressure, fever, rash or any new symptoms or concerns

## 2023-02-17 NOTE — PAYOR COMM NOTE
"Alfredo Vargas (67 y.o. Female)     Date of Birth   1955    Social Security Number       Address   [facility] [address] [facility] [address]    Home Phone   [phone]    MRN   [id number]       Hindu   None    Marital Status                               Admission Date   2/13/23    Admission Type   Emergency    Admitting Provider       Attending Provider       Department, Room/Bed   [facility] EMERGENCY ROOM, 22/22       Discharge Date   2/14/2023    Discharge Disposition   Home or Self Care    Discharge Destination                               Attending Provider: (none)   Allergies: Latex, Penicillins    Isolation: None   Infection: COVID (rule out) (02/13/23)   Code Status: Prior    Ht: 152.4 cm (60\")   Wt: 99.4 kg (219 lb 2.2 oz)    Admission Cmt: None   Principal Problem: None                Active Insurance as of 2/13/2023     Primary Coverage     Payor Plan Insurance Group Employer/Plan Group    ANTHEM MEDICARE REPLACEMENT ANTHEM MEDICARE ADVANTAGE [id number]     Payor Plan Address Payor Plan Phone Number Payor Plan Fax Number Effective Dates    [address] [phone]  11/1/2018 - None Entered    [facility] [id number]       Subscriber Name Subscriber Birth Date Member ID       ALFREDO VARGAS 1955 [id number]           Secondary Coverage     Payor Plan Insurance Group Employer/Plan Group    [place]      Payor Plan Address Payor Plan Phone Number Payor Plan Fax Number Effective Dates    [address] [phone]  4/25/2019 - None Entered    [facility] [address]       Subscriber Name Subscriber Birth Date Member ID       ALFREDO VARGAS 1955 [id number]                 Emergency Contacts      (Rel.) Home Phone Work Phone Mobile Phone    Tati PITTS (Sister) [phone] -- [phone]    MINGORODO ZUNIGA (Daughter) [phone] -- [phone]    "                                                              Emergency Department Notes      Peter Roberts DO at 02/13/23 2348          Time: 11:48 PM EST  Date of encounter:  2/13/2023  Independent Historian/Clinical History and Information was obtained by:   Patient  Chief Complaint: leg pain    History is limited by: N/A    History of Present Illness:  Patient is a 67 y.o. year old female who presents to the emergency department for evaluation of leg pain.     Pt reports cellulitis in her legs since last night. She reports a fever of 100. She admits diarrhea. She admits 2 loose stools. Pt denies dysuria, hematuria, urinary urgency, hesitancy or frequency. She denies cough and SOB.    She has been at [facility] for 2 years. She notes she usually is wheelchair bound but can stand to go to the bathroom.     She has weakness in her legs, but this is chronic and unchanged.          Patient Care Team  Primary Care Provider: Jennifer Newman MD    Past Medical History:     Allergies   Allergen Reactions   • Latex Hives   • Penicillins Rash     Pt tolerated graded dose challenge to amp-sulbactam on 9/24/2022     Past Medical History:   Diagnosis Date   • Acute kidney injury (HCC) 11/14/2022   • Cancer (HCC)     Left breast   • Depressed    • Diabetes (HCC)     TYPE 2   • Flea bite of multiple sites of lower extremity     PT INSTRUCTED TO INFORM DR TRINH OF BITES PRIOR TO SURGERY   • H/O meningitis 1987   • History of Bell's palsy    • Migraine headache    • Polio    • Seizures (HCC)    • Stroke (HCC) 1987     Past Surgical History:   Procedure Laterality Date   • APPENDECTOMY  1987   • BREAST SURGERY Left 10/2015    Biopsy, benign   • CATARACT EXTRACTION     • COLONOSCOPY N/A 9/28/2022    Procedure: COLONOSCOPY WITH POLYPECTOMY;  Surgeon: Alfonso Fitzgerald MD;  Location: [facility] ENDOSCOPY;  Service: Gastroenterology;  Laterality: N/A;  COLON POLYP   • EAR TUBES     • ENDOSCOPY N/A 9/28/2022     Procedure: ESOPHAGOGASTRODUODENOSCOPY WITH BIOPSIES;  Surgeon: Alfonso Fitzgerald MD;  Location: [facility] ENDOSCOPY;  Service: Gastroenterology;  Laterality: N/A;  GASTRITIS, VARIX   • KNEE MENISCECTOMY     • MASTECTOMY     • MASTECTOMY WITH SENTINEL NODE BIOPSY AND AXILLARY NODE DISSECTION Left 1/11/2018    Procedure: Left total mastectomy, left sentinel lymph node biopsy to include retrieval of prior clip, axillary lymph node dissection, no reconstruction. ;  Surgeon: Yuli Garcias MD;  Location: [facility] OR [facility];  Service:    • OVARIAN CYST REMOVAL     • HI INSJ TUNNELED CVC W/O SUBQ PORT/ AGE 3 YR/> Right 5/18/2017    Procedure: INSERTION RIGHT VENOUS ACCESS DEVICE;  Surgeon: Yuli Garcias MD;  Location: [facility] OR [facility];  Service: General   • TONSILLECTOMY     • TUBAL ABDOMINAL LIGATION     • VENOUS ACCESS DEVICE (PORT) REMOVAL Right 8/8/2019    Procedure: RIGHT port removal,;  Surgeon: Yuli Garcias MD;  Location: [facility] OR [facility];  Service: General     Family History   Problem Relation Age of Onset   • Asthma Mother    • Diabetes Mother    • Hearing loss Mother    • Heart attack Mother    • Heart failure Mother    • Breast cancer Mother 55   • Hypertension Mother    • Alcohol abuse Father    • Diabetes Sister    • Diabetes Brother    • Brain cancer Brother 56   • Cancer Brother 56        bladder   • Malig Hyperthermia Neg Hx        Home Medications:  Prior to Admission medications    Medication Sig Start Date End Date Taking? Authorizing Provider   acetaminophen (TYLENOL) 500 MG tablet Take 500 mg by mouth Every 8 (Eight) Hours As Needed for Mild Pain.    ProviderRoland MD   albuterol sulfate  (90 Base) MCG/ACT inhaler Inhale 2 puffs Every 6 (Six) Hours As Needed for Shortness of Air. 10/26/21   Roland Rankin MD   anastrozole (ARIMIDEX) 1 MG tablet Take 1 mg by mouth Daily.    Roland Rankin MD   azithromycin (ZITHROMAX) 250 MG tablet  10/21/22   Roland Rankin  MD   bisacodyl (DULCOLAX) 10 MG suppository Insert 10 mg into the rectum Daily As Needed for Constipation.    Roland Rankin MD   busPIRone (BUSPAR) 10 MG tablet Take 10 mg by mouth 3 (Three) Times a Day. 4/16/22   Roland Rankin MD   cetirizine (zyrTEC) 10 MG tablet Take 1 tablet by mouth Daily.    Roland Rankin MD   docusate sodium (COLACE) 100 MG capsule Take 100 mg by mouth 2 (Two) Times a Day.    Roland Rankin MD   fluticasone (FLONASE) 50 MCG/ACT nasal spray 2 sprays into the nostril(s) as directed by provider Daily. 1/14/22   Roland Rankin MD   furosemide (LASIX) 40 MG tablet Take 40 mg by mouth Daily.    Roland Rankin MD   gabapentin (NEURONTIN) 300 MG capsule Take 1 capsule by mouth Every 12 (Twelve) Hours.    Roland Rankin MD   HYDROcodone-acetaminophen (NORCO) 5-325 MG per tablet Take 1 tablet by mouth Every 6 (Six) Hours As Needed for Moderate Pain. 9/29/22   Tim Patel MD   hydrOXYzine (ATARAX) 25 MG tablet Take 25 mg by mouth 3 (Three) Times a Day. 4/20/22   Roland Rankin MD   insulin glargine (LANTUS, SEMGLEE) 100 UNIT/ML injection Inject 28 Units under the skin into the appropriate area as directed Daily.    Roland Rankin MD   ipratropium-albuterol (DUO-NEB) 0.5-2.5 mg/3 ml nebulizer Take 3 mL by nebulization Every 8 (Eight) Hours As Needed for Wheezing.    Roland Rankin MD   levoFLOXacin (LEVAQUIN) 500 MG tablet Take 1 tablet by mouth Daily. 1/6/23   Surya Guevara MD   magnesium hydroxide (MILK OF MAGNESIA) 400 MG/5ML suspension Take 30 mL by mouth At Night As Needed for Constipation.    Roland Rankin MD   metFORMIN (GLUCOPHAGE) 1000 MG tablet Take 1,000 mg by mouth 2 (Two) Times a Day With Meals.    Roland Rankin MD   nystatin (MYCOSTATIN) 200313 UNIT/GM powder Apply 1 application topically to the appropriate area as directed 2 (Two) Times a Day.    Roland Rankin MD   ondansetron (ZOFRAN) 4 MG  tablet Take 4 mg by mouth Every 8 (Eight) Hours As Needed for Nausea. 22   Roland Rankin MD   phenytoin (DILANTIN) 50 MG chewable tablet Chew 100 mg Every Night.    Roland Rankin MD   potassium chloride 10 MEQ CR tablet Take 10 mEq by mouth Daily.    Roland Rankin MD   prazosin (MINIPRESS) 1 MG capsule Take 1 mg by mouth Every Night.    Roland Rankin MD   rosuvastatin (CRESTOR) 20 MG tablet Take 1 tablet by mouth Every Night. 21   Roland Rankin MD   simethicone (MYLICON) 125 MG chewable tablet Chew 125 mg Every 6 (Six) Hours As Needed for Flatulence.    Roland Rankin MD   Spiriva HandiHaler 18 MCG per inhalation capsule Place 1 capsule into inhaler and inhale Daily. 22   Roland Rankin MD   topiramate (TOPAMAX) 25 MG tablet Take 25 mg by mouth Every Morning.    Roland Rankin MD   topiramate (TOPAMAX) 50 MG tablet Take 50 mg by mouth Every Night.    Roland Rankin MD   traZODone (DESYREL) 50 MG tablet Take 50 mg by mouth Every Night. 22   Roland Rankin MD   venlafaxine (EFFEXOR) 37.5 MG tablet Take 37.5 mg by mouth Daily.    Roland Rankin MD   Vitamin D, Cholecalciferol, 50 MCG (2000 UT) capsule Take 2,000 Units by mouth Daily.    Roland Rankin MD   zinc oxide 13 % cream cream Apply 1 application topically to the appropriate area as directed As Needed (redness).    Roland Rankin MD        Social History:   Social History     Tobacco Use   • Smoking status: Former     Packs/day: 2.50     Types: Cigarettes     Start date:      Quit date:      Years since quittin.1   • Smokeless tobacco: Never   Vaping Use   • Vaping Use: Never used   Substance Use Topics   • Alcohol use: Yes     Comment: rarely   • Drug use: No         Review of Systems:  Review of Systems   Constitutional: Positive for fever. Negative for chills and diaphoresis.   HENT: Negative for congestion, postnasal drip,  "rhinorrhea and sore throat.    Eyes: Negative for photophobia.   Respiratory: Negative for cough, chest tightness and shortness of breath.    Cardiovascular: Positive for leg swelling. Negative for chest pain and palpitations.   Gastrointestinal: Positive for diarrhea. Negative for abdominal pain, nausea and vomiting.   Genitourinary: Negative for difficulty urinating, dysuria, flank pain, frequency, hematuria and urgency.   Musculoskeletal: Negative for neck pain and neck stiffness.   Skin: Negative for pallor and rash.   Neurological: Negative for dizziness, syncope, weakness, numbness and headaches.   Hematological: Negative for adenopathy. Does not bruise/bleed easily.   Psychiatric/Behavioral: Negative.         Physical Exam:  /53 (BP Location: Right arm, Patient Position: Sitting)   Pulse 80   Temp 99.8 °F (37.7 °C) (Oral)   Resp 14   Ht 152.4 cm (60\")   Wt 99.4 kg (219 lb 2.2 oz)   SpO2 94%   BMI 42.80 kg/m²     Physical Exam  Vitals and nursing note reviewed.   Constitutional:       General: She is not in acute distress.     Appearance: Normal appearance. She is not ill-appearing, toxic-appearing or diaphoretic.   HENT:      Head: Normocephalic and atraumatic.      Mouth/Throat:      Mouth: Mucous membranes are moist.   Eyes:      Pupils: Pupils are equal, round, and reactive to light.   Cardiovascular:      Rate and Rhythm: Normal rate. Rhythm irregular.      Pulses: Normal pulses.           Carotid pulses are 2+ on the right side and 2+ on the left side.       Radial pulses are 2+ on the right side and 2+ on the left side.        Femoral pulses are 2+ on the right side and 2+ on the left side.       Popliteal pulses are 2+ on the right side and 2+ on the left side.        Dorsalis pedis pulses are 2+ on the right side and 2+ on the left side.        Posterior tibial pulses are 2+ on the right side and 2+ on the left side.      Heart sounds: Normal heart sounds. No murmur heard.  Pulmonary:     "  Effort: Pulmonary effort is normal. No accessory muscle usage, respiratory distress or retractions.      Breath sounds: Examination of the right-upper field reveals decreased breath sounds. Examination of the left-upper field reveals decreased breath sounds. Examination of the right-middle field reveals decreased breath sounds. Examination of the left-middle field reveals decreased breath sounds. Examination of the right-lower field reveals decreased breath sounds. Examination of the left-lower field reveals decreased breath sounds. Decreased breath sounds present. No wheezing, rhonchi or rales.   Abdominal:      General: Abdomen is flat. There is distension.      Palpations: Abdomen is soft. There is no mass or pulsatile mass.      Tenderness: There is no abdominal tenderness. There is no right CVA tenderness, left CVA tenderness, guarding or rebound.      Comments: No rigidity   Musculoskeletal:         General: No swelling, tenderness or deformity.      Cervical back: Neck supple. No tenderness.      Right lower leg: Edema present.      Left lower leg: Edema present.      Comments: Chronic lymphedema in legs.    Skin:     General: Skin is warm and dry.      Capillary Refill: Capillary refill takes less than 2 seconds.      Coloration: Skin is not jaundiced or pale.      Findings: No erythema.   Neurological:      General: No focal deficit present.      Mental Status: She is alert and oriented to person, place, and time. Mental status is at baseline.      Cranial Nerves: Cranial nerves 2-12 are intact. No cranial nerve deficit.      Sensory: Sensation is intact. No sensory deficit.      Motor: Motor function is intact. No weakness or pronator drift.      Coordination: Coordination is intact. Coordination normal.   Psychiatric:         Mood and Affect: Mood normal.         Behavior: Behavior normal.                 Procedures:  Procedures      Medical Decision Making:      Comorbidities that affect  care:    Cancer, Diabetes    External Notes reviewed:    Previous ED Note      The following orders were placed and all results were independently analyzed by me:  Orders Placed This Encounter   Procedures   • Influenza Antigen, Rapid - Swab, Nasopharynx   • COVID-19,APTIMA PANTHER([facility]),[facility]/ [facility], NP/OP SWAB IN UTM/VTM/SALINE TRANSPORT MEDIA,24 HR TAT - Swab, Nasal Cavity   • XR Chest 1 View   • [place] Draw   • Comprehensive Metabolic Panel   • BNP   • CBC Auto Differential   • Green Top (Gel)   • Lavender Top   • Gold Top - SST   • Light Blue Top   • CBC & Differential       Medications Given in the Emergency Department:  Medications   furosemide (LASIX) injection 80 mg (80 mg Intravenous Given 2/14/23 0058)   potassium chloride (MICRO-K) CR capsule 40 mEq (40 mEq Oral Given 2/14/23 0209)        ED Course:         Labs:    Lab Results (last 24 hours)     ** No results found for the last 24 hours. **           Imaging:    No Radiology Exams Resulted Within Past 24 Hours      Differential Diagnosis and Discussion:    Extremity Pain: Differential diagnosis includes but is not limited to soft tissue sprain, tendonitis, tendon injury, dislocation, fracture, deep vein thrombosis, arterial insufficiency, osteoarthritis, bursitis, and ligamentous damage.    All labs were reviewed and interpreted by me.  All X-rays were independently reviewed by me.  EKG was interpreted by me.    MDM  Number of Diagnoses or Management Options  Lymphedema  Peripheral edema  Diagnosis management comments: The patient's vital signs were stable while in the emergency room.  Patient's flu was negative but the patient's COVID was negative.  The patient had a normal BNP marker for heart failure.  The patient's CMP was reviewed and shows no abnormalities of critical concern.  Of note, the patient's sodium and potassium are acceptable.  The patient only had very mildly elevated liver enzymes.  The patient's renal function including creatinine  is preserved.  The patient has a normal anion gap.  The patient's CMP was reviewed and shows no abnormalities of critical concern.  Of note, the patient's sodium and potassium are acceptable.  The patient's liver enzymes are unremarkable.  The patient's renal function including creatinine is preserved.  The patient has a normal anion gap.  Chest x-ray demonstrates bilateral infiltrates which could represent infectious multifocal pneumonia or pulmonary edema.  Pulmonary edema is thought to be less likely.  However, the infiltrates are decreased since the chest x-ray performed January 6, 2023.  I reviewed that record and the patient was placed on Levaquin and treated for pneumonia at that time.  Certainly the chest x-ray findings could be representative of resolving pneumonia.  Clinically the patient does not appear to have pneumonia as she does not have a fever, elevated white blood cell count.  The patient is in no respiratory distress.  The patient's primary reason for being in the emergency room again is peripheral edema.  There was no evidence of cellulitis.  The patient will be discharged back to the nursing home.  The patient will increase her Lasix to twice a day as well as her potassium to twice a day.  The patient will wear compression stockings.  The patient will elevate her legs while at rest.  The patient will follow-up with her doctor within 48 hours.  The patient was given very specific instructions on when and why to return to the emergency room.  The patient voiced understanding and felt comfortable with the discharge instructions.  They would return to the emergency room if necessary.  The patient appears appropriate for discharge and outpatient follow-up.         Amount and/or Complexity of Data Reviewed  Clinical lab tests: reviewed  Tests in the radiology section of CPT®: reviewed           Patient Care Considerations:          Social Determinants of Health:    Patient is independent, reliable,  and has access to care.       Disposition and Care Coordination:    Discharged: The patient is suitable and stable for discharge with no need for consideration of observation or admission.    I have explained discharge medications and the need for follow up with the patient/caretakers. This was also printed in the discharge instructions. Patient was discharged with the following medications and follow up:      Medication List      No changes were made to your prescriptions during this visit.      Jennifer Newman MD  [address] [address]  [phone]    On 2/15/2023  Peripheral edema, call for appointment       Final diagnoses:   Peripheral edema   Lymphedema        ED Disposition     ED Disposition   Discharge    Condition   Stable    Comment   --             This medical record created using voice recognition software.      Documentation assistance provided by Jesús Bedolla acting as scribe for Peter Roberts DO. Information recorded by the scribe was done at my direction and has been verified and validated by me.        Jesús Bedolla  02/13/23 5538       Peter Roberts DO  02/15/23 3012      Electronically signed by Peter Roberts DO at 02/15/23 2202

## 2023-02-18 NOTE — PAYOR COMM NOTE
SUBJECT:     EMS TRANSPORTATION AUTHORIZATION REQUEST    PATIENT'S NAME:     Josseline BARNES [facility] MRN:     [id number]     DOS:     2/14/2023    INSURANCE MEMBER ID:     [id number]      ########################################################    PLEASE FAX AUTHORIZATION APPROVAL TO     SERVICE PROVIDER  Attention:  Ericka Arriaga  Name of Company: [facility]  NPI     [id number]   Tax ID   [id number]  Address:   [address] [place] [address]  Phone:       [phone]    Fax:     [phone]    #######################################################        DISCHARGE DATE  2/14/2023        DIAGNOSIS AND HOSPITAL PROBLEMS    Problems Addressed this Visit    None  Visit Diagnoses     Peripheral edema    -  Primary    Lymphedema          Diagnoses       Codes Comments    Peripheral edema    -  Primary ICD-10-CM: R60.9  ICD-9-CM: 782.3     Lymphedema     ICD-10-CM: I89.0  ICD-9-CM: 457.1                REQUESTING FACILITY  Name:  [facility]   NPI:  [id number]  TID:  [id number]  Address:      [address] [place] [address]        CASE MANAGEMENT CONTACT INFORMATION  Name:       Phone:      [phone] / [phone]                Josseline Vargas (67 y.o. Female)     Date of Birth   1955    Social Security Number       Address   [facility] [address] [place] [address]    Home Phone   [phone]    MRN   [id number]       Confucianism   None    Marital Status                               Admission Date   2/13/23    Admission Type   Emergency    Admitting Provider       Attending Provider       Department, Room/Bed   [facility] EMERGENCY ROOM, 22/22       Discharge Date   2/14/2023    Discharge Disposition   Home or Self Care    Discharge Destination                               Attending Provider: (none)   Allergies: Latex, Penicillins    Isolation: None   Infection: COVID (rule out) (02/13/23)   Code Status: Prior     "Ht: 152.4 cm (60\")   Wt: 99.4 kg (219 lb 2.2 oz)    Admission Cmt: None   Principal Problem: None                Active Insurance as of 2/13/2023     Primary Coverage     Payor Plan Insurance Group Employer/Plan Group    ANTHEM MEDICARE REPLACEMENT ANTHEM MEDICARE ADVANTAGE [id number]     Payor Plan Address Payor Plan Phone Number Payor Plan Fax Number Effective Dates    [address] [phone]  11/1/2018 - None Entered    [facility] [address]       Subscriber Name Subscriber Birth Date Member ID       ALFREDO PIKE 1955 [id number]           Secondary Coverage     Payor Plan Insurance Group Employer/Plan Group    [place]      Payor Plan Address Payor Plan Phone Number Payor Plan Fax Number Effective Dates    [address] [phone]  4/25/2019 - None Entered    [facility] [address]       Subscriber Name Subscriber Birth Date Member ID       ALFREDO PIKE 1955 [id number]                 Emergency Contacts      (Rel.) Home Phone Work Phone Mobile Phone    Tati PITTS (Sister) [phone] -- [phone]    RODO LYNCH (Daughter) [phone] -- [phone]                           Emergency Department Notes      Peter Roberts DO at 02/13/23 2348          Time: 11:48 PM EST  Date of encounter:  2/13/2023  Independent Historian/Clinical History and Information was obtained by:   Patient  Chief Complaint: leg pain    History is limited by: N/A    History of Present Illness:  Patient is a 67 y.o. year old female who presents to the emergency department for evaluation of leg pain.     Pt reports cellulitis in her legs since last night. She reports a fever of 100. She admits diarrhea. She admits 2 loose stools. Pt denies dysuria, hematuria, urinary urgency, hesitancy or frequency. She denies cough and SOB.    She has been at [facility] for 2 years. She notes she usually is wheelchair bound but can stand to go to the bathroom.     She has weakness " in her legs, but this is chronic and unchanged.          Patient Care Team  Primary Care Provider: Jennifer Newman MD    Past Medical History:     Allergies   Allergen Reactions   • Latex Hives   • Penicillins Rash     Pt tolerated graded dose challenge to amp-sulbactam on 9/24/2022     Past Medical History:   Diagnosis Date   • Acute kidney injury (HCC) 11/14/2022   • Cancer (HCC)     Left breast   • Depressed    • Diabetes (HCC)     TYPE 2   • Flea bite of multiple sites of lower extremity     PT INSTRUCTED TO INFORM DR TRINH OF BITES PRIOR TO SURGERY   • H/O meningitis 1987   • History of Bell's palsy    • Migraine headache    • Polio    • Seizures (HCC)    • Stroke (HCC) 1987     Past Surgical History:   Procedure Laterality Date   • APPENDECTOMY  1987   • BREAST SURGERY Left 10/2015    Biopsy, benign   • CATARACT EXTRACTION     • COLONOSCOPY N/A 9/28/2022    Procedure: COLONOSCOPY WITH POLYPECTOMY;  Surgeon: Alfonso Fitzgerald MD;  Location: [facility] ENDOSCOPY;  Service: Gastroenterology;  Laterality: N/A;  COLON POLYP   • EAR TUBES     • ENDOSCOPY N/A 9/28/2022    Procedure: ESOPHAGOGASTRODUODENOSCOPY WITH BIOPSIES;  Surgeon: Alfonso Fitzgerald MD;  Location: [facility] ENDOSCOPY;  Service: Gastroenterology;  Laterality: N/A;  GASTRITIS, VARIX   • KNEE MENISCECTOMY     • MASTECTOMY     • MASTECTOMY WITH SENTINEL NODE BIOPSY AND AXILLARY NODE DISSECTION Left 1/11/2018    Procedure: Left total mastectomy, left sentinel lymph node biopsy to include retrieval of prior clip, axillary lymph node dissection, no reconstruction. ;  Surgeon: Yuli Trinh MD;  [place] [facility] [place] [facility]  Service:    • OVARIAN CYST REMOVAL     • [place] INSJ TUNNELED CVC W/O SUBQ PORT/ AGE 3 YR/> Right 5/18/2017    Procedure: INSERTION RIGHT VENOUS ACCESS DEVICE;  Surgeon: Yuli Trinh MD;  Location: [facility];  Service: General   • TONSILLECTOMY     • TUBAL ABDOMINAL LIGATION     • VENOUS ACCESS DEVICE (PORT)  REMOVAL Right 8/8/2019    Procedure: RIGHT port removal,;  Surgeon: Yuli Garcias MD;  [place] [facility] [place] [facility];  Service: General     Family History   Problem Relation Age of Onset   • Asthma Mother    • Diabetes Mother    • Hearing loss Mother    • Heart attack Mother    • Heart failure Mother    • Breast cancer Mother 55   • Hypertension Mother    • Alcohol abuse Father    • Diabetes Sister    • Diabetes Brother    • Brain cancer Brother 56   • Cancer Brother 56        bladder   • Malig Hyperthermia Neg Hx        Home Medications:  Prior to Admission medications    Medication Sig Start Date End Date Taking? Authorizing Provider   acetaminophen (TYLENOL) 500 MG tablet Take 500 mg by mouth Every 8 (Eight) Hours As Needed for Mild Pain.    Roland Rankin MD   albuterol sulfate  (90 Base) MCG/ACT inhaler Inhale 2 puffs Every 6 (Six) Hours As Needed for Shortness of Air. 10/26/21   Roland Rankin MD   anastrozole (ARIMIDEX) 1 MG tablet Take 1 mg by mouth Daily.    Roland Rankin MD   azithromycin (ZITHROMAX) 250 MG tablet  10/21/22   Roland Rankin MD   bisacodyl (DULCOLAX) 10 MG suppository Insert 10 mg into the rectum Daily As Needed for Constipation.    Roland Rankin MD   busPIRone (BUSPAR) 10 MG tablet Take 10 mg by mouth 3 (Three) Times a Day. 4/16/22   Roland Rankin MD   cetirizine (zyrTEC) 10 MG tablet Take 1 tablet by mouth Daily.    Roland Rankin MD   docusate sodium (COLACE) 100 MG capsule Take 100 mg by mouth 2 (Two) Times a Day.    Roland Rankin MD   fluticasone (FLONASE) 50 MCG/ACT nasal spray 2 sprays into the nostril(s) as directed by provider Daily. 1/14/22   Roland Rankin MD   furosemide (LASIX) 40 MG tablet Take 40 mg by mouth Daily.    Roland Rankin MD   gabapentin (NEURONTIN) 300 MG capsule Take 1 capsule by mouth Every 12 (Twelve) Hours.    Roland Rankin MD   HYDROcodone-acetaminophen (NORCO) 5-325 MG  per tablet Take 1 tablet by mouth Every 6 (Six) Hours As Needed for Moderate Pain. 9/29/22   Tim Patel MD   hydrOXYzine (ATARAX) 25 MG tablet Take 25 mg by mouth 3 (Three) Times a Day. 4/20/22   Roland Rankin MD   insulin glargine (LANTUS, SEMGLEE) 100 UNIT/ML injection Inject 28 Units under the skin into the appropriate area as directed Daily.    Roland Rankin MD   ipratropium-albuterol (DUO-NEB) 0.5-2.5 mg/3 ml nebulizer Take 3 mL by nebulization Every 8 (Eight) Hours As Needed for Wheezing.    Roland Rankin MD   levoFLOXacin (LEVAQUIN) 500 MG tablet Take 1 tablet by mouth Daily. 1/6/23   Surya Guevara MD   magnesium hydroxide (MILK OF MAGNESIA) 400 MG/5ML suspension Take 30 mL by mouth At Night As Needed for Constipation.    Roland Rankin MD   metFORMIN (GLUCOPHAGE) 1000 MG tablet Take 1,000 mg by mouth 2 (Two) Times a Day With Meals.    Roland Rankin MD   nystatin (MYCOSTATIN) 420667 UNIT/GM powder Apply 1 application topically to the appropriate area as directed 2 (Two) Times a Day.    Roland Rankin MD   ondansetron (ZOFRAN) 4 MG tablet Take 4 mg by mouth Every 8 (Eight) Hours As Needed for Nausea. 6/19/22   Roland Rankin MD   phenytoin (DILANTIN) 50 MG chewable tablet Chew 100 mg Every Night.    Roland Rankin MD   potassium chloride 10 MEQ CR tablet Take 10 mEq by mouth Daily.    Roland Rankin MD   prazosin (MINIPRESS) 1 MG capsule Take 1 mg by mouth Every Night.    Roland Rankin MD   rosuvastatin (CRESTOR) 20 MG tablet Take 1 tablet by mouth Every Night. 11/8/21   Roland Rankin MD   simethicone (MYLICON) 125 MG chewable tablet Chew 125 mg Every 6 (Six) Hours As Needed for Flatulence.    Roland Rankin MD   Spiriva HandiHaler 18 MCG per inhalation capsule Place 1 capsule into inhaler and inhale Daily. 1/12/22   Roland Rankin MD   topiramate (TOPAMAX) 25 MG tablet Take 25 mg by mouth Every Morning.     Roland Rankin MD   topiramate (TOPAMAX) 50 MG tablet Take 50 mg by mouth Every Night.    Roland Rankin MD   traZODone (DESYREL) 50 MG tablet Take 50 mg by mouth Every Night. 22   Roland Rankin MD   venlafaxine (EFFEXOR) 37.5 MG tablet Take 37.5 mg by mouth Daily.    Roland Rankin MD   Vitamin D, Cholecalciferol, 50 MCG (2000 UT) capsule Take 2,000 Units by mouth Daily.    Roland Rankin MD   zinc oxide 13 % cream cream Apply 1 application topically to the appropriate area as directed As Needed (redness).    Roland Rankin MD        Social History:   Social History     Tobacco Use   • Smoking status: Former     Packs/day: 2.50     Types: Cigarettes     Start date:      Quit date:      Years since quittin.1   • Smokeless tobacco: Never   Vaping Use   • Vaping Use: Never used   Substance Use Topics   • Alcohol use: Yes     Comment: rarely   • Drug use: No         Review of Systems:  Review of Systems   Constitutional: Positive for fever. Negative for chills and diaphoresis.   HENT: Negative for congestion, postnasal drip, rhinorrhea and sore throat.    Eyes: Negative for photophobia.   Respiratory: Negative for cough, chest tightness and shortness of breath.    Cardiovascular: Positive for leg swelling. Negative for chest pain and palpitations.   Gastrointestinal: Positive for diarrhea. Negative for abdominal pain, nausea and vomiting.   Genitourinary: Negative for difficulty urinating, dysuria, flank pain, frequency, hematuria and urgency.   Musculoskeletal: Negative for neck pain and neck stiffness.   Skin: Negative for pallor and rash.   Neurological: Negative for dizziness, syncope, weakness, numbness and headaches.   Hematological: Negative for adenopathy. Does not bruise/bleed easily.   Psychiatric/Behavioral: Negative.         Physical Exam:  /53 (BP Location: Right arm, Patient Position: Sitting)   Pulse 80   Temp 99.8 °F (37.7 °C) (Oral)   " Resp 14   Ht 152.4 cm (60\")   Wt 99.4 kg (219 lb 2.2 oz)   SpO2 94%   BMI 42.80 kg/m²     Physical Exam  Vitals and nursing note reviewed.   Constitutional:       General: She is not in acute distress.     Appearance: Normal appearance. She is not ill-appearing, toxic-appearing or diaphoretic.   HENT:      Head: Normocephalic and atraumatic.      Mouth/Throat:      Mouth: Mucous membranes are moist.   Eyes:      Pupils: Pupils are equal, round, and reactive to light.   Cardiovascular:      Rate and Rhythm: Normal rate. Rhythm irregular.      Pulses: Normal pulses.           Carotid pulses are 2+ on the right side and 2+ on the left side.       Radial pulses are 2+ on the right side and 2+ on the left side.        Femoral pulses are 2+ on the right side and 2+ on the left side.       Popliteal pulses are 2+ on the right side and 2+ on the left side.        Dorsalis pedis pulses are 2+ on the right side and 2+ on the left side.        Posterior tibial pulses are 2+ on the right side and 2+ on the left side.      Heart sounds: Normal heart sounds. No murmur heard.  Pulmonary:      Effort: Pulmonary effort is normal. No accessory muscle usage, respiratory distress or retractions.      Breath sounds: Examination of the right-upper field reveals decreased breath sounds. Examination of the left-upper field reveals decreased breath sounds. Examination of the right-middle field reveals decreased breath sounds. Examination of the left-middle field reveals decreased breath sounds. Examination of the right-lower field reveals decreased breath sounds. Examination of the left-lower field reveals decreased breath sounds. Decreased breath sounds present. No wheezing, rhonchi or rales.   Abdominal:      General: Abdomen is flat. There is distension.      Palpations: Abdomen is soft. There is no mass or pulsatile mass.      Tenderness: There is no abdominal tenderness. There is no right CVA tenderness, left CVA tenderness, " guarding or rebound.      Comments: No rigidity   Musculoskeletal:         General: No swelling, tenderness or deformity.      Cervical back: Neck supple. No tenderness.      Right lower leg: Edema present.      Left lower leg: Edema present.      Comments: Chronic lymphedema in legs.    Skin:     General: Skin is warm and dry.      Capillary Refill: Capillary refill takes less than 2 seconds.      Coloration: Skin is not jaundiced or pale.      Findings: No erythema.   Neurological:      General: No focal deficit present.      Mental Status: She is alert and oriented to person, place, and time. Mental status is at baseline.      Cranial Nerves: Cranial nerves 2-12 are intact. No cranial nerve deficit.      Sensory: Sensation is intact. No sensory deficit.      Motor: Motor function is intact. No weakness or pronator drift.      Coordination: Coordination is intact. Coordination normal.   Psychiatric:         Mood and Affect: Mood normal.         Behavior: Behavior normal.                 Procedures:  Procedures      Medical Decision Making:      Comorbidities that affect care:    Cancer, Diabetes    External Notes reviewed:    Previous ED Note      The following orders were placed and all results were independently analyzed by me:  Orders Placed This Encounter   Procedures   • Influenza Antigen, Rapid - Swab, Nasopharynx   • COVID-19,APTIMA PANTHER(SANTOS),[facility]/ [facility], NP/OP SWAB IN UTM/VTM/SALINE TRANSPORT MEDIA,24 HR TAT - Swab, Nasal Cavity   • XR Chest 1 View   • [place] Draw   • Comprehensive Metabolic Panel   • BNP   • CBC Auto Differential   • Green Top (Gel)   • Lavender Top   • Gold Top - SST   • Light Blue Top   • CBC & Differential       Medications Given in the Emergency Department:  Medications   furosemide (LASIX) injection 80 mg (80 mg Intravenous Given 2/14/23 0058)   potassium chloride (MICRO-K) CR capsule 40 mEq (40 mEq Oral Given 2/14/23 0209)        ED Course:         Labs:    Lab Results (last  24 hours)     ** No results found for the last 24 hours. **           Imaging:    No Radiology Exams Resulted Within Past 24 Hours      Differential Diagnosis and Discussion:    Extremity Pain: Differential diagnosis includes but is not limited to soft tissue sprain, tendonitis, tendon injury, dislocation, fracture, deep vein thrombosis, arterial insufficiency, osteoarthritis, bursitis, and ligamentous damage.    All labs were reviewed and interpreted by me.  All X-rays were independently reviewed by me.  EKG was interpreted by me.    MDM  Number of Diagnoses or Management Options  Lymphedema  Peripheral edema  Diagnosis management comments: The patient's vital signs were stable while in the emergency room.  Patient's flu was negative but the patient's COVID was negative.  The patient had a normal BNP marker for heart failure.  The patient's CMP was reviewed and shows no abnormalities of critical concern.  Of note, the patient's sodium and potassium are acceptable.  The patient only had very mildly elevated liver enzymes.  The patient's renal function including creatinine is preserved.  The patient has a normal anion gap.  The patient's CMP was reviewed and shows no abnormalities of critical concern.  Of note, the patient's sodium and potassium are acceptable.  The patient's liver enzymes are unremarkable.  The patient's renal function including creatinine is preserved.  The patient has a normal anion gap.  Chest x-ray demonstrates bilateral infiltrates which could represent infectious multifocal pneumonia or pulmonary edema.  Pulmonary edema is thought to be less likely.  However, the infiltrates are decreased since the chest x-ray performed January 6, 2023.  I reviewed that record and the patient was placed on Levaquin and treated for pneumonia at that time.  Certainly the chest x-ray findings could be representative of resolving pneumonia.  Clinically the patient does not appear to have pneumonia as she does not  have a fever, elevated white blood cell count.  The patient is in no respiratory distress.  The patient's primary reason for being in the emergency room again is peripheral edema.  There was no evidence of cellulitis.  The patient will be discharged back to the nursing home.  The patient will increase her Lasix to twice a day as well as her potassium to twice a day.  The patient will wear compression stockings.  The patient will elevate her legs while at rest.  The patient will follow-up with her doctor within 48 hours.  The patient was given very specific instructions on when and why to return to the emergency room.  The patient voiced understanding and felt comfortable with the discharge instructions.  They would return to the emergency room if necessary.  The patient appears appropriate for discharge and outpatient follow-up.         Amount and/or Complexity of Data Reviewed  Clinical lab tests: reviewed  Tests in the radiology section of CPT®: reviewed           Patient Care Considerations:          Social Determinants of Health:    Patient is independent, reliable, and has access to care.       Disposition and Care Coordination:    Discharged: The patient is suitable and stable for discharge with no need for consideration of observation or admission.    I have explained discharge medications and the need for follow up with the patient/caretakers. This was also printed in the discharge instructions. Patient was discharged with the following medications and follow up:      Medication List      No changes were made to your prescriptions during this visit.      Jennifer Newman MD  [id number] [place] [id number] [phone]    On 2/15/2023  Peripheral edema, call for appointment       Final diagnoses:   Peripheral edema   Lymphedema        ED Disposition     ED Disposition   Discharge    Condition   Stable    Comment   --             This medical record created using voice recognition  software.      Documentation assistance provided by Jesús Bedolla acting as scribe for Peter Roberts DO. Information recorded by the scribe was done at my direction and has been verified and validated by me.        Jesús Bedolla  02/13/23 7131       Peter Roberts DO  02/15/23 1500      Electronically signed by Peter Roberts DO at 02/15/23 1503

## 2023-02-27 ENCOUNTER — TRANSCRIBE ORDERS (OUTPATIENT)
Dept: ADMINISTRATIVE | Facility: HOSPITAL | Age: 68
End: 2023-02-27
Payer: MEDICARE

## 2023-02-27 DIAGNOSIS — C50.912 MALIGNANT NEOPLASM OF LEFT FEMALE BREAST, UNSPECIFIED ESTROGEN RECEPTOR STATUS, UNSPECIFIED SITE OF BREAST: Primary | ICD-10-CM

## 2023-02-27 DIAGNOSIS — D47.2 MONOCLONAL PARAPROTEINEMIA: ICD-10-CM

## 2023-03-24 ENCOUNTER — HOSPITAL ENCOUNTER (OUTPATIENT)
Dept: CT IMAGING | Facility: HOSPITAL | Age: 68
Discharge: HOME | End: 2023-03-24
Payer: MEDICARE

## 2023-03-24 ENCOUNTER — HOSPITAL ENCOUNTER (OUTPATIENT)
Dept: BONE DENSITY | Facility: HOSPITAL | Age: 68
Discharge: HOME | End: 2023-03-24
Payer: MEDICARE

## 2023-03-24 DIAGNOSIS — C50.912 MALIGNANT NEOPLASM OF LEFT FEMALE BREAST, UNSPECIFIED ESTROGEN RECEPTOR STATUS, UNSPECIFIED SITE OF BREAST: ICD-10-CM

## 2023-03-24 DIAGNOSIS — D47.2 MONOCLONAL PARAPROTEINEMIA: ICD-10-CM

## 2023-03-24 LAB
CREAT BLDA-MCNC: 0.9 MG/DL
EGFRCR SERPLBLD CKD-EPI 2021: 69.3 ML/MIN/1.73

## 2023-03-24 PROCEDURE — 74177 CT ABD & PELVIS W/CONTRAST: CPT

## 2023-03-24 PROCEDURE — 71260 CT THORAX DX C+: CPT

## 2023-03-24 PROCEDURE — 77080 DXA BONE DENSITY AXIAL: CPT

## 2023-03-24 PROCEDURE — 25510000001 IOPAMIDOL PER 1 ML: Performed by: INTERNAL MEDICINE

## 2023-03-24 PROCEDURE — 82565 ASSAY OF CREATININE: CPT

## 2023-03-24 RX ADMIN — IOPAMIDOL 100 ML: 755 INJECTION, SOLUTION INTRAVENOUS at 10:07

## 2023-05-03 NOTE — PROGRESS NOTES
GYN Visit    Chief Complaint   Patient presents with   • CT F/U Fluid in Cervix       HPI:   67 y.o. LMP: No LMP recorded. Patient is postmenopausal.     Social History     Substance and Sexual Activity   Sexual Activity Yes   • Birth control/protection: Post-menopausal       66 y/o  s/p menopause seen for abnormal CT scan.  Pt is resident in assisted living facility.  Pt denies vaginal bleeding.  Pt not exactly sure why she is here and is not accompanied by anyone.  Pt has history of breast cancer and had CT scan done as part of cancer f/u and had mass on CT scan and was referred for this.  Pt c/o sore tailbone and this is her main concern today.  She states it is very painful and no one will evaluate this. She has no gyn complaints.  Urinary QUALITY measure 64yo: Patient is 64 yo or older and DENIES concerns with urinary incontinence      History: PMHx, Meds, Allergies, PSHx, Social Hx, and POBHx all reviewed and updated.    PHYSICAL EXAM:  /80   Pulse 61   Breastfeeding No   General- NAD, alert and oriented, appropriate  Psych- Normal mood    External genitalia- Normal female, no lesions  Urethra/meatus- Normal, no masses, non tender, no prolapse  Bladder- Normal, no masses, non tender, no prolapse  Vagina- Normal,no lesions, no discharge, no prolapse  Cervix- Normal, no lesions, no discharge, No cervical motion tenderness  Uterus- exam is suboptimal due to patient body habitus   Adnexa- exam is suboptimal and limited by body habitus  Anus/Rectum/Perineum- there is evidence of superficial separation of skin in crack of buttocks. Area very red and painful to touch           CT Abdomen Pelvis With Contrast (2023 10:07)       ASSESSMENT AND PLAN:  Diagnoses and all orders for this visit:    1. Mass of uterine cervix (Primary)  -     US Pelvis Transvaginal Non OB; Future   As per recommendations on CT scan will obtain pelvic ultrasound and have patient f/u. Pap smear was also performed  today.    2. Papanicolaou smear  -     IgP, Aptima HPV    3. Hx Left Breast Cancer        Follow Up:  Return for [facility] schedule ultrasound of plevis and have patient f/u with me.          Asmita Arzate,   05/04/2023    [facility]  [address] [place] [address]  Dept: [phone]  Dept Fax: [phone]  Loc: [phone]  Loc Fax: [phone]

## 2023-05-04 ENCOUNTER — OFFICE VISIT (OUTPATIENT)
Dept: OBSTETRICS AND GYNECOLOGY | Facility: CLINIC | Age: 68
End: 2023-05-04
Payer: MEDICAID

## 2023-05-04 VITALS — SYSTOLIC BLOOD PRESSURE: 136 MMHG | HEART RATE: 61 BPM | DIASTOLIC BLOOD PRESSURE: 80 MMHG

## 2023-05-04 DIAGNOSIS — Z85.3 HISTORY OF LEFT BREAST CANCER: ICD-10-CM

## 2023-05-04 DIAGNOSIS — N88.8 MASS OF UTERINE CERVIX: Primary | ICD-10-CM

## 2023-05-04 DIAGNOSIS — Z12.4 PAPANICOLAOU SMEAR: ICD-10-CM

## 2023-05-04 PROCEDURE — 1160F RVW MEDS BY RX/DR IN RCRD: CPT | Performed by: OBSTETRICS & GYNECOLOGY

## 2023-05-04 PROCEDURE — 99204 OFFICE O/P NEW MOD 45 MIN: CPT | Performed by: OBSTETRICS & GYNECOLOGY

## 2023-05-04 PROCEDURE — G0123 SCREEN CERV/VAG THIN LAYER: HCPCS | Performed by: OBSTETRICS & GYNECOLOGY

## 2023-05-04 PROCEDURE — 87624 HPV HI-RISK TYP POOLED RSLT: CPT | Performed by: OBSTETRICS & GYNECOLOGY

## 2023-05-04 PROCEDURE — 1159F MED LIST DOCD IN RCRD: CPT | Performed by: OBSTETRICS & GYNECOLOGY

## 2023-05-14 LAB
CYTOLOGIST CVX/VAG CYTO: NORMAL
CYTOLOGY CVX/VAG DOC CYTO: NORMAL
CYTOLOGY CVX/VAG DOC THIN PREP: NORMAL
DX ICD CODE: NORMAL
HIV 1 & 2 AB SER-IMP: NORMAL
HPV I/H RISK 4 DNA CVX QL PROBE+SIG AMP: NEGATIVE
Lab: NORMAL
OTHER STN SPEC: NORMAL
STAT OF ADQ CVX/VAG CYTO-IMP: NORMAL

## 2023-05-22 ENCOUNTER — HOSPITAL ENCOUNTER (OUTPATIENT)
Dept: ULTRASOUND IMAGING | Facility: HOSPITAL | Age: 68
Discharge: HOME | End: 2023-05-22
Admitting: OBSTETRICS & GYNECOLOGY
Payer: MEDICAID

## 2023-05-22 DIAGNOSIS — N88.8 MASS OF UTERINE CERVIX: ICD-10-CM

## 2023-05-22 PROCEDURE — 76830 TRANSVAGINAL US NON-OB: CPT

## 2023-05-22 PROCEDURE — 76856 US EXAM PELVIC COMPLETE: CPT

## 2023-05-23 ENCOUNTER — HOSPITAL ENCOUNTER (EMERGENCY)
Facility: HOSPITAL | Age: 68
Discharge: HOME | End: 2023-05-23
Attending: EMERGENCY MEDICINE
Payer: MEDICARE

## 2023-05-23 VITALS
HEART RATE: 88 BPM | DIASTOLIC BLOOD PRESSURE: 70 MMHG | RESPIRATION RATE: 20 BRPM | TEMPERATURE: 98 F | OXYGEN SATURATION: 94 % | WEIGHT: 229.94 LBS | BODY MASS INDEX: 45.14 KG/M2 | HEIGHT: 60 IN | SYSTOLIC BLOOD PRESSURE: 142 MMHG

## 2023-05-23 DIAGNOSIS — Z00.8 EVALUATION BY PSYCHIATRIC SERVICE REQUIRED: Primary | ICD-10-CM

## 2023-05-23 LAB
ALBUMIN SERPL-MCNC: 3.8 G/DL (ref 3.5–5.2)
ALBUMIN/GLOB SERPL: 0.8 G/DL
ALP SERPL-CCNC: 135 U/L (ref 39–117)
ALT SERPL W P-5'-P-CCNC: 5 U/L (ref 1–33)
AMPHET+METHAMPHET UR QL: NEGATIVE
ANION GAP SERPL CALCULATED.3IONS-SCNC: 9.3 MMOL/L (ref 5–15)
APAP SERPL-MCNC: <5 MCG/ML (ref 0–30)
AST SERPL-CCNC: 29 U/L (ref 1–32)
BARBITURATES UR QL SCN: NEGATIVE
BASOPHILS # BLD AUTO: 0.03 10*3/MM3 (ref 0–0.2)
BASOPHILS NFR BLD AUTO: 0.6 % (ref 0–1.5)
BENZODIAZ UR QL SCN: NEGATIVE
BILIRUB SERPL-MCNC: 0.5 MG/DL (ref 0–1.2)
BUN SERPL-MCNC: 23 MG/DL (ref 8–23)
BUN/CREAT SERPL: 24.5 (ref 7–25)
CALCIUM SPEC-SCNC: 9.6 MG/DL (ref 8.6–10.5)
CANNABINOIDS SERPL QL: NEGATIVE
CHLORIDE SERPL-SCNC: 106 MMOL/L (ref 98–107)
CO2 SERPL-SCNC: 26.7 MMOL/L (ref 22–29)
COCAINE UR QL: NEGATIVE
CREAT SERPL-MCNC: 0.94 MG/DL (ref 0.57–1)
DEPRECATED RDW RBC AUTO: 46.5 FL (ref 37–54)
EGFRCR SERPLBLD CKD-EPI 2021: 65.8 ML/MIN/1.73
EOSINOPHIL # BLD AUTO: 0.29 10*3/MM3 (ref 0–0.4)
EOSINOPHIL NFR BLD AUTO: 6 % (ref 0.3–6.2)
ERYTHROCYTE [DISTWIDTH] IN BLOOD BY AUTOMATED COUNT: 15.6 % (ref 12.3–15.4)
ETHANOL BLD-MCNC: <10 MG/DL (ref 0–10)
ETHANOL UR QL: <0.01 %
FENTANYL UR-MCNC: NEGATIVE NG/ML
GLOBULIN UR ELPH-MCNC: 4.6 GM/DL
GLUCOSE SERPL-MCNC: 125 MG/DL (ref 65–99)
HCT VFR BLD AUTO: 41.1 % (ref 34–46.6)
HGB BLD-MCNC: 13.1 G/DL (ref 12–15.9)
HOLD SPECIMEN: NORMAL
HOLD SPECIMEN: NORMAL
IMM GRANULOCYTES # BLD AUTO: 0.01 10*3/MM3 (ref 0–0.05)
IMM GRANULOCYTES NFR BLD AUTO: 0.2 % (ref 0–0.5)
LYMPHOCYTES # BLD AUTO: 0.55 10*3/MM3 (ref 0.7–3.1)
LYMPHOCYTES NFR BLD AUTO: 11.3 % (ref 19.6–45.3)
MCH RBC QN AUTO: 26.4 PG (ref 26.6–33)
MCHC RBC AUTO-ENTMCNC: 31.9 G/DL (ref 31.5–35.7)
MCV RBC AUTO: 82.7 FL (ref 79–97)
METHADONE UR QL SCN: NEGATIVE
MONOCYTES # BLD AUTO: 0.28 10*3/MM3 (ref 0.1–0.9)
MONOCYTES NFR BLD AUTO: 5.8 % (ref 5–12)
NEUTROPHILS NFR BLD AUTO: 3.7 10*3/MM3 (ref 1.7–7)
NEUTROPHILS NFR BLD AUTO: 76.1 % (ref 42.7–76)
NRBC BLD AUTO-RTO: 0 /100 WBC (ref 0–0.2)
OPIATES UR QL: POSITIVE
OXYCODONE UR QL SCN: NEGATIVE
PLATELET # BLD AUTO: 136 10*3/MM3 (ref 140–450)
PMV BLD AUTO: 11.1 FL (ref 6–12)
POTASSIUM SERPL-SCNC: 4.3 MMOL/L (ref 3.5–5.2)
PROT SERPL-MCNC: 8.4 G/DL (ref 6–8.5)
RBC # BLD AUTO: 4.97 10*6/MM3 (ref 3.77–5.28)
SALICYLATES SERPL-MCNC: <0.3 MG/DL
SODIUM SERPL-SCNC: 142 MMOL/L (ref 136–145)
WBC NRBC COR # BLD: 4.86 10*3/MM3 (ref 3.4–10.8)
WHOLE BLOOD HOLD COAG: NORMAL
WHOLE BLOOD HOLD SPECIMEN: NORMAL

## 2023-05-23 PROCEDURE — 80307 DRUG TEST PRSMV CHEM ANLYZR: CPT | Performed by: NURSE PRACTITIONER

## 2023-05-23 PROCEDURE — 80179 DRUG ASSAY SALICYLATE: CPT | Performed by: NURSE PRACTITIONER

## 2023-05-23 PROCEDURE — 80143 DRUG ASSAY ACETAMINOPHEN: CPT | Performed by: NURSE PRACTITIONER

## 2023-05-23 PROCEDURE — 99285 EMERGENCY DEPT VISIT HI MDM: CPT

## 2023-05-23 PROCEDURE — 82077 ASSAY SPEC XCP UR&BREATH IA: CPT | Performed by: NURSE PRACTITIONER

## 2023-05-23 PROCEDURE — 36415 COLL VENOUS BLD VENIPUNCTURE: CPT

## 2023-05-23 PROCEDURE — 80053 COMPREHEN METABOLIC PANEL: CPT | Performed by: NURSE PRACTITIONER

## 2023-05-23 PROCEDURE — 85025 COMPLETE CBC W/AUTO DIFF WBC: CPT | Performed by: NURSE PRACTITIONER

## 2023-05-23 RX ORDER — SODIUM CHLORIDE 0.9 % (FLUSH) 0.9 %
10 SYRINGE (ML) INJECTION AS NEEDED
Status: DISCONTINUED | OUTPATIENT
Start: 2023-05-23 | End: 2023-05-24 | Stop reason: HOSPADM

## 2023-05-23 RX ORDER — AZITHROMYCIN 250 MG/1
TABLET, FILM COATED ORAL
COMMUNITY
Start: 2023-05-16 | End: 2023-05-26

## 2023-05-23 RX ORDER — HYDROCODONE BITARTRATE AND ACETAMINOPHEN 5; 325 MG/1; MG/1
1 TABLET ORAL EVERY 6 HOURS PRN
Status: DISCONTINUED | OUTPATIENT
Start: 2023-05-23 | End: 2023-05-24 | Stop reason: HOSPADM

## 2023-05-23 RX ORDER — AZITHROMYCIN 500 MG/1
TABLET, FILM COATED ORAL
COMMUNITY
Start: 2023-05-16 | End: 2023-05-26

## 2023-05-23 RX ADMIN — HYDROCODONE BITARTRATE AND ACETAMINOPHEN 1 TABLET: 5; 325 TABLET ORAL at 21:44
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
## 2023-05-24 NOTE — SIGNIFICANT NOTE
05/23/23 4659   Plan   Final Note KEHINDE contacted [facility] to let them know pt would be returning this date and expressed that pt was sorry at pts request. KEHINDE updated provider.

## 2023-05-24 NOTE — DISCHARGE INSTRUCTIONS
Patient was seen and evaluated in the ER by provider as well as psych eval completed. Patient cleared for any suicidal thoughts or intent. Patient is agreeable to return to nursing home. Return to ER with new or worsening problems
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
## 2023-05-26 ENCOUNTER — OFFICE VISIT (OUTPATIENT)
Dept: OBSTETRICS AND GYNECOLOGY | Facility: CLINIC | Age: 68
End: 2023-05-26
Payer: MEDICARE

## 2023-05-26 VITALS — HEART RATE: 83 BPM | DIASTOLIC BLOOD PRESSURE: 71 MMHG | SYSTOLIC BLOOD PRESSURE: 106 MMHG

## 2023-05-26 DIAGNOSIS — N85.00 ENDOMETRIAL HYPERPLASIA: Primary | ICD-10-CM

## 2023-05-26 PROCEDURE — 1160F RVW MEDS BY RX/DR IN RCRD: CPT | Performed by: OBSTETRICS & GYNECOLOGY

## 2023-05-26 PROCEDURE — 99213 OFFICE O/P EST LOW 20 MIN: CPT | Performed by: OBSTETRICS & GYNECOLOGY

## 2023-05-26 PROCEDURE — 1159F MED LIST DOCD IN RCRD: CPT | Performed by: OBSTETRICS & GYNECOLOGY

## 2023-05-26 RX ORDER — GABAPENTIN 400 MG/1
CAPSULE ORAL
COMMUNITY
Start: 2023-05-06

## 2023-05-26 RX ORDER — HYDROXYZINE 50 MG/1
TABLET, FILM COATED ORAL
COMMUNITY
Start: 2023-05-13

## 2023-05-26 NOTE — PROGRESS NOTES
GYN Visit    Chief Complaint   Patient presents with   • Follow-up       HPI:   68 y/o  here to review ultrasound. Pt seen 2023 due to abnormal CT scan done as maintenance f/u for breast cancer. Pt is postmenopausal and denies vaginal bleeding.  Ultrasound was scheduled and patient is here for f/u. Pt has no new c/o.      History: PMHx, Meds, Allergies, PSHx, Social Hx, and POBHx all reviewed and updated.    PHYSICAL EXAM:  /71   Pulse 83   Breastfeeding No   General- NAD, alert and oriented, appropriate  Psych- Normal mood, good memory      US Pelvis Transvaginal Non OB (2023 13:30)       ASSESSMENT AND PLAN:  Diagnoses and all orders for this visit:    1. Endometrial hyperplasia (Primary)   Ultrasound shows endometrial stripe 7mm.  Pt is not bleeding and has not had any bleeding.  Literature not as clear on when to biopsy endometrial lining in none bleeding postmenopausal patient.  The cut off values have been suggested at 8-11mm.  Pt with morbid obesity (BMI 45) and many co morbidities. Would recommend  F/u for biopsy if patient develops vaginal bleeding.      •   •     Follow Up:  No follow-ups on file.          Asmita Arzate DO  2023    [facility]  [facility] [place] [address]  Dept: [phone]  Dept Fax: [phone]  Loc: [phone]  Loc Fax: [phone]

## 2023-09-26 ENCOUNTER — HOSPITAL ENCOUNTER (EMERGENCY)
Facility: HOSPITAL | Age: 68
Discharge: NURSING, REHAB OR LONG-TERM CARE | End: 2023-09-26
Attending: EMERGENCY MEDICINE | Admitting: EMERGENCY MEDICINE
Payer: MEDICARE

## 2023-09-26 ENCOUNTER — TRANSCRIBE ORDERS (OUTPATIENT)
Dept: ADMINISTRATIVE | Facility: HOSPITAL | Age: 68
End: 2023-09-26
Payer: MEDICARE

## 2023-09-26 VITALS
DIASTOLIC BLOOD PRESSURE: 90 MMHG | OXYGEN SATURATION: 94 % | SYSTOLIC BLOOD PRESSURE: 166 MMHG | TEMPERATURE: 97.2 F | HEART RATE: 103 BPM | RESPIRATION RATE: 18 BRPM

## 2023-09-26 DIAGNOSIS — C50.012 MALIGNANT NEOPLASM OF NIPPLE OF LEFT BREAST IN FEMALE, UNSPECIFIED ESTROGEN RECEPTOR STATUS: Primary | ICD-10-CM

## 2023-09-26 DIAGNOSIS — F32.A DEPRESSION, UNSPECIFIED DEPRESSION TYPE: ICD-10-CM

## 2023-09-26 DIAGNOSIS — R45.4 OUTBURSTS OF ANGER: Primary | ICD-10-CM

## 2023-09-26 PROCEDURE — 99283 EMERGENCY DEPT VISIT LOW MDM: CPT
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
## 2023-09-26 NOTE — ED NOTES
Pt more calm and laying in bed, she has been using her cell phone. Pt still denies any suicidal ideation and states she was just angry

## 2023-09-27 NOTE — ED PROVIDER NOTES
Time: 9:57 PM EDT  Date of encounter:  9/26/2023  Independent Historian/Clinical History and Information was obtained by:   Patient    History is limited by: N/A    Chief Complaint: Anger outburst, suicidal statement      History of Present Illness:  Patient is a 68 y.o. year old female who presents to the emergency department for evaluation of anger outburst and suicidal statement.  Patient states she was very frustrated with a nursing aide at the nursing facility who she feels was belittling her.  In the midst of this negative interaction she did state that if they thought she was that stupid she should just shoot herself.  She is in a nursing facility and does not have access to a weapon.  She states this was just a statement made out of anger and she is in no way actually suicidal.  Has no emergent medical complaints.    HPI    Patient Care Team  Primary Care Provider: Jennifer Newman MD    Past Medical History:     Allergies   Allergen Reactions    Latex Hives    Clindamycin/Lincomycin Rash    Penicillins Rash     Pt tolerated graded dose challenge to amp-sulbactam on 9/24/2022     Past Medical History:   Diagnosis Date    Acute kidney injury 11/14/2022    Cancer     Left breast    Depressed     Diabetes     TYPE 2    Flea bite of multiple sites of lower extremity     PT INSTRUCTED TO INFORM DR TRINH OF BITES PRIOR TO SURGERY    H/O meningitis 1987    History of Bell's palsy     Hypertension     Migraine headache     Polio     Seizures     Stroke 1987     Past Surgical History:   Procedure Laterality Date    APPENDECTOMY  1987    BREAST SURGERY Left 10/2015    Biopsy, benign    CATARACT EXTRACTION      COLONOSCOPY N/A 9/28/2022    Procedure: COLONOSCOPY WITH POLYPECTOMY;  Surgeon: Alfonso Fitzgerald MD;  Location: [facility];  Service: Gastroenterology;  Laterality: N/A;  COLON POLYP    EAR TUBES      ENDOSCOPY N/A 9/28/2022    Procedure: ESOPHAGOGASTRODUODENOSCOPY WITH BIOPSIES;  Surgeon:  Alfonso Fitzgerald MD;  Location:  [facility];  Service: Gastroenterology;  Laterality: N/A;  GASTRITIS, VARIX    KNEE MENISCECTOMY      MASTECTOMY      MASTECTOMY WITH SENTINEL NODE BIOPSY AND AXILLARY NODE DISSECTION Left 1/11/2018    Procedure: Left total mastectomy, left sentinel lymph node biopsy to include retrieval of prior clip, axillary lymph node dissection, no reconstruction. ;  Surgeon: Yuli Garcias MD;  Location:  [facility];  Service:     OVARIAN CYST REMOVAL      [place] INSJ TUNNELED CVC W/O SUBQ PORT/ AGE 3 YR/> Right 5/18/2017    Procedure: INSERTION RIGHT VENOUS ACCESS DEVICE;  Surgeon: Yuli Garcias MD;  Location:  [facility];  Service: General    TONSILLECTOMY      TUBAL ABDOMINAL LIGATION      VENOUS ACCESS DEVICE (PORT) REMOVAL Right 8/8/2019    Procedure: RIGHT port removal,;  Surgeon: Yuli Garcias MD;  Location:  [facility];  Service: General     Family History   Problem Relation Age of Onset    Alcohol abuse Father     Asthma Mother     Diabetes Mother     Hearing loss Mother     Heart attack Mother     Heart failure Mother     Breast cancer Mother 55    Hypertension Mother     Diabetes Brother     Brain cancer Brother 56    Cancer Brother 56        bladder    Diabetes Sister     Breast cancer Maternal Aunt     Malig Hyperthermia Neg Hx     Ovarian cancer Neg Hx     Uterine cancer Neg Hx     Colon cancer Neg Hx     Melanoma Neg Hx     Clotting disorder Neg Hx        Home Medications:  Prior to Admission medications    Medication Sig Start Date End Date Taking? Authorizing Provider   acetaminophen (TYLENOL) 500 MG tablet Take 500 mg by mouth Every 8 (Eight) Hours As Needed for Mild Pain.    Roland Rankin MD   albuterol sulfate  (90 Base) MCG/ACT inhaler Inhale 2 puffs Every 6 (Six) Hours As Needed for Shortness of Air. 10/26/21   ProviderRoland MD   anastrozole (ARIMIDEX) 1 MG tablet Take 1 tablet by mouth Daily.    Roland Rankin  MD   bisacodyl (DULCOLAX) 10 MG suppository Insert 10 mg into the rectum Daily As Needed for Constipation.  Patient not taking: Reported on 5/4/2023    Roland Rankin MD   busPIRone (BUSPAR) 10 MG tablet Take 1 tablet by mouth 3 (Three) Times a Day. 4/16/22   Roland Rankin MD   cetirizine (zyrTEC) 10 MG tablet Take 1 tablet by mouth Daily.    Roland Rankin MD   docusate sodium (COLACE) 100 MG capsule Take 1 capsule by mouth 2 (Two) Times a Day.    Roland Rankin MD   fluticasone (FLONASE) 50 MCG/ACT nasal spray 2 sprays into the nostril(s) as directed by provider Daily. 1/14/22   Roland Rankin MD   furosemide (LASIX) 40 MG tablet Take 1 tablet by mouth Daily.    Roland Rankin MD   gabapentin (NEURONTIN) 400 MG capsule  5/6/23   Roland Rankin MD   HYDROcodone-acetaminophen (NORCO) 5-325 MG per tablet Take 1 tablet by mouth Every 6 (Six) Hours As Needed for Moderate Pain. 9/29/22   Tim Patel MD   hydrOXYzine (ATARAX) 50 MG tablet  5/13/23   Roland Rankin MD   insulin glargine (LANTUS, SEMGLEE) 100 UNIT/ML injection Inject 28 Units under the skin into the appropriate area as directed Daily.    Roland Rankin MD   ipratropium-albuterol (DUO-NEB) 0.5-2.5 mg/3 ml nebulizer Take 3 mL by nebulization Every 8 (Eight) Hours As Needed for Wheezing.    Roland Rankin MD   levoFLOXacin (LEVAQUIN) 500 MG tablet Take 1 tablet by mouth Daily. 1/6/23   Surya Guevara MD   magnesium hydroxide (MILK OF MAGNESIA) 400 MG/5ML suspension Take 30 mL by mouth At Night As Needed for Constipation.    Roland Rankin MD   metFORMIN (GLUCOPHAGE) 1000 MG tablet Take 1 tablet by mouth 2 (Two) Times a Day With Meals.    Roland Rankin MD   nystatin (MYCOSTATIN) 537821 UNIT/GM powder Apply 1 application topically to the appropriate area as directed 2 (Two) Times a Day.    Roland Rankin MD   ondansetron (ZOFRAN) 4 MG tablet Take 1 tablet by mouth  Every 8 (Eight) Hours As Needed for Nausea. 22   Roland Rankin MD   phenytoin (DILANTIN) 50 MG chewable tablet Chew 2 tablets Every Night.    Roland Rankin MD   potassium chloride 10 MEQ CR tablet Take 2 tablets by mouth Daily.    Roland Rankin MD   prazosin (MINIPRESS) 1 MG capsule Take 1 capsule by mouth Every Night.    Roland Rankin MD   rosuvastatin (CRESTOR) 20 MG tablet Take 1 tablet by mouth Every Night. 21   Roland Rankin MD   simethicone (MYLICON) 125 MG chewable tablet Chew 1 tablet Every 6 (Six) Hours As Needed for Flatulence.    Roland Rankin MD   Spiriva HandiHaler 18 MCG per inhalation capsule Place 1 capsule into inhaler and inhale Daily. 22   Roland Rankin MD   topiramate (TOPAMAX) 25 MG tablet Take 1 tablet by mouth Every Morning.    Roland Rankin MD   topiramate (TOPAMAX) 50 MG tablet Take 1 tablet by mouth Every Night.    Roland Rankin MD   traZODone (DESYREL) 50 MG tablet Take 1 tablet by mouth Every Night. 22   Roland Rankin MD   venlafaxine (EFFEXOR) 37.5 MG tablet Take 1 tablet by mouth Daily.    Roland Rankin MD   Vitamin D, Cholecalciferol, 50 MCG (2000 UT) capsule Take 1 capsule by mouth Daily.    Roland Rankin MD   zinc oxide 13 % cream cream Apply 1 application topically to the appropriate area as directed As Needed (redness).    Roland Rankin MD        Social History:   Social History     Tobacco Use    Smoking status: Former     Packs/day: 2.50     Types: Cigarettes     Start date:      Quit date:      Years since quittin.7    Smokeless tobacco: Never   Vaping Use    Vaping Use: Never used   Substance Use Topics    Alcohol use: Not Currently     Comment: rarely    Drug use: No         Review of Systems:  Review of Systems   Constitutional:  Negative for chills and fever.   HENT:  Negative for congestion, rhinorrhea and sore throat.    Eyes:  Negative  for photophobia.   Respiratory:  Negative for apnea, cough, chest tightness and shortness of breath.    Cardiovascular:  Negative for chest pain and palpitations.   Gastrointestinal:  Negative for abdominal pain, diarrhea, nausea and vomiting.   Endocrine: Negative.    Genitourinary:  Negative for difficulty urinating and dysuria.   Musculoskeletal:  Negative for back pain, joint swelling and myalgias.   Skin:  Negative for color change and wound.   Allergic/Immunologic: Negative.    Neurological:  Negative for seizures and headaches.   Psychiatric/Behavioral:  Positive for behavioral problems.    All other systems reviewed and are negative.     Physical Exam:  /90   Pulse 101   Temp 97.2 °F (36.2 °C) (Oral)   Resp 18   SpO2 98%     Physical Exam  Vitals and nursing note reviewed.   Constitutional:       General: She is awake.      Appearance: Normal appearance.   HENT:      Head: Normocephalic and atraumatic.      Nose: Nose normal.      Mouth/Throat:      Mouth: Mucous membranes are moist.   Eyes:      Extraocular Movements: Extraocular movements intact.      Pupils: Pupils are equal, round, and reactive to light.   Cardiovascular:      Rate and Rhythm: Normal rate and regular rhythm.      Heart sounds: Normal heart sounds.   Pulmonary:      Effort: Pulmonary effort is normal. No respiratory distress.      Breath sounds: Normal breath sounds. No wheezing, rhonchi or rales.   Abdominal:      General: Bowel sounds are normal.      Palpations: Abdomen is soft.      Tenderness: There is no abdominal tenderness. There is no guarding or rebound.      Comments: No rigidity   Musculoskeletal:         General: No tenderness. Normal range of motion.      Cervical back: Normal range of motion and neck supple.   Skin:     General: Skin is warm and dry.      Coloration: Skin is not jaundiced.   Neurological:      General: No focal deficit present.      Mental Status: Mental status is at baseline.   Psychiatric:          Mood and Affect: Mood normal.         Behavior: Behavior normal.         Thought Content: Thought content normal.         Judgment: Judgment normal.                Procedures:  Procedures      Medical Decision Making:      Comorbidities that affect care:    Anxiety/depression, diabetes, CVA, hypertension, seizures    External Notes reviewed:    Encounter review: Office visit 5/26/2023 with OB/GYN for endometrial hyperplasia      The following orders were placed and all results were independently analyzed by me:  No orders of the defined types were placed in this encounter.      Medications Given in the Emergency Department:  Medications - No data to display     ED Course:         Labs:    Lab Results (last 24 hours)       ** No results found for the last 24 hours. **             Imaging:    No Radiology Exams Resulted Within Past 24 Hours      Differential Diagnosis and Discussion:    Psychiatric: Differential diagnosis includes but is not limited to depression, psychosis, bipolar disorder, anxiety, manic episode, schizophrenia, and substance abuse.        [facility]           Patient Care Considerations:    CONSULT: I considered consulting psychiatry, however patient denies feeling suicidal.  She states this entire episode was based on poor anger management and a negative interaction with the nursing aide.  She is calm here with an appropriate affect and insight into the situation.      Consultants/Shared Management Plan:    None    Social Determinants of Health:    Patient is a nursing home/assisted living resident and has reliable access to care.      Disposition and Care Coordination:    Discharged: The patient is suitable and stable for discharge with no need for consideration of observation or admission.        Final diagnoses:   Outbursts of anger   Depression, unspecified depression type        ED Disposition       ED Disposition   Discharge    Condition   Stable    Comment   --               This medical record  created using voice recognition software.             Markell Christy MD  09/26/23 [id number]       Markell Christy MD  09/26/23 [id number]

## 2023-10-02 ENCOUNTER — TRANSCRIBE ORDERS (OUTPATIENT)
Dept: ADMINISTRATIVE | Facility: HOSPITAL | Age: 68
End: 2023-10-02
Payer: MEDICARE

## 2023-10-16 ENCOUNTER — HOSPITAL ENCOUNTER (OUTPATIENT)
Dept: PET IMAGING | Facility: HOSPITAL | Age: 68
Discharge: HOME | End: 2023-10-16
Payer: MEDICARE

## 2023-10-16 DIAGNOSIS — C50.012 MALIGNANT NEOPLASM OF NIPPLE OF LEFT BREAST IN FEMALE, UNSPECIFIED ESTROGEN RECEPTOR STATUS: ICD-10-CM

## 2023-11-27 ENCOUNTER — APPOINTMENT (OUTPATIENT)
Dept: PET IMAGING | Facility: HOSPITAL | Age: 68
End: 2023-11-27
Payer: MEDICARE

## 2023-12-04 ENCOUNTER — HOSPITAL ENCOUNTER (OUTPATIENT)
Dept: PET IMAGING | Facility: HOSPITAL | Age: 68
Discharge: HOME | End: 2023-12-04
Payer: MEDICARE

## 2023-12-04 PROCEDURE — A9552 F18 FDG: HCPCS | Performed by: INTERNAL MEDICINE

## 2023-12-04 PROCEDURE — 78815 PET IMAGE W/CT SKULL-THIGH: CPT

## 2023-12-04 PROCEDURE — 0 FLUDEOXYGLUCOSE F18 SOLUTION: Performed by: INTERNAL MEDICINE

## 2023-12-04 RX ADMIN — FLUDEOXYGLUCOSE F 18 1 DOSE: 200 INJECTION, SOLUTION INTRAVENOUS at 13:23

## 2024-04-29 ENCOUNTER — HOSPITAL ENCOUNTER (EMERGENCY)
Facility: HOSPITAL | Age: 69
Discharge: NURSING, REHAB OR LONG-TERM CARE | End: 2024-04-29
Attending: EMERGENCY MEDICINE | Admitting: EMERGENCY MEDICINE
Payer: MEDICARE

## 2024-04-29 VITALS
TEMPERATURE: 98.5 F | SYSTOLIC BLOOD PRESSURE: 127 MMHG | DIASTOLIC BLOOD PRESSURE: 67 MMHG | HEART RATE: 81 BPM | OXYGEN SATURATION: 90 % | HEIGHT: 60 IN | WEIGHT: 231.04 LBS | RESPIRATION RATE: 20 BRPM | BODY MASS INDEX: 45.36 KG/M2

## 2024-04-29 DIAGNOSIS — R60.0 PERIPHERAL EDEMA: Primary | ICD-10-CM

## 2024-04-29 LAB
ALBUMIN SERPL-MCNC: 3.1 G/DL (ref 3.5–5.2)
ALBUMIN/GLOB SERPL: 0.7 G/DL
ALP SERPL-CCNC: 116 U/L (ref 39–117)
ALT SERPL W P-5'-P-CCNC: <5 U/L (ref 1–33)
ANION GAP SERPL CALCULATED.3IONS-SCNC: 14.6 MMOL/L (ref 5–15)
AST SERPL-CCNC: 27 U/L (ref 1–32)
BASOPHILS # BLD AUTO: 0.01 10*3/MM3 (ref 0–0.2)
BASOPHILS NFR BLD AUTO: 0.2 % (ref 0–1.5)
BILIRUB SERPL-MCNC: 0.3 MG/DL (ref 0–1.2)
BUN SERPL-MCNC: 21 MG/DL (ref 8–23)
BUN/CREAT SERPL: 23.6 (ref 7–25)
CALCIUM SPEC-SCNC: 8.9 MG/DL (ref 8.6–10.5)
CHLORIDE SERPL-SCNC: 104 MMOL/L (ref 98–107)
CO2 SERPL-SCNC: 22.4 MMOL/L (ref 22–29)
CREAT SERPL-MCNC: 0.89 MG/DL (ref 0.57–1)
DEPRECATED RDW RBC AUTO: 48.8 FL (ref 37–54)
EGFRCR SERPLBLD CKD-EPI 2021: 69.8 ML/MIN/1.73
EOSINOPHIL # BLD AUTO: 0.17 10*3/MM3 (ref 0–0.4)
EOSINOPHIL NFR BLD AUTO: 4.2 % (ref 0.3–6.2)
ERYTHROCYTE [DISTWIDTH] IN BLOOD BY AUTOMATED COUNT: 15.8 % (ref 12.3–15.4)
GLOBULIN UR ELPH-MCNC: 4.6 GM/DL
GLUCOSE SERPL-MCNC: 121 MG/DL (ref 65–99)
HCT VFR BLD AUTO: 40.7 % (ref 34–46.6)
HGB BLD-MCNC: 12.4 G/DL (ref 12–15.9)
IMM GRANULOCYTES # BLD AUTO: 0.01 10*3/MM3 (ref 0–0.05)
IMM GRANULOCYTES NFR BLD AUTO: 0.2 % (ref 0–0.5)
LYMPHOCYTES # BLD AUTO: 0.82 10*3/MM3 (ref 0.7–3.1)
LYMPHOCYTES NFR BLD AUTO: 20.3 % (ref 19.6–45.3)
MCH RBC QN AUTO: 26.2 PG (ref 26.6–33)
MCHC RBC AUTO-ENTMCNC: 30.5 G/DL (ref 31.5–35.7)
MCV RBC AUTO: 86 FL (ref 79–97)
MONOCYTES # BLD AUTO: 0.4 10*3/MM3 (ref 0.1–0.9)
MONOCYTES NFR BLD AUTO: 9.9 % (ref 5–12)
NEUTROPHILS NFR BLD AUTO: 2.62 10*3/MM3 (ref 1.7–7)
NEUTROPHILS NFR BLD AUTO: 65.2 % (ref 42.7–76)
NRBC BLD AUTO-RTO: 0 /100 WBC (ref 0–0.2)
NT-PROBNP SERPL-MCNC: 145.8 PG/ML (ref 0–900)
PLATELET # BLD AUTO: 142 10*3/MM3 (ref 140–450)
PMV BLD AUTO: 12.5 FL (ref 6–12)
POTASSIUM SERPL-SCNC: 4.1 MMOL/L (ref 3.5–5.2)
PROT SERPL-MCNC: 7.7 G/DL (ref 6–8.5)
RBC # BLD AUTO: 4.73 10*6/MM3 (ref 3.77–5.28)
SODIUM SERPL-SCNC: 141 MMOL/L (ref 136–145)
WBC NRBC COR # BLD AUTO: 4.03 10*3/MM3 (ref 3.4–10.8)

## 2024-04-29 PROCEDURE — 36415 COLL VENOUS BLD VENIPUNCTURE: CPT

## 2024-04-29 PROCEDURE — 96374 THER/PROPH/DIAG INJ IV PUSH: CPT

## 2024-04-29 PROCEDURE — 83880 ASSAY OF NATRIURETIC PEPTIDE: CPT | Performed by: EMERGENCY MEDICINE

## 2024-04-29 PROCEDURE — 25010000002 MORPHINE PER 10 MG: Performed by: EMERGENCY MEDICINE

## 2024-04-29 PROCEDURE — 99283 EMERGENCY DEPT VISIT LOW MDM: CPT

## 2024-04-29 PROCEDURE — 96375 TX/PRO/DX INJ NEW DRUG ADDON: CPT

## 2024-04-29 PROCEDURE — 25010000002 FUROSEMIDE PER 20 MG: Performed by: EMERGENCY MEDICINE

## 2024-04-29 PROCEDURE — 80053 COMPREHEN METABOLIC PANEL: CPT | Performed by: EMERGENCY MEDICINE

## 2024-04-29 PROCEDURE — 85025 COMPLETE CBC W/AUTO DIFF WBC: CPT | Performed by: EMERGENCY MEDICINE

## 2024-04-29 RX ORDER — FUROSEMIDE 10 MG/ML
80 INJECTION INTRAMUSCULAR; INTRAVENOUS ONCE
Status: COMPLETED | OUTPATIENT
Start: 2024-04-29 | End: 2024-04-29

## 2024-04-29 RX ADMIN — FUROSEMIDE 80 MG: 10 INJECTION, SOLUTION INTRAMUSCULAR; INTRAVENOUS at 20:53

## 2024-04-29 RX ADMIN — MORPHINE SULFATE 4 MG: 4 INJECTION, SOLUTION INTRAMUSCULAR; INTRAVENOUS at 19:26

## 2024-04-29 NOTE — ED PROVIDER NOTES
Time: 6:07 PM EDT  Date of encounter:  4/29/2024  Independent Historian/Clinical History and Information was obtained by:   Patient    History is limited by: N/A    Chief Complaint: Arm swelling and foot swelling      History of Present Illness:  Patient is a 68 y.o. year old female who presents to the emergency department for evaluation of arm swelling and leg swelling.  Patient states the swelling has been occurring for a while now. She is complaining of shortness of breath and back pain.  States she fell at the nursing home and no one looked into it.     HPI    Patient Care Team  Primary Care Provider: Jennifer Newman MD    Past Medical History:     Allergies   Allergen Reactions    Latex Hives    Clindamycin/Lincomycin Rash    Penicillins Rash     Pt tolerated graded dose challenge to amp-sulbactam on 9/24/2022     Past Medical History:   Diagnosis Date    Acute kidney injury 11/14/2022    Cancer     Left breast    Depressed     Diabetes     TYPE 2    Flea bite of multiple sites of lower extremity     PT INSTRUCTED TO INFORM DR TRINH OF BITES PRIOR TO SURGERY    H/O meningitis 1987    History of Bell's palsy     Hypertension     Migraine headache     Polio     Seizures     Stroke 1987     Past Surgical History:   Procedure Laterality Date    APPENDECTOMY  1987    BREAST SURGERY Left 10/2015    Biopsy, benign    CATARACT EXTRACTION      COLONOSCOPY N/A 9/28/2022    Procedure: COLONOSCOPY WITH POLYPECTOMY;  Surgeon: Alfonso Fitzgerald MD;  Location: [facility];  Service: Gastroenterology;  Laterality: N/A;  COLON POLYP    EAR TUBES      ENDOSCOPY N/A 9/28/2022    Procedure: ESOPHAGOGASTRODUODENOSCOPY WITH BIOPSIES;  Surgeon: Alfonso Fitzgerald MD;  Location: [facility] ENDOSCOPY;  Service: Gastroenterology;  Laterality: N/A;  GASTRITIS, VARIX    KNEE MENISCECTOMY      MASTECTOMY      MASTECTOMY WITH SENTINEL NODE BIOPSY AND AXILLARY NODE DISSECTION Left 1/11/2018    Procedure: Left total  mastectomy, left sentinel lymph node biopsy to include retrieval of prior clip, axillary lymph node dissection, no reconstruction. ;  Surgeon: Yuli Garcias MD;  Location:  [facility];  Service:     OVARIAN CYST REMOVAL      PA INSJ TUNNELED CVC W/O SUBQ PORT/ AGE 3 YR/> Right 5/18/2017    Procedure: INSERTION RIGHT VENOUS ACCESS DEVICE;  Surgeon: Yuli Garcias MD;  Location:  [facility];  Service: General    TONSILLECTOMY      TUBAL ABDOMINAL LIGATION      VENOUS ACCESS DEVICE (PORT) REMOVAL Right 8/8/2019    Procedure: RIGHT port removal,;  Surgeon: Yuli Garcias MD;  [place]  [facility] [place]  Service: General     Family History   Problem Relation Age of Onset    Alcohol abuse Father     Asthma Mother     Diabetes Mother     Hearing loss Mother     Heart attack Mother     Heart failure Mother     Breast cancer Mother 55    Hypertension Mother     Diabetes Brother     Brain cancer Brother 56    Cancer Brother 56        bladder    Diabetes Sister     Breast cancer Maternal Aunt     Malig Hyperthermia Neg Hx     Ovarian cancer Neg Hx     Uterine cancer Neg Hx     Colon cancer Neg Hx     Melanoma Neg Hx     Clotting disorder Neg Hx        Home Medications:  Prior to Admission medications    Medication Sig Start Date End Date Taking? Authorizing Provider   acetaminophen (TYLENOL) 500 MG tablet Take 500 mg by mouth Every 8 (Eight) Hours As Needed for Mild Pain.    Roland Rankin MD   albuterol sulfate  (90 Base) MCG/ACT inhaler Inhale 2 puffs Every 6 (Six) Hours As Needed for Shortness of Air. 10/26/21   Roland Rankin MD   anastrozole (ARIMIDEX) 1 MG tablet Take 1 tablet by mouth Daily.    Roland Rankin MD   bisacodyl (DULCOLAX) 10 MG suppository Insert 10 mg into the rectum Daily As Needed for Constipation.  Patient not taking: Reported on 5/4/2023    Roland Rankin MD   busPIRone (BUSPAR) 10 MG tablet Take 1 tablet by mouth 3 (Three) Times a Day. 4/16/22    Roland Rankin MD   cetirizine (zyrTEC) 10 MG tablet Take 1 tablet by mouth Daily.    Roland Rankin MD   docusate sodium (COLACE) 100 MG capsule Take 1 capsule by mouth 2 (Two) Times a Day.    Roland Rankin MD   fluticasone (FLONASE) 50 MCG/ACT nasal spray 2 sprays into the nostril(s) as directed by provider Daily. 1/14/22   Roland Rankin MD   furosemide (LASIX) 40 MG tablet Take 1 tablet by mouth Daily.    Roland Rankin MD   gabapentin (NEURONTIN) 400 MG capsule  5/6/23   Roland Rankin MD   HYDROcodone-acetaminophen (NORCO) 5-325 MG per tablet Take 1 tablet by mouth Every 6 (Six) Hours As Needed for Moderate Pain. 9/29/22   Tim Patel MD   hydrOXYzine (ATARAX) 50 MG tablet  5/13/23   Roland Rankin MD   insulin glargine (LANTUS, SEMGLEE) 100 UNIT/ML injection Inject 28 Units under the skin into the appropriate area as directed Daily.    Roland Rankin MD   ipratropium-albuterol (DUO-NEB) 0.5-2.5 mg/3 ml nebulizer Take 3 mL by nebulization Every 8 (Eight) Hours As Needed for Wheezing.    Roland Rankin MD   levoFLOXacin (LEVAQUIN) 500 MG tablet Take 1 tablet by mouth Daily. 1/6/23   Surya Guevara MD   magnesium hydroxide (MILK OF MAGNESIA) 400 MG/5ML suspension Take 30 mL by mouth At Night As Needed for Constipation.    Roland Rankin MD   metFORMIN (GLUCOPHAGE) 1000 MG tablet Take 1 tablet by mouth 2 (Two) Times a Day With Meals.    Roland Rankin MD   nystatin (MYCOSTATIN) 017501 UNIT/GM powder Apply 1 application topically to the appropriate area as directed 2 (Two) Times a Day.    Roland Rankin MD   ondansetron (ZOFRAN) 4 MG tablet Take 1 tablet by mouth Every 8 (Eight) Hours As Needed for Nausea. 6/19/22   Roland Rankin MD   phenytoin (DILANTIN) 50 MG chewable tablet Chew 2 tablets Every Night.    Roland Rankin MD   potassium chloride 10 MEQ CR tablet Take 2 tablets by mouth Daily.    Chucho  MD Roland   prazosin (MINIPRESS) 1 MG capsule Take 1 capsule by mouth Every Night.    Roland Rankin MD   rosuvastatin (CRESTOR) 20 MG tablet Take 1 tablet by mouth Every Night. 21   Roland Rankin MD   simethicone (MYLICON) 125 MG chewable tablet Chew 1 tablet Every 6 (Six) Hours As Needed for Flatulence.    Roland Rankin MD   Spiriva HandiHaler 18 MCG per inhalation capsule Place 1 capsule into inhaler and inhale Daily. 22   Roland Rankin MD   topiramate (TOPAMAX) 25 MG tablet Take 1 tablet by mouth Every Morning.    Roland Rankin MD   topiramate (TOPAMAX) 50 MG tablet Take 1 tablet by mouth Every Night.    Roland Rankin MD   traZODone (DESYREL) 50 MG tablet Take 1 tablet by mouth Every Night. 22   Roland Rankin MD   venlafaxine (EFFEXOR) 37.5 MG tablet Take 1 tablet by mouth Daily.    Roland Rankin MD   Vitamin D, Cholecalciferol, 50 MCG (2000 UT) capsule Take 1 capsule by mouth Daily.    Roland Rankin MD   zinc oxide 13 % cream cream Apply 1 application topically to the appropriate area as directed As Needed (redness).    Roland Rankin MD        Social History:   Social History     Tobacco Use    Smoking status: Former     Current packs/day: 0.00     Average packs/day: 2.5 packs/day for 31.0 years (77.5 ttl pk-yrs)     Types: Cigarettes     Start date:      Quit date:      Years since quittin.3    Smokeless tobacco: Never   Vaping Use    Vaping status: Never Used   Substance Use Topics    Alcohol use: Not Currently     Comment: rarely    Drug use: No         Review of Systems:  Review of Systems   Constitutional:  Negative for chills and fever.   HENT:  Negative for congestion, rhinorrhea and sore throat.    Eyes:  Negative for pain and visual disturbance.   Respiratory:  Negative for apnea, cough, chest tightness, shortness of breath and wheezing.    Cardiovascular:  Positive for leg swelling. Negative  "for chest pain and palpitations.   Gastrointestinal:  Positive for nausea. Negative for abdominal pain, diarrhea and vomiting.   Genitourinary:  Negative for difficulty urinating and dysuria.   Musculoskeletal:  Positive for back pain. Negative for joint swelling and myalgias.   Skin:  Negative for color change.   Neurological:  Negative for dizziness, seizures, weakness, light-headedness and headaches.   Psychiatric/Behavioral:  Positive for agitation. Negative for confusion.    All other systems reviewed and are negative.       Physical Exam:  /67   Pulse 81   Temp 98.5 °F (36.9 °C) (Oral)   Resp 20   Ht 152.4 cm (60\")   Wt 105 kg (231 lb 0.7 oz)   SpO2 90%   BMI 45.12 kg/m²     Physical Exam  Vitals and nursing note reviewed.   Constitutional:       General: She is not in acute distress.     Appearance: Normal appearance. She is not toxic-appearing.   HENT:      Head: Normocephalic and atraumatic.      Jaw: There is normal jaw occlusion.      Nose: Nose normal.      Mouth/Throat:      Mouth: Mucous membranes are moist.      Pharynx: Oropharynx is clear.   Eyes:      General: Lids are normal.      Extraocular Movements: Extraocular movements intact.      Conjunctiva/sclera: Conjunctivae normal.      Pupils: Pupils are equal, round, and reactive to light.   Cardiovascular:      Rate and Rhythm: Normal rate and regular rhythm.      Pulses: Normal pulses.      Heart sounds: Normal heart sounds. No murmur heard.  Pulmonary:      Effort: Pulmonary effort is normal. No respiratory distress.      Breath sounds: Normal breath sounds. No wheezing or rhonchi.   Abdominal:      General: Abdomen is flat.      Palpations: Abdomen is soft.      Tenderness: There is no abdominal tenderness. There is no guarding or rebound.   Musculoskeletal:         General: Normal range of motion.      Cervical back: Normal range of motion and neck supple.      Right lower leg: Edema present.      Left lower leg: Edema present. "   Skin:     General: Skin is warm and dry.      Capillary Refill: Capillary refill takes less than 2 seconds.   Neurological:      General: No focal deficit present.      Mental Status: She is alert and oriented to person, place, and time. Mental status is at baseline.   Psychiatric:         Mood and Affect: Mood normal.         Behavior: Behavior normal.                  Procedures:  Procedures      Medical Decision Making:      Comorbidities that affect care:    Diabetes    External Notes reviewed:    Previous Clinic Note: Heme-onc office visit for breast cancer management      The following orders were placed and all results were independently analyzed by me:  Orders Placed This Encounter   Procedures    Comprehensive Metabolic Panel    BNP    CBC Auto Differential    CBC & Differential       Medications Given in the Emergency Department:  Medications   morphine injection 4 mg (4 mg Intravenous Given 4/29/24 1926)   furosemide (LASIX) injection 80 mg (80 mg Intravenous Given 4/29/24 2053)        ED Course:         Labs:    Lab Results (last 24 hours)       Procedure Component Value Units Date/Time    CBC & Differential [[id number]]  (Abnormal) Collected: 04/29/24 1837    Specimen: Blood Updated: 04/29/24 1855    Narrative:      The following orders were created for panel order CBC & Differential.  Procedure                               Abnormality         Status                     ---------                               -----------         ------                     CBC Auto Differential[[id number]]        Abnormal            Final result                 Please view results for these tests on the individual orders.    CBC Auto Differential [[id number]]  (Abnormal) Collected: 04/29/24 1837    Specimen: Blood Updated: 04/29/24 1855     WBC 4.03 10*3/mm3      RBC 4.73 10*6/mm3      Hemoglobin 12.4 g/dL      Hematocrit 40.7 %      MCV 86.0 fL      MCH 26.2 pg      MCHC 30.5 g/dL      RDW 15.8 %      RDW-SD 48.8 fl       MPV 12.5 fL      Platelets 142 10*3/mm3      Neutrophil % 65.2 %      Lymphocyte % 20.3 %      Monocyte % 9.9 %      Eosinophil % 4.2 %      Basophil % 0.2 %      Immature Grans % 0.2 %      Neutrophils, Absolute 2.62 10*3/mm3      Lymphocytes, Absolute 0.82 10*3/mm3      Monocytes, Absolute 0.40 10*3/mm3      Eosinophils, Absolute 0.17 10*3/mm3      Basophils, Absolute 0.01 10*3/mm3      Immature Grans, Absolute 0.01 10*3/mm3      nRBC 0.0 /100 WBC     Comprehensive Metabolic Panel [[id number]]  (Abnormal) Collected: 04/29/24 1912    Specimen: Blood from Arm, Right Updated: 04/29/24 2011     Glucose 121 mg/dL      BUN 21 mg/dL      Creatinine 0.89 mg/dL      Sodium 141 mmol/L      Potassium 4.1 mmol/L      Comment: Slight hemolysis detected by analyzer. Result may be falsely elevated.        Chloride 104 mmol/L      CO2 22.4 mmol/L      Calcium 8.9 mg/dL      Total Protein 7.7 g/dL      Albumin 3.1 g/dL      ALT (SGPT) <5 U/L      AST (SGOT) 27 U/L      Alkaline Phosphatase 116 U/L      Total Bilirubin 0.3 mg/dL      Globulin 4.6 gm/dL      A/G Ratio 0.7 g/dL      BUN/Creatinine Ratio 23.6     Anion Gap 14.6 mmol/L      eGFR 69.8 mL/min/1.73     Narrative:      GFR Normal >60  Chronic Kidney Disease <60  Kidney Failure <15      BNP [[id number]]  (Normal) Collected: 04/29/24 1912    Specimen: Blood from Arm, Right Updated: 04/1953     proBNP 145.8 pg/mL     Narrative:      This assay is used as an aid in the diagnosis of individuals suspected of having heart failure. It can be used as an aid in the diagnosis of acute decompensated heart failure (ADHF) in patients presenting with signs and symptoms of ADHF to the emergency department (ED). In addition, NT-proBNP of <300 pg/mL indicates ADHF is not likely.    Age Range Result Interpretation  NT-proBNP Concentration (pg/mL:      <50             Positive            >450                   Gray                 300-450                    Negative              <300    50-75           Positive            >900                  Gray                300-900                  Negative            <300      >75             Positive            >1800                  Gray                300-1800                  Negative            <300             Imaging:    No Radiology Exams Resulted Within Past 24 Hours      Differential Diagnosis and Discussion:    Edema: Based on the patient's history, signs, and symptoms, the differential diagnosis includes but is not limited to heart failure, kidney disease, liver disease, venous insufficiency, lymphedema, pregnancy, medications, allergic reactions.    All labs were reviewed and interpreted by me.    MDM  Number of Diagnoses or Management Options  Peripheral edema  Diagnosis management comments: In summary this is a 68-year-old female who presents to the emergency department for evaluation of peripheral edema.  CBC independently reviewed by me and shows no critical abnormalities.  CMP independently reviewed by me and shows no critical abnormalities.  Patient has peripheral edema that appears chronic.  No concern for pulmonary edema patient is not in respiratory distress.  Diuretic given.  Very strict return to ER and follow-up instructions have been provided to the patient.                   Patient Care Considerations:    CHEST X-RAY: I considered ordering a chest x-ray however no respiratory      Consultants/Shared Management Plan:    None    Social Determinants of Health:    Patient is a nursing home/assisted living resident and has reliable access to care.      Disposition and Care Coordination:    Discharged: The patient is suitable and stable for discharge with no need for consideration of admission.    I have explained the patient´s condition, diagnoses and treatment plan based on the information available to me at this time. I have answered questions and addressed any concerns. The patient has a good  understanding of the patient´s  diagnosis, condition, and treatment plan as can be expected at this point. The vital signs have been stable. The patient´s condition is stable and appropriate for discharge from the emergency department.      The patient will pursue further outpatient evaluation with the primary care physician or other designated or consulting physician as outlined in the discharge instructions. They are agreeable to this plan of care and follow-up instructions have been explained in detail. The patient has received these instructions in written format and has expressed an understanding of the discharge instructions. The patient is aware that any significant change in condition or worsening of symptoms should prompt an immediate return to this or the closest emergency department or call to 911.  I have explained discharge medications and the need for follow up with the patient/caretakers. This was also printed in the discharge instructions. Patient was discharged with the following medications and follow up:      Medication List      No changes were made to your prescriptions during this visit.      Jennifer Newman MD  [address] [address]  [phone]    In 1 week         Final diagnoses:   Peripheral edema        ED Disposition       ED Disposition   Discharge    Condition   Stable    Comment   --               This medical record created using voice recognition software.           Surya Guevara MD  04/30/24 0024

## (undated) DEVICE — SUT VIC 3/0 TIES 18IN J110T

## (undated) DEVICE — INTENDED FOR TISSUE SEPARATION, AND OTHER PROCEDURES THAT REQUIRE A SHARP SURGICAL BLADE TO PUNCTURE OR CUT.: Brand: BARD-PARKER ® CARBON RIB-BACK BLADES

## (undated) DEVICE — SUT ETHLN 2/0 PS 18IN 585H

## (undated) DEVICE — SPNG GZ WOVN 4X4IN 12PLY 10/BX STRL

## (undated) DEVICE — PK UNIV COMPL 40

## (undated) DEVICE — SPNG LAP 18X18IN LF STRL PK/5

## (undated) DEVICE — CVR TRANSD CIV FLX TPR 11.9 TO 3.8X61CM

## (undated) DEVICE — CONTAINER,SPECIMEN,OR STERILE,4OZ: Brand: MEDLINE

## (undated) DEVICE — BLCK/BITE BLOX WO/DENTL/RIM W/STRAP 54F

## (undated) DEVICE — SUT MNCRYL 4/0 PS2 18 IN

## (undated) DEVICE — GLV SURG BIOGEL LTX PF 7

## (undated) DEVICE — THE SINGLE USE ETRAP – POLYP TRAP IS USED FOR SUCTION RETRIEVAL OF ENDOSCOPICALLY REMOVED POLYPS.: Brand: ETRAP

## (undated) DEVICE — SUT SILK 3/0 TIES 18IN A184H

## (undated) DEVICE — SNAR POLYP CAPTIFLEX XS/OVL 11X2.4MM 240CM 1P/U

## (undated) DEVICE — 3M™ STERI-STRIP™ REINFORCED ADHESIVE SKIN CLOSURES, R1547, 1/2 IN X 4 IN (12 MM X 100 MM), 6 STRIPS/ENVELOPE: Brand: 3M™ STERI-STRIP™

## (undated) DEVICE — CONN JET HYDRA H20 AUXILIARY DISP

## (undated) DEVICE — PENCL E/S HNDSWTCH SMOKEEVAC HOLSTR 10FT

## (undated) DEVICE — SUT VIC 3/0 SH CR8 18IN J864D

## (undated) DEVICE — GAUZE,SPONGE,FLUFF,6"X6.75",STRL,10/TRAY: Brand: MEDLINE

## (undated) DEVICE — TUBING, SUCTION, 1/4" X 20', STRAIGHT: Brand: MEDLINE INDUSTRIES, INC.

## (undated) DEVICE — DEV RETRV ESUCTION ESOPH SCP 8.6TO10MM 1P/U

## (undated) DEVICE — SUT VIC 3/0 SH 27IN J416H

## (undated) DEVICE — SUT PROLN 2/0 CT2 30IN 8411H

## (undated) DEVICE — Device

## (undated) DEVICE — JACKSON-PRATT 100CC BULB RESERVOIR: Brand: CARDINAL HEALTH

## (undated) DEVICE — DRSNG WND BORDR/ADHS NONADHR/GZ LF 4X14IN STRL

## (undated) DEVICE — 100% SILICONE FOLEY TRAY,16 FR/CH (5.3 MM), 5 ML CATHETER PRE-CONNECTED TO 2000 ML DRAINAGE BAG WITH LUER-LOCK SAMPLING AND ADHESIVE CATHETER SECUREMENT: Brand: DOVER

## (undated) DEVICE — SKIN PREP TRAY W/CHG: Brand: MEDLINE INDUSTRIES, INC.

## (undated) DEVICE — DRSNG SURESITE WNDW 4X4.5

## (undated) DEVICE — BNDG ELAS ELITE V/CLOSE 6IN 5YD LF STRL

## (undated) DEVICE — STPLR SKIN VISISTAT WD 35CT

## (undated) DEVICE — DECANT BG O JET

## (undated) DEVICE — IRRIGATOR BULB ASEPTO 60CC STRL

## (undated) DEVICE — NDL SPINE 20G 3 1/2 YEL STRL 1P/U

## (undated) DEVICE — MARKR SKIN W/RULR AND LBL

## (undated) DEVICE — TOWEL,OR,DSP,ST,BLUE,STD,4/PK,20PK/CS: Brand: MEDLINE

## (undated) DEVICE — LINER SURG CANSTR SXN S/RIGD 1500CC

## (undated) DEVICE — PK PROC MINOR TOWER 40

## (undated) DEVICE — SOLIDIFIER LIQLOC PLS 1500CC BT

## (undated) DEVICE — SYR LUERLOK 30CC

## (undated) DEVICE — DRP C/ARM 41X74IN

## (undated) DEVICE — SPONGE,LAP,12"X12",XR,ST,5/PK,40PK/CS: Brand: MEDLINE

## (undated) DEVICE — ELECTRD BLD EXT EDGE 1P COAT 6.5IN STRL

## (undated) DEVICE — Device: Brand: DEFENDO AIR/WATER/SUCTION AND BIOPSY VALVE

## (undated) DEVICE — SOL IRRG H2O PL/BG 1000ML STRL